# Patient Record
Sex: MALE | Race: WHITE | Employment: UNEMPLOYED | URBAN - METROPOLITAN AREA
[De-identification: names, ages, dates, MRNs, and addresses within clinical notes are randomized per-mention and may not be internally consistent; named-entity substitution may affect disease eponyms.]

---

## 2017-01-11 ENCOUNTER — HOSPITAL ENCOUNTER (OUTPATIENT)
Dept: LAB | Age: 52
Discharge: HOME | End: 2017-01-11

## 2017-01-11 ENCOUNTER — LAB ONLY (OUTPATIENT)
Dept: FAMILY MEDICINE CLINIC | Age: 52
End: 2017-01-11

## 2017-01-11 DIAGNOSIS — E11.9 TYPE 2 DIABETES MELLITUS WITHOUT COMPLICATION, UNSPECIFIED LONG TERM INSULIN USE STATUS: Primary | ICD-10-CM

## 2017-01-11 DIAGNOSIS — E11.9 TYPE 2 DIABETES MELLITUS WITHOUT COMPLICATION, UNSPECIFIED LONG TERM INSULIN USE STATUS: ICD-10-CM

## 2017-01-11 LAB
ALBUMIN SERPL BCP-MCNC: 3.9 G/DL (ref 3.4–5)
ALBUMIN/GLOB SERPL: 1.3 {RATIO} (ref 0.8–1.7)
ALP SERPL-CCNC: 65 U/L (ref 45–117)
ALT SERPL-CCNC: 44 U/L (ref 16–61)
ANION GAP BLD CALC-SCNC: 9 MMOL/L (ref 3–18)
AST SERPL W P-5'-P-CCNC: 20 U/L (ref 15–37)
BILIRUB SERPL-MCNC: 0.9 MG/DL (ref 0.2–1)
BUN SERPL-MCNC: 14 MG/DL (ref 7–18)
BUN/CREAT SERPL: 16 (ref 12–20)
CALCIUM SERPL-MCNC: 8.1 MG/DL (ref 8.5–10.1)
CHLORIDE SERPL-SCNC: 105 MMOL/L (ref 100–108)
CHOLEST SERPL-MCNC: 123 MG/DL
CO2 SERPL-SCNC: 28 MMOL/L (ref 21–32)
CREAT SERPL-MCNC: 0.89 MG/DL (ref 0.6–1.3)
CREAT UR-MCNC: 125 MG/DL (ref 30–125)
EST. AVERAGE GLUCOSE BLD GHB EST-MCNC: 157 MG/DL
GLOBULIN SER CALC-MCNC: 3 G/DL (ref 2–4)
GLUCOSE SERPL-MCNC: 225 MG/DL (ref 74–99)
HBA1C MFR BLD: 7.1 % (ref 4.2–5.6)
HDLC SERPL-MCNC: 31 MG/DL (ref 40–60)
HDLC SERPL: 4 {RATIO} (ref 0–5)
LDLC SERPL CALC-MCNC: 55 MG/DL (ref 0–100)
LIPID PROFILE,FLP: ABNORMAL
MICROALBUMIN UR-MCNC: 3.36 MG/DL (ref 0–3)
MICROALBUMIN/CREAT UR-RTO: 27 MG/G (ref 0–30)
POTASSIUM SERPL-SCNC: 3.6 MMOL/L (ref 3.5–5.5)
PROT SERPL-MCNC: 6.9 G/DL (ref 6.4–8.2)
SODIUM SERPL-SCNC: 142 MMOL/L (ref 136–145)
TRIGL SERPL-MCNC: 185 MG/DL (ref ?–150)
VLDLC SERPL CALC-MCNC: 37 MG/DL

## 2017-01-11 PROCEDURE — 83036 HEMOGLOBIN GLYCOSYLATED A1C: CPT | Performed by: NURSE PRACTITIONER

## 2017-01-11 PROCEDURE — 82043 UR ALBUMIN QUANTITATIVE: CPT | Performed by: NURSE PRACTITIONER

## 2017-01-11 PROCEDURE — 80053 COMPREHEN METABOLIC PANEL: CPT | Performed by: NURSE PRACTITIONER

## 2017-01-11 PROCEDURE — 80061 LIPID PANEL: CPT | Performed by: NURSE PRACTITIONER

## 2017-01-11 NOTE — PROGRESS NOTES
Pt. Name,  and orders verified before specimen collection. Site prepped using aseptic procedure. 21 gauge butterfly was utilized to collect specimen. Labs drawn in RT arm x's 1 attempts. Site benign no bleeding noted. Band-Aid and 2x2 applied. Pt tolerated procedure well.

## 2017-01-12 NOTE — PROGRESS NOTES
Will review results with pt at 1/23/17 appt  1. A1C increased from 6.5 to 7.1  2. Triglycerided elevated at 185. Diet? Alcohol? The patient is on Lovaza   3.  BMP Ok with exception of elevated glucose

## 2017-01-23 ENCOUNTER — OFFICE VISIT (OUTPATIENT)
Dept: FAMILY MEDICINE CLINIC | Age: 52
End: 2017-01-23

## 2017-01-23 VITALS
TEMPERATURE: 98.2 F | RESPIRATION RATE: 20 BRPM | OXYGEN SATURATION: 96 % | HEIGHT: 71 IN | HEART RATE: 78 BPM | BODY MASS INDEX: 30.66 KG/M2 | DIASTOLIC BLOOD PRESSURE: 86 MMHG | WEIGHT: 219 LBS | SYSTOLIC BLOOD PRESSURE: 127 MMHG

## 2017-01-23 DIAGNOSIS — J30.9 ALLERGIC RHINITIS, UNSPECIFIED ALLERGIC RHINITIS TRIGGER, UNSPECIFIED RHINITIS SEASONALITY: ICD-10-CM

## 2017-01-23 DIAGNOSIS — E11.9 TYPE 2 DIABETES MELLITUS WITHOUT COMPLICATION, UNSPECIFIED LONG TERM INSULIN USE STATUS: Primary | ICD-10-CM

## 2017-01-23 DIAGNOSIS — H66.92 LEFT OTITIS MEDIA, UNSPECIFIED CHRONICITY, UNSPECIFIED OTITIS MEDIA TYPE: ICD-10-CM

## 2017-01-23 DIAGNOSIS — I10 ESSENTIAL HYPERTENSION: ICD-10-CM

## 2017-01-23 DIAGNOSIS — E66.9 OBESITY (BMI 30-39.9): ICD-10-CM

## 2017-01-23 DIAGNOSIS — H26.9 RIGHT CATARACT: ICD-10-CM

## 2017-01-23 DIAGNOSIS — E78.1 HYPERTRIGLYCERIDEMIA: ICD-10-CM

## 2017-01-23 RX ORDER — AMOXICILLIN 500 MG/1
500 CAPSULE ORAL 2 TIMES DAILY
Qty: 14 CAP | Refills: 0 | Status: SHIPPED | OUTPATIENT
Start: 2017-01-23 | End: 2017-01-30

## 2017-01-23 RX ORDER — MOMETASONE FUROATE 50 UG/1
2 SPRAY, METERED NASAL DAILY
Qty: 2 CONTAINER | Refills: 0 | Status: SHIPPED | COMMUNITY
Start: 2017-01-23 | End: 2017-04-24 | Stop reason: SDUPTHER

## 2017-01-23 NOTE — PATIENT INSTRUCTIONS
Ear Infection (Otitis Media): Care Instructions  Your Care Instructions    An ear infection may start with a cold and affect the middle ear (otitis media). It can hurt a lot. Most ear infections clear up on their own in a couple of days. Most often you will not need antibiotics. This is because many ear infections are caused by a virus. Antibiotics don't work against a virus. Regular doses of pain medicines are the best way to reduce your fever and help you feel better. Follow-up care is a key part of your treatment and safety. Be sure to make and go to all appointments, and call your doctor if you are having problems. It's also a good idea to know your test results and keep a list of the medicines you take. How can you care for yourself at home? · Take pain medicines exactly as directed. ¨ If the doctor gave you a prescription medicine for pain, take it as prescribed. ¨ If you are not taking a prescription pain medicine, take an over-the-counter medicine, such as acetaminophen (Tylenol), ibuprofen (Advil, Motrin), or naproxen (Aleve). Read and follow all instructions on the label. ¨ Do not take two or more pain medicines at the same time unless the doctor told you to. Many pain medicines have acetaminophen, which is Tylenol. Too much acetaminophen (Tylenol) can be harmful. · Plan to take a full dose of pain reliever before bedtime. Getting enough sleep will help you get better. · Try a warm, moist washcloth on the ear. It may help relieve pain. · If your doctor prescribed antibiotics, take them as directed. Do not stop taking them just because you feel better. You need to take the full course of antibiotics. When should you call for help? Call your doctor now or seek immediate medical care if:  · You have new or increasing ear pain. · You have new or increasing pus or blood draining from your ear. · You have a fever with a stiff neck or a severe headache.   Watch closely for changes in your health, and be sure to contact your doctor if:  · You have new or worse symptoms. · You are not getting better after taking an antibiotic for 2 days. Where can you learn more? Go to http://todd-brayden.info/. Enter U214 in the search box to learn more about \"Ear Infection (Otitis Media): Care Instructions. \"  Current as of: July 29, 2016  Content Version: 11.1  © 1909-3246 [employer]. Care instructions adapted under license by [employer] (which disclaims liability or warranty for this information). If you have questions about a medical condition or this instruction, always ask your healthcare professional. [place] [address] any warranty or liability for your use of this information. The [facility] reminders! Foundation Operating Hours: These may change without notice. Mon- Wed 7am to 5pm. Closed for lunch 12-1pm  Thurs 7am to 12pm  Fridays closed     NO SHOW POLICY ~ If a patient has 3 no shows for an appointment with the Provider, Mental Health Provider, or the Nurse Navigator in 6 months, they will be discharged from the practice for 6 months. Medication ordering will also be suspended. If the patient is discharged from the [facility], they can go to the [place] [address] where they can be seen for the primary needs plus obtain the same types of medications as they receive at the [facility]. To avoid being discharged, the patient must call the office at [phone] 24 hours prior to their appointment if they need to cancel, arrive to their appointments on time and come to all scheduled appointments. If the patient is discharged from the practice, they can apply to be re-established after 12 months. Lab work:  Unless you are instructed differently, please return to the office between the hours of 7 am and 10:30 am Monday through Thursday to have your labs drawn one week before your next scheduled PROVIDER visit.   If you do not have an appointment to follow up on these results, please make one or plan to call the office if you do not hear from us to get the results. No news does not mean good news. Medications: If your medications are new or have changed, and you get your medications from the clinic pharmacy ([facility]), you MUST talk to the pharmacy staff to sign the new prescription applications. If you don't sign the applications we cannot get the medications for you. It usually takes 6-8 weeks for your medications to arrive. The Pharmacy staff will call you when your medications are available. You will have 30 days to come in and  your medications. If you don't  your medicines within those 30 days, those medicines will be placed on the self as samples and you will have to start all over again by completing the applications and waiting the 6-8 weeks for your medicines to arrive. This is firm and there will be no exceptions! ! The [facility] or [facility] will assist you with your medications if available but not all medications are available so some may be obtained through local pharmacies such as [facility] that has a large $4 list and [facility] that has many drugs for free or also for $4.  We will work hard to get the best medications for you that you can also afford. Feet Care: Local ministry through [facility]  Every second Tuesday of the month (except for holidays and election days) from 9am to 1 pm. The services provided by these ministry volunteers are free of charge with the option to donate. They will inspect your feet thoroughly, soak them for 10 minutes, cut and file your nails. They care for diabetics as well. Keep in mind this service is free and will be on a first come first serve basis. Bad teeth? Ask about the Dental Bus to get you in front of a local dentist. The bus leaves every other Wednesday for those on the list. (Ask about availability as these appointments are limited)    Eye exams for Diabetics. Please let us know so we can add you to the list to see the eye doctor at [facility]. You will receive a free eye exam and free glasses if needed. Unfortunately, if you are not a diabetic, we do not have a free service for eye exams for you (yet!). We do have information on where to go to get a huge discount on eye exams and glasses. Sick visits: If you are sick and it is not an emergency call the office to see if you can schedule an appointment. Charges and cost items from the clinic:  Most of our orders are covered by [employer] [place] [address] Ari but there ARE SOME CHARGES for items such as radiology interpretations and anesthesiology during procedures and surgeries. Please make sure you have contacted the Advanced Patient Advocacy (APA) group to check on your payment options: [email or web address]. [email or web address]. or come in and talk to them in person: On  Tuesdays, we have Advanced Patient Advocacy at the Clinic from [address] - 11:30pm and 1pm - 3pm. If you can't make it to the clinic on Tuesdays during their operating hours then APA is available Mon - Fri 8-4pm at DR. [facility] [place] on the first floor by the information desk. Their number is [phone]. It is important that you are screened in order to qualify for assistance and to avoid huge medical charges. The [facility] is not responsible for ANY charges you may accrue regardless of who ordered the medication, procedure, treatment or test. If you go to the Emergency Room, you WILL be charged! Behavior and emotional issues! It is stressful to be sick, have an illness, take medications, not have a job, not have medical insurance, have family issues or just getting older! Schedule an appointment with our mental health provider. She is in the office Mondays and Wednesdays from 8am to [phone] [facility] can also contact the following:     The national suicide hotline (3-396-858-XEIA or 7-613.949.3322)    [place], [place] [address] ([facility]) - [address], [address]   [phone]            Immunizations/Vaccination  Routine Immunizations are provided for infants, children, teens, and adults at the [facility]. Please bring all immunization records with you and present them at the time of registration. Phone [phone] extension [phone]  Hours (Walk in)  Monday, Tuesday, Wednesday and Friday  8:00 AM to 11:00 AM  12:30 PM to 3:00 PM      Drug and Alcohol Addiction Issues! It is hard to stop a poor habit but there is help out there. Please feel free to attend any other the following support groups to help you kick the habit or go to [facility] to be evaluated by the psychiatric team. Never give up!! [facility]on meetings: [facility] MONDAY 7:30 PM JUST FOR TODAY [employer] Al-Anon [facility] [address]     [facility] WEDNESDAY 10:30 AM [employer] Al-Anon [place] [facility], ROOM [address] [place] [address]     [facility] WEDNESDAY 8:00  Louise Denise[address] 8:00 PM KEEP IT SIMPLE [employer] Al-Anon [facility] [address]IXIE DENISE     [facility] THURSDAY 8:00 PM LET IT BEGIN WITH ME [employer] Al-Anon [facility] [address] [facility] 6;30 PM [place] [facility] PARENTS [employer] [facility] [place] [address] [facility] [address] [facility]      [place] MONDAY 10:30 AM [employer] [employer] Al-Anon [facility] [address] [facility] SATURDAY 8:00 PM [facility] SATURDAY NIGHT AFG Al-Anon [facility] [address] [facility] MONDAY 7:00 PM MONDAY NIGHT AFG Al-Anon [place] [facility] [address]     [place] WEDNESDAY 8:00 PM [employer]ENITY SEEK[employer] Al-Anon [facility] [address]  [facility]

## 2017-01-23 NOTE — MR AVS SNAPSHOT
Visit Information Date & Time Provider Department Dept. Phone Encounter #  
 1/23/2017  1:00 PM Nehemias Smith NP [address] [facility] [id number] Follow-up Instructions Return in about 3 months (around 4/23/2017), or if symptoms worsen or fail to improve. Upcoming Health Maintenance Date Due  
 EYE EXAM RETINAL OR DILATED Q1 1/2/1976 DTaP/Tdap/Td series (1 - Tdap) 11/24/2011 FOBT Q 1 YEAR AGE 50-75 1/2/2016 Pneumococcal 19-64 Medium Risk (1 of 1 - PPSV23) 1/23/2018* HEMOGLOBIN A1C Q6M 7/11/2017 FOOT EXAM Q1 10/20/2017 MICROALBUMIN Q1 1/11/2018 LIPID PANEL Q1 1/11/2018 *Topic was postponed. The date shown is not the original due date. Allergies as of 1/23/2017  Review Complete On: 1/23/2017 By: Carola Tyson No Known Allergies Current Immunizations  Reviewed on 10/22/2015 Name Date Influenza Vaccine Mag Rice) 10/12/2016, 10/22/2015 Td 11/23/2011 Not reviewed this visit You Were Diagnosed With   
  
 Codes Comments Type 2 diabetes mellitus without complication, unspecified long term insulin use status (HCC)    -  Primary ICD-10-CM: E11.9 ICD-9-CM: 250.00 Essential hypertension     ICD-10-CM: I10 
ICD-9-CM: 401.9 Obesity (BMI 30-39. 9)     ICD-10-CM: E66.9 ICD-9-CM: 278.00 Hypertriglyceridemia     ICD-10-CM: E78.1 ICD-9-CM: 272.1 Right cataract     ICD-10-CM: H26.9 ICD-9-CM: 366.9 Allergic rhinitis, unspecified allergic rhinitis trigger, unspecified rhinitis seasonality     ICD-10-CM: J30.9 ICD-9-CM: 477.9 Left otitis media, unspecified chronicity, unspecified otitis media type     ICD-10-CM: H66.92 
ICD-9-CM: 382. 9 Vitals BP Pulse Temp Resp Height(growth percentile) Weight(growth percentile) (!) 145/98 78 98.2 °F (36.8 °C) 20 5' 11\" (1.803 m) 219 lb (99.3 kg) SpO2 BMI Smoking Status 96% 30.54 kg/m2 Former Smoker BMI and BSA Data Body Mass Index Body Surface Area 30.54 kg/m 2 2.23 m 2 Preferred Pharmacy Pharmacy Name Phone [facility] Alisa [address]. [phone] Your Updated Medication List  
  
   
This list is accurate as of: 1/23/17  1:39 PM.  Always use your most recent med list.  
  
  
  
  
 amoxicillin 500 mg capsule Commonly known as:  AMOXIL Take 1 Cap by mouth two (2) times a day for 7 days. Indications: ACUTE OTITIS MEDIA  
  
 lisinopril-hydroCHLOROthiazide 20-12.5 mg per tablet Commonly known as:  Riana Mcardle Take 1 Tab by mouth daily. Indications: HYPERTENSION  
  
 loratadine 10 mg tablet Commonly known as:  Braulio Sor Take 1 Tab by mouth daily. Indications: ALLERGIC RHINITIS  
  
 meloxicam 15 mg tablet Commonly known as:  MOBIC  
TAKE ONE TABLET BY MOUTH ONCE DAILY  
  
 mometasone 50 mcg/actuation nasal spray Commonly known as:  NASONEX  
2 Sprays by Both Nostrils route daily. multivitamin tablet Commonly known as:  ONE A DAY Take 1 Tab by mouth daily. omega-3 acid ethyl esters 1 gram capsule Commonly known as:  Uyen Hoit Take 2 Caps by mouth daily (with breakfast). pitavastatin 2 mg tablet Commonly known as:  LIVALO Take 1 Tab by mouth daily. To start once Crestor complete. potassium 99 mg tablet Take 1 Tab by mouth daily. pregabalin 75 mg capsule Commonly known as:  Violet Harada Take 1 Cap by mouth two (2) times a day. Max Daily Amount: 150 mg. sAXagliptin-metFORMIN 2.5-1,000 mg Tm24 Commonly known as:  KOMBIGLYZE XR Take 2 Tabs by mouth daily (with dinner). Prescriptions Sent to Pharmacy Refills  
 amoxicillin (AMOXIL) 500 mg capsule 0 Sig: Take 1 Cap by mouth two (2) times a day for 7 days. Indications: ACUTE OTITIS MEDIA Class: Normal  
 Pharmacy: [id number] Ph #: [phone] Route: Oral  
  
We Performed the Following REFERRAL TO OPHTHALMOLOGY [REF57 Custom] Follow-up Instructions Return in about 3 months (around 4/23/2017), or if symptoms worsen or fail to improve. Referral Information Referral ID Referred By Referred To  
  
 [id number] My [facility] Not Available Visits Status Start Date End Date 1 New Request 1/23/17 1/23/18 If your referral has a status of pending review or denied, additional information will be sent to support the outcome of this decision. Patient Instructions Ear Infection (Otitis Media): Care Instructions Your Care Instructions An ear infection may start with a cold and affect the middle ear (otitis media). It can hurt a lot. Most ear infections clear up on their own in a couple of days. Most often you will not need antibiotics. This is because many ear infections are caused by a virus. Antibiotics don't work against a virus. Regular doses of pain medicines are the best way to reduce your fever and help you feel better. Follow-up care is a key part of your treatment and safety. Be sure to make and go to all appointments, and call your doctor if you are having problems. It's also a good idea to know your test results and keep a list of the medicines you take. How can you care for yourself at home? · Take pain medicines exactly as directed. ¨ If the doctor gave you a prescription medicine for pain, take it as prescribed. ¨ If you are not taking a prescription pain medicine, take an over-the-counter medicine, such as acetaminophen (Tylenol), ibuprofen (Advil, Motrin), or naproxen (Aleve). Read and follow all instructions on the label. ¨ Do not take two or more pain medicines at the same time unless the doctor told you to. Many pain medicines have acetaminophen, which is Tylenol. Too much acetaminophen (Tylenol) can be harmful. · Plan to take a full dose of pain reliever before bedtime. Getting enough sleep will help you get better. · Try a warm, moist washcloth on the ear. It may help relieve pain. · If your doctor prescribed antibiotics, take them as directed. Do not stop taking them just because you feel better. You need to take the full course of antibiotics. When should you call for help? Call your doctor now or seek immediate medical care if: 
· You have new or increasing ear pain. · You have new or increasing pus or blood draining from your ear. · You have a fever with a stiff neck or a severe headache. Watch closely for changes in your health, and be sure to contact your doctor if: 
· You have new or worse symptoms. · You are not getting better after taking an antibiotic for 2 days. Where can you learn more? Go to http://todd-brayden.info/. Enter W172 in the search box to learn more about \"Ear Infection (Otitis Media): Care Instructions. \" Current as of: July 29, 2016 Content Version: 11.1 © 6494-7518 [employer]. Care instructions adapted under license by [employer] (which disclaims liability or warranty for this information). If you have questions about a medical condition or this instruction, always ask your healthcare professional. [place] [address] any warranty or liability for your use of this information. [facility] reminders! Foundation Operating Hours: These may change without notice. Mon- Wed 7am to 5pm. Closed for lunch 12-1pm 
Thurs 7am to 12pm 
Fridays closed NO SHOW POLICY ~ If a patient has 3 no shows for an appointment with the Provider, Mental Health Provider, or the Nurse Navigator in 6 months, they will be discharged from the practice for 6 months. Medication ordering will also be suspended.  If the patient is discharged from the [place], they can go to the [place] [address] where they can be seen for the primary needs plus obtain the same types of medications as they receive at the [facility]. To avoid being discharged, the patient must call the office at [phone] 24 hours prior to their appointment if they need to cancel, arrive to their appointments on time and come to all scheduled appointments. If the patient is discharged from the practice, they can apply to be re-established after 12 months. Lab work:  Unless you are instructed differently, please return to the office between the hours of 7 am and 10:30 am Monday through Thursday to have your labs drawn one week before your next scheduled PROVIDER visit. If you do not have an appointment to follow up on these results, please make one or plan to call the office if you do not hear from us to get the results. No news does not mean good news. Medications: If your medications are new or have changed, and you get your medications from the clinic pharmacy ([facility]), you MUST talk to the pharmacy staff to sign the new prescription applications. If you don't sign the applications we cannot get the medications for you. It usually takes 6-8 weeks for your medications to arrive. The Pharmacy staff will call you when your medications are available. You will have 30 days to come in and  your medications. If you don't  your medicines within those 30 days, those medicines will be placed on the self as samples and you will have to start all over again by completing the applications and waiting the 6-8 weeks for your medicines to arrive. This is firm and there will be no exceptions! ! The [facility] or [facility] will assist you with your medications if available but not all medications are available so some may be obtained through local pharmacies such as [facility] that has a large $4 list and [facility] that has many drugs for free or also for $4.  We will work hard to get the best medications for you that you can also afford. Feet Care: [facility] through [facility] Every second Tuesday of the month (except for holidays and election days) from 9am to 1 pm. The services provided by these ministry volunteers are free of charge with the option to donate. They will inspect your feet thoroughly, soak them for 10 minutes, cut and file your nails. They care for diabetics as well. Keep in mind this service is free and will be on a first come first serve basis. Bad teeth? Ask about the Dental Bus to get you in front of a local dentist. The bus leaves every other Wednesday for those on the list. (Ask about availability as these appointments are limited) Eye exams for Diabetics. Please let us know so we can add you to the list to see the eye doctor at [facility]. You will receive a free eye exam and free glasses if needed. Unfortunately, if you are not a diabetic, we do not have a free service for eye exams for you (yet!). We do have information on where to go to get a huge discount on eye exams and glasses. Sick visits: If you are sick and it is not an emergency call the office to see if you can schedule an appointment. Charges and cost items from the clinic:  Most of our orders are covered by [employer] but there ARE SOME CHARGES for items such as radiology interpretations and anesthesiology during procedures and surgeries. Please make sure you have contacted the Advanced Patient Advocacy (APA) group to check on your payment options: [email or web address]. [email or web address]. or come in and talk to them in person: On 
Tuesdays, we have Advanced Patient Advocacy at the Clinic from [address] [facility] - 11:30pm and 1pm - 3pm. If you can't make it to the clinic on Tuesdays during their operating hours then APA is available Mon - Fri 8-4pm at [facility] on the first floor by the information desk. Their number is [phone].  It is important that you are screened in order to qualify for assistance and to avoid huge medical charges. The [facility] is not responsible for ANY charges you may accrue regardless of who ordered the medication, procedure, treatment or test. If you go to the Emergency Room, you WILL be charged! Behavior and emotional issues! It is stressful to be sick, have an illness, take medications, not have a job, not have medical insurance, have family issues or just getting older! Schedule an appointment with our mental health provider. She is in the office Mondays and Wednesdays from 8am to [place] [id number] can also contact the following: The national suicide hotline (2-624-134-PTMV or 8-823.362.4822) [address] [facility] [address] [facility], [address] [facility] 
[phone] Community Services Board (CSB) [facility] [address] [facility], [facility] Matheus Denise 
[phone] Immunizations/Vaccination Routine Immunizations are provided for infants, children, teens, and adults at the [facility]. Please bring all immunization records with you and present them at the time of registration. Phone [phone] Hours (Walk in) Monday, Tuesday, Wednesday and Friday 8:00 AM to 11:00 AM 
12:30 PM to 3:00 PM 
 
 
Drug and Alcohol Addiction Issues! It is hard to stop a poor habit but there is help out there. Please feel free to attend any other the following support groups to help you kick the habit or go to [facility] Emergency Department to be evaluated by the psychiatric team. Never give up!! AlAnon meetings: Aminah Wallace, Primo galvan [place] MONDAY 7:30 PM JUST FOR TODAY [employer] Mike [facility] [address] [facility] [place] WEDNESDAY 10:30 AM [employer]GIN[employer] Mike Brandon [employer] OF [facility], ROOM [address] [place], [place] [address] WEDNES[facility] 8:00 PM Ramses Ndiaye [place] THURSDAY 8:00 PM KEEP IT SIMPLE [employer] Al-[facility]yn [facility] [address] 8:00 PM LET IT BEGIN WITH ME [employer] Al[facility]on [facility] [address]  
 
[place] FRIDAY 6;30 PM [place] PARENTS [employer] Parents [facility] [address] [facility]  [place] MONDAY 10:30  [place] [address] [place] SATURDAY 8:00 PM [facility] SATUR[facility] NIGHT [facility] Al-Anon [place] [address] [place] MONDAY 7:00 PM MONDAY NIGHT AF[employer] Al-Anon [facility] [place] [address]  
 
[place] WEDNESDAY 8:00 PM SERENITY SEEKERS [employer] Al-Anon [facility] [address]  Please provide this summary of care documentation to your next provider. Your primary care clinician is listed as Anurag Shelton. If you have any questions after today's visit, please call [phone].

## 2017-01-23 NOTE — PROGRESS NOTES
[address]  [facility], [address] [place] [address]  [phone] office/[phone] fax      1/23/2017    Reason for visit:   Chief Complaint   Patient presents with    Results    Ear Fullness     Pt says he just don't feel well for about 4 - 5 days    Headache       Patient: Dalia White, 1965, [id number]       Primary MD: Jones Harris NP    Subjective:   Dalia White, a 47 y.o. male, who presents for Results; Ear Fullness (Pt says he just don't feel well for about 4 - 5 days); and Headache      Results   Associated symptoms include headaches. Pertinent negatives include no chest pain, no abdominal pain and no shortness of breath. Ear Fullness    The history is provided by the patient. This is a new problem. The current episode started more than 2 days ago. The problem occurs constantly. Patient complains that both ears are affected. There has been no fever. The pain is at a severity of 5/10. The pain is mild. Associated symptoms include headaches, rhinorrhea and neck pain. Pertinent negatives include no hearing loss, no sore throat, no abdominal pain, no diarrhea, no vomiting, no cough and no rash. His past medical history does not include chronic ear infection, hearing loss or tympanostomy tube. Headache   The history is provided by the patient. This is a new problem. The current episode started more than 2 days ago. The problem occurs constantly. The problem has not changed since onset. Associated symptoms include headaches. Pertinent negatives include no chest pain, no abdominal pain and no shortness of breath. Nothing aggravates the symptoms. The symptoms are relieved by NSAIDs (advil migraine liquid gels ). The treatment provided mild relief. Cardiovascular Disease Follow up: The patient has hypertension and hyperlipidemia.   Diet and Lifestyle: generally follows a low fat low cholesterol diet  Home BP Monitoring: is not measured at home.  Pertinent ROS: taking medications as instructed, no medication side effects noted, no TIA's, no chest pain on exertion, no dyspnea on exertion, no swelling of ankles. Diabetes Follow Up     Current symptoms/problems include paresthesias of the feet: mild and have been unchanged. Symptoms have been present for several years. Known diabetic complications: peripheral neuropathy  Cardiovascular risk factors: diabetes mellitus  Current diabetic medications include oral agents (dual therapy): combination: Kombiglyze. Eye exam current (within one year): yes  Weight trend: stable  Prior visit with dietician: no  Current diet: well balanced  Current exercise: no regular exercise    Current monitoring regimen: none  Home blood sugar records: trend: stable  Any episodes of hypoglycemia? no    Is She on ACE inhibitor or angiotensin II receptor blocker?    Yes   lisinopril     Past Medical History   Diagnosis Date    Cataract, right 2010    Diabetes ([address]     History of vitamin D deficiency 6/2016    Hypercholesterolemia     Hypertension     Hypertriglyceridemia 6/30/2016    Hypokalemia 6/30/2016    Low back pain     Numbness and tingling of foot June 2014     left toes and mid bottom of foot    Scrotal mass 6/6/2016       Past Surgical History   Procedure Laterality Date    Hx hernia repair  2742     umbilical, done at [facility]    Pr excision tumor soft tiss face/scalp subq 2+cm  1/4/16     scalp lesion removal, Dr. Ricardo Mckay History     Social History    Marital status: SINGLE     Spouse name: N/A    Number of children: 0    Years of education: 6     Occupational History    unemployed and living with son and daughter-in-law, after incarceration 9/2011 out 4/2014      Social History Main Topics    Smoking status: Former Smoker    Smokeless tobacco: Never Used      Comment: stopped in HS, never heavy and never long term    Alcohol use No      Comment: rarely holiday    Drug use: No    Sexual activity: Yes      Comment: no partner, spouse  2014     Other Topics Concern   Yudi Feliciano, from [id number], other than honorable discharge    Blood Transfusions No    Caffeine Concern No     decreased his 2-3 super big gulps    Occupational Exposure Yes     ? while in [place],  then [facility] [employer] No    Sleep Concern No    Stress Concern Yes     Life in general    Weight Concern No    Special Diet No    Back Care No    Exercise No     starting to do something but not currently    Bike Helmet No    Seat Belt Yes    Self-Exams No     Social History Narrative       No Known Allergies    Current Outpatient Prescriptions on File Prior to Visit   Medication Sig Dispense Refill    meloxicam (MOBIC) 15 mg tablet TAKE ONE TABLET BY MOUTH ONCE DAILY 30 Tab 0    potassium 99 mg tablet Take 1 Tab by mouth daily.  lisinopril-hydrochlorothiazide (PRINZIDE, ZESTORETIC) 20-12.5 mg per tablet Take 1 Tab by mouth daily. Indications: HYPERTENSION 30 Tab 5    pitavastatin (LIVALO) 2 mg tablet Take 1 Tab by mouth daily. To start once Crestor complete. 90 Tab 3    saxagliptin-metFORMIN (KOMBIGLYZE XR) 2.5-1,000 mg TM24 Take 2 Tabs by mouth daily (with dinner). 180 Tab 3    omega-3 acid ethyl esters (LOVAZA) 1 gram capsule Take 2 Caps by mouth daily (with breakfast). 180 Cap 3    pregabalin (LYRICA) 75 mg capsule Take 1 Cap by mouth two (2) times a day. Max Daily Amount: 150 mg. 60 Cap 3    multivitamin (ONE A DAY) tablet Take 1 Tab by mouth daily.  loratadine (CLARITIN) 10 mg tablet Take 1 Tab by mouth daily. Indications: ALLERGIC RHINITIS 30 Tab 1     No current facility-administered medications on file prior to visit. Review of Systems   Constitutional: Negative. HENT: Positive for congestion, ear pain and rhinorrhea. Negative for hearing loss and sore throat. Eyes: Positive for blurred vision (Right eye blurred vision ).  Negative for double vision, photophobia, pain, discharge and redness. Respiratory: Negative. Negative for cough, sputum production and shortness of breath. Cardiovascular: Negative. Negative for chest pain. Gastrointestinal: Negative. Negative for abdominal pain, diarrhea, heartburn, nausea and vomiting. Genitourinary: Negative. Musculoskeletal: Positive for neck pain. Skin: Negative for rash. Neurological: Positive for headaches. Negative for dizziness. Endo/Heme/Allergies: Positive for environmental allergies (History of env allergy). Psychiatric/Behavioral: Negative. Objective:   Visit Vitals    /86    Pulse 78    Temp 98.2 °F (36.8 °C)    Resp 20    Ht 5' 11\" (1.803 m)    Wt 219 lb (99.3 kg)    SpO2 96%    BMI 30.54 kg/m2      Wt Readings from Last 3 Encounters:   01/23/17 219 lb (99.3 kg)   10/19/16 221 lb 12.8 oz (100.6 kg)   08/03/16 218 lb (98.9 kg)     Lab Results   Component Value Date/Time    Glucose 225 01/11/2017 08:32 AM    Glucose (POC) 158 01/04/2016 11:52 AM    Glucose  10/19/2016 01:10 PM         Physical Exam   Constitutional: He is oriented to person, place, and time. He appears well-developed and well-nourished. HENT:   Head: Normocephalic. Right Ear: Tympanic membrane, external ear and ear canal normal. Tympanic membrane is not perforated and not erythematous. No decreased hearing is noted. Left Ear: Hearing normal. Tympanic membrane is injected and erythematous. A middle ear effusion is present. Nose: Mucosal edema and rhinorrhea present. Right sinus exhibits no maxillary sinus tenderness and no frontal sinus tenderness. Left sinus exhibits no maxillary sinus tenderness and no frontal sinus tenderness. Eyes: Pupils are equal, round, and reactive to light. Neck: Normal range of motion. Neck supple. No JVD present. Carotid bruit is not present. No thyromegaly present. Cardiovascular: Normal rate and regular rhythm.     No murmur heard.  Pulmonary/Chest: Effort normal and breath sounds normal. No respiratory distress. He has no wheezes. Abdominal: Soft. Bowel sounds are normal. He exhibits no distension. There is no tenderness. Musculoskeletal: Normal range of motion. He exhibits no edema or deformity. Neurological: He is alert and oriented to person, place, and time. He has normal reflexes. Skin: Skin is warm and dry. Psychiatric: He has a normal mood and affect. His behavior is normal. Judgment and thought content normal.   Vitals reviewed. Assessment:    Tom Rudd who has risk factors including (see above previous medical hx) and:       ICD-10-CM ICD-9-CM    1. Type 2 diabetes mellitus without complication, unspecified long term insulin use status (HCC) E11.9 250.00    2. Essential hypertension I10 401.9    3. Obesity (BMI 30-39. 9) E66.9 278.00    4. Hypertriglyceridemia E78.1 272.1    5. Right cataract H26.9 366.9 REFERRAL TO OPHTHALMOLOGY   6. Allergic rhinitis, unspecified allergic rhinitis trigger, unspecified rhinitis seasonality J30.9 477.9 mometasone (NASONEX) 50 mcg/actuation nasal spray   7. Left otitis media, unspecified chronicity, unspecified otitis media type H66.92 382.9 mometasone (NASONEX) 50 mcg/actuation nasal spray      amoxicillin (AMOXIL) 500 mg capsule   1. Type 2 diabetes mellitus without complication, unspecified long term insulin use status ([facility])  A1C 7.1  Continue current regimen and work on diet     2. Essential hypertension  -The current medical regimen is effective;  continue present plan and medications. 3. Obesity (BMI 30-39.9)  -The patient is asked to make an attempt to improve diet and exercise patterns to aid in medical management of this problem. 4. Hypertriglyceridemia  -The patient is on Lovazo. Triglycerides elevated at 185. -TG over 150, will have patient work on diet to reduce simple carbohydrates, alcohol and saturated fats from diet.       A certain amount of triglycerides are needed for your body to function, but excess is stored as fat. Triglycerides have always been known to raise heart disease risk. Foods to lower triglycerides   Cold water fish such as salmon, tuna, and cod. Olive oil and walnut oil. Grapes and Blueberries. Beans, legumes, peas and lentils. Spinach, broccoli, and [place] sprouts. Tomatoes, oranges, grapefruit, edgard, and limes. Foods to avoid  Butter and margarine are simply triglycerides in a stick or tub form. Palm, coconut oil,  Lard. Eating candy and sugary foods. Fatty red meat, poultry skin, butter, high-fat dairy products and shellfish. 5. Right cataract  _the patient was seen by optomtrist and dx with cataracts.   - REFERRAL TO OPHTHALMOLOGY    6. Allergic rhinitis, unspecified allergic rhinitis trigger, unspecified rhinitis seasonality  -Restart Claritin  - mometasone (NASONEX) 50 mcg/actuation nasal spray; 2 Sprays by Both Nostrils route daily. Dispense: 2 Container; Refill: 0    7. Left otitis media, unspecified chronicity, unspecified otitis media type  -Start taking Claritin  - mometasone (NASONEX) 50 mcg/actuation nasal spray; 2 Sprays by Both Nostrils route daily. Dispense: 2 Container; Refill: 0  - amoxicillin (AMOXIL) 500 mg capsule; Take 1 Cap by mouth two (2) times a day for 7 days. Indications: ACUTE OTITIS MEDIA  Dispense: 14 Cap; Refill: 0      Written instructions followed our verbal discussion of all information discussed above, pending tests ordered and future goals/plans. Patient expressed understanding of current diagnosis, planned testing, follow up and if needed to contact the office for any questions or concerns prior to the next visit. Plan:   Med reconciliation completed with patient. Reviewed side effects of medications with the patient. Questions were answered and patient verb understanding. Discussed lab results with patient  1. A1C increased from 6.5 to 7.1  2. Triglycerided elevated at 185. Diet? Alcohol? The patient is on Lovaza   3. BMP Ok with exception of elevated glucose    Orders Placed This Encounter    REFERRAL TO OPHTHALMOLOGY     Referral Priority:   Routine     Referral Type:   Consultation     Referral Reason:   Specialty Services Required    mometasone (NASONEX) 50 mcg/actuation nasal spray     Si Sprays by Both Nostrils route daily. Dispense:  2 Container     Refill:  0    amoxicillin (AMOXIL) 500 mg capsule     Sig: Take 1 Cap by mouth two (2) times a day for 7 days. Indications: ACUTE OTITIS MEDIA     Dispense:  14 Cap     Refill:  0     Current Outpatient Prescriptions   Medication Sig Dispense Refill    mometasone (NASONEX) 50 mcg/actuation nasal spray 2 Sprays by Both Nostrils route daily. 2 Container 0    amoxicillin (AMOXIL) 500 mg capsule Take 1 Cap by mouth two (2) times a day for 7 days. Indications: ACUTE OTITIS MEDIA 14 Cap 0    meloxicam (MOBIC) 15 mg tablet TAKE ONE TABLET BY MOUTH ONCE DAILY 30 Tab 0    potassium 99 mg tablet Take 1 Tab by mouth daily.  lisinopril-hydrochlorothiazide (PRINZIDE, ZESTORETIC) 20-12.5 mg per tablet Take 1 Tab by mouth daily. Indications: HYPERTENSION 30 Tab 5    pitavastatin (LIVALO) 2 mg tablet Take 1 Tab by mouth daily. To start once Crestor complete. 90 Tab 3    saxagliptin-metFORMIN (KOMBIGLYZE XR) 2.5-1,000 mg TM24 Take 2 Tabs by mouth daily (with dinner). 180 Tab 3    omega-3 acid ethyl esters (LOVAZA) 1 gram capsule Take 2 Caps by mouth daily (with breakfast). 180 Cap 3    pregabalin (LYRICA) 75 mg capsule Take 1 Cap by mouth two (2) times a day. Max Daily Amount: 150 mg. 60 Cap 3    multivitamin (ONE A DAY) tablet Take 1 Tab by mouth daily.  loratadine (CLARITIN) 10 mg tablet Take 1 Tab by mouth daily.  Indications: ALLERGIC RHINITIS 30 Tab 1     Medications Discontinued During This Encounter   Medication Reason    rosuvastatin (CRESTOR) 20 mg tablet Alternate Therapy       Follow-up Disposition:  Return in about 3 months (around 4/23/2017), or if symptoms worsen or fail to improve. Labs needed for follow-up appt    \"No Show policy was reviewed with the patient. The services affected are the nurse navigator and the provider. No show appointments include missing labs for a future scheduled appointment, Pap/pelvics, arriving to appointment more than 10 minutes late, and calling to cancel appointment less than 24 hours in advance. After the 6th No Show, the patient will be removed from the [facility] to include medications for 6 months. The patient will be referred to the [place] [id number] for their primary care needs. \"     Anthony Doss, [address]      I spent 35 minutes with the patient in face-to-face consultation, of which greater than 50% was spent in counseling and coordination of care as described above.

## 2017-01-24 ENCOUNTER — DOCUMENTATION ONLY (OUTPATIENT)
Dept: FAMILY MEDICINE CLINIC | Age: 52
End: 2017-01-24

## 2017-01-24 NOTE — PROGRESS NOTES
Patient was referred to Opthalmology at [facility] since that specialty is not offered by  [facility] in this area. Letter mailed to patient with instructions to begin the financial screening process with [facility].

## 2017-01-24 NOTE — LETTER
1/24/2017 1:59 PM 
 
Mr. Dalia Vazquez [address] [place] [phone] Dalia White Your provider has ordered a referral to a specialist (Opthalmology) that is not available through the [facility]. Therefore, we are referring you to [facility] in [place], [place]. You will need to call [phone] to begin the financial screening process. Once approved your referral can be completed. Please keep our office up-to-date on the status of your application. Thank you, Sincerely, Salvador Mcduffie LPN

## 2017-02-02 ENCOUNTER — TELEPHONE (OUTPATIENT)
Dept: FAMILY MEDICINE CLINIC | Age: 52
End: 2017-02-02

## 2017-02-19 RX ORDER — MELOXICAM 15 MG/1
TABLET ORAL
Qty: 30 TAB | Refills: 0 | Status: SHIPPED | OUTPATIENT
Start: 2017-02-19 | End: 2018-06-13 | Stop reason: SDUPTHER

## 2017-04-04 ENCOUNTER — TELEPHONE (OUTPATIENT)
Dept: FAMILY MEDICINE CLINIC | Age: 52
End: 2017-04-04

## 2017-04-13 DIAGNOSIS — E11.9 TYPE 2 DIABETES MELLITUS WITHOUT COMPLICATION, UNSPECIFIED LONG TERM INSULIN USE STATUS: Primary | ICD-10-CM

## 2017-04-13 NOTE — TELEPHONE ENCOUNTER
Medication: Kombiglyze 2.5/1000, dose: 2 tab, how often: qd , current number of medication days provided: 90, refill per application. Lot #: [id number], EXP 04/2018. This medication was received and verified for the following 1. Correct Patient, 2. Correct Diagnosis, 3. Correct Drug, 4. Correct route, and no current allergy to medication. Please contact patient to come  their medications. Danielle Umanzor, MSN,RN,[facility],  Please correct label to read 2 tabs daily.

## 2017-04-18 ENCOUNTER — HOSPITAL ENCOUNTER (OUTPATIENT)
Dept: LAB | Age: 52
Discharge: HOME | End: 2017-04-18

## 2017-04-18 ENCOUNTER — TELEPHONE (OUTPATIENT)
Dept: FAMILY MEDICINE CLINIC | Age: 52
End: 2017-04-18

## 2017-04-18 ENCOUNTER — LAB ONLY (OUTPATIENT)
Dept: FAMILY MEDICINE CLINIC | Age: 52
End: 2017-04-18

## 2017-04-18 DIAGNOSIS — E78.1 HYPERTRIGLYCERIDEMIA: Primary | ICD-10-CM

## 2017-04-18 DIAGNOSIS — E11.9 TYPE 2 DIABETES MELLITUS WITHOUT COMPLICATION, UNSPECIFIED LONG TERM INSULIN USE STATUS: ICD-10-CM

## 2017-04-18 LAB
ALBUMIN SERPL BCP-MCNC: 3.8 G/DL (ref 3.4–5)
ALBUMIN/GLOB SERPL: 1.2 {RATIO} (ref 0.8–1.7)
ALP SERPL-CCNC: 72 U/L (ref 45–117)
ALT SERPL-CCNC: 39 U/L (ref 16–61)
ANION GAP BLD CALC-SCNC: 7 MMOL/L (ref 3–18)
AST SERPL W P-5'-P-CCNC: 20 U/L (ref 15–37)
BILIRUB SERPL-MCNC: 0.7 MG/DL (ref 0.2–1)
BUN SERPL-MCNC: 12 MG/DL (ref 7–18)
BUN/CREAT SERPL: 15 (ref 12–20)
CALCIUM SERPL-MCNC: 8.3 MG/DL (ref 8.5–10.1)
CHLORIDE SERPL-SCNC: 104 MMOL/L (ref 100–108)
CHOLEST SERPL-MCNC: 152 MG/DL
CO2 SERPL-SCNC: 29 MMOL/L (ref 21–32)
CREAT SERPL-MCNC: 0.82 MG/DL (ref 0.6–1.3)
EST. AVERAGE GLUCOSE BLD GHB EST-MCNC: 203 MG/DL
GLOBULIN SER CALC-MCNC: 3.3 G/DL (ref 2–4)
GLUCOSE SERPL-MCNC: 181 MG/DL (ref 74–99)
HBA1C MFR BLD: 8.7 % (ref 4.2–5.6)
HDLC SERPL-MCNC: 31 MG/DL (ref 40–60)
HDLC SERPL: 4.9 {RATIO} (ref 0–5)
LDLC SERPL CALC-MCNC: 84.2 MG/DL (ref 0–100)
LIPID PROFILE,FLP: ABNORMAL
POTASSIUM SERPL-SCNC: 3.5 MMOL/L (ref 3.5–5.5)
PROT SERPL-MCNC: 7.1 G/DL (ref 6.4–8.2)
SODIUM SERPL-SCNC: 140 MMOL/L (ref 136–145)
TRIGL SERPL-MCNC: 184 MG/DL (ref ?–150)
VLDLC SERPL CALC-MCNC: 36.8 MG/DL

## 2017-04-18 PROCEDURE — 80053 COMPREHEN METABOLIC PANEL: CPT | Performed by: NURSE PRACTITIONER

## 2017-04-18 PROCEDURE — 83036 HEMOGLOBIN GLYCOSYLATED A1C: CPT | Performed by: NURSE PRACTITIONER

## 2017-04-18 PROCEDURE — 80061 LIPID PANEL: CPT | Performed by: NURSE PRACTITIONER

## 2017-04-18 NOTE — PROGRESS NOTES
Labs drawn on first attempt in right [facility]. Three identifiers used for confirmation: name,  and last of SSN. Lab orders verified.

## 2017-04-19 NOTE — PROGRESS NOTES
Will review results with pt at 4/24/17 appt  1. Hypertriglyceridemia 184  2. CMP ok  3. A1C 8.7 from 7. 1

## 2017-04-19 NOTE — TELEPHONE ENCOUNTER
Medication: Livalo 2 mg, dose: 1 tab, how often: daily, current number of medication days provided: 90, refill per application. Lot #[id number], EXP 04/2018   This medication was received and verified for the following 1. Correct Patient, 2. Correct Diagnosis, 3. Correct Drug, 4. Correct route, and no current allergy to medication.

## 2017-04-24 ENCOUNTER — TELEPHONE (OUTPATIENT)
Dept: FAMILY MEDICINE CLINIC | Age: 52
End: 2017-04-24

## 2017-04-24 ENCOUNTER — OFFICE VISIT (OUTPATIENT)
Dept: FAMILY MEDICINE CLINIC | Age: 52
End: 2017-04-24

## 2017-04-24 VITALS
SYSTOLIC BLOOD PRESSURE: 126 MMHG | TEMPERATURE: 98.6 F | HEIGHT: 71 IN | DIASTOLIC BLOOD PRESSURE: 87 MMHG | OXYGEN SATURATION: 96 % | HEART RATE: 75 BPM | BODY MASS INDEX: 31.22 KG/M2 | RESPIRATION RATE: 16 BRPM | WEIGHT: 223 LBS

## 2017-04-24 DIAGNOSIS — I10 ESSENTIAL HYPERTENSION: ICD-10-CM

## 2017-04-24 DIAGNOSIS — E78.1 HYPERTRIGLYCERIDEMIA: ICD-10-CM

## 2017-04-24 DIAGNOSIS — E11.9 TYPE 2 DIABETES MELLITUS WITHOUT COMPLICATION, UNSPECIFIED LONG TERM INSULIN USE STATUS: ICD-10-CM

## 2017-04-24 DIAGNOSIS — I10 ESSENTIAL HYPERTENSION WITH GOAL BLOOD PRESSURE LESS THAN 130/85: ICD-10-CM

## 2017-04-24 DIAGNOSIS — Z00.00 ROUTINE HEALTH MAINTENANCE: ICD-10-CM

## 2017-04-24 DIAGNOSIS — J30.2 SEASONAL ALLERGIC RHINITIS, UNSPECIFIED ALLERGIC RHINITIS TRIGGER: ICD-10-CM

## 2017-04-24 RX ORDER — LISINOPRIL AND HYDROCHLOROTHIAZIDE 12.5; 2 MG/1; MG/1
1 TABLET ORAL DAILY
Qty: 30 TAB | Refills: 3 | Status: SHIPPED | OUTPATIENT
Start: 2017-04-24 | End: 2017-07-17 | Stop reason: SDUPTHER

## 2017-04-24 RX ORDER — MOMETASONE FUROATE 50 UG/1
2 SPRAY, METERED NASAL DAILY
Qty: 2 CONTAINER | Refills: 0 | Status: SHIPPED | COMMUNITY
Start: 2017-04-24 | End: 2019-04-11 | Stop reason: SDUPTHER

## 2017-04-24 RX ORDER — MOMETASONE FUROATE 50 UG/1
2 SPRAY, METERED NASAL DAILY
Qty: 2 CONTAINER | Refills: 0 | Status: SHIPPED | COMMUNITY
Start: 2017-04-24 | End: 2017-12-20 | Stop reason: SDUPTHER

## 2017-04-24 RX ORDER — DESLORATADINE 5 MG/1
5 TABLET ORAL DAILY
Qty: 90 TAB | Refills: 3 | Status: SHIPPED | COMMUNITY
Start: 2017-04-24 | End: 2018-06-13

## 2017-04-24 NOTE — PATIENT INSTRUCTIONS
Learning About Diabetes Food Guidelines  Your Care Instructions  Meal planning is important to manage diabetes. It helps keep your blood sugar at a target level (which you set with your doctor). You don't have to eat special foods. You can eat what your family eats, including sweets once in a while. But you do have to pay attention to how often you eat and how much you eat of certain foods. You may want to work with a dietitian or a certified diabetes educator (CDE) to help you plan meals and snacks. A dietitian or CDE can also help you lose weight if that is one of your goals. What should you know about eating carbs? Managing the amount of carbohydrate (carbs) you eat is an important part of healthy meals when you have diabetes. Carbohydrate is found in many foods. · Learn which foods have carbs. And learn the amounts of carbs in different foods. ¨ Bread, cereal, pasta, and rice have about 15 grams of carbs in a serving. A serving is 1 slice of bread (1 ounce), ½ cup of cooked cereal, or 1/3 cup of cooked pasta or rice. ¨ Fruits have 15 grams of carbs in a serving. A serving is 1 small fresh fruit, such as an apple or orange; ½ of a banana; ½ cup of cooked or canned fruit; ½ cup of fruit juice; 1 cup of melon or raspberries; or 2 tablespoons of dried fruit. ¨ Milk and no-sugar-added yogurt have 15 grams of carbs in a serving. A serving is 1 cup of milk or 2/3 cup of no-sugar-added yogurt. ¨ Starchy vegetables have 15 grams of carbs in a serving. A serving is ½ cup of mashed potatoes or sweet potato; 1 cup winter squash; ½ of a small baked potato; ½ cup of cooked beans; or ½ cup cooked corn or green peas. · Learn how much carbs to eat each day and at each meal. A dietitian or CDE can teach you how to keep track of the amount of carbs you eat. This is called carbohydrate counting. · If you are not sure how to count carbohydrate grams, use the Plate Method to plan meals.  It is a good, quick way to make sure that you have a balanced meal. It also helps you spread carbs throughout the day. ¨ Divide your plate by types of foods. Put non-starchy vegetables on half the plate, meat or other protein food on one-quarter of the plate, and a grain or starchy vegetable in the final quarter of the plate. To this you can add a small piece of fruit and 1 cup of milk or yogurt, depending on how many carbs you are supposed to eat at a meal.  · Try to eat about the same amount of carbs at each meal. Do not \"save up\" your daily allowance of carbs to eat at one meal.  · Proteins have very little or no carbs per serving. Examples of proteins are beef, chicken, turkey, fish, eggs, tofu, cheese, cottage cheese, and peanut butter. A serving size of meat is 3 ounces, which is about the size of a deck of cards. Examples of meat substitute serving sizes (equal to 1 ounce of meat) are 1/4 cup of cottage cheese, 1 egg, 1 tablespoon of peanut butter, and ½ cup of tofu. How can you eat out and still eat healthy? · Learn to estimate the serving sizes of foods that have carbohydrate. If you measure food at home, it will be easier to estimate the amount in a serving of restaurant food. · If the meal you order has too much carbohydrate (such as potatoes, corn, or baked beans), ask to have a low-carbohydrate food instead. Ask for a salad or green vegetables. · If you use insulin, check your blood sugar before and after eating out to help you plan how much to eat in the future. · If you eat more carbohydrate at a meal than you had planned, take a walk or do other exercise. This will help lower your blood sugar. What else should you know? · Limit saturated fat, such as the fat from meat and dairy products. This is a healthy choice because people who have diabetes are at higher risk of heart disease. So choose lean cuts of meat and nonfat or low-fat dairy products. Use olive or canola oil instead of butter or shortening when cooking.   · Don't skip meals. Your blood sugar may drop too low if you skip meals and take insulin or certain medicines for diabetes. · Check with your doctor before you drink alcohol. Alcohol can cause your blood sugar to drop too low. Alcohol can also cause a bad reaction if you take certain diabetes medicines. Follow-up care is a key part of your treatment and safety. Be sure to make and go to all appointments, and call your doctor if you are having problems. It's also a good idea to know your test results and keep a list of the medicines you take. Where can you learn more? Go to http://todd-brayden.info/. Enter K457 in the search box to learn more about \"Learning About Diabetes Food Guidelines. \"  Current as of: May 23, 2016  Content Version: 11.2  © 7702-9742 [employer]. Care instructions adapted under license by [employer] (which disclaims liability or warranty for this information). If you have questions about a medical condition or this instruction, always ask your healthcare professional. [place] [address] any warranty or liability for your use of this information. Dapagliflozin (By mouth)   Dapagliflozin (lll-l-ahm-FLOE-zin)  Treats type 2 diabetes. Brand Name(s):Farxiga   There may be other brand names for this medicine. When This Medicine Should Not Be Used: This medicine is not right for everyone. Do not use it if you had an allergic reaction to dapagliflozin. How to Use This Medicine:   Tablet  · Take your medicine as directed. Your dose may need to be changed several times to find what works best for you. This medicine is usually taken in the morning. · This medicine should come with a Medication Guide. Ask your pharmacist for a copy if you do not have one. · Missed dose: Take a dose as soon as you remember. If it is almost time for your next dose, wait until then and take a regular dose.  Do not take extra medicine to make up for a missed dose.  · Store the medicine in a closed container at room temperature, away from heat, moisture, and direct light. Drugs and Foods to Avoid:   Ask your doctor or pharmacist before using any other medicine, including over-the-counter medicines, vitamins, and herbal products. · Some medicines can affect how dapagliflozin works. Tell your doctor if you are using any of the following:  ¨ Diuretic (water pill)  ¨ Insulin or other diabetes medicine  Warnings While Using This Medicine:   · Tell your doctor if you are pregnant or breastfeeding, or if you have kidney disease, liver disease, high cholesterol, or a history of pancreas problems, genital yeast or urinary tract infections, or bladder cancer. Tell your doctor if you are on a low-salt diet or if you drink alcohol. · This medicine may cause the following problems:  ¨ Low blood pressure  ¨ Ketoacidosis (high ketones and acid in the blood)  ¨ Increased risk of genital yeast or urinary tract infections  ¨ Low blood sugar levels  · Tell any doctor or dentist who treats you that you are using this medicine. This medicine may affect certain medical test results. This medicine may affect the results of urine glucose tests. · Your doctor will do lab tests at regular visits to check on the effects of this medicine. Keep all appointments. · Keep all medicine out of the reach of children. Never share your medicine with anyone.   Possible Side Effects While Using This Medicine:   Call your doctor right away if you notice any of these side effects:  · Allergic reaction: Itching or hives, swelling in your face or hands, swelling or tingling in your mouth or throat, chest tightness, trouble breathing  · Change in how much or how often you urinate, bloody urine, painful or difficult urination, lower back or side pain, fever, chills  · Increased hunger, headache, confusion, shaking, trembling, sweating  · Lightheadedness, dizziness, fainting  · Trouble breathing, tiredness, stomach pain, nausea, vomiting  If you notice these less serious side effects, talk with your doctor:   · Redness, itching, pain, or swelling of the penis, bad-smelling discharge from the penis  · White or yellow vaginal discharge, vaginal itching or odor  If you notice other side effects that you think are caused by this medicine, tell your doctor. Call your doctor for medical advice about side effects. You may report side effects to FDA at 9-653-UVJ-3795  © 2016 [address] is for End User's use only and may not be sold, redistributed or otherwise used for commercial purposes. The above information is an  only. It is not intended as medical advice for individual conditions or treatments. Talk to your doctor, nurse or pharmacist before following any medical regimen to see if it is safe and effective for you.

## 2017-04-24 NOTE — LETTER
4/24/2017 [facility] [address] [place], [address] Sabine Parker, 1965, is picking up the following medications ordered from the [facility]: 
 
KOMBIGLYZE XR 2.5/1000 MG #180 AND LIVALO 2 MG #90. Caitie Blair Patient's Signature: _____________________________ Today's Date: 4/24/2017

## 2017-04-27 ENCOUNTER — DOCUMENTATION ONLY (OUTPATIENT)
Dept: FAMILY MEDICINE CLINIC | Age: 52
End: 2017-04-27

## 2017-04-27 NOTE — PROGRESS NOTES
Patient called stating he has been approved for treatment at [facility].  He was given the number today to schedule his appointment

## 2017-05-09 ENCOUNTER — TELEPHONE (OUTPATIENT)
Dept: FAMILY MEDICINE CLINIC | Age: 52
End: 2017-05-09

## 2017-05-09 NOTE — TELEPHONE ENCOUNTER
Medication: Lovaza 1gm, dose: 2 caps, how often: every day with breakfast, current number of medication days provided: 90, refill per application. Lot #[id number], EXP 05/2018     This medication was received and verified for the following 1. Correct Patient, 2. Correct Diagnosis, 3. Correct Drug, 4. Correct route, and no current allergy to medication. Medication: Farxiga 2.5/1000mg , dose: 1 tab, how often: qd , current number of medication days provided: 90, refill per application. Lot #[id number], EXP 05/2018    This medication was received and verified for the following 1. Correct Patient, 2. Correct Diagnosis, 3. Correct Drug, 4. Correct route, and no current allergy to medication.      Aide Person PharmD

## 2017-05-30 ENCOUNTER — TELEPHONE (OUTPATIENT)
Dept: FAMILY MEDICINE CLINIC | Age: 52
End: 2017-05-30

## 2017-06-07 NOTE — TELEPHONE ENCOUNTER
Medication: Clarinex 5mg, dose: 1 tab, how often: daily as needed for allergies, current number of medication days provided: 90, refill per application. Lot #[id number], EXP 05/2018  This medication was received and verified for the following 1. Correct Patient, 2. Correct Diagnosis, 3. Correct Drug, 4. Correct route, and no current allergy to medication. Medication: Nasonex, dose: 2 sprays both nostrils, how often: every day as needed for allergies, current number of medication days provided: 90, refill per application. Lot #[id number], EXP 05/2018 . This medication was received and verified for the following 1. Correct Patient, 2. Correct Diagnosis, 3. Correct Drug, 4. Correct route, and no current allergy to medication.     Trinidad SanfordD

## 2017-06-14 ENCOUNTER — OFFICE VISIT (OUTPATIENT)
Dept: FAMILY MEDICINE CLINIC | Age: 52
End: 2017-06-14

## 2017-06-14 ENCOUNTER — HOSPITAL ENCOUNTER (OUTPATIENT)
Dept: GENERAL RADIOLOGY | Age: 52
Discharge: HOME | End: 2017-06-14
Payer: SUBSIDIZED

## 2017-06-14 ENCOUNTER — TELEPHONE (OUTPATIENT)
Dept: ORTHOPEDIC SURGERY | Facility: CLINIC | Age: 52
End: 2017-06-14

## 2017-06-14 VITALS
TEMPERATURE: 98.2 F | WEIGHT: 231.6 LBS | DIASTOLIC BLOOD PRESSURE: 88 MMHG | BODY MASS INDEX: 32.3 KG/M2 | HEART RATE: 69 BPM | SYSTOLIC BLOOD PRESSURE: 134 MMHG | OXYGEN SATURATION: 95 % | RESPIRATION RATE: 16 BRPM

## 2017-06-14 DIAGNOSIS — M54.2 NECK AND SHOULDER PAIN: ICD-10-CM

## 2017-06-14 DIAGNOSIS — R20.0 NUMBNESS AND TINGLING: ICD-10-CM

## 2017-06-14 DIAGNOSIS — M54.2 NECK AND SHOULDER PAIN: Primary | ICD-10-CM

## 2017-06-14 DIAGNOSIS — R20.2 NUMBNESS AND TINGLING: ICD-10-CM

## 2017-06-14 DIAGNOSIS — M25.519 NECK AND SHOULDER PAIN: Primary | ICD-10-CM

## 2017-06-14 DIAGNOSIS — M25.519 NECK AND SHOULDER PAIN: ICD-10-CM

## 2017-06-14 PROCEDURE — 72050 X-RAY EXAM NECK SPINE 4/5VWS: CPT

## 2017-06-14 RX ORDER — IBUPROFEN 800 MG/1
800 TABLET ORAL
Qty: 30 TAB | Refills: 0 | Status: SHIPPED | OUTPATIENT
Start: 2017-06-14 | End: 2017-07-25 | Stop reason: ALTCHOICE

## 2017-06-14 RX ORDER — CYCLOBENZAPRINE HCL 10 MG
5 TABLET ORAL
Qty: 30 TAB | Refills: 0 | Status: SHIPPED | OUTPATIENT
Start: 2017-06-14 | End: 2017-07-25 | Stop reason: SDUPTHER

## 2017-06-14 NOTE — PROGRESS NOTES
Marleni Gamez,   Please inform the patient that his Cervical Xray showed  1. Degenerative Disc disease:  natural breakdown of an intervertebral disc of the spine  2. Osteoarthritic changes     We will continue conservative treatment with Ibuprofen and flexeril for 6-8 weeks and if no improvement will consider physical therapy. If numbness and tingling gets worse please RTO to be seen.

## 2017-06-14 NOTE — PROGRESS NOTES
Called patient and explained that C-S xray showed degenerative disc disease which is a natural breakdown of the intervertebral disc of the spine and osteoarthritic changes. He is on his way to  the medications of ibuprofen and flexeril from pharmacy. Information including side effects given on ibuprofen and flexeril and patient verbalized understanding to try meds x 6-8 wks. If no improvement, he may be referred to physical therapy . He agrees to return to clinic if symptoms increase. Understanding verbally verified and all questions answered.

## 2017-06-14 NOTE — PROGRESS NOTES
HISTORY OF PRESENT ILLNESS  Tavia Nunez is a 47 y.o. male. With past pmhx HTN, DM, HLD, numbness of foot, OA of the knee presents with right arm/shoulder pain. Denies any known mechanism of injury. 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 Arm Pain    The history is provided by the patient. The current episode started more than 2 days ago. The problem occurs constantly. The problem has not changed since onset. The pain is present in the right arm. The quality of the pain is described as aching and sharp. The pain is at a severity of 8/10. Associated symptoms include numbness, tingling and neck pain. Pertinent negatives include full range of motion and no back pain. Shoulder Pain    The history is provided by the patient. The incident occurred more than 2 days ago (1 week). There was no injury mechanism. The right shoulder is affected. The pain is at a severity of 8/10. The pain is moderate. The pain has been constant since onset. The pain radiates. There is no history of shoulder injury. He has no other injuries. There is no history of shoulder surgery. Associated symptoms include numbness and tingling. He reports no foreign bodies present. Review of Systems   Musculoskeletal: Positive for neck pain. Negative for back pain. Right neck/shoulder/arm shoulder pain with radiation/radiculopathy   Neurological: Positive for tingling and numbness. Physical Exam   Musculoskeletal:        Right shoulder: He exhibits pain. He exhibits normal range of motion and normal strength. Cervical back: He exhibits decreased range of motion, tenderness, pain and spasm. Right upper arm: He exhibits no tenderness, no bony tenderness, no swelling and no deformity.    Positive Spurlings Maneuver  ROM in right arm/shoulder normal  Right lateral bending of neck with pain  Right rotation of neck abnormal with pain   Hyperextension and flexion of the neck normal ASSESSMENT and PLAN  Differential Dx:   DDD, Cervical Stenosis, Cervical Sprain/Strain, Spondylosis, Cervical Radiculopathy, Herniated cervical disc, torticollis, Diabetic Neuropathy    ICD-10-CM ICD-9-CM    1. Neck and shoulder pain M54.2 723.1 XR SPINE CERV 4 OR 5 V    M25.519 719.41 ibuprofen (MOTRIN) 800 mg tablet      cyclobenzaprine (FLEXERIL) 10 mg tablet   2. Numbness and tingling R20.0 782.0 XR SPINE CERV 4 OR 5 V    R20.2  ibuprofen (MOTRIN) 800 mg tablet      cyclobenzaprine (FLEXERIL) 10 mg tablet     Follow-up Disposition:  Return in about 3 months (around 9/14/2017), or if symptoms worsen or fail to improve.

## 2017-06-14 NOTE — TELEPHONE ENCOUNTER
Patient is requesting pain medication for kristel knee pain. Patient was last seen 8/3/2016, and was told that he may need to schedule an appointment prior to receiving any medication, and refused. Patient can be reached at [phone].

## 2017-06-14 NOTE — TELEPHONE ENCOUNTER
Please review message below and advise.      Last Visit: 08/03/2016 with MD Avani Montiel    Next Appointment: noted to f/u in 6 months for evaluation and management

## 2017-06-14 NOTE — MR AVS SNAPSHOT
Visit Information Date & Time Provider Department Dept. Phone Encounter #  
 6/14/2017 10:30 AM Jluis Davila NP [address] [id number] Follow-up Instructions Return in about 3 months (around 9/14/2017), or if symptoms worsen or fail to improve. Your Appointments 7/25/2017  2:30 PM  
Follow Up with Jluis Davila NP [address] [facility] ([address] [facility]) Appt Note: 3 mth f/u  
 [address] [facility] [id number] [address] [facility] [id number] Upcoming Health Maintenance Date Due  
 EYE EXAM RETINAL OR DILATED Q1 1/2/1976 DTaP/Tdap/Td series (1 - Tdap) 11/24/2011 FOBT Q 1 YEAR AGE 50-75 1/2/2016 Pneumococcal 19-64 Medium Risk (1 of 1 - PPSV23) 1/23/2018* INFLUENZA AGE 9 TO ADULT 8/1/2017 HEMOGLOBIN A1C Q6M 10/18/2017 FOOT EXAM Q1 10/20/2017 MICROALBUMIN Q1 1/11/2018 LIPID PANEL Q1 4/18/2018 *Topic was postponed. The date shown is not the original due date. Allergies as of 6/14/2017  Review Complete On: 6/14/2017 By: Jluis Davila NP No Known Allergies Current Immunizations  Reviewed on 10/22/2015 Name Date Influenza Vaccine Piedadariel Rebolledo) 10/12/2016, 10/22/2015 Td 11/23/2011 Not reviewed this visit You Were Diagnosed With   
  
 Codes Comments Neck and shoulder pain    -  Primary ICD-10-CM: M54.2, M25.519 ICD-9-CM: 723.1, 719.41 Numbness and tingling     ICD-10-CM: R20.0, R20.2 ICD-9-CM: 677. 0 Vitals BP Pulse Temp Resp Weight(growth percentile) SpO2  
 134/88 69 98.2 °F (36.8 °C) 16 231 lb 9.6 oz (105.1 kg) 95% BMI Smoking Status 32.3 kg/m2 Former Smoker Vitals History BMI and BSA Data Body Mass Index Body Surface Area  
 32.3 kg/m 2 2.29 m 2 Preferred Pharmacy Pharmacy Name Phone [facility] [address] [phone] Your Updated Medication List  
  
   
This list is accurate as of: 6/14/17 11:05 AM.  Always use your most recent med list.  
  
  
  
  
 * dapagliflozin 10 mg Tab Commonly known as:  [employer] Take 1 Tab by mouth daily (with breakfast). Indications: type 2 diabetes mellitus * dapagliflozin 10 mg Tab Commonly known as:  [employer] Take 1 Tab by mouth daily (with breakfast). Indications: type 2 diabetes mellitus  
  
 desloratadine 5 mg tablet Commonly known as:  CLARINEX Take 1 Tab by mouth daily. lisinopril-hydroCHLOROthiazide 20-12.5 mg per tablet Commonly known as:  Camryn Boga Take 1 Tab by mouth daily. Indications: hypertension  
  
 loratadine 10 mg tablet Commonly known as:  Christi Oseguera Take 1 Tab by mouth daily. Indications: ALLERGIC RHINITIS  
  
 meloxicam 15 mg tablet Commonly known as:  MOBIC  
TAKE ONE TABLET BY MOUTH ONCE DAILY * mometasone 50 mcg/actuation nasal spray Commonly known as:  NASONEX  
2 Sprays by Both Nostrils route daily. Indications: ALLERGIC RHINITIS * mometasone 50 mcg/actuation nasal spray Commonly known as:  NASONEX  
2 Sprays by Both Nostrils route daily. multivitamin tablet Commonly known as:  ONE A DAY Take 1 Tab by mouth daily. omega-3 acid ethyl esters 1 gram capsule Commonly known as:  Kashmir Fritter Take 2 Caps by mouth daily (with breakfast). pitavastatin 2 mg tablet Commonly known as:  LIVALO Take 1 Tab by mouth daily. To start once Crestor complete. potassium 99 mg tablet Take 1 Tab by mouth daily. pregabalin 75 mg capsule Commonly known as:  Bonnie Groom Take 1 Cap by mouth two (2) times a day. Max Daily Amount: 150 mg. sAXagliptin-metFORMIN 2.5-1,000 mg Tm24 Commonly known as:  KOMBIGLYZE XR Take 2 Tabs by mouth daily (with dinner). * Notice: This list has 4 medication(s) that are the same as other medications prescribed for you. Read the directions carefully, and ask your doctor or other care provider to review them with you. Follow-up Instructions Return in about 3 months (around 9/14/2017), or if symptoms worsen or fail to improve. To-Do List   
 06/14/2017 Imaging:  XR SPINE CERV 4 OR 5 V Patient Instructions Neck Pain: Care Instructions Your Care Instructions You can have neck pain anywhere from the bottom of your head to the top of your shoulders. It can spread to the upper back or arms. Injuries, painting a ceiling, sleeping with your neck twisted, staying in one position for too long, and many other activities can cause neck pain. Most neck pain gets better with home care. Your doctor may recommend medicine to relieve pain or relax your muscles. He or she may suggest exercise and physical therapy to increase flexibility and relieve stress. You may need to wear a special (cervical) collar to support your neck for a day or two. Follow-up care is a key part of your treatment and safety. Be sure to make and go to all appointments, and call your doctor if you are having problems. It's also a good idea to know your test results and keep a list of the medicines you take. How can you care for yourself at home? · Try using a heating pad on a low or medium setting for 15 to 20 minutes every 2 or 3 hours. Try a warm shower in place of one session with the heating pad. · You can also try an ice pack for 10 to 15 minutes every 2 to 3 hours. Put a thin cloth between the ice and your skin. · Take pain medicines exactly as directed. ¨ If the doctor gave you a prescription medicine for pain, take it as prescribed. ¨ If you are not taking a prescription pain medicine, ask your doctor if you can take an over-the-counter medicine.  
· If your doctor recommends a cervical collar, wear it exactly as directed. When should you call for help? Call your doctor now or seek immediate medical care if: 
· You have new or worsening numbness in your arms, buttocks or legs. · You have new or worsening weakness in your arms or legs. (This could make it hard to stand up.) · You lose control of your bladder or bowels. Watch closely for changes in your health, and be sure to contact your doctor if: 
· Your neck pain is getting worse. · You are not getting better after 1 week. · You do not get better as expected. Where can you learn more? Go to http://todd-brayden.info/. Enter [id number] in the search box to learn more about \"Neck Pain: Care Instructions. \" Current as of: May 23, 2016 Content Version: 11.2 © 2166-2086 [employer]. Care instructions adapted under license by [employer] (which disclaims liability or warranty for this information). If you have questions about a medical condition or this instruction, always ask your healthcare professional. [place] [address] any warranty or liability for your use of this information. Introducing [facility]! Peg Hemp introduces Instagarage patient portal. Now you can access parts of your medical record, email your doctor's office, and request medication refills online. 1. In your internet browser, go to [email or web address] Talk [email or web address] [email or web address] Talk Radio 2. Click on the First Time User? Click Here link in the Sign In box. You will see the New Member Sign Up page. 3. Enter your Instagarage Access Code exactly as it appears below. You will not need to use this code after youve completed the sign-up process. If you do not sign up before the expiration date, you must request a new code. · Inst[employer]age Access Code: [email or web address] Expires: 9/12/2017 10:39 AM 
 
4. Enter the last four digits of your Social Security Number (xxxx) and Date of Birth (mm/dd/yyyy) as indicated and click Submit.  You will be taken to the next sign-up page. 5. Create a Skimbl ID. This will be your Skimbl login ID and cannot be changed, so think of one that is secure and easy to remember. 6. Create a Skimbl password. You can change your password at any time. 7. Enter your Password Reset Question and Answer. This can be used at a later time if you forget your password. 8. Enter your e-mail address. You will receive e-mail notification when new information is available in [address]. 9. Click Sign Up. You can now view and download portions of your medical record. 10. Click the Download Summary menu link to download a portable copy of your medical information. If you have questions, please visit the Frequently Asked Questions section of the Skimbl website. Remember, Skimbl is NOT to be used for urgent needs. For medical emergencies, dial 911. Now available from your iPhone and Android! Please provide this summary of care documentation to your next provider. Your primary care clinician is listed as David Dai. If you have any questions after today's visit, please call [phone].

## 2017-06-14 NOTE — PATIENT INSTRUCTIONS
Neck Pain: Care Instructions  Your Care Instructions  You can have neck pain anywhere from the bottom of your head to the top of your shoulders. It can spread to the upper back or arms. Injuries, painting a ceiling, sleeping with your neck twisted, staying in one position for too long, and many other activities can cause neck pain. Most neck pain gets better with home care. Your doctor may recommend medicine to relieve pain or relax your muscles. He or she may suggest exercise and physical therapy to increase flexibility and relieve stress. You may need to wear a special (cervical) collar to support your neck for a day or two. Follow-up care is a key part of your treatment and safety. Be sure to make and go to all appointments, and call your doctor if you are having problems. It's also a good idea to know your test results and keep a list of the medicines you take. How can you care for yourself at home? · Try using a heating pad on a low or medium setting for 15 to 20 minutes every 2 or 3 hours. Try a warm shower in place of one session with the heating pad. · You can also try an ice pack for 10 to 15 minutes every 2 to 3 hours. Put a thin cloth between the ice and your skin. · Take pain medicines exactly as directed. ¨ If the doctor gave you a prescription medicine for pain, take it as prescribed. ¨ If you are not taking a prescription pain medicine, ask your doctor if you can take an over-the-counter medicine. · If your doctor recommends a cervical collar, wear it exactly as directed. When should you call for help? Call your doctor now or seek immediate medical care if:  · You have new or worsening numbness in your arms, buttocks or legs. · You have new or worsening weakness in your arms or legs. (This could make it hard to stand up.)  · You lose control of your bladder or bowels.   Watch closely for changes in your health, and be sure to contact your doctor if:  · Your neck pain is getting worse.  · You are not getting better after 1 week. · You do not get better as expected. Where can you learn more? Go to http://todd-brayden.info/. Enter [id number] in the search box to learn more about \"Neck Pain: Care Instructions. \"  Current as of: May 23, 2016  Content Version: 11.2  © 7921-1662 [employer]. Care instructions adapted under license by [employer] (which disclaims liability or warranty for this information). If you have questions about a medical condition or this instruction, always ask your healthcare professional. [place] [address] any warranty or liability for your use of this information.

## 2017-06-16 ENCOUNTER — OFFICE VISIT (OUTPATIENT)
Dept: ORTHOPEDIC SURGERY | Age: 52
End: 2017-06-16

## 2017-06-16 VITALS
DIASTOLIC BLOOD PRESSURE: 75 MMHG | HEIGHT: 71 IN | BODY MASS INDEX: 30.27 KG/M2 | TEMPERATURE: 97.7 F | HEART RATE: 76 BPM | WEIGHT: 216.2 LBS | SYSTOLIC BLOOD PRESSURE: 117 MMHG

## 2017-06-16 DIAGNOSIS — M17.0 PRIMARY OSTEOARTHRITIS OF BOTH KNEES: Primary | ICD-10-CM

## 2017-06-16 RX ORDER — MELOXICAM 15 MG/1
15 TABLET ORAL DAILY
Qty: 30 TAB | Refills: 3 | Status: SHIPPED | OUTPATIENT
Start: 2017-06-16 | End: 2017-10-17 | Stop reason: SDUPTHER

## 2017-06-16 NOTE — PROGRESS NOTES
[address] [place] [address]  [phone]           Patient: Brianna Aceves                MRN: [id number]       SSN: [id number]  YOB: 1965        AGE: 47 y.o. SEX: male  Body mass index is 30.15 kg/(m^2). PCP: Asha Melchor NP  06/16/17      This office note has been dictated. REVIEW OF SYSTEMS:  Constitutional: Negative for fever, chills, weight loss and malaise/fatigue. HENT: Negative. Eyes: Negative. Respiratory: Negative. Cardiovascular: Negative. Gastrointestinal: No bowel incontinence or constipation. Genitourinary: No bladder incontinence or saddle anesthesia. Skin: Negative. Neurological: Negative. Endo/Heme/Allergies: Negative. Psychiatric/Behavioral: Negative. Musculoskeletal: As per HPI above. Past Medical History:   Diagnosis Date    Cataract, right 2010    Diabetes ([address]     History of vitamin D deficiency 6/2016    Hypercholesterolemia     Hypertension     Hypertriglyceridemia 6/30/2016    Hypokalemia 6/30/2016    Low back pain     Numbness and tingling of foot June 2014    left toes and mid bottom of foot    Scrotal mass 6/6/2016         Current Outpatient Prescriptions:     ibuprofen (MOTRIN) 800 mg tablet, Take 1 Tab by mouth every eight (8) hours as needed for Pain., Disp: 30 Tab, Rfl: 0    cyclobenzaprine (FLEXERIL) 10 mg tablet, Take 0.5 Tabs by mouth three (3) times daily as needed (for pain and muscle spasms). , Disp: 30 Tab, Rfl: 0    dapagliflozin (FARXIGA) 10 mg tab, Take 1 Tab by mouth daily (with breakfast). Indications: type 2 diabetes mellitus, Disp: 90 Tab, Rfl: 3    mometasone (NASONEX) 50 mcg/actuation nasal spray, 2 Sprays by Both Nostrils route daily. Indications: ALLERGIC RHINITIS, Disp: 2 Container, Rfl: 0    lisinopril-hydroCHLOROthiazide (PRINZIDE, ZESTORETIC) 20-12.5 mg per tablet, Take 1 Tab by mouth daily.  Indications: hypertension, Disp: 30 Tab, Rfl: 3    potassium 99 mg tablet, Take 1 Tab by mouth daily. , Disp: , Rfl:     pitavastatin (LIVALO) 2 mg tablet, Take 1 Tab by mouth daily. To start once Crestor complete., Disp: 90 Tab, Rfl: 3    saxagliptin-metFORMIN (KOMBIGLYZE XR) 2.5-1,000 mg TM24, Take 2 Tabs by mouth daily (with dinner). , Disp: 180 Tab, Rfl: 3    omega-3 acid ethyl esters (LOVAZA) 1 gram capsule, Take 2 Caps by mouth daily (with breakfast). , Disp: 180 Cap, Rfl: 3    pregabalin (LYRICA) 75 mg capsule, Take 1 Cap by mouth two (2) times a day. Max Daily Amount: 150 mg., Disp: 60 Cap, Rfl: 3    dapagliflozin (FARXIGA) 10 mg tab, Take 1 Tab by mouth daily (with breakfast). Indications: type 2 diabetes mellitus, Disp: 90 Tab, Rfl: 0    mometasone (NASONEX) 50 mcg/actuation nasal spray, 2 Sprays by Both Nostrils route daily. , Disp: 2 Container, Rfl: 0    desloratadine (CLARINEX) 5 mg tablet, Take 1 Tab by mouth daily. , Disp: 90 Tab, Rfl: 3    meloxicam (MOBIC) 15 mg tablet, TAKE ONE TABLET BY MOUTH ONCE DAILY, Disp: 30 Tab, Rfl: 0    multivitamin (ONE A DAY) tablet, Take 1 Tab by mouth daily. , Disp: , Rfl:     loratadine (CLARITIN) 10 mg tablet, Take 1 Tab by mouth daily.  Indications: ALLERGIC RHINITIS, Disp: 30 Tab, Rfl: 1    No Known Allergies    Social History     Social History    Marital status: SINGLE     Spouse name: N/A    Number of children: 0    Years of education: 6     Occupational History    unemployed and living with son and daughter-in-law, after incarceration 2011 out 2014      Social History Main Topics    Smoking status: Former Smoker    Smokeless tobacco: Never Used      Comment: stopped in HS, never heavy and never long term    Alcohol use No      Comment: rarely holiday    Drug use: No    Sexual activity: Yes      Comment: no partner, spouse  2014     Other Topics Concern   [id number], other than honorable discharge    Blood Transfusions No    Caffeine Concern No     decreased his 2-3 super big gulps    Occupational Exposure Yes     ? while in [place],  then [facility] No    Sleep Concern No    Stress Concern Yes     Life in general    Weight Concern No    Special Diet No    Back Care No    Exercise No     starting to do something but not currently    Bike Helmet No    Seat Belt Yes    Self-Exams No     Social History Narrative       Past Surgical History:   Procedure Laterality Date    HX HERNIA REPAIR  6363    umbilical, done at [address] [facility] [address] 2+CM  1/4/16    scalp lesion removal, Dr. Poppy Thomson             We did see Mr. Jori Chu for followup with regards to his bilateral knee osteoarthritis. The patient does have patellofemoral arthritis, trouble getting in and out of a chair and going up and down stairs. He does have some feelings of instability in his knee at times. The left hip is the worse of the two at this point. He has had injections in the past, which gave him good relief. At this point, the pain is intermittent. He would like a refill of his Mobic. The patient has had no fevers, chills, systemic changes, and no injuries to report. PHYSICAL EXAMINATION: In general, the patient is alert and oriented x 3 and is in no acute distress. The patient is well-developed and well-nourished with a normal affect. The patient is afebrile. HEENT:  Head is normocephalic and atraumatic. Pupils are equally round and reactive to light and accommodation. Extraocular eye movements are intact. Neck is supple. Trachea is midline. No JVD is present. Breathing is nonlabored. Examination of the lower extremities reveals pain-free range of motion of the hips. There is no pain to palpation of the trochanteric bursae. There is a negative straight leg raise, negative calf tenderness, and negative Arlenes sign. There are no signs of DVT present.   Examination of each knee reveals the skin is intact. There is no ecchymosis, no warmth, and no signs of infection or cellulitis present. He does have crepitus arising from range of motion activities and it feels like a plica band bilaterally with some slight snapping anteriorly. He has no pain to palpation to the lateral joint line, but slightly to the medial joint line. There is a slight click present medially with Lin's test.     ASSESSMENT:  Bilateral knee osteoarthritis, patellofemoral, with likely plicamine and meniscal pathology. PLAN:  At this point, the patient states his knees are quiescent. We will hold off on MRIs for now. Certainly, if his symptoms act up again, we can order him an MRI of his knees. In the meantime, we are going to order Mobic 15 mg with food. He is instructed on its use, as well as any precautions. He will call with any questions or concerns that shall arise.                JR Gregorio TANG, SCARLETT, ATC

## 2017-06-28 ENCOUNTER — TELEPHONE (OUTPATIENT)
Dept: FAMILY MEDICINE CLINIC | Age: 52
End: 2017-06-28

## 2017-06-29 NOTE — TELEPHONE ENCOUNTER
Medication: Livalo 2mg , dose: tab, how often: qpm , current number of medication days provided: 90, refill per application. Lot #: [id number], EXP 06/2018. This medication was received and verified for the following 1. Correct Patient, 2. Correct Diagnosis, 3. Correct Drug, 4. Correct route, and no current allergy to medication. Please contact patient to come  their medications. Renetta Casey, EFE,RN,AGNP-C     [address], make sure pt has completed crestor before starting Livalo.  Thank you

## 2017-07-14 DIAGNOSIS — E11.9 TYPE 2 DIABETES MELLITUS WITHOUT COMPLICATION, UNSPECIFIED LONG TERM INSULIN USE STATUS: Primary | ICD-10-CM

## 2017-07-14 DIAGNOSIS — I10 ESSENTIAL HYPERTENSION: ICD-10-CM

## 2017-07-17 DIAGNOSIS — I10 ESSENTIAL HYPERTENSION WITH GOAL BLOOD PRESSURE LESS THAN 130/85: ICD-10-CM

## 2017-07-17 RX ORDER — LISINOPRIL AND HYDROCHLOROTHIAZIDE 12.5; 2 MG/1; MG/1
1 TABLET ORAL DAILY
Qty: 30 TAB | Refills: 3 | Status: SHIPPED | OUTPATIENT
Start: 2017-07-17 | End: 2017-07-25 | Stop reason: SDUPTHER

## 2017-07-18 ENCOUNTER — LAB ONLY (OUTPATIENT)
Dept: FAMILY MEDICINE CLINIC | Age: 52
End: 2017-07-18

## 2017-07-18 ENCOUNTER — HOSPITAL ENCOUNTER (OUTPATIENT)
Dept: LAB | Age: 52
Discharge: HOME | End: 2017-07-18

## 2017-07-18 DIAGNOSIS — E11.9 TYPE 2 DIABETES MELLITUS WITHOUT COMPLICATION, UNSPECIFIED LONG TERM INSULIN USE STATUS: ICD-10-CM

## 2017-07-18 DIAGNOSIS — E11.9 TYPE 2 DIABETES MELLITUS WITHOUT COMPLICATION, UNSPECIFIED LONG TERM INSULIN USE STATUS: Primary | ICD-10-CM

## 2017-07-18 DIAGNOSIS — I10 ESSENTIAL HYPERTENSION: ICD-10-CM

## 2017-07-18 LAB
ALBUMIN SERPL BCP-MCNC: 4.1 G/DL (ref 3.4–5)
ALBUMIN/GLOB SERPL: 1.2 {RATIO} (ref 0.8–1.7)
ALP SERPL-CCNC: 64 U/L (ref 45–117)
ALT SERPL-CCNC: 29 U/L (ref 16–61)
ANION GAP BLD CALC-SCNC: 6 MMOL/L (ref 3–18)
AST SERPL W P-5'-P-CCNC: 14 U/L (ref 15–37)
BILIRUB SERPL-MCNC: 1.1 MG/DL (ref 0.2–1)
BUN SERPL-MCNC: 21 MG/DL (ref 7–18)
BUN/CREAT SERPL: 24 (ref 12–20)
CALCIUM SERPL-MCNC: 8.4 MG/DL (ref 8.5–10.1)
CHLORIDE SERPL-SCNC: 108 MMOL/L (ref 100–108)
CHOLEST SERPL-MCNC: 146 MG/DL
CO2 SERPL-SCNC: 26 MMOL/L (ref 21–32)
CREAT SERPL-MCNC: 0.86 MG/DL (ref 0.6–1.3)
EST. AVERAGE GLUCOSE BLD GHB EST-MCNC: 131 MG/DL
GLOBULIN SER CALC-MCNC: 3.4 G/DL (ref 2–4)
GLUCOSE SERPL-MCNC: 107 MG/DL (ref 74–99)
HBA1C MFR BLD: 6.2 % (ref 4.2–5.6)
HDLC SERPL-MCNC: 32 MG/DL (ref 40–60)
HDLC SERPL: 4.6 {RATIO} (ref 0–5)
LDLC SERPL CALC-MCNC: 80.8 MG/DL (ref 0–100)
LIPID PROFILE,FLP: ABNORMAL
POTASSIUM SERPL-SCNC: 3.9 MMOL/L (ref 3.5–5.5)
PROT SERPL-MCNC: 7.5 G/DL (ref 6.4–8.2)
SODIUM SERPL-SCNC: 140 MMOL/L (ref 136–145)
TRIGL SERPL-MCNC: 166 MG/DL (ref ?–150)
VLDLC SERPL CALC-MCNC: 33.2 MG/DL

## 2017-07-18 PROCEDURE — 80053 COMPREHEN METABOLIC PANEL: CPT | Performed by: NURSE PRACTITIONER

## 2017-07-18 PROCEDURE — 80061 LIPID PANEL: CPT | Performed by: NURSE PRACTITIONER

## 2017-07-18 PROCEDURE — 83036 HEMOGLOBIN GLYCOSYLATED A1C: CPT | Performed by: NURSE PRACTITIONER

## 2017-07-20 ENCOUNTER — TELEPHONE (OUTPATIENT)
Dept: FAMILY MEDICINE CLINIC | Age: 52
End: 2017-07-20

## 2017-07-25 ENCOUNTER — OFFICE VISIT (OUTPATIENT)
Dept: FAMILY MEDICINE CLINIC | Age: 52
End: 2017-07-25

## 2017-07-25 VITALS
OXYGEN SATURATION: 96 % | DIASTOLIC BLOOD PRESSURE: 91 MMHG | TEMPERATURE: 98 F | SYSTOLIC BLOOD PRESSURE: 131 MMHG | HEART RATE: 83 BPM | HEIGHT: 71 IN | WEIGHT: 214 LBS | RESPIRATION RATE: 20 BRPM | BODY MASS INDEX: 29.96 KG/M2

## 2017-07-25 DIAGNOSIS — I10 ESSENTIAL HYPERTENSION: ICD-10-CM

## 2017-07-25 DIAGNOSIS — Z79.899 CONTROLLED SUBSTANCE AGREEMENT SIGNED: ICD-10-CM

## 2017-07-25 DIAGNOSIS — M54.2 NECK AND SHOULDER PAIN: ICD-10-CM

## 2017-07-25 DIAGNOSIS — E11.42 DM TYPE 2 WITH DIABETIC PERIPHERAL NEUROPATHY (HCC): Primary | ICD-10-CM

## 2017-07-25 DIAGNOSIS — E55.9 VITAMIN D DEFICIENCY: ICD-10-CM

## 2017-07-25 DIAGNOSIS — R20.0 NUMBNESS AND TINGLING: ICD-10-CM

## 2017-07-25 DIAGNOSIS — M25.519 NECK AND SHOULDER PAIN: ICD-10-CM

## 2017-07-25 DIAGNOSIS — Z12.11 COLON CANCER SCREENING: ICD-10-CM

## 2017-07-25 DIAGNOSIS — R20.2 NUMBNESS AND TINGLING: ICD-10-CM

## 2017-07-25 RX ORDER — CYCLOBENZAPRINE HCL 10 MG
5 TABLET ORAL
Qty: 30 TAB | Refills: 0 | Status: SHIPPED | OUTPATIENT
Start: 2017-07-25 | End: 2019-01-25

## 2017-07-25 RX ORDER — LISINOPRIL AND HYDROCHLOROTHIAZIDE 12.5; 2 MG/1; MG/1
1 TABLET ORAL DAILY
Qty: 30 TAB | Refills: 5 | Status: SHIPPED | OUTPATIENT
Start: 2017-07-25 | End: 2018-08-09 | Stop reason: SDUPTHER

## 2017-07-25 NOTE — LETTER
Name:Tianna Boo :1965 MR #:[id number] Provider Name:Jessica Liang NP  
*RGCZ-031* BSMG-491 () Page 1 of 5 Initial [employer] CONTROLLED SUBSTANCE AGREEMENT I may be prescribed medications that are controlled substances as part  of my treatment plan for management of my medical condition(s). The goal of my treatment plan is to maintain and/or improve my health and wellbeing. Because controlled substances have an increased risk of abuse or harm, continual re-evaluation is needed determine if the goals of my treatment plan are being met for my safety and the safety of others. I Gregg Cherelle  am entering into this Controlled Substance Agreement with my provider, Jodie Hernández NP at [facility] . I understand that successful treatment requires mutual trust and honesty between me and my provider. I understand that there are state and federal laws and regulations which apply to the medications that my provider may prescribe that must be followed. I understand there are risks and benefits ts of taking the medicines that my provider may prescribe. I understand and agree that following this Agreement is necessary in continuing my provider-patient relationship and success of my treatment plan. As a part of my treatment plan, I agree to the following: COMMUNICATION: 
 
1. I will communicate fully with my provider about my medical condition(s), including the effect on my daily life and how well my medications are helping. I will tell my provider all of the medications that I take for any reason, including medications I receive from another health care provider, and will notify my provider about all issues, problems or concerns, including any side effects, which may be related to my medications.  
 
I understand that this information allows my provider to adjust my treatment plan to help manage my medical condition. I understand that this information will become part of my permanent medical record. 2. I will notify my provider if I have a history of alcohol/drug misuse/addiction or if I have had treatment for alcohol/drug addiction in the past, or if I have a new problem with or concern about alcohol/drug use/addiction, because this increases the likelihood of high risk behaviors and may lead to serious medical conditions. 3. Females Only: I will notify my provider if I am or become pregnant, or if I intend to become pregnant, or if I intend to breastfeed. I understand that communication of these issues with my provider is important, due to possible effects my medication could have on an unborn fetus or breastfeeding child. Name:Tianna Saldana :1965 MR #:[id number] Provider Name:Jessica Alanis NP  
*WSET-767* BSMG-491 () Page 2 of 5 Initial [employer] MISUSE OF MEDICATIONS / DRUGS: 
 
1. I agree to take all controlled substances as prescribed, and will not misuse or abuse any controlled substances prescribed by my provider. For my safety, I will not increase the amount of medicine I take without first talking with and getting permission from my provider. 2. If I have a medical emergency, another health care provider may prescribe me medication. If I seek emergency treatment, I will notify my provider within seventy-two (72) hours. 3. I understand that my provider may discuss my use and/or possible misuse/abuse of controlled substances and alcohol, as appropriate, with any health care provider involved in my care, pharmacist or legal authority. ILLEGAL DRUGS: 
 
1. I will not use illegal drugs of any kind, including but not limited to marijuana, heroin, cocaine, or any prescription drug which is not prescribed to me.  
 
DRUG DIVERSION / PRESCRIPTION FRAUD: 
 
 1. I will not share, sell, trade, give away, or otherwise misuse my prescriptions or medications. 2. I will not alter any prescriptions provided to me by my provider. SINGLE PROVIDER: 
 
1. I agree that all controlled substances that I take will be prescribed only by my provider (or his/her covering provider) under this Agreement. This agreement does not prevent me from seeking emergency medical treatment or receiving pain management related to a surgery. PROTECTING MEDICATIONS: 
 
1. I am responsible for keeping my prescriptions and medications in a safe and secure place including safeguarding them from loss or theft. I understand that lost, stolen or damaged/destroyed prescriptions or medications will not be replaced. Name:.Hoang Nava :1965 MR #:[id number] Provider Name:Jessica Blanchard NP  
*LBYG-966* BSMG-491 () Page 3 of 5 Initial [employer] PRESCRIPTION RENEWALS/REFILLS: 
 
 
1. I authorize my provider and my pharmacy to cooperate fully with any local, state, or federal law enforcement agency in the investigation of any possible misuse, sale, or other diversion of my controlled substance prescriptions or medications. RISKS: 
 
 
1. I understand that if I do not adhere to this Agreement in any way, my provider may change my prescriptions, stop prescribing controlled substances or end our provider-patient relationship. 2. If my provider decides to stop prescribing medication, or decides to end our provider-patient relationship,my provider may require that I taper my medications slowly. If necessary, my provider may also provide a prescription for other medications to treat my withdrawal symptoms. UNDERSTANDING THIS AGREEMENT: 
 
I understand that my provider may adjust or stop my prescriptions for controlled substances based on my medical condition and my treatment plan. I understand that this Agreement does not guarantee that I will be prescribed medications or controlled substances. I understand that controlled substances may be just one part of my treatment plan. My initial on each page and my signature below shows that I have read each page of this Agreement, I have had an opportunity to ask questions, and all of my questions have been answered to my satisfaction by my provider. By signing below, I agree to comply with this Agreement, and I understand that if I do not follow the Agreements listed above, my provider may stop 
 
 
 
_________________________________________  Date/Time 7/25/2017 2:54 PM   
             (Patient Signature)

## 2017-07-25 NOTE — PROGRESS NOTES
[facility]  [address] [facility], [address]  [phone] office/[phone] fax      7/25/2017    Reason for visit:   Chief Complaint   Patient presents with    Results    Shoulder Pain     right shoulder pain level 5/10 x 1-2 months       Patient: Jose Lim, 1965, [id number]       Primary MD: Amber Keller NP    Subjective:   Jose Lim, a 47 y.o. male, who presents for Results and Shoulder Pain (right shoulder pain level 5/10 x 1-2 months)      Shoulder Pain    The incident occurred at home. There was no injury mechanism. The right shoulder is affected. The pain is at a severity of 5/10. The pain is moderate. The pain has been fluctuating since onset. The pain does not radiate. There is no history of shoulder injury. He has no other injuries. There is no history of shoulder surgery. Associated symptoms include tingling. Pertinent negatives include no numbness and no muscle weakness. He reports no foreign bodies present. Diabetes Follow Up  Known diabetic complications: peripheral neuropathy  Cardiovascular risk factors: dyslipidemia, diabetes mellitus, male gender, hypertension  Current diabetic medications include See MAR. Eye exam current (within one year): yes  Weight trend: stable  Prior visit with dietician: no  Current diet: \"healthy\" diet  in general  Current exercise: walking    Current monitoring regimen: office lab tests - 3 times yearly  Home blood sugar records: trend: decreasing steadily  Any episodes of hypoglycemia? no    Is She on ACE inhibitor or angiotensin II receptor blocker?    Yes   Prinzide    Past Medical History:   Diagnosis Date    Cataract, right 2010    Diabetes ([facility] [address]     History of vitamin D deficiency 6/2016    Hypercholesterolemia     Hypertension     Hypertriglyceridemia 6/30/2016    Hypokalemia 6/30/2016    Low back pain     Numbness and tingling of foot June 2014    left toes and mid bottom of foot    Scrotal mass 2016       Past Surgical History:   Procedure Laterality Date    HX HERNIA REPAIR  6195    umbilical, done at [address] TISS FACE/SCALP SUBQ 2+CM  16    scalp lesion removal, Dr. Orlando Pennington History     Social History    Marital status: SINGLE     Spouse name: N/A    Number of children: 0    Years of education: 6     Occupational History    unemployed and living with son and daughter-in-law, after incarceration 2011 out 2014      Social History Main Topics    Smoking status: Former Smoker    Smokeless tobacco: Never Used      Comment: stopped in HS, never heavy and never long term    Alcohol use No      Comment: rarely holiday    Drug use: No    Sexual activity: Yes      Comment: no partner, spouse  2014     Other Topics Concern   Yudi Feliciano, from 0920-1086, other than honorable discharge    Blood Transfusions No    Caffeine Concern No     decreased his 2-3 super big gulps    Occupational Exposure Yes     ? 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 Indications: type 2 diabetes mellitus 90 Tab 3    mometasone (NASONEX) 50 mcg/actuation nasal spray 2 Sprays by Both Nostrils route daily. Indications: ALLERGIC RHINITIS 2 Container 0    meloxicam (MOBIC) 15 mg tablet TAKE ONE TABLET BY MOUTH ONCE DAILY 30 Tab 0    pitavastatin (LIVALO) 2 mg tablet Take 1 Tab by mouth daily. To start once Crestor complete.  90 Tab 3    saxagliptin-metFORMIN (KOMBIGLYZE XR) 2.5-1,000 mg TM24 Take 2 Tabs by mouth daily (with dinner). 180 Tab 3    omega-3 acid ethyl esters (LOVAZA) 1 gram capsule Take 2 Caps by mouth daily (with breakfast). 180 Cap 3    pregabalin (LYRICA) 75 mg capsule Take 1 Cap by mouth two (2) times a day. Max Daily Amount: 150 mg. 60 Cap 3    meloxicam (MOBIC) 15 mg tablet Take 1 Tab by mouth daily. 30 Tab 3    mometasone (NASONEX) 50 mcg/actuation nasal spray 2 Sprays by Both Nostrils route daily. 2 Container 0    desloratadine (CLARINEX) 5 mg tablet Take 1 Tab by mouth daily. 90 Tab 3    potassium 99 mg tablet Take 1 Tab by mouth daily.  multivitamin (ONE A DAY) tablet Take 1 Tab by mouth daily.  loratadine (CLARITIN) 10 mg tablet Take 1 Tab by mouth daily. Indications: ALLERGIC RHINITIS 30 Tab 1     No current facility-administered medications on file prior to visit. Review of Systems   Constitutional: Negative. HENT: Negative. Eyes: Positive for blurred vision (right eye can see shadows, has cataracts in right eye. Has [facility] 8/3/17 for pre-op exam for surgery). Respiratory: Negative. Cardiovascular: Negative. Genitourinary: Negative. Musculoskeletal: Positive for joint pain (Right shoulder ) and neck pain. Negative for back pain, falls and myalgias. Reports some relief with flexiril but reports he's out    Skin: Negative. Neurological: Positive for tingling. Negative for numbness. Psychiatric/Behavioral: Negative.         Objective:   Visit Vitals    BP (!) 131/91    Pulse 83    Temp 98 °F (36.7 °C) (Oral)    Resp 20    Ht 5' 11\" (1.803 m)    Wt 214 lb (97.1 kg)    SpO2 96%    BMI 29.85 kg/m2      Wt Readings from Last 3 Encounters:   07/25/17 214 lb (97.1 kg)   06/16/17 216 lb 3.2 oz (98.1 kg)   06/14/17 231 lb 9.6 oz (105.1 kg)     Lab Results   Component Value Date/Time    Glucose 107 07/18/2017 09:44 AM    Glucose (POC) 158 01/04/2016 11:52 AM    Glucose  10/19/2016 01:10 PM         Physical Exam   Constitutional: He is oriented to person, place, and time. He appears well-developed and well-nourished. HENT:   Head: Normocephalic. Eyes: Pupils are equal, round, and reactive to light. Neck: Normal range of motion. Neck supple. No JVD present. Carotid bruit is not present. No thyromegaly present. Cardiovascular: Normal rate and regular rhythm. No murmur heard. Pulmonary/Chest: Effort normal and breath sounds normal. No respiratory distress. He has no wheezes. Abdominal: Soft. Bowel sounds are normal. He exhibits no distension. There is no tenderness. Musculoskeletal: Normal range of motion. He exhibits no edema or deformity. Cervical back: He exhibits pain and spasm. Neurological: He is alert and oriented to person, place, and time. He has normal reflexes. Skin: Skin is warm and dry. Psychiatric: He has a normal mood and affect. His behavior is normal. Judgment and thought content normal.   Vitals reviewed. CERVICAL SPINE COMPLETE     CPT CODE: 50511     COMPARISON: None     INDICATIONS: Neck and shoulder pain with right-sided radiculopathy for one week     FINDINGS: AP, lateral,  both oblique, and odontoid views are obtained. Disc  space narrowing is present at the C6-C7 level. Mild discal spurring is present  at the C5-C6 and C6-C7 levels. Bilateral neural foraminal narrowing is present  at the C6-C7 level. I see no fracture or subluxation. Prevertebral soft tissues  and predental space are normal.     IMPRESSION  Impression:      Degenerative disc disease and osteoarthritic changes primarily at the C6-C7  level as described above.   Assessment:    David Gold who has risk factors including (see above previous medical hx) and:       ICD-10-CM ICD-9-CM    1. DM type 2 with diabetic peripheral neuropathy ([facility] [address] E11.42 250.60 CBC WITH AUTOMATED DIFF     357.2 HEMOGLOBIN A1C WITH EAG      LIPID PANEL      METABOLIC PANEL, COMPREHENSIVE      PROSTATE SPECIFIC AG (PSA)   2. Essential hypertension I10 401.9 lisinopril-hydroCHLOROthiazide (PRINZIDE, ZESTORETIC) 20-12.5 mg per tablet      LIPID PANEL      METABOLIC PANEL, COMPREHENSIVE      MAGNESIUM      MICROALBUMIN, UR, RAND W/ MICROALBUMIN/CREA RATIO   3. Controlled substance agreement signed Z79.899 V58.69 DRUG SCREEN, URINE   4. Colon cancer screening Z12.11 V76.51 OCCULT BLOOD, IMMUNOASSAY (FIT)   5. Neck and shoulder pain M54.2 723.1 cyclobenzaprine (FLEXERIL) 10 mg tablet    M25.519 719.41 REFERRAL TO PHYSICAL THERAPY   6. Numbness and tingling R20.0 782.0 cyclobenzaprine (FLEXERIL) 10 mg tablet    R20.2  REFERRAL TO PHYSICAL THERAPY   7. Vitamin D deficiency E55.9 268.9 VITAMIN D, 25 HYDROXY     1. DM type 2 with diabetic peripheral neuropathy ([facility] [address]  -The current medical regimen is effective;  continue present plan and medications. - CBC WITH AUTOMATED DIFF; Future  - HEMOGLOBIN A1C WITH EAG; Future  - LIPID PANEL; Future  - METABOLIC PANEL, COMPREHENSIVE; Future  - PROSTATE SPECIFIC AG; Future    2. Essential hypertension  - lisinopril-hydroCHLOROthiazide (PRINZIDE, ZESTORETIC) 20-12.5 mg per tablet; Take 1 Tab by mouth daily. Indications: hypertension  Dispense: 30 Tab; Refill: 5  - LIPID PANEL; Future  - METABOLIC PANEL, COMPREHENSIVE; Future  - MAGNESIUM; Future  - MICROALBUMIN, UR, RAND W/ MICROALBUMIN/CREA RATIO; Future    3. Controlled substance agreement signed  -Patient is on Lyrica for neuropathy   - reviewed  -Controlled contract signed  - [address]; Future    4. Colon cancer screening  - OCCULT BLOOD, IMMUNOASSAY (FIT); Future    5. Neck and shoulder pain  -Xray with DDD and OA Continue mobic and muscle relaxant. Will refer to PT for further therapy. - cyclobenzaprine (FLEXERIL) 10 mg tablet; Take 0.5 Tabs by mouth three (3) times daily as needed (for pain and muscle spasms). Dispense: 30 Tab; Refill: 0  - REFERRAL TO PHYSICAL THERAPY    6.  Numbness and tingling  - cyclobenzaprine (FLEXERIL) 10 mg tablet; Take 0.5 Tabs by mouth three (3) times daily as needed (for pain and muscle spasms). Dispense: 30 Tab; Refill: 0  - REFERRAL TO PHYSICAL THERAPY    7. Vitamin D deficiency  - VITAMIN D, 25 HYDROXY; Future      Written instructions followed our verbal discussion of all information discussed above, pending tests ordered and future goals/plans. Patient expressed understanding of current diagnosis, planned testing, follow up and if needed to contact the office for any questions or concerns prior to the next visit. Plan:   Med reconciliation completed with patient. Reviewed side effects of medications with the patient. Questions were answered and patient verb understanding. Discussed lab results with patient  Displays most recent values of common labs: H&H, WBC, Platelets, ALT, AST, BUN, Creat, Na, K, TSH, HgbA1c, Lipids, INR and/or PSA. For additional labs, please use Results Review or Flowsheets.     Lab Results   Component Value Date/Time    WBC 6.6 10/12/2016 09:49 AM    HGB 14.7 10/12/2016 09:49 AM    HCT 41.9 10/12/2016 09:49 AM    PLATELET 994 05/85/6268 09:49 AM       Lab Results   Component Value Date/Time    ALT (SGPT) 29 07/18/2017 09:44 AM    AST (SGOT) 14 07/18/2017 09:44 AM    Creatinine 0.86 07/18/2017 09:44 AM    BUN 21 07/18/2017 09:44 AM    Sodium 140 07/18/2017 09:44 AM    Potassium 3.9 07/18/2017 09:44 AM       Lab Results   Component Value Date/Time    Cholesterol, total 146 07/18/2017 09:44 AM    HDL Cholesterol 32 07/18/2017 09:44 AM    LDL, calculated 80.8 07/18/2017 09:44 AM    Triglyceride 166 07/18/2017 09:44 AM    TSH 1.24 08/03/2015 03:03 PM    Hemoglobin A1c 6.2 07/18/2017 09:44 AM    INR 1.0 08/03/2015 03:03 PM       Lab Results   Component Value Date/Time    Prostate Specific Ag 1.4 08/03/2015 03:03 PM       Orders Placed This Encounter    CBC WITH AUTOMATED DIFF     Standing Status:   Future     Standing Expiration Date: 4/1/2018    HEMOGLOBIN A1C WITH EAG     Standing Status:   Future     Standing Expiration Date:   4/1/2018    LIPID PANEL     Standing Status:   Future     Standing Expiration Date:   6/0/1106    METABOLIC PANEL, COMPREHENSIVE     Standing Status:   Future     Standing Expiration Date:   4/1/2018    MAGNESIUM     Standing Status:   Future     Standing Expiration Date:   4/1/2018    MICROALBUMIN, UR, RAND W/ MICROALBUMIN/CREA RATIO     Standing Status:   Future     Standing Expiration Date:   4/1/2018    PROSTATE SPECIFIC AG     Standing Status:   Future     Standing Expiration Date:   4/1/2018    VITAMIN D, 25 HYDROXY     Standing Status:   Future     Standing Expiration Date:   4/1/2018    DRUG SCREEN, URINE     Standing Status:   Future     Standing Expiration Date:   4/1/2018    OCCULT BLOOD, IMMUNOASSAY (FIT)     Standing Status:   Future     Standing Expiration Date:   7/26/2018    REFERRAL TO PHYSICAL THERAPY     Referral Priority:   Routine     Referral Type:   PT/OT/ST     Referral Reason:   Specialty Services Required    cyclobenzaprine (FLEXERIL) 10 mg tablet     Sig: Take 0.5 Tabs by mouth three (3) times daily as needed (for pain and muscle spasms). Dispense:  30 Tab     Refill:  0    lisinopril-hydroCHLOROthiazide (PRINZIDE, ZESTORETIC) 20-12.5 mg per tablet     Sig: Take 1 Tab by mouth daily. Indications: hypertension     Dispense:  30 Tab     Refill:  5     Current Outpatient Prescriptions   Medication Sig Dispense Refill    cyclobenzaprine (FLEXERIL) 10 mg tablet Take 0.5 Tabs by mouth three (3) times daily as needed (for pain and muscle spasms). 30 Tab 0    lisinopril-hydroCHLOROthiazide (PRINZIDE, ZESTORETIC) 20-12.5 mg per tablet Take 1 Tab by mouth daily. Indications: hypertension 30 Tab 5    dapagliflozin (FARXIGA) 10 mg tab Take 1 Tab by mouth daily (with breakfast).  Indications: type 2 diabetes mellitus 90 Tab 3    mometasone (NASONEX) 50 mcg/actuation nasal spray 2 Sprays by Both Nostrils route daily. Indications: ALLERGIC RHINITIS 2 Container 0    meloxicam (MOBIC) 15 mg tablet TAKE ONE TABLET BY MOUTH ONCE DAILY 30 Tab 0    pitavastatin (LIVALO) 2 mg tablet Take 1 Tab by mouth daily. To start once Crestor complete. 90 Tab 3    saxagliptin-metFORMIN (KOMBIGLYZE XR) 2.5-1,000 mg TM24 Take 2 Tabs by mouth daily (with dinner). 180 Tab 3    omega-3 acid ethyl esters (LOVAZA) 1 gram capsule Take 2 Caps by mouth daily (with breakfast). 180 Cap 3    pregabalin (LYRICA) 75 mg capsule Take 1 Cap by mouth two (2) times a day. Max Daily Amount: 150 mg. 60 Cap 3    meloxicam (MOBIC) 15 mg tablet Take 1 Tab by mouth daily. 30 Tab 3    mometasone (NASONEX) 50 mcg/actuation nasal spray 2 Sprays by Both Nostrils route daily. 2 Container 0    desloratadine (CLARINEX) 5 mg tablet Take 1 Tab by mouth daily. 90 Tab 3    potassium 99 mg tablet Take 1 Tab by mouth daily.  multivitamin (ONE A DAY) tablet Take 1 Tab by mouth daily.  loratadine (CLARITIN) 10 mg tablet Take 1 Tab by mouth daily. Indications: ALLERGIC RHINITIS 30 Tab 1     Medications Discontinued During This Encounter   Medication Reason    ibuprofen (MOTRIN) 800 mg tablet Alternate Therapy    cyclobenzaprine (FLEXERIL) 10 mg tablet Reorder    lisinopril-hydroCHLOROthiazide (PRINZIDE, ZESTORETIC) 20-12.5 mg per tablet Reorder       Follow-up Disposition:  Return in about 6 months (around 1/25/2018), or if symptoms worsen or fail to improve. Labs needed for follow-up appt      Jessica Baker, [address]      I spent 35 minutes with the patient in face-to-face consultation, of which greater than 50% was spent in counseling and coordination of care as described above.

## 2017-07-25 NOTE — MR AVS SNAPSHOT
Visit Information Date & Time Provider Department Dept. Phone Encounter #  
 7/25/2017  2:30 PM Homer Boone NP [address] [facility] [id number] Follow-up Instructions Return in about 6 months (around 1/25/2018), or if symptoms worsen or fail to improve. Upcoming Health Maintenance Date Due  
 EYE EXAM RETINAL OR DILATED Q1 1/2/1976 DTaP/Tdap/Td series (1 - Tdap) 11/24/2011 FOBT Q 1 YEAR AGE 50-75 1/2/2016 Pneumococcal 19-64 Medium Risk (1 of 1 - PPSV23) 1/23/2018* INFLUENZA AGE 9 TO ADULT 8/1/2017 FOOT EXAM Q1 10/20/2017 MICROALBUMIN Q1 1/11/2018 HEMOGLOBIN A1C Q6M 1/18/2018 LIPID PANEL Q1 7/18/2018 *Topic was postponed. The date shown is not the original due date. Allergies as of 7/25/2017  Review Complete On: 7/25/2017 By: Alex Epps. Uri Posada LPN No Known Allergies Current Immunizations  Reviewed on 10/22/2015 Name Date Influenza Vaccine Dariela Neil) 10/12/2016, 10/22/2015 Td 11/23/2011 Not reviewed this visit You Were Diagnosed With   
  
 Codes Comments DM type 2 with diabetic peripheral neuropathy (HCC)    -  Primary ICD-10-CM: E11.42 
ICD-9-CM: 250.60, 357.2 Essential hypertension     ICD-10-CM: I10 
ICD-9-CM: 401.9 Controlled substance agreement signed     ICD-10-CM: Z79.899 ICD-9-CM: V58.69 Colon cancer screening     ICD-10-CM: Z12.11 ICD-9-CM: V76.51 Neck and shoulder pain     ICD-10-CM: M54.2, M25.519 ICD-9-CM: 723.1, 719.41 Numbness and tingling     ICD-10-CM: R20.0, R20.2 ICD-9-CM: 782.0 Essential hypertension with goal blood pressure less than 130/85     ICD-10-CM: I10 
ICD-9-CM: 401.9 Vitals BP Pulse Temp Resp Height(growth percentile) Weight(growth percentile) (!) 131/91 83 98 °F (36.7 °C) (Oral) 20 5' 11\" (1.803 m) 214 lb (97.1 kg) SpO2 BMI Smoking Status 96% 29.85 kg/m2 Former Smoker Vitals History BMI and BSA Data Body Mass Index Body Surface Area  
 29.85 kg/m 2 2.21 m 2 Preferred Pharmacy Pharmacy Name Phone [facility] [address] [phone] Your Updated Medication List  
  
   
This list is accurate as of: 7/25/17  2:58 PM.  Always use your most recent med list.  
  
  
  
  
 cyclobenzaprine 10 mg tablet Commonly known as:  FLEXERIL Take 0.5 Tabs by mouth three (3) times daily as needed (for pain and muscle spasms). dapagliflozin 10 mg Tab Commonly known as:  [employer] Take 1 Tab by mouth daily (with breakfast). Indications: type 2 diabetes mellitus  
  
 desloratadine 5 mg tablet Commonly known as:  CLARINEX Take 1 Tab by mouth daily. lisinopril-hydroCHLOROthiazide 20-12.5 mg per tablet Commonly known as:  Coye David Take 1 Tab by mouth daily. Indications: hypertension  
  
 loratadine 10 mg tablet Commonly known as:  Rodriguez Canavan Take 1 Tab by mouth daily. Indications: ALLERGIC RHINITIS  
  
 * meloxicam 15 mg tablet Commonly known as:  MOBIC  
TAKE ONE TABLET BY MOUTH ONCE DAILY * meloxicam 15 mg tablet Commonly known as:  MOBIC Take 1 Tab by mouth daily. * mometasone 50 mcg/actuation nasal spray Commonly known as:  NASONEX  
2 Sprays by Both Nostrils route daily. Indications: ALLERGIC RHINITIS * mometasone 50 mcg/actuation nasal spray Commonly known as:  NASONEX  
2 Sprays by Both Nostrils route daily. multivitamin tablet Commonly known as:  ONE A DAY Take 1 Tab by mouth daily. omega-3 acid ethyl esters 1 gram capsule Commonly known as:  Sabino Shook Take 2 Caps by mouth daily (with breakfast). pitavastatin 2 mg tablet Commonly known as:  LIVALO Take 1 Tab by mouth daily. To start once Crestor complete. potassium 99 mg tablet Take 1 Tab by mouth daily. pregabalin 75 mg capsule Commonly known as:  [place] Take 1 Cap by mouth two (2) times a day. Max Daily Amount: 150 mg. sAXagliptin-metFORMIN 2.5-1,000 mg Tm24 Commonly known as:  KOMBIGLYZE XR Take 2 Tabs by mouth daily (with dinner). * Notice: This list has 4 medication(s) that are the same as other medications prescribed for you. Read the directions carefully, and ask your doctor or other care provider to review them with you. Prescriptions Sent to Pharmacy Refills  
 cyclobenzaprine (FLEXERIL) 10 mg tablet 0 Sig: Take 0.5 Tabs by mouth three (3) times daily as needed (for pain and muscle spasms). Class: Normal  
 Pharmacy: [place] [id number]. Ph #: [phone] Route: Oral  
 lisinopril-hydroCHLOROthiazide (PRINZIDE, ZESTORETIC) 20-12.5 mg per tablet 5 Sig: Take 1 Tab by mouth daily. Indications: hypertension Class: Normal  
 Pharmacy: [place] [id number]. Ph #: [phone] Route: Oral  
  
We Performed the Following REFERRAL TO PHYSICAL THERAPY [CVZ10 Custom] Follow-up Instructions Return in about 6 months (around 1/25/2018), or if symptoms worsen or fail to improve. Referral Information Referral ID Referred By Referred To  
  
 [id number] Adam PRIETO Not Available Visits Status Start Date End Date 1 New Request 7/25/17 7/25/18 If your referral has a status of pending review or denied, additional information will be sent to support the outcome of this decision. Please provide this summary of care documentation to your next provider. Your primary care clinician is listed as Erlinda [place]. If you have any questions after today's visit, please call [phone].

## 2017-07-26 ENCOUNTER — HOSPITAL ENCOUNTER (OUTPATIENT)
Dept: LAB | Age: 52
Discharge: HOME | End: 2017-07-26

## 2017-07-26 DIAGNOSIS — Z12.11 COLON CANCER SCREENING: ICD-10-CM

## 2017-07-26 PROCEDURE — 82274 ASSAY TEST FOR BLOOD FECAL: CPT | Performed by: NURSE PRACTITIONER

## 2017-07-26 NOTE — TELEPHONE ENCOUNTER
Medication: Kombiglyze 2.5/1000, dose: 2 tab, how often: qd , current number of medication days provided: 90, refill per application. Lot #: [id number], EXP 07/2018. This medication was received and verified for the following 1. Correct Patient, 2. Correct Diagnosis, 3. Correct Drug, 4. Correct route, and no current allergy to medication. Please contact patient to come  their medications.      Kenney Carranza, MSN,RN,[facility]

## 2017-08-01 LAB — HEMOCCULT STL QL IA: NEGATIVE

## 2017-08-02 ENCOUNTER — HOSPITAL ENCOUNTER (OUTPATIENT)
Dept: PHYSICAL THERAPY | Age: 52
Discharge: HOME | End: 2017-08-02
Payer: SELF-PAY

## 2017-08-02 PROCEDURE — 97530 THERAPEUTIC ACTIVITIES: CPT

## 2017-08-02 PROCEDURE — 97161 PT EVAL LOW COMPLEX 20 MIN: CPT

## 2017-08-02 PROCEDURE — 97110 THERAPEUTIC EXERCISES: CPT

## 2017-08-02 NOTE — PROGRESS NOTES
PT DAILY TREATMENT NOTE/SHOULDER EVAL 3-16    Patient Name: Bella Jones  Date:2017  : 1965  [x]  Patient  Verified  Payor: SELF PAY / Plan: [facility] SELF PAY / Product Type: Self Pay /    In time: 8:47  Out time:9:33  Total Treatment Time (min): 46  Total Timed Codes (min): 16  1:1 Treatment Time ( only): 36   Visit #: 1 of     Treatment Area: Cervicalgia [M54.2]  Anesthesia of skin [R20.0]  Paresthesia of skin [R20.2]  Pain in shoulder [M25.519]    SUBJECTIVE  Pain Level (0-10 scale): 3-4  [x]constant [x]intermittent []improving []worsening []no change since onset    Any medication changes, allergies to medications, adverse drug reactions, diagnosis change, or new procedure performed?: [x] No    [] Yes (see summary sheet for update)  Subjective functional status/changes:     PLOF: independent with all mobility/ADLs performance  Limitations to PLOF: none  Mechanism of Injury: unkonw  Current symptoms/Complaints: driving  Previous Treatment/Compliance: pain med  PMHx/Surgical Hx: unremarkable with R shoulder  Work Hx: unemployed  Living Situation: living alone  Pt Goals: \"relief\"  Barriers: []pain []financial []time []transportation []other  Motivation: yes  Substance use: []Alcohol []Tobacco []other:   FABQ Score: []low []elevate  Cognition: A & O x 4    Other:    OBJECTIVE/EXAMINATION      CC: \"I have pain in the shoulder blade and running down into my R arm, I have numbness and tingling around my arm, hand. \"    Modality rationale: decrease pain, increase tissue extensibility and increase muscle contraction/control to improve the patients ability to tolerate ADLs   Min Type Additional Details    [] Estim:  []Unatt       []IFC  []Premod                        []Other:  []w/ice   []w/heat  Position:  Location:    [] Estim: []Att    []TENS instruct  []NMES                    []Other:  []w/US   []w/ice   []w/heat  Position:  Location:    []  Traction: [] Cervical       []Lumbar [] Prone          []Supine                       []Intermittent   []Continuous Lbs:  [] before manual  [] after manual    []  Ultrasound: []Continuous   [] Pulsed                           []1MHz   []3MHz Location:  W/cm2:    []  Iontophoresis with dexamethasone         Location: [] Take home patch   [] In clinic   10 []  Ice     []  heat  []  Ice massage  []  Laser   []  Anodyne Position: long sitting  Location: upper back    []  Laser with stim  []  Other: Position:  Location:    []  Vasopneumatic Device Pressure:       [] lo [] med [] hi   Temperature: [] lo [] med [] hi   [x] Skin assessment post-treatment:  [x]intact []redness- no adverse reaction    []redness - adverse reaction:     20 min [x]Eval                  []Re-Eval       8 min Therapeutic Exercise:  [x] See flow sheet :   Rationale: increase ROM and increase strength to improve the patients ability to perform ADLs comfortably.     8 min Therapeutic Activity:  []  See flow sheet: pt education on posture, use of tennis ball for TPR, modalities, POC, DN concept and purpose in scope of practice   Rationale: increase ROM, increase strength, improve coordination and increase proprioception  to improve the patients ability to perform ADLs comfortably     With   [] TE   [] TA   [] neuro   [] other: Patient Education: [x] Review HEP    [] Progressed/Changed HEP based on:   [] positioning   [] body mechanics   [] transfers   [] heat/ice application    [] other:      Other Objective/Functional Measures: BP: 124/82 mmHg    Physical Therapy Evaluation - Shoulder    Posture: [] Poor    [x] Fair with forwarded head, min slouching     [] Good    Describe:      Cervical   Full functional ROM with all except lat: 30 degrees toward L, 25 degrees toward R and pain with this      ROM: full functional ROM  [] Unable to assess at this time                                           AROM                                                              PROM   Left Right Left Right   Flexion   Flexion     Extension   Extension     Scaption/ABD   Scaptin/ABD     ER @ 0 Degrees   ER @ 0 Degrees     ER @ 90 Degrees   ER @ 90 Degrees     IR @ 90 Degrees   IR @ 90 Degrees       End Feel / Painful Arc:    Strength:   [] Unable to assess at this time                                                                             L (1-5) R (1-5) Pain   Flexors [facility] WFL [] Yes   [] No   Abductors WFL WFL [] Yes   [] No   External Rotators   [] Yes   [] No   Internal Rotators   [] Yes   [] No   Supraspinatus   [] Yes   [] No   Serratus Anterior 4 4 [] Yes   [] No   Lower Trapezius 3 3 [] Yes   [] No   Elbow Flexion [facility] WFL [] Yes   [] No   Elbow Extension [facility] WFL [] Yes   [] No       Scapulohumoral Control / Rhythm:  Able to eccentrically lower with good control?  Left: [x] Yes   [] No     Right: [x] Yes   [] No    Accessory Motions:    Palpation  [] Min  [] Mod  [x] Severe    Location:hypertonicity of suboccipital muscles  [] Min  [x] Mod  [] Severe    Location: hypertonicity and positive trigger point of mid trap/parascapular muscle of R side   [] Min  [] Mod  [] Severe    Location:    Optional Tests:  WNL with L UE, numbness and tingling with UE  Sensation Left Right Reflexes Left Right   Biceps (C5)   Biceps (C5)     Coughlin Radial(C6-7)   Brachioradialis (C6)     Coughlin Ulnar(C8-T1)   Triceps (C7)       Adson's Test  [] Pos   [] Neg Yergason's Test [] Pos   [] Neg  Loren's Test  [] Pos   [] Neg Baker's Sign [] Pos   [] Neg  Neer's Test  [] Pos   [] Neg Clunk Test  [] Pos   [] Neg  Hawkin's Test  [] Pos   [] Neg AC Joint  [] Pos   [] Neg  Speed's Test  [] Pos   [] Neg SC Joint  [] Pos   [] Neg  Empty Can  [] Pos   [x] Neg Pectoral Tightness [] Pos   [] Neg  Anterior Apprehension [] Pos   [] Neg   Posterior Apprehension [] Pos   [] Neg      Other Tests / Comments:    Negative Phalen's, Spurling to R with and without compression, Alar's test   Positive Bakody's sign    Pain Level (0-10 scale) post treatment: 2/10    ASSESSMENT/Changes in Function: see POC    Patient will continue to benefit from skilled PT services to modify and progress therapeutic interventions, address functional mobility deficits, address ROM deficits, address strength deficits, analyze and address soft tissue restrictions, analyze and cue movement patterns, analyze and modify body mechanics/ergonomics, assess and modify postural abnormalities and instruct in home and community integration to attain remaining goals.      [x]  See Plan of Care  []  See progress note/recertification  []  See Discharge Summary         Progress towards goals / Updated goals:  See POC    PLAN  []  Upgrade activities as tolerated     [x]  Continue plan of care  []  Update interventions per flow sheet       []  Discharge due to:_  []  Other:_      Ilana Agent, PT 8/2/2017  8:51 AM

## 2017-08-02 NOTE — PROGRESS NOTES
[facility] - [facility]  [address] [place] [address]  [phone] fax    Plan of Care/ Statement of Necessity for Physical Therapy Services    Patient name: Latrell Pina Start of Care: 2017   Referral source: Debborah Klinefelter, NP : 1965    Medical Diagnosis: Cervicalgia [M54.2]  Anesthesia of skin [R20.0]  Paresthesia of skin [R20.2]  Pain in shoulder [M25.519]   Onset Date:aabout 2 months ago    Treatment Diagnosis: R upper back/shoulder pain and numbness of R UE   Prior Hospitalization: see medical history Provider#: [id number]   Medications: Verified on Patient summary List    Comorbidities: DM type 2, HTN, LBP, numbness of L foot   Prior Level of Function: independent with all ADLs/driving     The Plan of Care and following information is based on the information from the initial evaluation. Assessment/ beckett information: Mr. Jennifer Joseph is a 48 y/o M pt with CC of pain locating along his R scapula and numbness and tingling of R UE. The problem started about 2 months ago and has been causing difficulty with driving for prolonged period of time, pt couldn't find consistent relief with pain med. Imaging done included Xray of cervical spine showing normal degenerative changes. Pt presented with hypertonicity of suboccipital and R parascapular musculature, especially mid and lower trap with positive trigger point. He also demonstrated positive Bakody's sign, decreased sensation of R UE, min decreased AROM of cervical spine, decreased strength of upper back musculature, decreased core strength poor posture with forwarded head. Negative Spurling's, Negative Empty Cane, Negative Drop Arm, negative Alar ligament test. Pt would benefit from skilled PT to address these deficits above. Educated pt on Dry Needling in scope of practice.     Evaluation Complexity History HIGH Complexity :3+ comorbidities / personal factors will impact the outcome/ POC ; Examination HIGH Complexity : 4+ Standardized tests and measures addressing body structure, function, activity limitation and / or participation in recreation  ;Presentation LOW Complexity : Stable, uncomplicated  ;Clinical Decision Making MEDIUM Complexity : FOTO score of 26-74  Overall Complexity Rating: LOW   Problem List: pain affecting function, decrease ROM, decrease strength, edema affecting function, decrease ADL/ functional abilitiies, decrease activity tolerance, decrease flexibility/ joint mobility and decrease transfer abilities   Treatment Plan may include any combination of the following: Therapeutic exercise, Therapeutic activities, Neuromuscular re-education, Physical agent/modality, Manual therapy, Patient education, Self Care training and Functional mobility training  Patient / Family readiness to learn indicated by: asking questions, trying to perform skills and interest  Persons(s) to be included in education: patient (P)  Barriers to Learning/Limitations: None  Patient Goal (s): Relief  Patient Self Reported Health Status: good  Rehabilitation Potential: good    Short term goals: To be accomplished within 1 week   1. Pt will be independent with HEP to maintain progression. Long term goals: To be accomplished within 4 weeks   1. Pt will improve FOTO score by 6 points to show improvement with functional mobility performance. 2. Pt will report being able to drive with no numbness of R UE so he can drive safely and comfortably. 3. Pt will have R mid and lower trapezius strength at least 4/5 to be able to perform ADLs comfortably. Frequency / Duration: Patient to be seen 2-3 times per week for 4 weeks.     Patient/ CarPatient/ Caregiver education and instruction: Diagnosis, prognosis, self care, activity modification and exercises   [x]  Plan of care has been reviewed with MIRACLE Sanderson, PT 8/2/2017 9:35 AM    ________________________________________________________________________    I certify that the above Therapy Services are being furnished while the patient is under my care. I agree with the treatment plan and certify that this therapy is necessary.     Physician's Signature:____________________  Date:____________Time: _________    Please sign and return to [facility] - [facility] Isbell  [address] [place] [address]  [phone] fax

## 2017-08-08 ENCOUNTER — HOSPITAL ENCOUNTER (OUTPATIENT)
Dept: PHYSICAL THERAPY | Age: 52
Discharge: HOME | End: 2017-08-08
Payer: SELF-PAY

## 2017-08-08 PROCEDURE — 97110 THERAPEUTIC EXERCISES: CPT

## 2017-08-08 PROCEDURE — 97140 MANUAL THERAPY 1/> REGIONS: CPT

## 2017-08-08 NOTE — PROGRESS NOTES
PT DAILY TREATMENT NOTE     Patient Name: Jose Lim  Date:2017  : 1965  [x]  Patient  Verified  Payor: SELF PAY / Plan: [facility] SELF PAY / Product Type: Self Pay /    In time:9:31  Out time:10:10  Total Treatment Time (min): 39  Visit #: 2 of     Treatment Area: Cervicalgia [M54.2]  Paresthesia of skin [R20.2]  Pain in shoulder [M25.519]    SUBJECTIVE  Pain Level (0-10 scale): 1/10 shoulder (R)  Any medication changes, allergies to medications, adverse drug reactions, diagnosis change, or new procedure performed?: [x] No    [] Yes (see summary sheet for update)  Subjective functional status/changes:   [] No changes reported  Pt reported shoulder is hurting more than the neck today    OBJECTIVE    Modality rationale: decrease pain, increase tissue extensibility and increase muscle contraction/control to improve the patients ability to tolerate ADLs   Min Type Additional Details    [] Estim:  []Unatt       []IFC  []Premod                        []Other:  []w/ice   []w/heat  Position:  Location:    [] Estim: []Att    []TENS instruct  []NMES                    []Other:  []w/US   []w/ice   []w/heat  Position:  Location:    []  Traction: [] Cervical       []Lumbar                       [] Prone          []Supine                       []Intermittent   []Continuous Lbs:  [] before manual  [] after manual    []  Ultrasound: []Continuous   [] Pulsed                           []1MHz   []3MHz W/cm2:  Location:    []  Iontophoresis with dexamethasone         Location: [] Take home patch   [] In clinic   10 []  Ice     [x]  heat  []  Ice massage  []  Laser   []  Anodyne Position: seated  Location: B shoulder, c/s    []  Laser with stim  []  Other:  Position:  Location:    []  Vasopneumatic Device Pressure:       [] lo [] med [] hi   Temperature: [] lo [] med [] hi   [x] Skin assessment post-treatment:  [x]intact []redness- no adverse reaction    []redness - adverse reaction:     21 min Therapeutic Exercise:  [x] See flow sheet :   Rationale: increase ROM and increase strength to improve the patients ability to perform ADLs with ease    8 min Manual Therapy:  DTM/TPR to B UT, R scapular muscles medially   Rationale: decrease pain, increase tissue extensibility and decrease trigger points to improve pt's ability to perform ADLs comfortably. With   [] TE   [] TA   [] neuro   [] other: Patient Education: [x] Review HEP    [] Progressed/Changed HEP based on:   [] positioning   [] body mechanics   [] transfers   [] heat/ice application    [] other:      Other Objective/Functional Measures:    Pt performed all Wendy appropriately after instructions     Improved muscle tones along suboccipital and R parascapular region   Triggers points along med border of R scapular muscles    Pain Level (0-10 scale) post treatment: 3/10    ASSESSMENT/Changes in Function: initiated treatment as plan. Pt showed improved muscle tone; reported mod relief with tennis ball use. Will edu pt on theracane use next visit to improve his progression. Patient will continue to benefit from skilled PT services to modify and progress therapeutic interventions, address functional mobility deficits, address ROM deficits, address strength deficits, analyze and address soft tissue restrictions, analyze and cue movement patterns, analyze and modify body mechanics/ergonomics, assess and modify postural abnormalities and instruct in home and community integration to attain remaining goals. [x]  See Plan of Care  []  See progress note/recertification  []  See Discharge Summary         Progress towards goals / Updated goals:  Short term goals: To be accomplished within 1 week                        1. Pt will be independent with HEP to maintain progression. Met 8-8-17  Long term goals:  To be accomplished within 4 weeks                        1. Pt will improve FOTO score by 6 points to show improvement with functional mobility performance. 2. Pt will report being able to drive with no numbness of R UE so he can drive safely and comfortably. 3. Pt will have R mid and lower trapezius strength at least 4/5 to be able to perform ADLs comfortably.                   PLAN  []  Upgrade activities as tolerated     [x]  Continue plan of care  []  Update interventions per flow sheet       []  Discharge due to:_  []  Other:_      Lauren Lofton, PT 8/8/2017  7:43 AM    Future Appointments  Date Time Provider Yudi Chaves   8/8/2017 9:30 AM Thienphuc Epimenio Soles [facility]   8/10/2017 9:30 AM Margaret Robles PT [facility]   8/15/2017 10:00 AM Pauline Villegason [facility]   8/17/2017 10:00 AM Margaret Robles PT [id number]   8/22/2017 9:30 AM Thienphuc Epimenio Soles [facility]   8/24/2017 9:30 AM Margaret Robles PT [id number] [facility]   8/29/2017 9:30 AM Thienphuc Epimenio Soles [id number] [facility]   8/31/2017 9:30 AM Margaret Robles PT [id number] [facility]   12/20/2017 8:00 AM Myrna Mejía, NP [id number]

## 2017-08-10 ENCOUNTER — TELEPHONE (OUTPATIENT)
Dept: FAMILY MEDICINE CLINIC | Age: 52
End: 2017-08-10

## 2017-08-10 ENCOUNTER — HOSPITAL ENCOUNTER (OUTPATIENT)
Dept: PHYSICAL THERAPY | Age: 52
Discharge: HOME | End: 2017-08-10
Payer: SELF-PAY

## 2017-08-10 PROCEDURE — 97140 MANUAL THERAPY 1/> REGIONS: CPT

## 2017-08-10 PROCEDURE — 97110 THERAPEUTIC EXERCISES: CPT

## 2017-08-10 NOTE — PROGRESS NOTES
Request for use of Dry Needling/Intramuscular Manual Therapy  Patient: Madi Chamberlain     Referral Source: Mary Kay West NP  Diagnosis: Cervicalgia [M54.2]  Paresthesia of skin [R20.2]  Pain in shoulder [M25.519]      : 1965  Date of initial visit: 17   Attended visits: 3  Missed Visits: 0    Based on findings from the physical therapy examination and evaluation, the evaluating therapist believes the patientMadi  would benefit from including Dry Needling as part of the plan of care. Dry needling is a treatment technique utilized in conjunction with other PT interventions to inactivate myofascial trigger points and the pain and dysfunction they cause. Dry Needling is an advanced procedure that requires additional training including greater than 54 hours of intensive course work. Physical Therapists at [address] [place] [address] are trained and/or certified through [employer] for their education. PROCEDURE:   Solid filament sterile needle (typically 0.3mm/30 gauge) inserted into a trigger point   Repeated movements inactivate the trigger points, taking 30-60 seconds per site   Typically consists of 1 dry needling session per week and a possible second treatment including muscle re-education, flexibility, strengthening and other manual techniques to facilitate the benefits of dry needling     BENEFITS:   Inactivation of trigger points   Decreased pain   Increased muscle length   Improved movement patterns   Restoration of function POTENTIAL RISKS:   Post-needling soreness   Infection   Bruising/bleeding   Penetration of a nerve   Pneumothorax   All treating PTs have been thoroughly educated in avoiding adverse reactions    If you agree with this recommendation, please sign this form and fax it to us at [phone].   If you have questions or concerns regarding dry needling or any other treatment we may be providing, please contact us at [phone]    Thank you for allowing us to assist in the care of your patient. Debbie Colin, PT    8/10/2017 10:13 AM     NOTE TO PHYSICIAN:  PLEASE COMPLETE THE ORDERS BELOW AND   FAX TO [facility]: [phone]  If you are unable to process this request in 24 hours please contact our office:   [phone]    I have read the above request and AGREE to the recommendation of including dry needling as part of the plan of care.     Physicians signature: _________________________Date: _________Time:________

## 2017-08-10 NOTE — PROGRESS NOTES
PT DAILY TREATMENT NOTE - [facility] 3-16    Patient Name: Melissa Hernandez  Date:8/10/2017  : 1965  [x]  Patient  Verified  Payor: SELF PAY / Plan: [facility]\"" SELF PAY / Product Type: Self Pay /    In time: 9:30  Out time: 10:10  Total Treatment Time (min): 40  Total Timed Codes (min): 30     Visit #: 3 of 8-12    Treatment Area: Cervicalgia [M54.2]  Paresthesia of skin [R20.2]  Pain in shoulder [M25.519]    SUBJECTIVE  Pain Level (0-10 scale):  3-4/10  Any medication changes, allergies to medications, adverse drug reactions, diagnosis change, or new procedure performed?: [x] No    [] Yes (see summary sheet for update)  Subjective functional status/changes:   [] No changes reported  Pt.  Reports he woke up with tingling in R hand in all his fingerw    OBJECTIVE    Modality rationale: decrease pain and increase tissue extensibility to improve the patients ability to increase ease of ADLs   Min Type Additional Details    [] Estim:  []Unatt       []IFC  []Premod                        []Other:  []w/ice   []w/heat  Position:  Location:    [] Estim: []Att    []TENS instruct  []NMES                    []Other:  []w/US   []w/ice   []w/heat  Position:  Location:    []  Traction: [] Cervical       []Lumbar                       [] Prone          []Supine                       []Intermittent   []Continuous Lbs:  [] before manual  [] after manual    []  Ultrasound: []Continuous   [] Pulsed                           []1MHz   []3MHz Location:  W/cm2:    []  Iontophoresis with dexamethasone         Location: [] Take home patch   [] In clinic   10 []  Ice     [x]  heat  []  Ice massage  []  Laser   []  Anodyne Position: seated  Location: R shoulder    []  Laser with stim  []  Other: Position:  Location:    []  Vasopneumatic Device Pressure:       [] lo [] med [] hi   Temperature: [] lo [] med [] hi   [x] Skin assessment post-treatment:  [x]intact []redness- no adverse reaction    []redness - adverse reaction:     10 min Therapeutic Exercise:  [x] See flow sheet :   Rationale: increase ROM and increase strength to improve the patients ability to increase ease of ADLs    20 min Manual Therapy:  Trigger point release rhomboids, mid trap, lower trap, infraspinatus, supraspinatus. Cervical distraction   Rationale: decrease pain, increase ROM and increase tissue extensibility to increase ease of ADLs          With   [x] TE   [] TA   [] neuro   [] other: Patient Education: [x] Review HEP    [] Progressed/Changed HEP based on:   [] positioning   [] body mechanics   [] transfers   [] heat/ice application    [] other:      Other Objective/Functional Measures:   Decreased numbness/tingling following trigger point release and cervical distraction    Pt. Required cues to decrease UT use during shoulder shrugs  Discussed dry needling with patient and he is agreeable, will send script for dry needling  Pt. Was educated on using theracane     Pain Level (0-10 scale) post treatment: 1/10    ASSESSMENT/Changes in Function:  Pt. Is making slow progress towards goals. He continues to have numbness/tinging down to R hand and has significant trigger points in posterior shoulder. Patient will continue to benefit from skilled PT services to modify and progress therapeutic interventions, address functional mobility deficits, address ROM deficits, address strength deficits, analyze and address soft tissue restrictions, analyze and cue movement patterns, analyze and modify body mechanics/ergonomics and assess and modify postural abnormalities to attain remaining goals. Progress towards goals / Updated goals:  Short term goals: To be accomplished within 1 week                        1. Pt will be independent with HEP to maintain progression. Met 8-8-17    Long term goals: To be accomplished within 4 weeks                        1.  Pt will improve FOTO score by 6 points to show improvement with functional mobility performance.                        0. Pt will report being able to drive with no numbness of R UE so he can drive safely and comfortably.   Not met (8/10/17)                        3. Pt will have R mid and lower trapezius strength at least 4/5 to be able to perform ADLs comfortably.                  PLAN  []  Upgrade activities as tolerated     [x]  Continue plan of care  []  Update interventions per flow sheet       []  Discharge due to:_  []  Other:_      Pablo Lofton, PT 8/10/2017  9:32 AM

## 2017-08-10 NOTE — TELEPHONE ENCOUNTER
Medication: Farxiga 10 mg , dose: 1 tab, how often: qd , current number of medication days provided: 90, refill per application. Lot #: [id number], EXP 08/2018. This medication was received and verified for the following 1. Correct Patient, 2. Correct Diagnosis, 3. Correct Drug, 4. Correct route, and no current allergy to medication. Please contact patient to come  their medications.      Joyce Ng MSN, RN, FNP-C     [facility]

## 2017-08-15 ENCOUNTER — HOSPITAL ENCOUNTER (OUTPATIENT)
Dept: PHYSICAL THERAPY | Age: 52
Discharge: HOME | End: 2017-08-15
Payer: SELF-PAY

## 2017-08-15 PROCEDURE — 97140 MANUAL THERAPY 1/> REGIONS: CPT

## 2017-08-15 PROCEDURE — 97110 THERAPEUTIC EXERCISES: CPT

## 2017-08-15 NOTE — PROGRESS NOTES
PT DAILY TREATMENT NOTE     Patient Name: Godfrey Burrell  Date:8/15/2017  : 1965  [x]  Patient  Verified  Payor: SELF PAY / Plan: [facility] SELF PAY / Product Type: Self Pay /    In time:1000  Out time:1040  Total Treatment Time (min): 40  Visit #: 4 of     Treatment Area: Cervicalgia [M54.2]  Paresthesia of skin [R20.2]  Pain in shoulder [M25.519]    SUBJECTIVE  Pain Level (0-10 scale): 1/10  Any medication changes, allergies to medications, adverse drug reactions, diagnosis change, or new procedure performed?: [x] No    [] Yes (see summary sheet for update)  Subjective functional status/changes:   [] No changes reported  Pt stated that he is doing fairly well today    OBJECTIVE    Modality rationale: decrease pain and increase tissue extensibility to improve the patients ability to increase ease with ADLs   Min Type Additional Details    [] Estim:  []Unatt       []IFC  []Premod                        []Other:  []w/ice   []w/heat  Position:  Location:    [] Estim: []Att    []TENS instruct  []NMES                    []Other:  []w/US   []w/ice   []w/heat  Position:  Location:    []  Traction: [] Cervical       []Lumbar                       [] Prone          []Supine                       []Intermittent   []Continuous Lbs:  [] before manual  [] after manual    []  Ultrasound: []Continuous   [] Pulsed                           []1MHz   []3MHz W/cm2:  Location:    []  Iontophoresis with dexamethasone         Location: [] Take home patch   [] In clinic   10 []  Ice     [x]  heat  []  Ice massage  []  Laser   []  Anodyne Position:seated  Location:back and B sh    []  Laser with stim  []  Other:  Position:  Location:    []  Vasopneumatic Device Pressure:       [] lo [] med [] hi   Temperature: [] lo [] med [] hi   [x] Skin assessment post-treatment:  [x]intact []redness- no adverse reaction    []redness - adverse reaction:     22 min Therapeutic Exercise:  [x] See flow sheet :   Rationale: increase ROM and increase strength to improve the patients ability to increase ease with ADLs    8 min Manual Therapy:  DTM to C/S paraspinals and sub-occipitals and manual traction   Rationale: decrease pain, increase ROM and increase tissue extensibility to increase ease with ADLs    With   [x] TE   [] TA   [] neuro   [] other: Patient Education: [x] Review HEP    [] Progressed/Changed HEP based on:   [] positioning   [] body mechanics   [] transfers   [] heat/ice application    [] other:      Other Objective/Functional Measures:   Pt had no complaint of increased pain with exercises  TB Y and T's were challenging  Pt had moderate tightness in cervical paraspinals and sub-occipitals      Pain Level (0-10 scale) post treatment: 0/10    ASSESSMENT/Changes in Function:   Pt is slowly progressing toward goals. Pt cont with numbness in R UE, but had none at end of session today. Cont to have difficulty with driving    Patient will continue to benefit from skilled PT services to modify and progress therapeutic interventions, address functional mobility deficits, address ROM deficits and address strength deficits to attain remaining goals. [x]  See Plan of Care  []  See progress note/recertification  []  See Discharge Summary         Progress towards goals / Updated goals:  Short term goals: To be accomplished within 1 week                        2. Pt will be independent with HEP to maintain progression. Met 8-8-17     Long term goals: To be accomplished within 4 weeks                        4. Pt will improve FOTO score by 6 points to show improvement with functional mobility performance.                        9. Pt will report being able to drive with no numbness of R UE so he can drive safely and comfortably.   Not met (8/10/17)                        3. Pt will have R mid and lower trapezius strength at least 4/5 to be able to perform ADLs comfortably.                  PLAN  []  Upgrade activities as tolerated     [x] Continue plan of care  []  Update interventions per flow sheet       []  Discharge due to:_  []  Other:_      Ailin Flores, PTA 8/15/2017  10:04 AM    Future Appointments  Date Time Provider Yudi Chaves   8/17/2017 10:00 AM Sandip Willoughby, PT [facility]   8/22/2017 9:30 AM Thienphjacquelin Alma Silvano [facility]   8/24/2017 9:30 AM Sandip Willoughby, PT [id number] [facility]   8/29/2017 9:30 AM Pauline Chowdhurya Silvano GAKSZRU [facility]   8/31/2017 9:30 AM Sandip Willoughby, PT [email or web address] [facility]   12/20/2017 8:00 AM Gail Guajardo NP [address] [facility]

## 2017-08-17 ENCOUNTER — HOSPITAL ENCOUNTER (OUTPATIENT)
Dept: PHYSICAL THERAPY | Age: 52
End: 2017-08-17
Payer: SELF-PAY

## 2017-08-22 ENCOUNTER — HOSPITAL ENCOUNTER (OUTPATIENT)
Dept: PHYSICAL THERAPY | Age: 52
Discharge: HOME | End: 2017-08-22
Payer: SELF-PAY

## 2017-08-22 PROCEDURE — 97140 MANUAL THERAPY 1/> REGIONS: CPT

## 2017-08-22 PROCEDURE — 97110 THERAPEUTIC EXERCISES: CPT

## 2017-08-22 NOTE — PROGRESS NOTES
PT DAILY TREATMENT NOTE     Patient Name: Latrell Pina  Date:2017  : 1965  [x]  Patient  Verified  Payor: SELF PAY / Plan: [facility] SELF PAY / Product Type: Self Pay /    In time:930  Out time:1011  Total Treatment Time (min): 41  Visit #: 5 of     Treatment Area: Cervicalgia [M54.2]  Paresthesia of skin [R20.2]  Pain in shoulder [M25.519]    SUBJECTIVE  Pain Level (0-10 scale): 2/10  Any medication changes, allergies to medications, adverse drug reactions, diagnosis change, or new procedure performed?: [x] No    [] Yes (see summary sheet for update)  Subjective functional status/changes:   [] No changes reported  Pt stated that he is doing pretty good today    OBJECTIVE    Modality rationale: decrease inflammation and decrease pain to improve the patients ability to increase ease with ADLs   Min Type Additional Details    [] Estim:  []Unatt       []IFC  []Premod                        []Other:  []w/ice   []w/heat  Position:  Location:    [] Estim: []Att    []TENS instruct  []NMES                    []Other:  []w/US   []w/ice   []w/heat  Position:  Location:    []  Traction: [] Cervical       []Lumbar                       [] Prone          []Supine                       []Intermittent   []Continuous Lbs:  [] before manual  [] after manual    []  Ultrasound: []Continuous   [] Pulsed                           []1MHz   []3MHz W/cm2:  Location:    []  Iontophoresis with dexamethasone         Location: [] Take home patch   [] In clinic   10 [x]  Ice     []  heat  []  Ice massage  []  Laser   []  Anodyne Position:seated  Location:back and R sh    []  Laser with stim  []  Other:  Position:  Location:    []  Vasopneumatic Device Pressure:       [] lo [] med [] hi   Temperature: [] lo [] med [] hi   [x] Skin assessment post-treatment:  [x]intact []redness- no adverse reaction    []redness - adverse reaction:     23 min Therapeutic Exercise:  [x] See flow sheet :   Rationale: increase ROM and increase strength to improve the patients ability to increase ease with ADLs    8 min Manual Therapy:  DTM to cervical paraspinals, UT and sub-occipitals and manual traction   Rationale: decrease pain, increase ROM and increase tissue extensibility to increase ease with ADLs    With   [x] TE   [] TA   [] neuro   [] other: Patient Education: [x] Review HEP    [] Progressed/Changed HEP based on:   [] positioning   [] body mechanics   [] transfers   [] heat/ice application    [] other:      Other Objective/Functional Measures:   Pt had no complaint of increased pain with exercises  Cont with some tightness in R UT and cervical paraspinals  Tolerated manual traction without difficulty     Pain Level (0-10 scale) post treatment: 0/10    ASSESSMENT/Changes in Function:   Pt cont to slowly progress toward goals. Pt cont with radicular sx's in R UE. Stated that it is about the same. Overall pain has decreased. Pt cont to have difficulty with driving. Patient will continue to benefit from skilled PT services to modify and progress therapeutic interventions, address functional mobility deficits, address ROM deficits and address strength deficits to attain remaining goals. [x]  See Plan of Care  []  See progress note/recertification  []  See Discharge Summary         Progress towards goals / Updated goals:  Short term goals: To be accomplished within 1 week                        0. Pt will be independent with HEP to maintain progression. Met 8-8-17      Long term goals: To be accomplished within 4 weeks                        1. Pt will improve FOTO score by 6 points to show improvement with functional mobility performance.                        1. Pt will report being able to drive with no numbness of R UE so he can drive safely and comfortably.   Not met (8/10/17)                        3. Pt will have R mid and lower trapezius strength at least 4/5 to be able to perform ADLs comfortably.                  PLAN  [] Upgrade activities as tolerated     [x]  Continue plan of care  []  Update interventions per flow sheet       []  Discharge due to:_  []  Other:_      Nora Aguilar, MIRACLE 8/22/2017  9:31 AM    Future Appointments  Date Time Provider Yudi Chaves   8/24/2017 9:30 AM Brynn Arias, PT [id number]   8/29/2017 9:30 AM Pauline Ramirez [facility]   8/30/2017 8:00 AM Brynn Arias, PT [facility]   8/31/2017 9:30 AM Brynn Arias, PT [id number] [facility]   12/20/2017 8:00 AM Mary Kay Nick NP [address] [facility]

## 2017-08-24 ENCOUNTER — HOSPITAL ENCOUNTER (OUTPATIENT)
Dept: PHYSICAL THERAPY | Age: 52
Discharge: HOME | End: 2017-08-24
Payer: SELF-PAY

## 2017-08-24 PROCEDURE — 97110 THERAPEUTIC EXERCISES: CPT

## 2017-08-24 PROCEDURE — 97140 MANUAL THERAPY 1/> REGIONS: CPT

## 2017-08-24 NOTE — PROGRESS NOTES
PT DAILY TREATMENT NOTE - [facility] [address]    Patient Name: Kika Puri  Date:2017  : 1965  [x]  Patient  Verified  Payor: SELF PAY / Plan: [facility] SELF PAY / Product Type: Self Pay /    In time: 9:10  Out time: 9:52  Total Treatment Time (min): 42  Total Timed Codes (min): 32     Visit #: 6 of 8-12    Treatment Area: Cervicalgia [M54.2]  Paresthesia of skin [R20.2]  Pain in shoulder [M25.519]    SUBJECTIVE  Pain Level (0-10 scale):  1/10  Any medication changes, allergies to medications, adverse drug reactions, diagnosis change, or new procedure performed?: [x] No    [] Yes (see summary sheet for update)  Subjective functional status/changes:   [] No changes reported  Pt. Reports he has been having less pain but continues to have tingling down arm.      OBJECTIVE    Modality rationale: decrease inflammation and decrease pain to improve the patients ability to increase ease of ADLs   Min Type Additional Details    [] Estim:  []Unatt       []IFC  []Premod                        []Other:  []w/ice   []w/heat  Position:  Location:    [] Estim: []Att    []TENS instruct  []NMES                    []Other:  []w/US   []w/ice   []w/heat  Position:  Location:    []  Traction: [] Cervical       []Lumbar                       [] Prone          []Supine                       []Intermittent   []Continuous Lbs:  [] before manual  [] after manual    []  Ultrasound: []Continuous   [] Pulsed                           []1MHz   []3MHz Location:  W/cm2:    []  Iontophoresis with dexamethasone         Location: [] Take home patch   [] In clinic   10 [x]  Ice     []  heat  []  Ice massage  []  Laser   []  Anodyne Position: seated  Location: R shoulder    []  Laser with stim  []  Other: Position:  Location:    []  Vasopneumatic Device Pressure:       [] lo [] med [] hi   Temperature: [] lo [] med [] hi   [x] Skin assessment post-treatment:  [x]intact []redness- no adverse reaction    []redness - adverse reaction:     10 min Therapeutic Exercise:  [x] See flow sheet :   Rationale: increase ROM and increase strength to improve the patients ability to increase ease of ADLs    22 min Manual Therapy:  See attached note, STM to infraspinatus and middle trap   Rationale: decrease pain, increase ROM and increase tissue extensibility to increase ease of ADLs    Dry Needling Procedure Note    Procedure: An intramuscular manual therapy (dry needling) and a neuro-muscular re-education treatment was done to deactivate myofascial trigger points with a 30 gauge filament needle under aseptic technique. Indications:  [x] Myofascial pain and dysfunction [] Muscled spasms  [x] Myalgia/myositis   [] Muscle cramps  [] Muscle imbalances  [] Temporomandibular Dysfunction  [] Other:    Chart reviewed for the following:  Cadence OH PT, have reviewed the medical history, summary list and precautions/contraindications for [employer].   TIME OUT performed immediately prior to start of procedure:  Cadence HO PT, have performed the following reviews on [employer] prior to the start of the session:      [x] Verified patient identification by name and date of birth    [x] Agreement on all muscles being treated was verified   [x] Purpose of dry needling, side effects, possible complications, risks and benefits were explained to the patient   [x] Procedure site(s) verified  [x] Patient was positioned for comfort and draped for privacy  [x] Informed Consent was signed (initial visit) and verified verbally (subsequent visits)  [x] Patient was instructed on the need to report the use of blood thinners and/or immunosuppressant medications  [x] How to respond to possible adverse effects of treatment  [x] Self treatment of post needling soreness: ice, heat (moist heat, heat wraps) and stretching  [x] Opportunity was given to ask any questions, all questions were answered            Time:  9:20-9:35  Date of procedure: 8/24/2017  Treatment: The following muscles were treated today with intramuscular dry needling  [] Left [] Right Masster  [] Left [] Right Temporalis  [] Left [] Right Zygomaticus Major / Minor  [] Left [] Right Lateral Pterygoid  [] Left [] Right Medial Pterygoid  [] Left [] Right Digastric Post / Anterior Belly  [] Left [] Right Sternocleidomastoid  [] Left [] Right Scalene Anterior / Medial / Posterior  [] Left [] Right Extra Laryngeal Muscles  [] Left [] Right Upper Trapezius  [] Left [x] Right Middle Trapezius  [] Left [] Right Lower Trapezius  [] Left [] Right Oblique Capitis Inferior  [] Left [] Right Splenius Capitis / Cervicis  [] Left [] Right Semispinalis: Capitis / Cervicis  [] Left [] Right Multifidi / Rotatores Cervicis / Thoracic  [] Left [] Right Longissimus Thoracis / Illiocostalis  [] Left [] Right Levator Scapulae  [] Left [x] Right Supraspinatus / Infraspinatus  [] Left [] Right Teres Major / Minor  [] Left [] Right Rhomboids / Serratus posterior superior  [] Left [] Right Pectoralis Major / Minor  [] Left [] Right Serratus Anterior  [] Left [] Right Latissimus Dorsi  [] Left [] Right Subscapularis  [] Left [] Right Coracobrachialis  [] Left [] Right Biceps Brachii  [] Left [] Right Deltoid: Anterior / Medial / Posterior  [] Left [] Right Brachialis  [] Left [] Right Triceps  [] Left [] Right Brachioradialis  [] Left [] Right Extensor Carpi Radialis Brevis / Extensor Carpi Radialis Longus    [] Left [] Right  Extensor digitorum  [] Left [] Right Supinator / Pronator Teres  [] Left [] Right Flexor Carpi Radialis/ Flexor Carpi Ulnaris   [] Left [] Right  Flexor Digitorum Superficialis/ Flexor Digitorum Profundus  [] Left [] Right Flexor Pollicis Longus / Flexor Pollicis Brevis / Palmaris Longus  [] Left [] Right Abductor Pollicis Longus / Abductor Pollicis Brevis  [] Left [] Right Opponens Pollicis / Adductor Pollicis  [] Left [] Right Dorsal / Palmar Interossei / Lumbricalis  [] Left [] Right Abductor Digiti Minimi / Opponens Digiti Minimi    Patient's response to today's treatment:  [] Latent Twitch Response     [x] Muscle relaxation [] Pain Relief  [] Post needling soreness    [] without complications  [] Increased Range of Motion   [] Decreased headaches    [] Decreased Tinnitus  [] Other:     Performed by: Junior Miranda, PT            With   [x] TE   [] TA   [] neuro   [] other: Patient Education: [x] Review HEP    [] Progressed/Changed HEP based on:   [] positioning   [] body mechanics   [] transfers   [] heat/ice application    [x] other: dry needling     Other Objective/Functional Measures:   Pt. Was educated on dry needling and contraindications   He had a good twitch response on R infraspinatus with referred pain to R anterior shoulder  He was educated on stretching and ice to help alleviate soreness     Pain Level (0-10 scale) post treatment: 0/10    ASSESSMENT/Changes in Function: pt. Is progressing slowly towards goals. He is having less pain but continues to complain of tingling down R UE    Patient will continue to benefit from skilled PT services to modify and progress therapeutic interventions, address functional mobility deficits, address ROM deficits, address strength deficits, analyze and address soft tissue restrictions and analyze and cue movement patterns to attain remaining goals. Progress towards goals / Updated goals:  Short term goals: To be accomplished within 1 week                        1. Pt will be independent with HEP to maintain progression. Met 8-8-17      Long term goals: To be accomplished within 4 weeks                        1. Pt will improve FOTO score by 6 points to show improvement with functional mobility performance.                        7. Pt will report being able to drive with no numbness of R UE so he can drive safely and comfortably.   Not met (8/24/17)                        3. Pt will have R mid and lower trapezius strength at least 4/5 to be able to perform ADLs comfortably.                  PLAN  []  Upgrade activities as tolerated     [x]  Continue plan of care  []  Update interventions per flow sheet       []  Discharge due to:_  []  Other:_      Jacqualine Schaumann, PT 8/24/2017  9:15 AM

## 2017-08-29 ENCOUNTER — HOSPITAL ENCOUNTER (OUTPATIENT)
Dept: PHYSICAL THERAPY | Age: 52
Discharge: HOME | End: 2017-08-29
Payer: SELF-PAY

## 2017-08-29 ENCOUNTER — OFFICE VISIT (OUTPATIENT)
Dept: FAMILY MEDICINE CLINIC | Age: 52
End: 2017-08-29

## 2017-08-29 VITALS
TEMPERATURE: 98.2 F | DIASTOLIC BLOOD PRESSURE: 94 MMHG | HEIGHT: 71 IN | WEIGHT: 211 LBS | HEART RATE: 90 BPM | SYSTOLIC BLOOD PRESSURE: 138 MMHG | RESPIRATION RATE: 20 BRPM | OXYGEN SATURATION: 96 % | BODY MASS INDEX: 29.54 KG/M2

## 2017-08-29 DIAGNOSIS — B02.9 HERPES ZOSTER WITHOUT COMPLICATION: Primary | ICD-10-CM

## 2017-08-29 PROCEDURE — 97110 THERAPEUTIC EXERCISES: CPT

## 2017-08-29 PROCEDURE — 97140 MANUAL THERAPY 1/> REGIONS: CPT

## 2017-08-29 RX ORDER — PREDNISONE 20 MG/1
20 TABLET ORAL DAILY
Qty: 5 TAB | Refills: 0 | Status: SHIPPED | OUTPATIENT
Start: 2017-08-29 | End: 2017-09-03

## 2017-08-29 RX ORDER — VALACYCLOVIR HYDROCHLORIDE 1 G/1
1000 TABLET, FILM COATED ORAL 3 TIMES DAILY
Qty: 21 TAB | Refills: 0 | Status: SHIPPED | OUTPATIENT
Start: 2017-08-29 | End: 2017-09-05

## 2017-08-29 NOTE — PROGRESS NOTES
PT DAILY TREATMENT NOTE     Patient Name: Loki Robertson  Date:2017  : 1965  [x]  Patient  Verified  Payor: SELF PAY / Plan: [facility] SELF PAY / Product Type: Self Pay /    In time: 9:30  Out time: 10:10  Total Treatment Time (min): 40  Visit #: 7 of     Treatment Area: Cervicalgia [M54.2]  Paresthesia of skin [R20.2]  Pain in shoulder [M25.519]    SUBJECTIVE  Pain Level (0-10 scale): 2/10  Any medication changes, allergies to medications, adverse drug reactions, diagnosis change, or new procedure performed?: [x] No    [] Yes (see summary sheet for update)  Subjective functional status/changes:   [] No changes reported  Pt reported feeling very good after DN last session    OBJECTIVE  Modality rationale: decrease inflammation and decrease pain to improve the patients ability to increase ease of ADLs   Min Type Additional Details     [] Estim:  []Unatt       []IFC  []Premod                        []Other:  []w/ice   []w/heat  Position:  Location:     [] Estim: []Att    []TENS instruct  []NMES                    []Other:  []w/US   []w/ice   []w/heat  Position:  Location:     []  Traction: [] Cervical       []Lumbar                       [] Prone          []Supine                       []Intermittent   []Continuous Lbs:  [] before manual  [] after manual     []  Ultrasound: []Continuous   [] Pulsed                           []1MHz   []3MHz Location:  W/cm2:     []  Iontophoresis with dexamethasone         Location: [] Take home patch   [] In clinic   10 [x]  Ice     []  heat  []  Ice massage  []  Laser   []  Anodyne Position: prone  Location: R shoulder.sca[     []  Laser with stim  []  Other: Position:  Location:     []  Vasopneumatic Device Pressure:       [] lo [] med [] hi   Temperature: [] lo [] med [] hi   [x] Skin assessment post-treatment:  [x]intact []redness- no adverse reaction    []redness - adverse reaction:      23 min Therapeutic Exercise:  [x] See flow sheet :   Rationale: increase ROM and increase strength to improve the patients ability to increase ease of ADLs     7 min Manual Therapy: [place] DTM/TPR to infraspinatus and middle trap   Rationale: decrease pain, increase ROM and increase tissue extensibility to increase ease of ADLs    With   [] TE   [] TA   [] neuro   [] other: Patient Education: [x] Review HEP    [] Progressed/Changed HEP based on:   [] positioning   [] body mechanics   [] transfers   [] heat/ice application    [] other:      Other Objective/Functional Measures: Mod numbness/tingling during manual   Hypertonicity of lower t/s musculature   Cont to demonstrate shoulder elevation with MT/LT Wendy   FOTO: 67 Neck; 71 shouder    Pain Level (0-10 scale) post treatment: 0/10    ASSESSMENT/Changes in Function: pt making slow progress towards goals. He reported good response with DN. Will cont with POC and DN to improve his parascapular muscle extensibility and strength for comfortable ADLs performance. Patient will continue to benefit from skilled PT services to modify and progress therapeutic interventions, address functional mobility deficits, address ROM deficits, address strength deficits, analyze and address soft tissue restrictions and analyze and cue movement patterns to attain remaining goals.      Progress towards goals / Updated goals:  Short term goals: To be accomplished within 1 week                        1. Pt will be independent with HEP to maintain progression.   Met 8-8-17      Long term goals: To be accomplished within 4 weeks                        1. Pt will improve FOTO score by 6 points to show improvement with functional mobility performance. Met with shoulder but decreased 1 with neck 7 8-29-17                        2. Pt will report being able to drive with no numbness of R UE so he can drive safely and comfortably.   Not met (8/24/17)                        3. Pt will have R mid and lower trapezius strength at least 4/5 to be able to perform ADLs comfortably.                   PLAN  []  Upgrade activities as tolerated     [x]  Continue plan of care  []  Update interventions per flow sheet       []  Discharge due to:_  []  Other:_      Ilana Gibson, PT 8/29/2017  7:53 AM    Future Appointments  Date Time Provider Yudi Chaves   8/29/2017 9:30 AM Pauline Gutierres [facility]   8/30/2017 8:00 AM Timbo Morales PT [id number]   8/31/2017 9:30 AM Timbo Morales PT [id number] [facility]   12/20/2017 8:00 AM Dinorah Nuñez NP [address] [facility]

## 2017-08-29 NOTE — MR AVS SNAPSHOT
Visit Information Date & Time Provider Department Dept. Phone Encounter #  
 8/29/2017  1:00 PM Ernestina Sandifer, NP [address] [facility] [id number] Follow-up Instructions Return in about 1 week (around 9/5/2017), or if symptoms worsen or fail to improve. Your Appointments 12/20/2017  8:00 AM  
Follow Up with Ernestina Sandifer, NP [address] [facility] ([address]) Appt Note: 6 mth follow up  
 [address] [facility] [address]  
[address] [facility] [address] Upcoming Health Maintenance Date Due  
 EYE EXAM RETINAL OR DILATED Q1 1/2/1976 DTaP/Tdap/Td series (1 - Tdap) 11/24/2011 INFLUENZA AGE 9 TO ADULT 8/1/2017 Pneumococcal 19-64 Medium Risk (1 of 1 - PPSV23) 1/23/2018* FOOT EXAM Q1 10/20/2017 MICROALBUMIN Q1 1/11/2018 HEMOGLOBIN A1C Q6M 1/18/2018 LIPID PANEL Q1 7/18/2018 FOBT Q 1 YEAR AGE 50-75 7/26/2018 *Topic was postponed. The date shown is not the original due date. Allergies as of 8/29/2017  Review Complete On: 8/29/2017 By: Kym Khan No Known Allergies Current Immunizations  Reviewed on 10/22/2015 Name Date Influenza Vaccine Rashawn Diaz) 10/12/2016, 10/22/2015 Td 11/23/2011 Not reviewed this visit You Were Diagnosed With   
  
 Codes Comments Herpes zoster without complication    -  Primary ICD-10-CM: B02.9 ICD-9-CM: 376. 9 Vitals BP Pulse Temp Resp Height(growth percentile) Weight(growth percentile) (!) 138/94 90 98.2 °F (36.8 °C) 20 5' 11\" (1.803 m) 211 lb (95.7 kg) SpO2 BMI Smoking Status 96% 29.43 kg/m2 Former Smoker Vitals History BMI and BSA Data Body Mass Index Body Surface Area  
 29.43 kg/m 2 2.19 m 2 Preferred Pharmacy Pharmacy Name Phone [id number] [phone] Your Updated Medication List  
  
   
This list is accurate as of: 8/29/17  1:28 PM.  Always use your most recent med list.  
  
  
  
  
 cyclobenzaprine 10 mg tablet Commonly known as:  FLEXERIL Take 0.5 Tabs by mouth three (3) times daily as needed (for pain and muscle spasms). dapagliflozin 10 mg Tab Commonly known as:  [employer] Take 1 Tab by mouth daily (with breakfast). Indications: type 2 diabetes mellitus  
  
 desloratadine 5 mg tablet Commonly known as:  CLARINEX Take 1 Tab by mouth daily. lisinopril-hydroCHLOROthiazide 20-12.5 mg per tablet Commonly known as:  Pilar Rattler Take 1 Tab by mouth daily. Indications: hypertension  
  
 loratadine 10 mg tablet Commonly known as:  Junior Daub Take 1 Tab by mouth daily. Indications: ALLERGIC RHINITIS  
  
 * meloxicam 15 mg tablet Commonly known as:  MOBIC  
TAKE ONE TABLET BY MOUTH ONCE DAILY * meloxicam 15 mg tablet Commonly known as:  MOBIC Take 1 Tab by mouth daily. * mometasone 50 mcg/actuation nasal spray Commonly known as:  NASONEX  
2 Sprays by Both Nostrils route daily. Indications: ALLERGIC RHINITIS * mometasone 50 mcg/actuation nasal spray Commonly known as:  NASONEX  
2 Sprays by Both Nostrils route daily. multivitamin tablet Commonly known as:  ONE A DAY Take 1 Tab by mouth daily. omega-3 acid ethyl esters 1 gram capsule Commonly known as:  Sabino Lapine Take 2 Caps by mouth daily (with breakfast). pitavastatin calcium 2 mg tablet Commonly known as:  LIVALO Take 1 Tab by mouth daily. To start once Crestor complete. potassium 99 mg tablet Take 1 Tab by mouth daily. predniSONE 20 mg tablet Commonly known as:  Prema Look Take 1 Tab by mouth daily for 5 days. With Breakfast  
  
 pregabalin 75 mg capsule Commonly known as:  Valentine Raw Take 1 Cap by mouth two (2) times a day. Max Daily Amount: 150 mg. sAXagliptin-metFORMIN 2.5-1,000 mg Tm24 Commonly known as:  KOMBIGLYZE XR Take 2 Tabs by mouth daily (with dinner). valACYclovir 1 gram tablet Commonly known as:  VALTREX Take 1 Tab by mouth three (3) times daily for 7 days. Indications: HERPES ZOSTER * Notice: This list has 4 medication(s) that are the same as other medications prescribed for you. Read the directions carefully, and ask your doctor or other care provider to review them with you. Prescriptions Printed Refills  
 valACYclovir (VALTREX) 1 gram tablet 0 Sig: Take 1 Tab by mouth three (3) times daily for 7 days. Indications: HERPES ZOSTER Class: Print Route: Oral  
 predniSONE (DELTASONE) 20 mg tablet 0 Sig: Take 1 Tab by mouth daily for 5 days. With Breakfast  
 Class: Print Route: Oral  
  
Follow-up Instructions Return in about 1 week (around 9/5/2017), or if symptoms worsen or fail to improve. To-Do List   
 08/30/2017 8:00 AM  
  Appointment with Antonio Moore PT at [facility] [address] ([phone])  
  
 08/31/2017 9:30 AM  
  Appointment with Antonio Moore PT at [facility] [address] [facility] ([phone]) Patient Instructions Shingles: Care Instructions Your Care Instructions Shingles (herpes zoster) causes pain and a blistered rash. The rash can appear anywhere on the body but will be on only one side of the body, the left or right. It will be in a band, a strip, or a small area. The pain can be very severe. Shingles can also cause tingling or itching in the area of the rash. The blisters scab over after a few days and heal in 2 to 4 weeks. Medicines can help you feel better and may help prevent more serious problems caused by shingles. Shingles is caused by the same virus that causes chickenpox. When you have chickenpox, the virus gets into your nerve roots and stays there (becomes dormant) long after you get over the chickenpox.  If the virus becomes active again, it can cause shingles. Follow-up care is a key part of your treatment and safety. Be sure to make and go to all appointments, and call your doctor if you are having problems. It's also a good idea to know your test results and keep a list of the medicines you take. How can you care for yourself at home? · Be safe with medicines. Take your medicines exactly as prescribed. Call your doctor if you think you are having a problem with your medicine. Antiviral medicine helps you get better faster. · Try not to scratch or pick at the blisters. They will crust over and fall off on their own if you leave them alone. · Put cool, wet cloths on the area to relieve pain and itching. You can also use calamine lotion. Try not to use so much lotion that it cakes and is hard to get off. · Put cornstarch or baking soda on the sores to help dry them out so they heal faster. · Do not use thick ointment, such as petroleum jelly, on the sores. This will keep them from drying and healing. · To help remove loose crusts, soak them in tap water. This can help decrease oozing, and dry and soothe the skin. · Take an over-the-counter pain medicine, such as acetaminophen (Tylenol), ibuprofen (Advil, Motrin), or naproxen (Aleve). Read and follow all instructions on the label. · Avoid close contact with people until the blisters have healed. It is very important for you to avoid contact with anyone who has never had chickenpox or the chickenpox vaccine. Pregnant women, young babies, and anyone else who has a hard time fighting infection (such as someone with HIV, diabetes, or cancer) is especially at risk. When should you call for help? Call your doctor now or seek immediate medical care if: 
· You have a new or higher fever. · You have a severe headache and a stiff neck. · You lose the ability to think clearly. · The rash spreads to your forehead, nose, eyes, or eyelids. · You have eye pain, or your vision gets worse. · You have new pain in your face, or you cannot move the muscles in your face. · Blisters spread to new parts of your body. Watch closely for changes in your health, and be sure to contact your doctor if: · The rash has not healed after 2 to 4 weeks. · You still have pain after the rash has healed. Where can you learn more? Go to http://todd-brayden.info/. Acosta William in the search box to learn more about \"Shingles: Care Instructions. \" Current as of: March 3, 2017 Content Version: 11.3 © 1325-6418 [employer]. Care instructions adapted under license by [employer] (which disclaims liability or warranty for this information). If you have questions about a medical condition or this instruction, always ask your healthcare professional. [address] any warranty or liability for your use of this information. Please provide this summary of care documentation to your next provider. Your primary care clinician is listed as Juanita Cabrera. If you have any questions after today's visit, please call [phone].

## 2017-08-29 NOTE — PATIENT INSTRUCTIONS

## 2017-08-29 NOTE — PROGRESS NOTES
Chief Complaint   Patient presents with    Rash     rash on stomach since last nite that itch and burn       Subjective:      Billy Mercado is a 47 y.o. male who presents for evaluation of rash. Rash started 1 day ago. Lesions are red in color, are of blistering textrure, 8 cm by 8cm in size. Initial distribution: abdomen. Rash has not changed over time. Discomfort associated with rash: burning, painful 8/10 with touch. Associated symptoms: no associated symptoms. Denies: fever, cough, congestion, sore throat, headache, arthralgia, nausea, vomiting, myalgia, decrease in appetite, decrease in energy level. Patient has not had previous evaluation of rash. Patient has not had previous treatment. . Patient has not had contacts with person with similar rash but states that he ingris his nephews yesterday and he had an abrasion on his right wrist from playing in the back yard on his fence and suggest maybe he got it from playing with his nephew. Patient has not identified precipitant. Patient has not had new exposures (soaps, lotions, laundry detergents, foods, medications, plants, insects or animals.)    Review of Systems  Pertinent items are noted in HPI. Objective:     Visit Vitals    BP (!) 138/94    Pulse 90    Temp 98.2 °F (36.8 °C)    Resp 20    Ht 5' 11\" (1.803 m)    Wt 211 lb (95.7 kg)    SpO2 96%    BMI 29.43 kg/m2     General:  alert, cooperative, no distress, appears stated age   Primary findings: macule, vesicles   Lesion Size: 8 cm x 8 cm. Color of lesions:  erythematous   Blanching: yes   Configuration: dermatomal   Secondary Features:  excoriation: with some vesicular like structures   Distribution:  Abdomen    Lymph Nodes:  no regional adenopathy     Assessment:     Zoster    Plan:   Follow up in 1 week if there is no improvement. Information on the above diagnosis was given to the patient. Watch for signs of fever or worsening of the rash.     Diff Dx: Contact Dermatitis, Allergic Reaction, Eczema, Shingles    1. Herpes zoster without complication  -Nsaids as needed for pain   - valACYclovir (VALTREX) 1 gram tablet; Take 1 Tab by mouth three (3) times daily for 7 days. Indications: HERPES ZOSTER  Dispense: 21 Tab; Refill: 0  - predniSONE (DELTASONE) 20 mg tablet; Take 1 Tab by mouth daily for 5 days. With Breakfast  Dispense: 5 Tab;  Refill: 0

## 2017-08-30 ENCOUNTER — HOSPITAL ENCOUNTER (OUTPATIENT)
Dept: PHYSICAL THERAPY | Age: 52
End: 2017-08-30
Payer: SELF-PAY

## 2017-08-31 ENCOUNTER — APPOINTMENT (OUTPATIENT)
Dept: PHYSICAL THERAPY | Age: 52
End: 2017-08-31
Payer: SELF-PAY

## 2017-09-19 ENCOUNTER — TELEPHONE (OUTPATIENT)
Dept: FAMILY MEDICINE CLINIC | Age: 52
End: 2017-09-19

## 2017-09-19 NOTE — TELEPHONE ENCOUNTER
Medication: Lovaza 1gm, dose: 2 caps, how often: QD, current number of medication days provided: 90, refill per application. Lot #: [id number], EXP 09/2018. This medication was received and verified for the following 1. Correct Patient, 2. Correct Diagnosis, 3. Correct Drug, 4. Correct route, and no current allergy to medication. Please contact patient to come  their medications.      Marissa Saucedo MSN, RN, [facility]

## 2017-09-21 ENCOUNTER — TELEPHONE (OUTPATIENT)
Dept: FAMILY MEDICINE CLINIC | Age: 52
End: 2017-09-21

## 2017-09-21 NOTE — TELEPHONE ENCOUNTER
Medication: Kombiglyze 2.5/1000, dose: 2 tab, how often: qd , current number of medication days provided: 90, refill per application. Lot #: [id number], EXP 09/2018  . This medication was received and verified for the following 1. Correct Patient, 2. Correct Diagnosis, 3. Correct Drug, 4. Correct route, and no current allergy to medication      Please contact patient to come  their medications.      Krupa Nguyen, MSN, RN, [facility]

## 2017-10-05 ENCOUNTER — TELEPHONE (OUTPATIENT)
Dept: FAMILY MEDICINE CLINIC | Age: 52
End: 2017-10-05

## 2017-10-05 NOTE — TELEPHONE ENCOUNTER
Medication: Livalo , dose: 2 mg, how often: Daily , current number of medication days provided: 90, refill per application. Lot #: [id number], EXP 01/2018. This medication was received and verified for the following 1. Correct Patient, 2. Correct Diagnosis, 3. Correct Drug, 4. Correct route, and no current allergy to medication. Please contact patient to come  their medications.      Eduardo Carrera MSN, RN, [facility]

## 2017-10-10 NOTE — PROGRESS NOTES
[facility] - [facility]  [address] [place] [address]  [phone] fax    Physical Therapy Discharge Summary    Patient name: Marilu Wilson Start of Care: 2017   Referral source: Mae Segura NP : 1965                          Medical Diagnosis: Cervicalgia [M54.2]  Anesthesia of skin [R20.0]  Paresthesia of skin [R20.2]  Pain in shoulder [M25.519] Onset Date:aabout 2 months ago                          Treatment Diagnosis: R upper back/shoulder pain and numbness of R UE   Prior Hospitalization: see medical history Provider#: [id number]   Medications: Verified on Patient summary List    Comorbidities: DM type 2, HTN, LBP, numbness of L foot   Prior Level of Function: independent with all ADLs/driving                          Visits from Start of Care: 7    Missed Visits: 1    Reporting Period :  2017 to 2017    Summary of Care:  Goal: Pt will be independent with HEP to maintain progression  Status at last note/certification: not met  Status at discharge: not met    Goal: Pt will improve FOTO score by 6 points to show improvement with functional mobility performance. Status at last note/certification: not met  Status at discharge: not met    Goal: Pt will report being able to drive with no numbness of R UE so he can drive safely and comfortably. Status at last note/certification: not met  Status at discharge: not met    Goal: Pt will have R mid and lower trapezius strength at least 4/5 to be able to perform ADLs comfortably.         Status at last note/certification: not met  Status at discharge: not met      ASSESSMENT/RECOMMENDATIONS: pt making steady progress towards goals with gradually improving back pain and reducing radicular symptoms.  He cont to be limited with core and B UEs strength, increased pain and radicular symptoms with prolonged physical activities/amb, and decreased extensibility of parascapular/paraspinal musculature. Pt would cont to benefit from skilled PT. Pt canceled appointments due to other medical issue (unknown rash) and promised to follow up latter with PT but he didn't. Will discharge due to non-compliance.     [x]Discontinue therapy: []Patient has reached or is progressing toward set goals      [x]Patient is non-compliant or has abdicated      [x]Due to lack of appreciable progress towards set goals    Maria Guadalupe Shin, PT 10/10/2017 9:49 AM

## 2017-10-16 ENCOUNTER — CLINICAL SUPPORT (OUTPATIENT)
Dept: FAMILY MEDICINE CLINIC | Age: 52
End: 2017-10-16

## 2017-10-16 DIAGNOSIS — Z23 ENCOUNTER FOR IMMUNIZATION: Primary | ICD-10-CM

## 2017-10-16 NOTE — PROGRESS NOTES
Marina Duffy is a 47 y.o. male who presents for influenza immunizations. He denies any symptoms , reactions or allergies that would exclude them from being immunized today. Risks and adverse reactions were discussed and the VIS was given to them. All questions were addressed. He was observed for 20 min post injection. There were no reactions observed. Marizol Campo LPN

## 2017-10-16 NOTE — MR AVS SNAPSHOT
Visit Information Date & Time Provider Department Dept. Phone Encounter #  
 10/16/2017  9:10 AM Mtateo Evans, LAB [address] [id number] Your Appointments 12/20/2017  8:00 AM  
Follow Up with Ramo Franklin NP [address] [facility] ([facility]) Appt Note: 6 mth follow up  
 [address] [id number]  
[address] [facility] [address] Upcoming Health Maintenance Date Due  
 EYE EXAM RETINAL OR DILATED Q1 1/2/1976 DTaP/Tdap/Td series (1 - Tdap) 11/24/2011 INFLUENZA AGE 9 TO ADULT 8/1/2017 FOOT EXAM Q1 10/20/2017 Pneumococcal 19-64 Medium Risk (1 of 1 - PPSV23) 1/23/2018* MICROALBUMIN Q1 1/11/2018 HEMOGLOBIN A1C Q6M 1/18/2018 LIPID PANEL Q1 7/18/2018 FOBT Q 1 YEAR AGE 50-75 7/26/2018 *Topic was postponed. The date shown is not the original due date. Allergies as of 10/16/2017  Review Complete On: 10/16/2017 By: Kely Castrejon. Gladis Campo LPN No Known Allergies Current Immunizations  Reviewed on 10/22/2015 Name Date Influenza Vaccine Melanie Sven) 10/12/2016, 10/22/2015 Influenza Vaccine (Quad) PF 10/16/2017  9:19 AM  
 Td 11/23/2011 Not reviewed this visit You Were Diagnosed With   
  
 Codes Comments Encounter for immunization    -  Primary ICD-10-CM: E41 ICD-9-CM: V03.89 Vitals Smoking Status Former Smoker Preferred Pharmacy Pharmacy Name Phone [facility] [address] [phone] Your Updated Medication List  
  
   
This list is accurate as of: 10/16/17  9:21 AM.  Always use your most recent med list.  
  
  
  
  
 cyclobenzaprine 10 mg tablet Commonly known as:  FLEXERIL Take 0.5 Tabs by mouth three (3) times daily as needed (for pain and muscle spasms). dapagliflozin 10 mg Tab tablet Commonly known as:  [employer] Take 1 Tab by mouth daily (with breakfast). Indications: type 2 diabetes mellitus  
  
 desloratadine 5 mg tablet Commonly known as:  CLARINEX Take 1 Tab by mouth daily. lisinopril-hydroCHLOROthiazide 20-12.5 mg per tablet Commonly known as:  Jayde Hodgkin Take 1 Tab by mouth daily. Indications: hypertension  
  
 loratadine 10 mg tablet Commonly known as:  César Hudson Take 1 Tab by mouth daily. Indications: ALLERGIC RHINITIS  
  
 * meloxicam 15 mg tablet Commonly known as:  MOBIC  
TAKE ONE TABLET BY MOUTH ONCE DAILY * meloxicam 15 mg tablet Commonly known as:  MOBIC Take 1 Tab by mouth daily. * mometasone 50 mcg/actuation nasal spray Commonly known as:  NASONEX  
2 Sprays by Both Nostrils route daily. Indications: ALLERGIC RHINITIS * mometasone 50 mcg/actuation nasal spray Commonly known as:  NASONEX  
2 Sprays by Both Nostrils route daily. multivitamin tablet Commonly known as:  ONE A DAY Take 1 Tab by mouth daily. omega-3 acid ethyl esters 1 gram capsule Commonly known as:  Peggi Rodriguez Take 2 Caps by mouth daily (with breakfast). pitavastatin calcium 2 mg tablet Commonly known as:  LIVALO Take 1 Tab by mouth daily. To start once Crestor complete. potassium 99 mg tablet Take 1 Tab by mouth daily. pregabalin 75 mg capsule Commonly known as:  Edman Speaker Take 1 Cap by mouth two (2) times a day. Max Daily Amount: 150 mg. sAXagliptin-metFORMIN 2.5-1,000 mg Tm24 Commonly known as:  KOMBIGLYZE XR Take 2 Tabs by mouth daily (with dinner). * Notice: This list has 4 medication(s) that are the same as other medications prescribed for you. Read the directions carefully, and ask your doctor or other care provider to review them with you. We Performed the Following INFLUENZA VIRUS VAC QUAD,SPLIT,PRESV FREE SYRINGE IM [id number] CPT(R)] Patient Instructions Vaccine Information Statement Influenza (Flu) Vaccine (Inactivated or Recombinant): What you need to know Many Vaccine Information Statements are available in [place] and other languages. See www.immunize.org/vis Hojas de Información Sobre Vacunas están disponibles en Español y en muchos otros idiomas. Visite www.immunize.org/vis 1. Why get vaccinated? Influenza (flu) is a contagious disease that spreads around the [facility] every year, usually between October and May. Flu is caused by influenza viruses, and is spread mainly by coughing, sneezing, and close contact. Anyone can get flu. Flu strikes suddenly and can last several days. Symptoms vary by age, but can include: 
 fever/chills  sore throat  muscle aches  fatigue  cough  headache  runny or stuffy nose Flu can also lead to pneumonia and blood infections, and cause diarrhea and seizures in children. If you have a medical condition, such as heart or lung disease, flu can make it worse. Flu is more dangerous for some people. Infants and young children, people 72years of age and older, pregnant women, and people with certain health conditions or a weakened immune system are at greatest risk. Each year thousands of people in the [facility] die from flu, and many more are hospitalized. Flu vaccine can: 
 keep you from getting flu, 
 make flu less severe if you do get it, and 
 keep you from spreading flu to your family and other people. 2. Inactivated and recombinant flu vaccines A dose of flu vaccine is recommended every flu season. Children 6 months through 6years of age may need two doses during the same flu season. Everyone else needs only one dose each flu season.   
 
 
Some inactivated flu vaccines contain a very small amount of a mercury-based preservative called thimerosal. Studies have not shown thimerosal in vaccines to be harmful, but flu vaccines that do not contain thimerosal are available. There is no live flu virus in flu shots. They cannot cause the flu. There are many flu viruses, and they are always changing. Each year a new flu vaccine is made to protect against three or four viruses that are likely to cause disease in the upcoming flu season. But even when the vaccine doesnt exactly match these viruses, it may still provide some protection Flu vaccine cannot prevent: 
 flu that is caused by a virus not covered by the vaccine, or 
 illnesses that look like flu but are not. It takes about 2 weeks for protection to develop after vaccination, and protection lasts through the flu season. 3. Some people should not get this vaccine Tell the person who is giving you the vaccine:  If you have any severe, life-threatening allergies. If you ever had a life-threatening allergic reaction after a dose of flu vaccine, or have a severe allergy to any part of this vaccine, you may be advised not to get vaccinated. Most, but not all, types of flu vaccine contain a small amount of egg protein.  If you ever had Guillain-Barré Syndrome (also called GBS). Some people with a history of GBS should not get this vaccine. This should be discussed with your doctor.  If you are not feeling well. It is usually okay to get flu vaccine when you have a mild illness, but you might be asked to come back when you feel better. 4. Risks of a vaccine reaction With any medicine, including vaccines, there is a chance of reactions. These are usually mild and go away on their own, but serious reactions are also possible. Most people who get a flu shot do not have any problems with it. Minor problems following a flu shot include:  
 soreness, redness, or swelling where the shot was given  hoarseness  sore, red or itchy eyes  cough  fever  aches  headache  itching  fatigue If these problems occur, they usually begin soon after the shot and last 1 or 2 days. More serious problems following a flu shot can include the following:  There may be a small increased risk of Guillain-Barré Syndrome (GBS) after inactivated flu vaccine. This risk has been estimated at 1 or 2 additional cases per million people vaccinated. This is much lower than the risk of severe complications from flu, which can be prevented by flu vaccine.  Young children who get the flu shot along with pneumococcal vaccine (PCV13) and/or DTaP vaccine at the same time might be slightly more likely to have a seizure caused by fever. Ask your doctor for more information. Tell your doctor if a child who is getting flu vaccine has ever had a seizure. Problems that could happen after any injected vaccine:  People sometimes faint after a medical procedure, including vaccination. Sitting or lying down for about 15 minutes can help prevent fainting, and injuries caused by a fall. Tell your doctor if you feel dizzy, or have vision changes or ringing in the ears.  Some people get severe pain in the shoulder and have difficulty moving the arm where a shot was given. This happens very rarely.  Any medication can cause a severe allergic reaction. Such reactions from a vaccine are very rare, estimated at about 1 in a million doses, and would happen within a few minutes to a few hours after the vaccination. As with any medicine, there is a very remote chance of a vaccine causing a serious injury or death. The safety of vaccines is always being monitored. For more information, visit: www.cdc.gov/vaccinesafety/ 
 
5. What if there is a serious reaction? What should I look for?  Look for anything that concerns you, such as signs of a severe allergic reaction, very high fever, or unusual behavior.  
 
 
The [facility] Vaccine Injury Compensation Program (VICP) is a federal program that was created to compensate people who may have been injured by certain vaccines. Persons who believe they may have been injured by a vaccine can learn about the program and about filing a claim by calling 4-950.814.7875 or visiting the [address] website at www.[facility].gov/vaccinecompensation. There is a time limit to file a claim for compensation. 7. How can I learn more?  Ask your healthcare provider. He or she can give you the vaccine package insert or suggest other sources of information.  Call your local or state health department.  Contact the Centers for Disease Control and Prevention (CDC): 
- Call 3-468.864.5311 (1-800-CDC-INFO) or 
- Visit CDCs website at www.cdc.gov/flu Vaccine Information Statement Inactivated Influenza Vaccine 8/7/2015 
42 KETAN Dejesus [id number] Department of Health and [employer] Centers for Disease Control and Prevention Office Use Only Please provide this summary of care documentation to your next provider. Your primary care clinician is listed as Bijan Almanza. If you have any questions after today's visit, please call [phone].

## 2017-10-16 NOTE — PATIENT INSTRUCTIONS
Vaccine Information Statement    Influenza (Flu) Vaccine (Inactivated or Recombinant): What you need to know    Many Vaccine Information Statements are available in [place] and other languages. See www.immunize.org/vis  Hojas de Información Sobre Vacunas están disponibles en Español y en muchos otros idiomas. Visite www.immunize.org/vis    1. Why get vaccinated? Influenza (flu) is a contagious disease that spreads around the [place] every year, usually between October and May. Flu is caused by influenza viruses, and is spread mainly by coughing, sneezing, and close contact. Anyone can get flu. Flu strikes suddenly and can last several days. Symptoms vary by age, but can include:   fever/chills   sore throat   muscle aches   fatigue   cough   headache    runny or stuffy nose    Flu can also lead to pneumonia and blood infections, and cause diarrhea and seizures in children. If you have a medical condition, such as heart or lung disease, flu can make it worse. Flu is more dangerous for some people. Infants and young children, people 72years of age and older, pregnant women, and people with certain health conditions or a weakened immune system are at greatest risk. Each year thousands of people in the [facility] die from flu, and many more are hospitalized. Flu vaccine can:   keep you from getting flu,   make flu less severe if you do get it, and   keep you from spreading flu to your family and other people. 2. Inactivated and recombinant flu vaccines    A dose of flu vaccine is recommended every flu season. Children 6 months through 6years of age may need two doses during the same flu season. Everyone else needs only one dose each flu season.        Some inactivated flu vaccines contain a very small amount of a mercury-based preservative called thimerosal. Studies have not shown thimerosal in vaccines to be harmful, but flu vaccines that do not contain thimerosal are available. There is no live flu virus in flu shots. They cannot cause the flu. There are many flu viruses, and they are always changing. Each year a new flu vaccine is made to protect against three or four viruses that are likely to cause disease in the upcoming flu season. But even when the vaccine doesnt exactly match these viruses, it may still provide some protection    Flu vaccine cannot prevent:   flu that is caused by a virus not covered by the vaccine, or   illnesses that look like flu but are not. It takes about 2 weeks for protection to develop after vaccination, and protection lasts through the flu season. 3. Some people should not get this vaccine    Tell the person who is giving you the vaccine:     If you have any severe, life-threatening allergies. If you ever had a life-threatening allergic reaction after a dose of flu vaccine, or have a severe allergy to any part of this vaccine, you may be advised not to get vaccinated. Most, but not all, types of flu vaccine contain a small amount of egg protein.  If you ever had Guillain-Barré Syndrome (also called GBS). Some people with a history of GBS should not get this vaccine. This should be discussed with your doctor.  If you are not feeling well. It is usually okay to get flu vaccine when you have a mild illness, but you might be asked to come back when you feel better. 4. Risks of a vaccine reaction    With any medicine, including vaccines, there is a chance of reactions. These are usually mild and go away on their own, but serious reactions are also possible. Most people who get a flu shot do not have any problems with it.      Minor problems following a flu shot include:    soreness, redness, or swelling where the shot was given     hoarseness   sore, red or itchy eyes   cough   fever   aches   headache   itching   fatigue  If these problems occur, they usually begin soon after the shot and last 1 or 2 days. More serious problems following a flu shot can include the following:     There may be a small increased risk of Guillain-Barré Syndrome (GBS) after inactivated flu vaccine. This risk has been estimated at 1 or 2 additional cases per million people vaccinated. This is much lower than the risk of severe complications from flu, which can be prevented by flu vaccine.  Young children who get the flu shot along with pneumococcal vaccine (PCV13) and/or DTaP vaccine at the same time might be slightly more likely to have a seizure caused by fever. Ask your doctor for more information. Tell your doctor if a child who is getting flu vaccine has ever had a seizure. Problems that could happen after any injected vaccine:      People sometimes faint after a medical procedure, including vaccination. Sitting or lying down for about 15 minutes can help prevent fainting, and injuries caused by a fall. Tell your doctor if you feel dizzy, or have vision changes or ringing in the ears.  Some people get severe pain in the shoulder and have difficulty moving the arm where a shot was given. This happens very rarely.  Any medication can cause a severe allergic reaction. Such reactions from a vaccine are very rare, estimated at about 1 in a million doses, and would happen within a few minutes to a few hours after the vaccination. As with any medicine, there is a very remote chance of a vaccine causing a serious injury or death. The safety of vaccines is always being monitored. For more information, visit: www.cdc.gov/vaccinesafety/    5. What if there is a serious reaction? What should I look for?  Look for anything that concerns you, such as signs of a severe allergic reaction, very high fever, or unusual behavior.     Signs of a severe allergic reaction can include hives, swelling of the face and throat, difficulty breathing, a fast heartbeat, dizziness, and weakness - usually within a few minutes to a few hours after the vaccination. What should I do?  If you think it is a severe allergic reaction or other emergency that cant wait, call 9-1-1 and get the person to the nearest hospital. Otherwise, call your doctor.  Reactions should be reported to the Vaccine Adverse Event Reporting System (VAERS). Your doctor should file this report, or you can do it yourself through  the VAERS web site at www.vaers. [facility].gov, or by calling 7-267.190.3684. VAERS does not give medical advice. 6. The National Vaccine Injury Compensation Program    The [facility] Vaccine Injury Compensation Program (VICP) is a federal program that was created to compensate people who may have been injured by certain vaccines. Persons who believe they may have been injured by a vaccine can learn about the program and about filing a claim by calling 2-235.578.9121 or visiting the [address] website at www.[facility].gov/vaccinecompensation. There is a time limit to file a claim for compensation. 7. How can I learn more?  Ask your healthcare provider. He or she can give you the vaccine package insert or suggest other sources of information.  Call your local or state health department.  Contact the Centers for Disease Control and Prevention (CDC):  - Call 2-719.179.4977 (1-800-CDC-INFO) or  - Visit CDCs website at www.cdc.gov/flu    Vaccine Information Statement   Inactivated Influenza Vaccine   8/7/2015  42 KETAN Gilson Renny [id number]    Department of Health and Human Services  Centers for Disease Control and Prevention    Office Use Only

## 2017-10-17 DIAGNOSIS — M17.0 PRIMARY OSTEOARTHRITIS OF BOTH KNEES: ICD-10-CM

## 2017-10-17 RX ORDER — MELOXICAM 15 MG/1
TABLET ORAL
Qty: 30 TAB | Refills: 3 | Status: SHIPPED | OUTPATIENT
Start: 2017-10-17 | End: 2018-02-21 | Stop reason: SDUPTHER

## 2017-10-24 ENCOUNTER — TELEPHONE (OUTPATIENT)
Dept: FAMILY MEDICINE CLINIC | Age: 52
End: 2017-10-24

## 2017-10-24 NOTE — TELEPHONE ENCOUNTER
Medication: Farxiga 10mg , dose: 1 tab, how often: qd , current number of medication days provided: 90, refill per application. Lot #: [id number], EXP 10/2018  . This medication was received and verified for the following 1. Correct Patient, 2. Correct Diagnosis, 3. Correct Drug, 4. Correct route, and no current allergy to medication. Please contact patient to come  their medications.      Melissa Harman MSN, RN, FNP-C     [facility]

## 2017-11-21 ENCOUNTER — TELEPHONE (OUTPATIENT)
Dept: FAMILY MEDICINE CLINIC | Age: 52
End: 2017-11-21

## 2017-11-22 NOTE — TELEPHONE ENCOUNTER
Medication: Nasonex, dose: 2 sprays both nostrils, how often: every day as needed for allergies, current number of medication days provided: 90, refill per application. Lot #: [id number], EXP 11/2018  . This medication was received and verified for the following 1. Correct Patient, 2. Correct Diagnosis, 3. Correct Drug, 4. Correct route, and no current allergy to medication. Medication: Lovaza 1gm, dose: 2 caps, how often: QD, current number of medication days provided: 90, refill per application. Lot #: [id number], EXP 11/2018. This medication was received and verified for the following 1. Correct Patient, 2. Correct Diagnosis, 3. Correct Drug, 4. Correct route, and no current allergy to medication. Please contact patient to come  their medications.      Brandy Lopez, MSN, RN, FNP-C     [facility]

## 2017-12-11 ENCOUNTER — HOSPITAL ENCOUNTER (OUTPATIENT)
Dept: LAB | Age: 52
Discharge: HOME | End: 2017-12-11

## 2017-12-11 ENCOUNTER — LAB ONLY (OUTPATIENT)
Dept: FAMILY MEDICINE CLINIC | Age: 52
End: 2017-12-11

## 2017-12-11 DIAGNOSIS — Z79.899 CONTROLLED SUBSTANCE AGREEMENT SIGNED: ICD-10-CM

## 2017-12-11 DIAGNOSIS — E11.42 DM TYPE 2 WITH DIABETIC PERIPHERAL NEUROPATHY (HCC): ICD-10-CM

## 2017-12-11 DIAGNOSIS — I10 ESSENTIAL HYPERTENSION: Primary | ICD-10-CM

## 2017-12-11 DIAGNOSIS — I10 ESSENTIAL HYPERTENSION: ICD-10-CM

## 2017-12-11 DIAGNOSIS — E55.9 VITAMIN D DEFICIENCY: ICD-10-CM

## 2017-12-11 LAB
25(OH)D3 SERPL-MCNC: 11.7 NG/ML (ref 30–100)
ALBUMIN SERPL-MCNC: 4.1 G/DL (ref 3.4–5)
ALBUMIN/GLOB SERPL: 1.2 {RATIO} (ref 0.8–1.7)
ALP SERPL-CCNC: 71 U/L (ref 45–117)
ALT SERPL-CCNC: 39 U/L (ref 16–61)
AMPHET UR QL SCN: NEGATIVE
ANION GAP SERPL CALC-SCNC: 7 MMOL/L (ref 3–18)
AST SERPL-CCNC: 19 U/L (ref 15–37)
BARBITURATES UR QL SCN: NEGATIVE
BASOPHILS # BLD: 0.1 K/UL (ref 0–0.06)
BASOPHILS NFR BLD: 1 % (ref 0–2)
BENZODIAZ UR QL: NEGATIVE
BILIRUB SERPL-MCNC: 1.2 MG/DL (ref 0.2–1)
BUN SERPL-MCNC: 20 MG/DL (ref 7–18)
BUN/CREAT SERPL: 22 (ref 12–20)
CALCIUM SERPL-MCNC: 8.5 MG/DL (ref 8.5–10.1)
CANNABINOIDS UR QL SCN: NEGATIVE
CHLORIDE SERPL-SCNC: 103 MMOL/L (ref 100–108)
CHOLEST SERPL-MCNC: 128 MG/DL
CO2 SERPL-SCNC: 29 MMOL/L (ref 21–32)
COCAINE UR QL SCN: NEGATIVE
CREAT SERPL-MCNC: 0.91 MG/DL (ref 0.6–1.3)
CREAT UR-MCNC: 257 MG/DL (ref 30–125)
DIFFERENTIAL METHOD BLD: ABNORMAL
EOSINOPHIL # BLD: 0.3 K/UL (ref 0–0.4)
EOSINOPHIL NFR BLD: 4 % (ref 0–5)
ERYTHROCYTE [DISTWIDTH] IN BLOOD BY AUTOMATED COUNT: 13.1 % (ref 11.6–14.5)
EST. AVERAGE GLUCOSE BLD GHB EST-MCNC: 134 MG/DL
GLOBULIN SER CALC-MCNC: 3.3 G/DL (ref 2–4)
GLUCOSE SERPL-MCNC: 130 MG/DL (ref 74–99)
HBA1C MFR BLD: 6.3 % (ref 4.2–5.6)
HCT VFR BLD AUTO: 47.3 % (ref 36–48)
HDLC SERPL-MCNC: 37 MG/DL (ref 40–60)
HDLC SERPL: 3.5 {RATIO} (ref 0–5)
HDSCOM,HDSCOM: NORMAL
HGB BLD-MCNC: 16.5 G/DL (ref 13–16)
LDLC SERPL CALC-MCNC: 44 MG/DL (ref 0–100)
LIPID PROFILE,FLP: ABNORMAL
LYMPHOCYTES # BLD: 3.2 K/UL (ref 0.9–3.6)
LYMPHOCYTES NFR BLD: 38 % (ref 21–52)
MAGNESIUM SERPL-MCNC: 2.1 MG/DL (ref 1.6–2.6)
MCH RBC QN AUTO: 29.3 PG (ref 24–34)
MCHC RBC AUTO-ENTMCNC: 34.9 G/DL (ref 31–37)
MCV RBC AUTO: 84 FL (ref 74–97)
METHADONE UR QL: NEGATIVE
MICROALBUMIN UR-MCNC: 6.82 MG/DL (ref 0–3)
MICROALBUMIN/CREAT UR-RTO: 27 MG/G (ref 0–30)
MONOCYTES # BLD: 0.7 K/UL (ref 0.05–1.2)
MONOCYTES NFR BLD: 9 % (ref 3–10)
NEUTS SEG # BLD: 4.1 K/UL (ref 1.8–8)
NEUTS SEG NFR BLD: 48 % (ref 40–73)
OPIATES UR QL: NEGATIVE
PCP UR QL: NEGATIVE
PLATELET # BLD AUTO: 183 K/UL (ref 135–420)
PMV BLD AUTO: 10.8 FL (ref 9.2–11.8)
POTASSIUM SERPL-SCNC: 3.6 MMOL/L (ref 3.5–5.5)
PROT SERPL-MCNC: 7.4 G/DL (ref 6.4–8.2)
PSA SERPL-MCNC: 2.5 NG/ML (ref 0–4)
RBC # BLD AUTO: 5.63 M/UL (ref 4.7–5.5)
SODIUM SERPL-SCNC: 139 MMOL/L (ref 136–145)
TRIGL SERPL-MCNC: 235 MG/DL (ref ?–150)
VLDLC SERPL CALC-MCNC: 47 MG/DL
WBC # BLD AUTO: 8.3 K/UL (ref 4.6–13.2)

## 2017-12-11 PROCEDURE — 80307 DRUG TEST PRSMV CHEM ANLYZR: CPT | Performed by: NURSE PRACTITIONER

## 2017-12-11 PROCEDURE — 82043 UR ALBUMIN QUANTITATIVE: CPT | Performed by: NURSE PRACTITIONER

## 2017-12-11 PROCEDURE — 85025 COMPLETE CBC W/AUTO DIFF WBC: CPT | Performed by: NURSE PRACTITIONER

## 2017-12-11 PROCEDURE — 80053 COMPREHEN METABOLIC PANEL: CPT | Performed by: NURSE PRACTITIONER

## 2017-12-11 PROCEDURE — 84153 ASSAY OF PSA TOTAL: CPT | Performed by: NURSE PRACTITIONER

## 2017-12-11 PROCEDURE — 80061 LIPID PANEL: CPT | Performed by: NURSE PRACTITIONER

## 2017-12-11 PROCEDURE — 82306 VITAMIN D 25 HYDROXY: CPT | Performed by: NURSE PRACTITIONER

## 2017-12-11 PROCEDURE — 83735 ASSAY OF MAGNESIUM: CPT | Performed by: NURSE PRACTITIONER

## 2017-12-11 PROCEDURE — 83036 HEMOGLOBIN GLYCOSYLATED A1C: CPT | Performed by: NURSE PRACTITIONER

## 2017-12-11 NOTE — PROGRESS NOTES
Will review results with pt at 12/20/17 appt  1. Triglycerides 235 from 166  2. A1C 6.3  3. Vit D 11.7  4.  Microalbuminuria, on ace inhibitor

## 2017-12-20 ENCOUNTER — OFFICE VISIT (OUTPATIENT)
Dept: FAMILY MEDICINE CLINIC | Age: 52
End: 2017-12-20

## 2017-12-20 VITALS
HEART RATE: 80 BPM | OXYGEN SATURATION: 96 % | RESPIRATION RATE: 16 BRPM | HEIGHT: 71 IN | BODY MASS INDEX: 30.27 KG/M2 | DIASTOLIC BLOOD PRESSURE: 89 MMHG | TEMPERATURE: 98.3 F | SYSTOLIC BLOOD PRESSURE: 129 MMHG | WEIGHT: 216.2 LBS

## 2017-12-20 DIAGNOSIS — E78.1 HYPERTRIGLYCERIDEMIA: ICD-10-CM

## 2017-12-20 DIAGNOSIS — E11.9 TYPE 2 DIABETES MELLITUS WITHOUT COMPLICATION, UNSPECIFIED LONG TERM INSULIN USE STATUS: Primary | ICD-10-CM

## 2017-12-20 DIAGNOSIS — I10 ESSENTIAL HYPERTENSION: ICD-10-CM

## 2017-12-20 DIAGNOSIS — E55.9 VITAMIN D DEFICIENCY: ICD-10-CM

## 2017-12-20 RX ORDER — PREDNISOLONE ACETATE 10 MG/ML
1 SUSPENSION/ DROPS OPHTHALMIC
COMMUNITY
End: 2019-01-25

## 2017-12-20 RX ORDER — LEVOFLOXACIN 5 MG/ML
SOLUTION/ DROPS TOPICAL 4 TIMES DAILY
COMMUNITY
Start: 2017-12-13 | End: 2017-12-23

## 2017-12-20 RX ORDER — ERGOCALCIFEROL 1.25 MG/1
CAPSULE ORAL
Qty: 12 CAP | Refills: 0 | Status: SHIPPED | OUTPATIENT
Start: 2017-12-20 | End: 2019-01-25

## 2017-12-20 RX ORDER — OMEGA-3-ACID ETHYL ESTERS 1 G/1
2 CAPSULE, LIQUID FILLED ORAL 2 TIMES DAILY WITH MEALS
Qty: 180 CAP | Refills: 3 | Status: SHIPPED | COMMUNITY
Start: 2017-12-20 | End: 2018-06-13

## 2017-12-20 NOTE — MR AVS SNAPSHOT
Visit Information Date & Time Provider Department Dept. Phone Encounter #  
 12/20/2017  8:00 AM Jaquelin Villasenor NP [address] [facility] [address] [id number] Follow-up Instructions Return in about 6 months (around 6/20/2018), or if symptoms worsen or fail to improve. Your Appointments 6/13/2018  9:30 AM  
Follow Up with Jaquelin Villasenor NP [address] [facility] ([address]) Appt Note: 6 mth follow up  
 [address] [place] [id number]  
[address] [facility] [id number] Upcoming Health Maintenance Date Due  
 EYE EXAM RETINAL OR DILATED Q1 1/2/1976 DTaP/Tdap/Td series (1 - Tdap) 11/24/2011 Pneumococcal 19-64 Medium Risk (1 of 1 - PPSV23) 1/23/2018* HEMOGLOBIN A1C Q6M 6/11/2018 FOBT Q 1 YEAR AGE 50-75 7/26/2018 MICROALBUMIN Q1 12/11/2018 LIPID PANEL Q1 12/11/2018 FOOT EXAM Q1 12/20/2018 *Topic was postponed. The date shown is not the original due date. Allergies as of 12/20/2017  Review Complete On: 12/20/2017 By: Maged Crane. Sherry Batista LPN No Known Allergies Current Immunizations  Reviewed on 10/22/2015 Name Date Influenza Vaccine Nayan Goring) 10/12/2016, 10/22/2015 Influenza Vaccine (Quad) PF 10/16/2017  9:19 AM  
 Td 11/23/2011 Not reviewed this visit You Were Diagnosed With   
  
 Codes Comments Type 2 diabetes mellitus without complication, unspecified long term insulin use status (HCC)    -  Primary ICD-10-CM: E11.9 ICD-9-CM: 250.00 Essential hypertension     ICD-10-CM: I10 
ICD-9-CM: 401.9 Hypertriglyceridemia     ICD-10-CM: E78.1 ICD-9-CM: 272.1 Vitamin D deficiency     ICD-10-CM: E55.9 ICD-9-CM: 268.9 Vitals BP Pulse Temp Resp Height(growth percentile) Weight(growth percentile)  129/89 (BP 1 Location: Left arm, BP Patient Position: Sitting) 80 98.3 °F (36.8 °C) (Oral) 16 5' 11\" (1.803 m) 216 lb 3.2 oz (98.1 kg) SpO2 BMI Smoking Status 96% 30.15 kg/m2 Former Smoker Vitals History BMI and BSA Data Body Mass Index Body Surface Area  
 30.15 kg/m 2 2.22 m 2 Preferred Pharmacy Pharmacy Name Phone [facility] [address]. [phone] Your Updated Medication List  
  
   
This list is accurate as of: 12/20/17  8:19 AM.  Always use your most recent med list.  
  
  
  
  
 cyclobenzaprine 10 mg tablet Commonly known as:  FLEXERIL Take 0.5 Tabs by mouth three (3) times daily as needed (for pain and muscle spasms). dapagliflozin 10 mg Tab tablet Commonly known as:  [employer] Take 1 Tab by mouth daily (with breakfast). Indications: type 2 diabetes mellitus  
  
 desloratadine 5 mg tablet Commonly known as:  CLARINEX Take 1 Tab by mouth daily. ergocalciferol 50,000 unit capsule Commonly known as:  VITAMIN D2 Take one capsule every 7 days till gone, then start taking over-the-counter Vitamin D3 1,000 units every day  
  
 levoFLOXacin 0.5 % ophthalmic solution Commonly known as:  Josefina Karen Administer  to right eye four (4) times daily. use in affected eye(s)  
  
 lisinopril-hydroCHLOROthiazide 20-12.5 mg per tablet Commonly known as:  Brittany Gosper Take 1 Tab by mouth daily. Indications: hypertension  
  
 loratadine 10 mg tablet Commonly known as:  Alanda Youong Take 1 Tab by mouth daily. Indications: ALLERGIC RHINITIS  
  
 * meloxicam 15 mg tablet Commonly known as:  MOBIC  
TAKE ONE TABLET BY MOUTH ONCE DAILY * meloxicam 15 mg tablet Commonly known as:  MOBIC  
TAKE ONE TABLET BY MOUTH ONCE DAILY  
  
 mometasone 50 mcg/actuation nasal spray Commonly known as:  NASONEX  
2 Sprays by Both Nostrils route daily. Indications: ALLERGIC RHINITIS  
  
 multivitamin tablet Commonly known as:  ONE A DAY Take 1 Tab by mouth daily. omega-3 acid ethyl esters 1 gram capsule Commonly known as:  [employer] Take 2 Caps by mouth two (2) times daily (with meals). pitavastatin calcium 2 mg tablet Commonly known as:  LIVALO Take 1 Tab by mouth daily. To start once Crestor complete. potassium 99 mg tablet Take 1 Tab by mouth daily. prednisoLONE acetate 1 % ophthalmic suspension Commonly known as:  PRED FORTE Administer 1 Drop to right eye six (6) times daily. pregabalin 75 mg capsule Commonly known as:  Jinnie Ruff Take 1 Cap by mouth two (2) times a day. Max Daily Amount: 150 mg. sAXagliptin-metFORMIN 2.5-1,000 mg Tm24 Commonly known as:  KOMBIGLYZE XR Take 2 Tabs by mouth daily (with dinner). * Notice: This list has 2 medication(s) that are the same as other medications prescribed for you. Read the directions carefully, and ask your doctor or other care provider to review them with you. Prescriptions Printed Refills  
 omega-3 acid ethyl esters (LOVAZA) 1 gram capsule 3 Sig: Take 2 Caps by mouth two (2) times daily (with meals). Class: Program  
 Route: Oral  
  
Prescriptions Sent to Mail Order Refills  
 omega-3 acid ethyl esters (LOVAZA) 1 gram capsule 3 Sig: Take 2 Caps by mouth two (2) times daily (with meals). Class: Program  
 Pharmacy: [facility] [address] [place] [address], [facility] [address] [facility]. Ph #: [phone] Route: Oral  
  
Prescriptions Sent to Pharmacy Refills  
 omega-3 acid ethyl esters (LOVAZA) 1 gram capsule 3 Sig: Take 2 Caps by mouth two (2) times daily (with meals). Class: Program  
 Pharmacy: [address] [place] [address], [address] [facility]. Ph #: [phone] Route: Oral  
 ergocalciferol (VITAMIN D2) 50,000 unit capsule 0 Sig: Take one capsule every 7 days till gone, then start taking over-the-counter Vitamin D3 1,000 units every day  Class: Normal  
 Pharmacy: [facility] [address]  #: [phone] Follow-up Instructions Return in about 6 months (around 6/20/2018), or if symptoms worsen or fail to improve. Patient Instructions Hyperlipidemia: After Your Visit Your Care Instructions Hyperlipidemia is too much fat in your blood. The body has several kinds of fat, including cholesterol and triglycerides. Your body needs fat for many things, such as making new cells. But too much fat in your blood increases your chances of having a heart attack or stroke. You may be able to lower your cholesterol and triglycerides with a heart-healthy diet, exercise, and if needed, medicine. Your doctor may want you to try lifestyle changes first to see whether they lower the fat in your blood. You may need to take medicine if lifestyle changes do not lower the fat in your blood enough. Follow-up care is a key part of your treatment and safety. Be sure to make and go to all appointments, and call your doctor if you are having problems. Its also a good idea to know your test results and keep a list of the medicines you take. How can you care for yourself at home? Take your medicines · Take your medicines exactly as prescribed. Call your doctor if you think you are having a problem with your medicine. · If you take medicine to lower your cholesterol, go to follow-up visits. You will need to have blood tests. · Do not take large doses of niacin, which is a B vitamin, while taking medicine called statins. It may increase the chance of muscle pain and liver problems. · Talk to your doctor about avoiding grapefruit juice if you are taking statins. Grapefruit juice can raise the level of this medicine in your blood. This could increase side effects. Eat more fruits, vegetables, and fiber · Fruits and vegetables have lots of nutrients that help protect against heart disease, and they have littleif anyfat. Try to eat at least five servings a day. Dark green, deep orange, or yellow fruits and vegetables are healthy choices. · Keep carrots, celery, and other veggies handy for snacks. Buy fruit that is in season and store it where you can see it so that you will be tempted to eat it. Cook dishes that have a lot of veggies in them, such as stir-fries and soups. · Foods high in fiber may reduce your cholesterol and provide important vitamins and minerals. High-fiber foods include whole-grain cereals and breads, oatmeal, beans, brown rice, citrus fruits, and apples. · Buy whole-grain breads and cereals instead of white bread and pastries. Limit saturated fat · Read food labels and try to avoid saturated fat and trans fat. They increase your risk of heart disease. · Use olive or canola oil when you cook. Try cholesterol-lowering spreads, such as Benecol or Take Control. · Bake, broil, grill, or steam foods instead of frying them. · Limit the amount of high-fat meats you eat, including hot dogs and sausages. Cut out all visible fat when you prepare meat. · Eat fish, skinless poultry, and soy products such as tofu instead of high-fat meats. Soybeans may be especially good for your heart. Eat at least two servings of fish a week. Certain fish, such as salmon, contain omega-3 fatty acids, which may help reduce your risk of heart attack. · Choose low-fat or fat-free milk and dairy products. Get exercise, limit alcohol, and quit smoking · Get more exercise. Work with your doctor to set up an exercise program. Even if you can do only a small amount, exercise will help you get stronger, have more energy, and manage your weight and your stress. Walking is an easy way to get exercise. Gradually increase the amount you walk every day. Aim for at least 30 minutes on most days of the week. You also may want to swim, bike, or do other activities. · Limit alcohol to no more than 2 drinks a day for men and 1 drink a day for women. · Do not smoke. If you need help quitting, talk to your doctor about stop-smoking programs and medicines. These can increase your chances of quitting for good. When should you call for help? Call 911 anytime you think you may need emergency care. For example, call if: 
· You have symptoms of a heart attack. These may include: ¨ Chest pain or pressure, or a strange feeling in the chest. 
¨ Sweating. ¨ Shortness of breath. ¨ Nausea or vomiting. ¨ Pain, pressure, or a strange feeling in the back, neck, jaw, or upper belly or in one or both shoulders or arms. ¨ Lightheadedness or sudden weakness. ¨ A fast or irregular heartbeat. After you call 911, the  may tell you to chew 1 adult-strength or 2 to 4 low-dose aspirin. Wait for an ambulance. Do not try to drive yourself. · You have signs of a stroke. These may include: 
¨ Sudden numbness, paralysis, or weakness in your face, arm, or leg, especially on only one side of your body. ¨ New problems with walking or balance. ¨ Sudden vision changes. ¨ Drooling or slurred speech. ¨ New problems speaking or understanding simple statements, or feeling confused. ¨ A sudden, severe headache that is different from past headaches. · You passed out (lost consciousness). Call your doctor now or seek immediate medical care if: 
· You have muscle pain or weakness. Watch closely for changes in your health, and be sure to contact your doctor if: 
· You are very tired. · You have an upset stomach, gas, constipation, or belly pain or cramps. Where can you learn more? Go to [employer] Enter C406 in the search box to learn more about \"Hyperlipidemia: After Your Visit. \"  
© 9489-0846 [employer], [employer].  Care instructions adapted under license by Julio C Peres (which disclaims liability or warranty for this information). This care instruction is for use with your licensed healthcare professional. If you have questions about a medical condition or this instruction, always ask your healthcare professional. [address] any warranty or liability for your use of this information. Content Version: 3.4.996182; Last Revised: October 13, 2011 Please provide this summary of care documentation to your next provider. Your primary care clinician is listed as Lisa Avalos. If you have any questions after today's visit, please call [phone].

## 2017-12-20 NOTE — PROGRESS NOTES
12/20/17    PCP: Daniela Luna NP    Chief Complaint   Patient presents with    Follow Up Chronic Condition    Other     revovering from right eye cataract surgery done 12/13/17 at [place] [address]  Jacqueline Her  is a 47 y.o. male whom presents for Follow Up Chronic Condition and Other (revovering from right eye cataract surgery done 12/13/17 at [address] [place] [address])      HPI    Diabetes Mellitus:  He has diabetes mellitus, and  hyperlipidemia. Diabetic ROS - medication compliance: compliant all of the time, diabetic diet compliance: compliant most of the time, home glucose monitoring: is not performed, further diabetic ROS: no polyuria or polydipsia, no chest pain, dyspnea or TIA's, no numbness, tingling or pain in extremities. Lab review: labs reviewed, I note that lipids triglycerides high. Current Outpatient Prescriptions   Medication Sig Dispense Refill    prednisoLONE acetate (PRED FORTE) 1 % ophthalmic suspension Administer 1 Drop to right eye six (6) times daily.  levoFLOXacin (QUIXIN) 0.5 % ophthalmic solution Administer  to right eye four (4) times daily. use in affected eye(s)      omega-3 acid ethyl esters (LOVAZA) 1 gram capsule Take 2 Caps by mouth two (2) times daily (with meals). 180 Cap 3    ergocalciferol (VITAMIN D2) 50,000 unit capsule Take one capsule every 7 days till gone, then start taking over-the-counter Vitamin D3 1,000 units every day 12 Cap 0    cyclobenzaprine (FLEXERIL) 10 mg tablet Take 0.5 Tabs by mouth three (3) times daily as needed (for pain and muscle spasms). 30 Tab 0    lisinopril-hydroCHLOROthiazide (PRINZIDE, ZESTORETIC) 20-12.5 mg per tablet Take 1 Tab by mouth daily. Indications: hypertension 30 Tab 5    dapagliflozin (FARXIGA) 10 mg tab Take 1 Tab by mouth daily (with breakfast). Indications: type 2 diabetes mellitus 90 Tab 3    mometasone (NASONEX) 50 mcg/actuation nasal spray 2 Sprays by Both Nostrils route daily.  Indications: ALLERGIC RHINITIS 2 Container 0    meloxicam (MOBIC) 15 mg tablet TAKE ONE TABLET BY MOUTH ONCE DAILY 30 Tab 0    pitavastatin (LIVALO) 2 mg tablet Take 1 Tab by mouth daily. To start once Crestor complete. 90 Tab 3    saxagliptin-metFORMIN (KOMBIGLYZE XR) 2.5-1,000 mg TM24 Take 2 Tabs by mouth daily (with dinner). 180 Tab 3    pregabalin (LYRICA) 75 mg capsule Take 1 Cap by mouth two (2) times a day. Max Daily Amount: 150 mg. 60 Cap 3    multivitamin (ONE A DAY) tablet Take 1 Tab by mouth daily.  meloxicam (MOBIC) 15 mg tablet TAKE ONE TABLET BY MOUTH ONCE DAILY 30 Tab 3    desloratadine (CLARINEX) 5 mg tablet Take 1 Tab by mouth daily. 90 Tab 3    potassium 99 mg tablet Take 1 Tab by mouth daily.  loratadine (CLARITIN) 10 mg tablet Take 1 Tab by mouth daily. Indications: ALLERGIC RHINITIS 30 Tab 1     No Known Allergies  Past Medical History:   Diagnosis Date    Cataract, right 2010    Diabetes ([facility] [address] [address]     History of vitamin D deficiency 6/2016    Hypercholesterolemia     Hypertension     Hypertriglyceridemia 6/30/2016    Hypokalemia 6/30/2016    Low back pain     Numbness and tingling of foot June 2014    left toes and mid bottom of foot    Scrotal mass 6/6/2016     Past Surgical History:   Procedure Laterality Date    HX HERNIA REPAIR  6492    umbilical, done at [address] TISS FACE/SCALP SUBQ 2+CM  1/4/16    scalp lesion removal, Dr. Rodriguez Ards     No family history on file.   Social History   Substance Use Topics    Smoking status: Former Smoker    Smokeless tobacco: Never Used      Comment: stopped in HS, never heavy and never long term    Alcohol use No      Comment: rarely holiday      Lab Results  Component Value Date/Time   WBC 8.3 12/11/2017 09:13 AM   HGB 16.5 12/11/2017 09:13 AM   HCT 47.3 12/11/2017 09:13 AM   PLATELET 349 74/37/4775 09:13 AM   MCV 84.0 12/11/2017 09:13 AM     Lab Results  Component Value Date/Time   Hemoglobin A1c 6.3 12/11/2017 09:13 AM   Hemoglobin A1c 6.2 07/18/2017 09:44 AM   Hemoglobin A1c 8.7 04/18/2017 08:47 AM   Glucose 130 12/11/2017 09:13 AM   Glucose (POC) 158 01/04/2016 11:52 AM   Glucose  10/19/2016 01:10 PM   Microalbumin/Creat ratio (mg/g creat) 27 12/11/2017 09:13 AM   Microalbumin,urine random 6.82 12/11/2017 09:13 AM   LDL, calculated 44 12/11/2017 09:13 AM   Creatinine 0.91 12/11/2017 09:13 AM      Lab Results  Component Value Date/Time   Cholesterol, total 128 12/11/2017 09:13 AM   HDL Cholesterol 37 12/11/2017 09:13 AM   LDL, calculated 44 12/11/2017 09:13 AM   Triglyceride 235 12/11/2017 09:13 AM   CHOL/HDL Ratio 3.5 12/11/2017 09:13 AM     Lab Results   Component Value Date/Time    Sodium 139 12/11/2017 09:13 AM    Potassium 3.6 12/11/2017 09:13 AM    Chloride 103 12/11/2017 09:13 AM    CO2 29 12/11/2017 09:13 AM    Anion gap 7 12/11/2017 09:13 AM    Glucose 130 12/11/2017 09:13 AM    BUN 20 12/11/2017 09:13 AM    Creatinine 0.91 12/11/2017 09:13 AM    BUN/Creatinine ratio 22 12/11/2017 09:13 AM    GFR est AA >60 12/11/2017 09:13 AM    GFR est non-AA >60 12/11/2017 09:13 AM    Calcium 8.5 12/11/2017 09:13 AM    Bilirubin, total 1.2 12/11/2017 09:13 AM    ALT (SGPT) 39 12/11/2017 09:13 AM    AST (SGOT) 19 12/11/2017 09:13 AM    Alk. phosphatase 71 12/11/2017 09:13 AM    Protein, total 7.4 12/11/2017 09:13 AM    Albumin 4.1 12/11/2017 09:13 AM    Globulin 3.3 12/11/2017 09:13 AM    A-G Ratio 1.2 12/11/2017 09:13 AM         Visit Vitals    /89 (BP 1 Location: Left arm, BP Patient Position: Sitting)    Pulse 80    Temp 98.3 °F (36.8 °C) (Oral)    Resp 16    Ht 5' 11\" (1.803 m)    Wt 216 lb 3.2 oz (98.1 kg)    SpO2 96%    BMI 30.15 kg/m2       Pain Scale: 5/10    Pain Location: Knee (left knee and right)    Review of Systems   Constitutional: Negative. HENT: Negative. Eyes: Negative. Just had cataract surgery    Respiratory: Negative. Cardiovascular: Negative. Gastrointestinal: Negative. Genitourinary: Negative. Musculoskeletal: Negative. Skin: Negative. Neurological: Negative. Endo/Heme/Allergies: Negative. Psychiatric/Behavioral: Negative. Physical Exam   Constitutional: He is oriented to person, place, and time and well-developed, well-nourished, and in no distress. HENT:   Head: Normocephalic. Eyes: Pupils are equal, round, and reactive to light. Dark Glasses on    Neck: Normal range of motion. Neck supple. Cardiovascular: Normal rate, regular rhythm and normal heart sounds. Pulmonary/Chest: Effort normal and breath sounds normal.   Abdominal: Soft. Bowel sounds are normal.   Musculoskeletal: Normal range of motion. He exhibits no edema. Neurological: He is alert and oriented to person, place, and time. Skin: Skin is warm and dry. Psychiatric: Mood and affect normal.   Vitals reviewed. Radiology reports:     ASSESSMENT and PLAN    ICD-10-CM ICD-9-CM    1. Type 2 diabetes mellitus without complication, unspecified long term insulin use status ([facility]) E11.9 250.00    2. Essential hypertension I10 401.9    3. Hypertriglyceridemia E78.1 272.1 omega-3 acid ethyl esters (LOVAZA) 1 gram capsule   4. Vitamin D deficiency E55.9 268.9 ergocalciferol (VITAMIN D2) 50,000 unit capsule     Orders Placed This Encounter    prednisoLONE acetate (PRED FORTE) 1 % ophthalmic suspension    levoFLOXacin (QUIXIN) 0.5 % ophthalmic solution    omega-3 acid ethyl esters (LOVAZA) 1 gram capsule    ergocalciferol (VITAMIN D2) 50,000 unit capsule     Diagnoses and all orders for this visit:    1. Type 2 diabetes mellitus without complication, unspecified long term insulin use status ([facility]    2. Essential hypertension    3. Hypertriglyceridemia  -     omega-3 acid ethyl esters (LOVAZA) 1 gram capsule; Take 2 Caps by mouth two (2) times daily (with meals). 4. Vitamin D deficiency  -     ergocalciferol (VITAMIN D2) 50,000 unit capsule;  Take one capsule every 7 days till gone, then start taking over-the-counter Vitamin D3 1,000 units every day      Follow-up Disposition:  Return in about 6 months (around 6/20/2018), or if symptoms worsen or fail to improve. reviewed diet, exercise and weight control  cardiovascular risk and specific lipid/LDL goals reviewed  reviewed medications and side effects in detail  specific diabetic recommendations: low cholesterol diet, weight control and daily exercise discussed, all medications, side effects and compliance discussed carefully, foot care discussed and Podiatry visits discussed, annual eye examinations at Ophthalmology discussed, glycohemoglobin and other lab monitoring discussed, long term diabetic complications discussed and labs immediately prior to next visit      There are no discontinued medications. Written instructions followed our verbal discussion of all information discussed above, pending tests ordered and future goals/plans. Patient expressed understanding of current diagnosis, planned testing, follow up and if needed to contact the office for any questions or concerns prior to the next visit.     Call [facility] for Financial Assistance

## 2017-12-20 NOTE — PATIENT INSTRUCTIONS
Hyperlipidemia: After Your Visit  Your Care Instructions  Hyperlipidemia is too much fat in your blood. The body has several kinds of fat, including cholesterol and triglycerides. Your body needs fat for many things, such as making new cells. But too much fat in your blood increases your chances of having a heart attack or stroke. You may be able to lower your cholesterol and triglycerides with a heart-healthy diet, exercise, and if needed, medicine. Your doctor may want you to try lifestyle changes first to see whether they lower the fat in your blood. You may need to take medicine if lifestyle changes do not lower the fat in your blood enough. Follow-up care is a key part of your treatment and safety. Be sure to make and go to all appointments, and call your doctor if you are having problems. Its also a good idea to know your test results and keep a list of the medicines you take. How can you care for yourself at home? Take your medicines  · Take your medicines exactly as prescribed. Call your doctor if you think you are having a problem with your medicine. · If you take medicine to lower your cholesterol, go to follow-up visits. You will need to have blood tests. · Do not take large doses of niacin, which is a B vitamin, while taking medicine called statins. It may increase the chance of muscle pain and liver problems. · Talk to your doctor about avoiding grapefruit juice if you are taking statins. Grapefruit juice can raise the level of this medicine in your blood. This could increase side effects. Eat more fruits, vegetables, and fiber  · Fruits and vegetables have lots of nutrients that help protect against heart disease, and they have little--if any--fat. Try to eat at least five servings a day. Dark green, deep orange, or yellow fruits and vegetables are healthy choices. · Keep carrots, celery, and other veggies handy for snacks.  Buy fruit that is in season and store it where you can see it so that you will be tempted to eat it. Cook dishes that have a lot of veggies in them, such as stir-fries and soups. · Foods high in fiber may reduce your cholesterol and provide important vitamins and minerals. High-fiber foods include whole-grain cereals and breads, oatmeal, beans, brown rice, citrus fruits, and apples. · Buy whole-grain breads and cereals instead of white bread and pastries. Limit saturated fat  · Read food labels and try to avoid saturated fat and trans fat. They increase your risk of heart disease. · Use olive or canola oil when you cook. Try cholesterol-lowering spreads, such as Benecol or Take Control. · Bake, broil, grill, or steam foods instead of frying them. · Limit the amount of high-fat meats you eat, including hot dogs and sausages. Cut out all visible fat when you prepare meat. · Eat fish, skinless poultry, and soy products such as tofu instead of high-fat meats. Soybeans may be especially good for your heart. Eat at least two servings of fish a week. Certain fish, such as salmon, contain omega-3 fatty acids, which may help reduce your risk of heart attack. · Choose low-fat or fat-free milk and dairy products. Get exercise, limit alcohol, and quit smoking  · Get more exercise. Work with your doctor to set up an exercise program. Even if you can do only a small amount, exercise will help you get stronger, have more energy, and manage your weight and your stress. Walking is an easy way to get exercise. Gradually increase the amount you walk every day. Aim for at least 30 minutes on most days of the week. You also may want to swim, bike, or do other activities. · Limit alcohol to no more than 2 drinks a day for men and 1 drink a day for women. · Do not smoke. If you need help quitting, talk to your doctor about stop-smoking programs and medicines. These can increase your chances of quitting for good. When should you call for help?   Call 911 anytime you think you may need emergency care. For example, call if:  · You have symptoms of a heart attack. These may include:  ¨ Chest pain or pressure, or a strange feeling in the chest.  ¨ Sweating. ¨ Shortness of breath. ¨ Nausea or vomiting. ¨ Pain, pressure, or a strange feeling in the back, neck, jaw, or upper belly or in one or both shoulders or arms. ¨ Lightheadedness or sudden weakness. ¨ A fast or irregular heartbeat. After you call 911, the  may tell you to chew 1 adult-strength or 2 to 4 low-dose aspirin. Wait for an ambulance. Do not try to drive yourself. · You have signs of a stroke. These may include:  ¨ Sudden numbness, paralysis, or weakness in your face, arm, or leg, especially on only one side of your body. ¨ New problems with walking or balance. ¨ Sudden vision changes. ¨ Drooling or slurred speech. ¨ New problems speaking or understanding simple statements, or feeling confused. ¨ A sudden, severe headache that is different from past headaches. · You passed out (lost consciousness). Call your doctor now or seek immediate medical care if:  · You have muscle pain or weakness. Watch closely for changes in your health, and be sure to contact your doctor if:  · You are very tired. · You have an upset stomach, gas, constipation, or belly pain or cramps. Where can you learn more? Go to [email or web address]  Enter C406 in the search box to learn more about \"Hyperlipidemia: After Your Visit. \"   © 2111-2841 [employer]. Care instructions adapted under license by Katelynn Costa (which disclaims liability or warranty for this information). This care instruction is for use with your licensed healthcare professional. If you have questions about a medical condition or this instruction, always ask your healthcare professional. [place] [address] any warranty or liability for your use of this information.   Content Version: 5.8.928826; Last Revised: October 13, 2011

## 2018-01-03 ENCOUNTER — TELEPHONE (OUTPATIENT)
Dept: FAMILY MEDICINE CLINIC | Age: 53
End: 2018-01-03

## 2018-01-03 NOTE — TELEPHONE ENCOUNTER
Medication: Farxiga 10mg , dose: 1 tab, how often: qd , current number of medication days provided: 90, refill per application. Lot #: [id number], EXP 01/2019. This medication was received and verified for the following 1. Correct Patient, 2. Correct Diagnosis, 3. Correct Drug, 4. Correct route, and no current allergy to medication. Medication: Kombiglyze 2.5-1,000, dose: 2 tab, how often: qd , current number of medication days provided: 90, refill per application. Lot #: [id number], EXP 01/2019. This medication was received and verified for the following 1. Correct Patient, 2. Correct Diagnosis, 3. Correct Drug, 4. Correct route, and no current allergy to medication. Please contact patient to come  their medications.      Irina Moran, MSN, RN, [facility]

## 2018-02-21 DIAGNOSIS — M17.0 PRIMARY OSTEOARTHRITIS OF BOTH KNEES: ICD-10-CM

## 2018-02-21 RX ORDER — MELOXICAM 15 MG/1
TABLET ORAL
Qty: 30 TAB | Refills: 3 | Status: SHIPPED | OUTPATIENT
Start: 2018-02-21 | End: 2018-07-03 | Stop reason: SDUPTHER

## 2018-02-26 ENCOUNTER — TELEPHONE (OUTPATIENT)
Dept: FAMILY MEDICINE CLINIC | Age: 53
End: 2018-02-26

## 2018-02-27 NOTE — TELEPHONE ENCOUNTER
Medication: Livalo , dose: 2 mg, how often: Daily , current number of medication days provided: 90, refill per application. Lot #: [id number], EXP 02/2019. This medication was received and verified for the following 1. Correct Patient, 2. Correct Diagnosis, 3. Correct Drug, 4. Correct route, and no current allergy to medication. Please contact patient to come  their medications.      EFE Rizvi, RN, [facility]

## 2018-03-05 ENCOUNTER — TELEPHONE (OUTPATIENT)
Dept: FAMILY MEDICINE CLINIC | Age: 53
End: 2018-03-05

## 2018-03-06 NOTE — TELEPHONE ENCOUNTER
Medication: Nasonex, dose: 2 sprays both nostrils, how often: every day as needed for allergies, current number of medication days provided: 90, refill per application. Lot #: [id number], EXP 03/2019  . This medication was received and verified for the following 1. Correct Patient, 2. Correct Diagnosis, 3. Correct Drug, 4. Correct route, and no current allergy to medication. Please contact patient to come  their medications.      Nelson Gimenez, MSN, RN, FNP-C     [facility]

## 2018-03-14 ENCOUNTER — TELEPHONE (OUTPATIENT)
Dept: FAMILY MEDICINE CLINIC | Age: 53
End: 2018-03-14

## 2018-03-15 NOTE — TELEPHONE ENCOUNTER
Medication: Farxiga 10 mg , dose: 1 tab, how often: qd , current number of medication days provided: 90, refill per application. Lot #: [id number], EXP 03/2019. This medication was received and verified for the following 1. Correct Patient, 2. Correct Diagnosis, 3. Correct Drug, 4. Correct route, and no current allergy to medication. Medication: Kombiglyze 2.5-1,000, dose: 1 tab, how often: qd , current number of medication days provided: 90, refill per application. Lot #: [id number], EXP 03/2019. This medication was received and verified for the following 1. Correct Patient, 2. Correct Diagnosis, 3. Correct Drug, 4. Correct route, and no current allergy to medication. Please contact patient to come  their medications.      Reji Rivero MSN, RN, [facility]

## 2018-05-08 ENCOUNTER — TELEPHONE (OUTPATIENT)
Dept: FAMILY MEDICINE CLINIC | Age: 53
End: 2018-05-08

## 2018-05-08 NOTE — TELEPHONE ENCOUNTER
Medication: Livalo , dose: 2 mg, how often: Daily , current number of medication days provided: 90, refill per application. Lot #: [id number], EXP 05/2019. This medication was received and verified for the following 1. Correct Patient, 2. Correct Diagnosis, 3. Correct Drug, 4. Correct route, and no current allergy to medication. Please contact patient to come  their medications.      EFE Bethea, RN, [facility]

## 2018-05-30 ENCOUNTER — TELEPHONE (OUTPATIENT)
Dept: FAMILY MEDICINE CLINIC | Age: 53
End: 2018-05-30

## 2018-05-30 NOTE — TELEPHONE ENCOUNTER
Medication: Farxiga 10 mg , dose: 1 tab, how often: qd , current number of medication days provided: 90, refill per application. Lot #: [id number], EXP 05/2019. This medication was received and verified for the following 1. Correct Patient, 2. Correct Diagnosis, 3. Correct Drug, 4. Correct route, and no current allergy to medication. Medication: Kombiglyze 2.5-1,000 mg, dose: 2 tab, how often: qd , current number of medication days provided: 90, refill per application. Lot #: [id number], EXP 05/2019  . This medication was received and verified for the following 1. Correct Patient, 2. Correct Diagnosis, 3. Correct Drug, 4. Correct route, and no current allergy to medication. Please contact patient to come  their medications.      Jeffrey Franco, MSN, RN, FNP-C     [facility]

## 2018-05-31 DIAGNOSIS — E11.9 TYPE 2 DIABETES MELLITUS WITHOUT COMPLICATION, UNSPECIFIED WHETHER LONG TERM INSULIN USE (HCC): Primary | ICD-10-CM

## 2018-05-31 DIAGNOSIS — I10 ESSENTIAL HYPERTENSION: ICD-10-CM

## 2018-06-05 ENCOUNTER — LAB ONLY (OUTPATIENT)
Dept: FAMILY MEDICINE CLINIC | Age: 53
End: 2018-06-05

## 2018-06-05 ENCOUNTER — HOSPITAL ENCOUNTER (OUTPATIENT)
Dept: LAB | Age: 53
Discharge: HOME | End: 2018-06-05

## 2018-06-05 DIAGNOSIS — E11.9 TYPE 2 DIABETES MELLITUS WITHOUT COMPLICATION, UNSPECIFIED WHETHER LONG TERM INSULIN USE (HCC): Primary | ICD-10-CM

## 2018-06-05 DIAGNOSIS — E11.9 TYPE 2 DIABETES MELLITUS WITHOUT COMPLICATION, UNSPECIFIED WHETHER LONG TERM INSULIN USE (HCC): ICD-10-CM

## 2018-06-05 LAB
ANION GAP SERPL CALC-SCNC: 7 MMOL/L (ref 3–18)
BUN SERPL-MCNC: 20 MG/DL (ref 7–18)
BUN/CREAT SERPL: 22 (ref 12–20)
CALCIUM SERPL-MCNC: 9.3 MG/DL (ref 8.5–10.1)
CHLORIDE SERPL-SCNC: 105 MMOL/L (ref 100–108)
CO2 SERPL-SCNC: 30 MMOL/L (ref 21–32)
CREAT SERPL-MCNC: 0.89 MG/DL (ref 0.6–1.3)
EST. AVERAGE GLUCOSE BLD GHB EST-MCNC: 137 MG/DL
GLUCOSE SERPL-MCNC: 166 MG/DL (ref 74–99)
HBA1C MFR BLD: 6.4 % (ref 4.2–5.6)
POTASSIUM SERPL-SCNC: 3.8 MMOL/L (ref 3.5–5.5)
SODIUM SERPL-SCNC: 142 MMOL/L (ref 136–145)

## 2018-06-05 PROCEDURE — 80048 BASIC METABOLIC PNL TOTAL CA: CPT | Performed by: NURSE PRACTITIONER

## 2018-06-05 PROCEDURE — 83036 HEMOGLOBIN GLYCOSYLATED A1C: CPT | Performed by: NURSE PRACTITIONER

## 2018-06-05 NOTE — PROGRESS NOTES
Verified patient name, , demographics and orders. Venipuncture for labs performed using 23G x 3/4\" butterfly Left AC with 2nd attempt using aseptic technique. Skin intact and dry. No active bleeding or complications noted. Bandaid dressing applied.

## 2018-06-13 ENCOUNTER — OFFICE VISIT (OUTPATIENT)
Dept: FAMILY MEDICINE CLINIC | Age: 53
End: 2018-06-13

## 2018-06-13 VITALS
RESPIRATION RATE: 16 BRPM | BODY MASS INDEX: 29.96 KG/M2 | DIASTOLIC BLOOD PRESSURE: 95 MMHG | WEIGHT: 214 LBS | HEART RATE: 74 BPM | SYSTOLIC BLOOD PRESSURE: 151 MMHG | OXYGEN SATURATION: 96 % | HEIGHT: 71 IN | TEMPERATURE: 98 F

## 2018-06-13 DIAGNOSIS — E11.42 CONTROLLED TYPE 2 DIABETES MELLITUS WITH DIABETIC POLYNEUROPATHY, WITHOUT LONG-TERM CURRENT USE OF INSULIN (HCC): Primary | ICD-10-CM

## 2018-06-13 DIAGNOSIS — I10 ESSENTIAL HYPERTENSION: ICD-10-CM

## 2018-06-13 PROBLEM — E11.40 TYPE 2 DIABETES MELLITUS WITH DIABETIC NEUROPATHY (HCC): Status: ACTIVE | Noted: 2018-06-13

## 2018-06-13 RX ORDER — PREGABALIN 100 MG/1
100 CAPSULE ORAL 2 TIMES DAILY
Qty: 180 CAP | Refills: 3 | Status: SHIPPED | COMMUNITY
Start: 2018-06-13 | End: 2019-02-20 | Stop reason: ALTCHOICE

## 2018-06-13 RX ORDER — FISH OIL/DHA/EPA 1200-144MG
CAPSULE ORAL
COMMUNITY
End: 2022-08-15

## 2018-06-13 RX ORDER — DIPHENHYDRAMINE HCL 25 MG
TABLET,DISINTEGRATING ORAL
COMMUNITY
End: 2022-08-15

## 2018-06-13 NOTE — LETTER
6/13/2018 [facility] [address], [address] Dai Asif, 1965, is picking up the following medications ordered from the [facility]: 
 
KOMBIGLYZE XR 2.5/1000 MG #180, FARXIGA 10 MG #90 AND LIVALO 2 MG #90. Sukhdeep Negro Patient's Signature: _____________________________ Today's Date: 6/13/2018

## 2018-06-13 NOTE — PROGRESS NOTES
Subjective:     Oc Parisi is a 47 y.o. male seen for follow up of diabetes. He also has hypertension and hyperlipidemia. Diabetic Review of Systems - medication compliance: compliant all of the time, diabetic diet compliance: compliant most of the time, home glucose monitoring: Patient did not bring glucose log to this visit. , further diabetic ROS: no polyuria or polydipsia, no chest pain, dyspnea or TIA's, has dysesthesias in the feet, .. Other symptoms and concerns: Pt with Peripheral neuropathy complains of dropping things. He reports some numbness in his fingers. Patient Active Problem List    Diagnosis Date Noted    Type 2 diabetes mellitus with diabetic neuropathy ([facility] [address] 06/13/2018    Hypertriglyceridemia 06/30/2016    Hypokalemia 06/30/2016    Scrotal mass 06/06/2016    Low back pain     Other seasonal allergic rhinitis 10/07/2015    Right cataract 08/03/2015    Type 2 diabetes mellitus without complication [facility] 45/81/7764    Dribbling urine 08/03/2015    Essential hypertension 08/03/2015    Numbness of left foot 08/03/2015     Current Outpatient Prescriptions   Medication Sig Dispense Refill    calcium carbonate-vitamin D3 600 mg(1,500mg) -800 unit tab Take  by mouth.  fish oil-dha-epa 1,200-144-216 mg cap Take  by mouth.  pregabalin (LYRICA) 100 mg capsule Take 1 Cap by mouth two (2) times a day. Max Daily Amount: 200 mg. Indications: Diabetic Peripheral Neuropathy 180 Cap 3    meloxicam (MOBIC) 15 mg tablet TAKE ONE TABLET BY MOUTH ONCE DAILY 30 Tab 3    cyclobenzaprine (FLEXERIL) 10 mg tablet Take 0.5 Tabs by mouth three (3) times daily as needed (for pain and muscle spasms). 30 Tab 0    lisinopril-hydroCHLOROthiazide (PRINZIDE, ZESTORETIC) 20-12.5 mg per tablet Take 1 Tab by mouth daily. Indications: hypertension 30 Tab 5    dapagliflozin (FARXIGA) 10 mg tab Take 1 Tab by mouth daily (with breakfast).  Indications: type 2 diabetes mellitus 90 Tab 3    mometasone (NASONEX) 50 mcg/actuation nasal spray 2 Sprays by Both Nostrils route daily. Indications: ALLERGIC RHINITIS 2 Container 0    pitavastatin (LIVALO) 2 mg tablet Take 1 Tab by mouth daily. To start once Crestor complete. 90 Tab 3    saxagliptin-metFORMIN (KOMBIGLYZE XR) 2.5-1,000 mg TM24 Take 2 Tabs by mouth daily (with dinner). 180 Tab 3    multivitamin (ONE A DAY) tablet Take 1 Tab by mouth daily.  prednisoLONE acetate (PRED FORTE) 1 % ophthalmic suspension Administer 1 Drop to right eye six (6) times daily.  ergocalciferol (VITAMIN D2) 50,000 unit capsule Take one capsule every 7 days till gone, then start taking over-the-counter Vitamin D3 1,000 units every day 12 Cap 0    potassium 99 mg tablet Take 1 Tab by mouth daily. No Known Allergies  Past Medical History:   Diagnosis Date    Cataract, right 2010    Diabetes ([address]     History of vitamin D deficiency 6/2016    Hypercholesterolemia     Hypertension     Hypertriglyceridemia 6/30/2016    Hypokalemia 6/30/2016    Low back pain     Numbness and tingling of foot June 2014    left toes and mid bottom of foot    Scrotal mass 6/6/2016     Past Surgical History:   Procedure Laterality Date    HX HERNIA REPAIR  5710    umbilical, done at [address] TISS FACE/SCALP SUBQ 2+CM  1/4/16    scalp lesion removal, Dr. Truman Giraldo     No family history on file.   Social History   Substance Use Topics    Smoking status: Former Smoker    Smokeless tobacco: Never Used      Comment: stopped in HS, never heavy and never long term    Alcohol use No      Comment: rarely holiday        Lab Results   Component Value Date/Time    WBC 8.3 12/11/2017 09:13 AM    HGB 16.5 (H) 12/11/2017 09:13 AM    HCT 47.3 12/11/2017 09:13 AM    PLATELET 034 31/20/7668 09:13 AM    MCV 84.0 12/11/2017 09:13 AM     Lab Results   Component Value Date/Time    Hemoglobin A1c 6.4 (H) 06/05/2018 08:54 AM    Hemoglobin A1c 6.3 (H) 12/11/2017 09:13 AM Hemoglobin A1c 6.2 (H) 07/18/2017 09:44 AM    Glucose 166 (H) 06/05/2018 08:54 AM    Glucose (POC) 158 (H) 01/04/2016 11:52 AM    Glucose  10/19/2016 01:10 PM    Microalbumin/Creat ratio (mg/g creat) 27 12/11/2017 09:13 AM    Microalbumin,urine random 6.82 (H) 12/11/2017 09:13 AM    LDL, calculated 44 12/11/2017 09:13 AM    Creatinine 0.89 06/05/2018 08:54 AM      Lab Results   Component Value Date/Time    Cholesterol, total 128 12/11/2017 09:13 AM    HDL Cholesterol 37 (L) 12/11/2017 09:13 AM    LDL, calculated 44 12/11/2017 09:13 AM    Triglyceride 235 (H) 12/11/2017 09:13 AM    CHOL/HDL Ratio 3.5 12/11/2017 09:13 AM     Lab Results   Component Value Date/Time    Sodium 142 06/05/2018 08:54 AM    Potassium 3.8 06/05/2018 08:54 AM    Chloride 105 06/05/2018 08:54 AM    CO2 30 06/05/2018 08:54 AM    Anion gap 7 06/05/2018 08:54 AM    Glucose 166 (H) 06/05/2018 08:54 AM    BUN 20 (H) 06/05/2018 08:54 AM    Creatinine 0.89 06/05/2018 08:54 AM    BUN/Creatinine ratio 22 (H) 06/05/2018 08:54 AM    GFR est AA >60 06/05/2018 08:54 AM    GFR est non-AA >60 06/05/2018 08:54 AM    Calcium 9.3 06/05/2018 08:54 AM    Bilirubin, total 1.2 (H) 12/11/2017 09:13 AM    ALT (SGPT) 39 12/11/2017 09:13 AM    AST (SGOT) 19 12/11/2017 09:13 AM    Alk.  phosphatase 71 12/11/2017 09:13 AM    Protein, total 7.4 12/11/2017 09:13 AM    Albumin 4.1 12/11/2017 09:13 AM    Globulin 3.3 12/11/2017 09:13 AM    A-G Ratio 1.2 12/11/2017 09:13 AM         Review of Systems  Constitutional: negative  Eyes: negative  Ears, nose, mouth, throat, and face: negative  Respiratory: negative  Cardiovascular: negative  Gastrointestinal: negative  Genitourinary:negative  Musculoskeletal:negative for neck pain  Neurological: positive for paresthesia and dropping items \"I noticed it about 4 times\"   Endocrine: negative    Objective:     Visit Vitals    BP (!) 151/95 (BP 1 Location: Left arm, BP Patient Position: Sitting)    Pulse 74    Temp 98 °F (36.7 °C) (Oral)    Resp 16    Ht 5' 11\" (1.803 m)    Wt 214 lb (97.1 kg)    SpO2 96%    BMI 29.85 kg/m2     Appearance: alert, well appearing, and in no distress and well hydrated. Exam: heart sounds normal rate, regular rhythm, normal S1, S2, no murmurs, rubs, clicks or gallops, normal bilateral carotid upstroke without bruits, chest clear, no hepatosplenomegaly, no carotid bruits, feet: warm, good capillary refill and normal DP and PT pulses  Cranial nerves intact, Strength 5/5. Pt with diminished sensation in extremities of the hand. Negative Tinels and Phalens. No wrist pain or neck/shoulder  Pain/tenderness. Lab review: labs are reviewed, up to date and normal.    Assessment/Plan:     diabetes well controlled, stable, hypertension well controlled, stable, hyperlipidemia well controlled, stable. Diabetic issues reviewed with him: low cholesterol diet, weight control and daily exercise discussed, all medications, side effects and compliance discussed carefully, foot care discussed and Podiatry visits discussed, annual eye examinations at Ophthalmology discussed and labs immediately prior to next visit. Lyrica increased to 100 mg BID for neuropathy    ICD-10-CM ICD-9-CM    1. Controlled type 2 diabetes mellitus with diabetic polyneuropathy, without long-term current use of insulin ([facility]) E11.42 250.60 pregabalin (LYRICA) 100 mg capsule     892.7 METABOLIC PANEL, BASIC      LIPID PANEL      HEMOGLOBIN A1C WITH EAG   2. Essential hypertension L59 091.9 METABOLIC PANEL, BASIC      LIPID PANEL      HEMOGLOBIN A1C WITH EAG     Follow-up Disposition:  Return in about 3 months (around 9/13/2018), or if symptoms worsen or fail to improve.      Medications Discontinued During This Encounter   Medication Reason    meloxicam (MOBIC) 15 mg tablet Duplicate Order    desloratadine (CLARINEX) 5 mg tablet Not A Current Medication    loratadine (CLARITIN) 10 mg tablet Not A Current Medication    omega-3 acid ethyl esters (LOVAZA) 1 gram capsule Not A Current Medication    pregabalin (LYRICA) 75 mg capsule Reorder

## 2018-06-13 NOTE — PATIENT INSTRUCTIONS
Diabetic Neuropathy: Care Instructions  Your Care Instructions    When you have diabetes, your blood sugar level may get too high. Over time, high blood sugar levels can damage nerves. This is called diabetic neuropathy. Nerve damage can cause pain, burning, tingling, and numbness and may leave you feeling weak. The feet are often affected. When you have nerve damage in your feet, you cannot feel your feet and toes as well as normal and may not notice cuts or sores. Even a small injury can lead to a serious infection. It is very important that you follow your doctor's advice on foot care. Sometimes diabetes damages nerves that help the body function. If this happens, your blood pressure, sweating, digestion, and urination might be affected. Your doctor may give you a target blood sugar level that is higher or lower than you are used to. Try to keep your blood sugar very close to this target level to prevent more damage. Follow-up care is a key part of your treatment and safety. Be sure to make and go to all appointments, and call your doctor if you are having problems. It's also a good idea to know your test results and keep a list of the medicines you take. How can you care for yourself at home? · Take your medicines exactly as prescribed. Call your doctor if you think you are having a problem with your medicine. It is very important that you take your insulin or diabetes pills as your doctor tells you. · Try to keep blood sugar at your target level. ¨ Eat a variety of healthy foods, with carbohydrate spread out in your meals. A dietitian can help you plan meals. ¨ Try to get at least 30 minutes of exercise on most days. ¨ Check your blood sugar as many times each day as your doctor recommends. · Take and record your blood pressure at home if your doctor tells you to. Learn the importance of the two measures of blood pressure (such as 130 over 80, or 130/80).  To take your blood pressure at home:  ¨ Ask your doctor to check your blood pressure monitor to be sure it is accurate and the cuff fits you. Also ask your doctor to watch you to make sure that you are using it right. ¨ Do not use medicine known to raise blood pressure (such as some nasal decongestant sprays) before taking your blood pressure. ¨ Avoid taking your blood pressure if you have just exercised or are nervous or upset. Rest at least 15 minutes before you take a reading. · Take pain medicines exactly as directed. ¨ If the doctor gave you a prescription medicine for pain, take it as prescribed. ¨ If you are not taking a prescription pain medicine, ask your doctor if you can take an over-the-counter medicine. · Do not smoke. Smoking can increase your chance for a heart attack or stroke. If you need help quitting, talk to your doctor about stop-smoking programs and medicines. These can increase your chances of quitting for good. · Limit alcohol to 2 drinks a day for men and 1 drink a day for women. Too much alcohol can cause health problems. · Eat small meals often, rather than 2 or 3 large meals a day. To care for your feet  · Prevent injury by wearing shoes at all times, even when you are indoors. · Do foot care as part of your daily routine. Wash your feet and then rub lotion on your feet, but not between your toes. Use a handheld mirror or magnifying mirror to inspect your feet for blisters, cuts, cracks, or sores. · Have your toenails trimmed and filed straight across. · Wear shoes and socks that fit well. Soft shoes that have good support and that fit well (such as tennis shoes) are best for your feet. · Check your shoes for any loose objects or rough edges before you put them on. · Ask your doctor to check your feet during each visit. Your doctor may notice a foot problem you have missed. · Get early treatment for any foot problem, even a minor one. When should you call for help?   Call your doctor now or seek immediate medical care if:  ? · You have symptoms of infection, such as:  ¨ Increased pain, swelling, warmth, or redness. ¨ Red streaks leading from the area. ¨ Pus draining from the area. ¨ A fever. ? · You have new or worse numbness, pain, or tingling in any part of your body. ? Watch closely for changes in your health, and be sure to contact your doctor if:  ? · You have a new problem with your feet, such as:  ¨ A new sore or ulcer. ¨ A break in the skin that is not healing after several days. ¨ Bleeding corns or calluses. ¨ An ingrown toenail. ? · You do not get better as expected. Where can you learn more? Go to http://todd-brayden.info/. Enter S503 in the search box to learn more about \"Diabetic Neuropathy: Care Instructions. \"  Current as of: March 13, 2017  Content Version: 11.4  © 6374-9351 [employer]. Care instructions adapted under license by [employer] (which disclaims liability or warranty for this information). If you have questions about a medical condition or this instruction, always ask your healthcare professional. [address] any warranty or liability for your use of this information.

## 2018-06-13 NOTE — MR AVS SNAPSHOT
[address] [facility] [address] [phone] Patient: Last Reeves MRN: [id number] :[id number] Visit Information Date & Time Provider Department Dept. Phone Encounter #  
 2018  9:30 AM Margie Ambriz NP  [facility] [id number] Your Appointments 2018  9:30 AM  
Follow Up with Margie Ambriz NP [address] [facility] ([address]) Appt Note: 3 mth follow up  
 [facility] [address]  
[address] [facility] [address] Upcoming Health Maintenance Date Due  
 EYE EXAM RETINAL OR DILATED Q1 1976 DTaP/Tdap/Td series (1 - Tdap) 2011 FOBT Q 1 YEAR AGE 50-75 2018 Pneumococcal 19-64 Medium Risk (1 of 1 - PPSV23) 2019* Influenza Age 5 to Adult 2018 HEMOGLOBIN A1C Q6M 2018 MICROALBUMIN Q1 2018 LIPID PANEL Q1 2018 FOOT EXAM Q1 2018 *Topic was postponed. The date shown is not the original due date. Allergies as of 2018  Review Complete On: 2018 By: Linh Forte. Tony Quintanilla LPN No Known Allergies Current Immunizations  Reviewed on 10/22/2015 Name Date Influenza Vaccine Pamela Broody) 10/12/2016, 10/22/2015 Influenza Vaccine (Quad) PF 10/16/2017  9:19 AM  
 Td 2011 Not reviewed this visit You Were Diagnosed With   
  
 Codes Comments Controlled type 2 diabetes mellitus with diabetic polyneuropathy, without long-term current use of insulin (HCC)    -  Primary ICD-10-CM: E11.42 
ICD-9-CM: 250.60, 357.2 Essential hypertension     ICD-10-CM: I10 
ICD-9-CM: 401.9 Vitals BP Pulse Temp Resp Height(growth percentile) Weight(growth percentile)  (!) 151/95 (BP 1 Location: Left arm, BP Patient Position: Sitting) 74 98 °F (36.7 °C) (Oral) 16 5' 11\" (1.803 m) 214 lb (97.1 kg) SpO2 BMI Smoking Status 96% 29.85 kg/m2 Former Smoker Vitals History BMI and BSA Data Body Mass Index Body Surface Area  
 29.85 kg/m 2 2.21 m 2 Preferred Pharmacy Pharmacy Name Phone [address] [place] [address]. [phone] Your Updated Medication List  
  
   
This list is accurate as of 6/13/18  9:36 AM.  Always use your most recent med list.  
  
  
  
  
 calcium carbonate-vitamin D3 600 mg(1,500mg) -800 unit Tab Take  by mouth. cyclobenzaprine 10 mg tablet Commonly known as:  FLEXERIL Take 0.5 Tabs by mouth three (3) times daily as needed (for pain and muscle spasms). dapagliflozin 10 mg Tab tablet Commonly known as:  [employer] Take 1 Tab by mouth daily (with breakfast). Indications: type 2 diabetes mellitus  
  
 ergocalciferol 50,000 unit capsule Commonly known as:  VITAMIN D2 Take one capsule every 7 days till gone, then start taking over-the-counter Vitamin D3 1,000 units every day  
  
 fish oil-dha-epa 1,200-144-216 mg Cap Take  by mouth.  
  
 lisinopril-hydroCHLOROthiazide 20-12.5 mg per tablet Commonly known as:  Eather Revel Take 1 Tab by mouth daily. Indications: hypertension  
  
 meloxicam 15 mg tablet Commonly known as:  MOBIC  
TAKE ONE TABLET BY MOUTH ONCE DAILY  
  
 mometasone 50 mcg/actuation nasal spray Commonly known as:  NASONEX  
2 Sprays by Both Nostrils route daily. Indications: ALLERGIC RHINITIS  
  
 multivitamin tablet Commonly known as:  ONE A DAY Take 1 Tab by mouth daily. pitavastatin calcium 2 mg tablet Commonly known as:  LIVALO Take 1 Tab by mouth daily. To start once Crestor complete. potassium 99 mg tablet Take 1 Tab by mouth daily. prednisoLONE acetate 1 % ophthalmic suspension Commonly known as:  PRED FORTE Administer 1 Drop to right eye six (6) times daily. pregabalin 100 mg capsule Commonly known as:  Shyam Laurel Take 1 Cap by mouth two (2) times a day. Max Daily Amount: 200 mg. Indications: Diabetic Peripheral Neuropathy sAXagliptin-metFORMIN 2.5-1,000 mg Tm24 Commonly known as:  KOMBIGLYZE XR Take 2 Tabs by mouth daily (with dinner). Prescriptions Printed Refills  
 pregabalin (LYRICA) 100 mg capsule 3 Sig: Take 1 Cap by mouth two (2) times a day. Max Daily Amount: 200 mg. Indications: Diabetic Peripheral Neuropathy Class: Program  
 Route: Oral  
  
Prescriptions Sent to Mail Order Refills  
 pregabalin (LYRICA) 100 mg capsule 3 Sig: Take 1 Cap by mouth two (2) times a day. Max Daily Amount: 200 mg. Indications: Diabetic Peripheral Neuropathy Class: Program  
 Pharmacy: [address] [place], [place] [address], [address] [facility]. Ph #: [phone] Route: Oral  
  
Prescriptions Sent to Pharmacy Refills  
 pregabalin (LYRICA) 100 mg capsule 3 Sig: Take 1 Cap by mouth two (2) times a day. Max Daily Amount: 200 mg. Indications: Diabetic Peripheral Neuropathy Class: Program  
 Pharmacy: [address] [place], [place] [address], [address] [facility]. Ph #: [phone] Route: Oral  
  
Patient Instructions Diabetic Neuropathy: Care Instructions Your Care Instructions When you have diabetes, your blood sugar level may get too high. Over time, high blood sugar levels can damage nerves. This is called diabetic neuropathy. Nerve damage can cause pain, burning, tingling, and numbness and may leave you feeling weak. The feet are often affected. When you have nerve damage in your feet, you cannot feel your feet and toes as well as normal and may not notice cuts or sores. Even a small injury can lead to a serious infection. It is very important that you follow your doctor's advice on foot care. Sometimes diabetes damages nerves that help the body function. If this happens, your blood pressure, sweating, digestion, and urination might be affected. Your doctor may give you a target blood sugar level that is higher or lower than you are used to. Try to keep your blood sugar very close to this target level to prevent more damage. Follow-up care is a key part of your treatment and safety. Be sure to make and go to all appointments, and call your doctor if you are having problems. It's also a good idea to know your test results and keep a list of the medicines you take. How can you care for yourself at home? · Take your medicines exactly as prescribed. Call your doctor if you think you are having a problem with your medicine. It is very important that you take your insulin or diabetes pills as your doctor tells you. · Try to keep blood sugar at your target level. ¨ Eat a variety of healthy foods, with carbohydrate spread out in your meals. A dietitian can help you plan meals. ¨ Try to get at least 30 minutes of exercise on most days. ¨ Check your blood sugar as many times each day as your doctor recommends. · Take and record your blood pressure at home if your doctor tells you to. Learn the importance of the two measures of blood pressure (such as 130 over 80, or 130/80). To take your blood pressure at home: ¨ Ask your doctor to check your blood pressure monitor to be sure it is accurate and the cuff fits you. Also ask your doctor to watch you to make sure that you are using it right. ¨ Do not use medicine known to raise blood pressure (such as some nasal decongestant sprays) before taking your blood pressure. ¨ Avoid taking your blood pressure if you have just exercised or are nervous or upset. Rest at least 15 minutes before you take a reading. · Take pain medicines exactly as directed. ¨ If the doctor gave you a prescription medicine for pain, take it as prescribed. ¨ If you are not taking a prescription pain medicine, ask your doctor if you can take an over-the-counter medicine. · Do not smoke. Smoking can increase your chance for a heart attack or stroke. If you need help quitting, talk to your doctor about stop-smoking programs and medicines. These can increase your chances of quitting for good. · Limit alcohol to 2 drinks a day for men and 1 drink a day for women. Too much alcohol can cause health problems. · Eat small meals often, rather than 2 or 3 large meals a day. To care for your feet · Prevent injury by wearing shoes at all times, even when you are indoors. · Do foot care as part of your daily routine. Wash your feet and then rub lotion on your feet, but not between your toes. Use a handheld mirror or magnifying mirror to inspect your feet for blisters, cuts, cracks, or sores. · Have your toenails trimmed and filed straight across. · Wear shoes and socks that fit well. Soft shoes that have good support and that fit well (such as tennis shoes) are best for your feet. · Check your shoes for any loose objects or rough edges before you put them on. · Ask your doctor to check your feet during each visit. Your doctor may notice a foot problem you have missed. · Get early treatment for any foot problem, even a minor one. When should you call for help? Call your doctor now or seek immediate medical care if: 
? · You have symptoms of infection, such as: 
¨ Increased pain, swelling, warmth, or redness. ¨ Red streaks leading from the area. ¨ Pus draining from the area. ¨ A fever. ? · You have new or worse numbness, pain, or tingling in any part of your body. ? Watch closely for changes in your health, and be sure to contact your doctor if: 
? · You have a new problem with your feet, such as: ¨ A new sore or ulcer. ¨ A break in the skin that is not healing after several days. ¨ Bleeding corns or calluses. ¨ An ingrown toenail. ? · You do not get better as expected. Where can you learn more? Go to http://todd-brayden.info/. Enter H192 in the search box to learn more about \"Diabetic Neuropathy: Care Instructions. \" Current as of: March 13, 2017 Content Version: 11.4 © 2629-0510 [employer]. Care instructions adapted under license by [employer] (which disclaims liability or warranty for this information). If you have questions about a medical condition or this instruction, always ask your healthcare professional. [address] any warranty or liability for your use of this information. Please provide this summary of care documentation to your next provider. Your primary care clinician is listed as Yvrose Turner. If you have any questions after today's visit, please call [phone].

## 2018-07-03 DIAGNOSIS — M17.0 PRIMARY OSTEOARTHRITIS OF BOTH KNEES: ICD-10-CM

## 2018-07-03 RX ORDER — MELOXICAM 15 MG/1
TABLET ORAL
Qty: 30 TAB | Refills: 3 | Status: SHIPPED | OUTPATIENT
Start: 2018-07-03 | End: 2018-11-10 | Stop reason: SDUPTHER

## 2018-07-10 ENCOUNTER — TELEPHONE (OUTPATIENT)
Dept: FAMILY MEDICINE CLINIC | Age: 53
End: 2018-07-10

## 2018-08-07 ENCOUNTER — TELEPHONE (OUTPATIENT)
Dept: FAMILY MEDICINE CLINIC | Age: 53
End: 2018-08-07

## 2018-08-07 NOTE — TELEPHONE ENCOUNTER
Medication: Livalo , dose: 2 mg, how often: Daily , current number of medication days provided: 90, refill per application. Lot #: [id number], EXP 02/2019. This medication was received and verified for the following 1. Correct Patient, 2. Correct Diagnosis, 3. Correct Drug, 4. Correct route, and no current allergy to medication. Please contact patient to come  their medications.      Alex Borrego MSN, RN, [facility]

## 2018-08-08 DIAGNOSIS — I10 ESSENTIAL HYPERTENSION WITH GOAL BLOOD PRESSURE LESS THAN 130/85: ICD-10-CM

## 2018-08-09 RX ORDER — LISINOPRIL AND HYDROCHLOROTHIAZIDE 12.5; 2 MG/1; MG/1
TABLET ORAL
Qty: 30 TAB | Refills: 3 | Status: SHIPPED | OUTPATIENT
Start: 2018-08-09 | End: 2018-09-26 | Stop reason: SDUPTHER

## 2018-08-30 ENCOUNTER — TELEPHONE (OUTPATIENT)
Dept: FAMILY MEDICINE CLINIC | Age: 53
End: 2018-08-30

## 2018-08-30 NOTE — TELEPHONE ENCOUNTER
Medication: Farxiga 10 mg , dose: 1 tab, how often: qd , current number of medication days provided: 90, refill per application. Lot #: [id number], EXP 08/2019. This medication was received and verified for the following 1. Correct Patient, 2. Correct Diagnosis, 3. Correct Drug, 4. Correct route, and no current allergy to medication. Medication: Kombiglyze 2.5/1,000 mg, dose: 2 tab, how often: qd , current number of medication days provided: 90, refill per application. Lot #: [id number], EXP 08/2019. This medication was received and verified for the following 1. Correct Patient, 2. Correct Diagnosis, 3. Correct Drug, 4. Correct route, and no current allergy to medication. Please contact patient to come  their medications.      Anupam Finn, MSN, RN, FNP-C     [facility]

## 2018-09-18 ENCOUNTER — LAB ONLY (OUTPATIENT)
Dept: FAMILY MEDICINE CLINIC | Age: 53
End: 2018-09-18

## 2018-09-18 ENCOUNTER — HOSPITAL ENCOUNTER (OUTPATIENT)
Dept: LAB | Age: 53
Discharge: HOME | End: 2018-09-18

## 2018-09-18 DIAGNOSIS — E11.9 TYPE 2 DIABETES MELLITUS WITHOUT COMPLICATION, UNSPECIFIED WHETHER LONG TERM INSULIN USE (HCC): Primary | ICD-10-CM

## 2018-09-18 DIAGNOSIS — I10 ESSENTIAL HYPERTENSION: ICD-10-CM

## 2018-09-18 DIAGNOSIS — E11.42 CONTROLLED TYPE 2 DIABETES MELLITUS WITH DIABETIC POLYNEUROPATHY, WITHOUT LONG-TERM CURRENT USE OF INSULIN (HCC): ICD-10-CM

## 2018-09-18 DIAGNOSIS — E78.1 HYPERTRIGLYCERIDEMIA: ICD-10-CM

## 2018-09-18 LAB
ANION GAP SERPL CALC-SCNC: 8 MMOL/L (ref 3–18)
BUN SERPL-MCNC: 17 MG/DL (ref 7–18)
BUN/CREAT SERPL: 18 (ref 12–20)
CALCIUM SERPL-MCNC: 9.4 MG/DL (ref 8.5–10.1)
CHLORIDE SERPL-SCNC: 104 MMOL/L (ref 100–108)
CHOLEST SERPL-MCNC: 138 MG/DL
CO2 SERPL-SCNC: 29 MMOL/L (ref 21–32)
CREAT SERPL-MCNC: 0.95 MG/DL (ref 0.6–1.3)
EST. AVERAGE GLUCOSE BLD GHB EST-MCNC: 131 MG/DL
GLUCOSE SERPL-MCNC: 115 MG/DL (ref 74–99)
HBA1C MFR BLD: 6.2 % (ref 4.2–5.6)
HDLC SERPL-MCNC: 32 MG/DL (ref 40–60)
HDLC SERPL: 4.3 {RATIO} (ref 0–5)
LDLC SERPL CALC-MCNC: 70.8 MG/DL (ref 0–100)
LIPID PROFILE,FLP: ABNORMAL
POTASSIUM SERPL-SCNC: 3.3 MMOL/L (ref 3.5–5.5)
SODIUM SERPL-SCNC: 141 MMOL/L (ref 136–145)
TRIGL SERPL-MCNC: 176 MG/DL (ref ?–150)
VLDLC SERPL CALC-MCNC: 35.2 MG/DL

## 2018-09-18 PROCEDURE — 80061 LIPID PANEL: CPT | Performed by: NURSE PRACTITIONER

## 2018-09-18 PROCEDURE — 83036 HEMOGLOBIN GLYCOSYLATED A1C: CPT | Performed by: NURSE PRACTITIONER

## 2018-09-18 PROCEDURE — 80048 BASIC METABOLIC PNL TOTAL CA: CPT | Performed by: NURSE PRACTITIONER

## 2018-09-18 NOTE — PROGRESS NOTES
Verified patient name, , demographics and orders. Venipuncture for labs performed using 23G x 3/4\" butterfly Right AC using aseptic technique. Skin intact and dry. No active bleeding or complications noted. Bandaid dressing applied.

## 2018-09-26 ENCOUNTER — OFFICE VISIT (OUTPATIENT)
Dept: FAMILY MEDICINE CLINIC | Age: 53
End: 2018-09-26

## 2018-09-26 VITALS
WEIGHT: 218.6 LBS | DIASTOLIC BLOOD PRESSURE: 88 MMHG | BODY MASS INDEX: 30.6 KG/M2 | TEMPERATURE: 98.2 F | OXYGEN SATURATION: 96 % | HEIGHT: 71 IN | HEART RATE: 77 BPM | SYSTOLIC BLOOD PRESSURE: 150 MMHG | RESPIRATION RATE: 16 BRPM

## 2018-09-26 DIAGNOSIS — E78.1 HYPERTRIGLYCERIDEMIA: ICD-10-CM

## 2018-09-26 DIAGNOSIS — I10 ESSENTIAL HYPERTENSION: ICD-10-CM

## 2018-09-26 DIAGNOSIS — E11.40 TYPE 2 DIABETES MELLITUS WITH DIABETIC NEUROPATHY, WITH LONG-TERM CURRENT USE OF INSULIN (HCC): Primary | ICD-10-CM

## 2018-09-26 DIAGNOSIS — G47.30 SLEEP APNEA, UNSPECIFIED TYPE: ICD-10-CM

## 2018-09-26 DIAGNOSIS — E87.6 HYPOKALEMIA: ICD-10-CM

## 2018-09-26 DIAGNOSIS — R09.81 NASAL CONGESTION: ICD-10-CM

## 2018-09-26 DIAGNOSIS — Z79.4 TYPE 2 DIABETES MELLITUS WITH DIABETIC NEUROPATHY, WITH LONG-TERM CURRENT USE OF INSULIN (HCC): Primary | ICD-10-CM

## 2018-09-26 RX ORDER — LISINOPRIL AND HYDROCHLOROTHIAZIDE 12.5; 2 MG/1; MG/1
TABLET ORAL
Qty: 30 TAB | Refills: 6 | Status: SHIPPED | OUTPATIENT
Start: 2018-09-26 | End: 2019-02-20 | Stop reason: SDUPTHER

## 2018-09-26 RX ORDER — POTASSIUM CHLORIDE 20 MEQ/1
20 TABLET, EXTENDED RELEASE ORAL ONCE
Qty: 1 TAB | Refills: 0 | Status: SHIPPED | OUTPATIENT
Start: 2018-09-26 | End: 2018-09-26

## 2018-09-26 NOTE — PROGRESS NOTES
Subjective:   Milagro Ramos is a 47y.o. year old male who presents for evalution of Follow Up Chronic Condition (DM, HTN, HLD) and Nasal Congestion (x 2 weeks)  . Diabetes Follow Up   Current symptoms/problems include paresthesias of the feet: tolerable and have been stable. Symptoms have been present for several months. Known diabetic complications: peripheral neuropathy  Cardiovascular risk factors: dyslipidemia, diabetes mellitus, male gender, hypertension  Current diabetic medications include Farxiga and Kombiglyze. Eye exam current (within one year): unknown  Weight trend: stable  Prior visit with dietician: no  Current diet: not asked  Current exercise: not asked    Current monitoring regimen: none  Home blood sugar records: trend: stable  Any episodes of hypoglycemia? no    Is She on ACE inhibitor or angiotensin II receptor blocker? Yes   lisinopril (generic)  Pt is on a statin   LDL is stable       Pt report h/o Sleep apnea with Dx> 20 years ago. He is not currently on CPAP and has not had routine follow up with sleep specialists. He does endorse snoring, daytime somnolence and headaches. Review of Systems - General ROS: negative  Ophthalmic ROS: negative  ENT ROS: positive for - nasal congestion and nasal discharge  negative for - headaches, hearing change, sinus pain or sore throat  Allergy and Immunology ROS: h/o seasonal allergies  Endocrine ROS: negative  Respiratory ROS: no cough, shortness of breath, or wheezing  Cardiovascular ROS: no chest pain or dyspnea on exertion  Gastrointestinal ROS: no abdominal pain, change in bowel habits, or black or bloody stools  Neurological ROS: no TIA or stroke symptoms  No Known Allergies  Current Outpatient Prescriptions on File Prior to Visit   Medication Sig Dispense Refill    meloxicam (MOBIC) 15 mg tablet TAKE 1 TABLET BY MOUTH ONCE DAILY 30 Tab 3    calcium carbonate-vitamin D3 600 mg(1,500mg) -800 unit tab Take  by mouth.       fish oil-dha-epa 1,200-144-216 mg cap Take  by mouth.  pregabalin (LYRICA) 100 mg capsule Take 1 Cap by mouth two (2) times a day. Max Daily Amount: 200 mg. Indications: Diabetic Peripheral Neuropathy 180 Cap 3    dapagliflozin (FARXIGA) 10 mg tab Take 1 Tab by mouth daily (with breakfast). Indications: type 2 diabetes mellitus 90 Tab 3    mometasone (NASONEX) 50 mcg/actuation nasal spray 2 Sprays by Both Nostrils route daily. Indications: ALLERGIC RHINITIS 2 Container 0    pitavastatin (LIVALO) 2 mg tablet Take 1 Tab by mouth daily. To start once Crestor complete. 90 Tab 3    saxagliptin-metFORMIN (KOMBIGLYZE XR) 2.5-1,000 mg TM24 Take 2 Tabs by mouth daily (with dinner). 180 Tab 3    multivitamin (ONE A DAY) tablet Take 1 Tab by mouth daily.  prednisoLONE acetate (PRED FORTE) 1 % ophthalmic suspension Administer 1 Drop to right eye six (6) times daily.  ergocalciferol (VITAMIN D2) 50,000 unit capsule Take one capsule every 7 days till gone, then start taking over-the-counter Vitamin D3 1,000 units every day 12 Cap 0    cyclobenzaprine (FLEXERIL) 10 mg tablet Take 0.5 Tabs by mouth three (3) times daily as needed (for pain and muscle spasms). 30 Tab 0    potassium 99 mg tablet Take 1 Tab by mouth daily. No current facility-administered medications on file prior to visit.       Past Medical History:   Diagnosis Date    Cataract, right 2010    Diabetes ([facility] [address]     History of vitamin D deficiency 6/2016    Hypercholesterolemia     Hypertension     Hypertriglyceridemia 6/30/2016    Hypokalemia 6/30/2016    Low back pain     Numbness and tingling of foot June 2014    left toes and mid bottom of foot    Scrotal mass 6/6/2016     Past Surgical History:   Procedure Laterality Date    HX HERNIA REPAIR  3885    umbilical, done at [address] TISS FACE/SCALP SUBQ 2+CM  1/4/16    scalp lesion removal, Dr. Zohaib Rizo     Lab Results   Component Value Date/Time    WBC 8.3 12/11/2017 09:13 AM    HGB 16.5 (H) 12/11/2017 09:13 AM    HCT 47.3 12/11/2017 09:13 AM    PLATELET 284 81/32/6285 09:13 AM    MCV 84.0 12/11/2017 09:13 AM     Lab Results   Component Value Date/Time    Sodium 141 09/18/2018 09:36 AM    Potassium 3.3 (L) 09/18/2018 09:36 AM    Chloride 104 09/18/2018 09:36 AM    CO2 29 09/18/2018 09:36 AM    Anion gap 8 09/18/2018 09:36 AM    Glucose 115 (H) 09/18/2018 09:36 AM    BUN 17 09/18/2018 09:36 AM    Creatinine 0.95 09/18/2018 09:36 AM    BUN/Creatinine ratio 18 09/18/2018 09:36 AM    GFR est AA >60 09/18/2018 09:36 AM    GFR est non-AA >60 09/18/2018 09:36 AM    Calcium 9.4 09/18/2018 09:36 AM    Bilirubin, total 1.2 (H) 12/11/2017 09:13 AM    AST (SGOT) 19 12/11/2017 09:13 AM    Alk. phosphatase 71 12/11/2017 09:13 AM    Protein, total 7.4 12/11/2017 09:13 AM    Albumin 4.1 12/11/2017 09:13 AM    Globulin 3.3 12/11/2017 09:13 AM    A-G Ratio 1.2 12/11/2017 09:13 AM    ALT (SGPT) 39 12/11/2017 09:13 AM     Lab Results   Component Value Date/Time    Hemoglobin A1c 6.2 (H) 09/18/2018 09:36 AM     Lab Results   Component Value Date/Time    TSH 1.24 08/03/2015 03:03 PM     Wt Readings from Last 3 Encounters:   09/26/18 218 lb 9.6 oz (99.2 kg)   06/13/18 214 lb (97.1 kg)   12/20/17 216 lb 3.2 oz (98.1 kg)     BP Readings from Last 3 Encounters:   09/26/18 150/88   06/13/18 (!) 151/95   12/20/17 129/89       Objective:     Visit Vitals    /88 (BP 1 Location: Left arm, BP Patient Position: Sitting)    Pulse 77    Temp 98.2 °F (36.8 °C) (Oral)    Resp 16    Ht 5' 11\" (1.803 m)    Wt 218 lb 9.6 oz (99.2 kg)    SpO2 96%    BMI 30.49 kg/m2       Physical Examination: General appearance - alert, well appearing, and in no distress  Neck - supple, no significant adenopathy, carotids upstroke normal bilaterally, no bruits, thyroid exam: thyroid is normal in size without nodules or tenderness He does have some excess neck tissue previous US was negative.    Chest - clear to auscultation, no wheezes, rales or rhonchi, symmetric air entry  Heart - normal rate, regular rhythm, normal S1, S2, no murmurs, rubs, clicks or gallops  Extremities - peripheral pulses normal, no pedal edema, no clubbing or cyanosis  Assessment/ Plan:       ICD-10-CM ICD-9-CM    1. Type 2 diabetes mellitus with diabetic neuropathy, with long-term current use of insulin (HCC) E11.40 250.60 lisinopril-hydroCHLOROthiazide (PRINZIDE, ZESTORETIC) 20-12.5 mg per tablet    J37.7 137.9 METABOLIC PANEL, COMPREHENSIVE     V58.67 LIPID PANEL      HEMOGLOBIN A1C WITH EAG   2. Essential hypertension B61 968.7 METABOLIC PANEL, COMPREHENSIVE      LIPID PANEL   3. Hypertriglyceridemia J07.0 714.6 METABOLIC PANEL, COMPREHENSIVE      LIPID PANEL   4. Hypokalemia E87.6 276.8 potassium chloride (K-DUR, KLOR-CON) 20 mEq tablet      METABOLIC PANEL, COMPREHENSIVE   5. Nasal congestion R09.81 478.19     Recommend Daily Nasonex use, and OTC nasal decongestent Benzedrex given h/o HTN or allergy medication. Advised not to use Pseudafed as this may raise his BP    6. Sleep apnea, unspecified type G47.30 780.57 REFERRAL TO NEUROLOGY    H?o sleep apnea Dx 20 years ago. Will refer to Neurology for sleep study and cpap settings. Diagnoses and all orders for this visit:    1. Type 2 diabetes mellitus with diabetic neuropathy, with long-term current use of insulin (HCC)  -     lisinopril-hydroCHLOROthiazide (PRINZIDE, ZESTORETIC) 20-12.5 mg per tablet; TAKE ONE TABLET BY MOUTH ONCE DAILY FOR HYPERTENSION  -     METABOLIC PANEL, COMPREHENSIVE; Future  -     LIPID PANEL; Future  -     HEMOGLOBIN A1C WITH EAG; Future    2. Essential hypertension  -     METABOLIC PANEL, COMPREHENSIVE; Future  -     LIPID PANEL; Future    3. Hypertriglyceridemia  -     METABOLIC PANEL, COMPREHENSIVE; Future  -     LIPID PANEL; Future    4. Hypokalemia  -     potassium chloride (K-DUR, KLOR-CON) 20 mEq tablet; Take 1 Tab by mouth once for 1 dose.  Indications: hypokalemia  -     METABOLIC PANEL, COMPREHENSIVE; Future    5. Nasal congestion  Comments:  Recommend Daily Nasonex use, and OTC nasal decongestent Benzedrex given h/o HTN or allergy medication. Advised not to use Pseudafed as this may raise his BP     6. Sleep apnea, unspecified type  Comments:  H?o sleep apnea Dx 20 years ago. Will refer to Neurology for sleep study and cpap settings. Orders:  -     REFERRAL TO NEUROLOGY        I have discussed the diagnosis with the patient and the intended plan as seen in the above orders. The patient has received an after-visit summary and questions were answered concerning future plans. I have discussed medication side effects and warnings with the patient as well. Pt verbalizes understanding. Follow-up Disposition:  Return in about 6 months (around 3/26/2019), or if symptoms worsen or fail to improve.

## 2018-09-26 NOTE — MR AVS SNAPSHOT
[address] [facility] [address] [phone] Patient: Page Home MRN: [id number] :1965 Visit Information Date & Time Provider Department Dept. Phone Encounter #  
 2018  9:30 AM Jessica Baez NP 1997 [facility] [id number] Your Appointments 2019 10:30 AM  
Follow Up with Jessica Baez NP [address] [facility] ([facility]) Appt Note: 5 mth follow up  
 [facility] [address]  
[address] [facility] [address] Upcoming Health Maintenance Date Due  
 EYE EXAM RETINAL OR DILATED Q1 1976 DTaP/Tdap/Td series (1 - Tdap) 2011 Shingrix Vaccine Age 50> (1 of 2) 2016 FOBT Q 1 YEAR AGE 50-75 2018 Influenza Age 5 to Adult 2018 Pneumococcal 19-64 Medium Risk (1 of 1 - PPSV23) 2019* MICROALBUMIN Q1 2018 FOOT EXAM Q1 2018 HEMOGLOBIN A1C Q6M 3/18/2019 LIPID PANEL Q1 2019 *Topic was postponed. The date shown is not the original due date. Allergies as of 2018  Review Complete On: 2018 By: Kely Castrejon. Gladis Campo LPN No Known Allergies Current Immunizations  Reviewed on 10/22/2015 Name Date Influenza Vaccine Melanie Sven) 10/12/2016, 10/22/2015 Influenza Vaccine (Quad) PF 10/16/2017  9:19 AM  
 Td 2011 Not reviewed this visit You Were Diagnosed With   
  
 Codes Comments Type 2 diabetes mellitus with diabetic neuropathy, with long-term current use of insulin (HCC)    -  Primary ICD-10-CM: E11.40, Z79.4 ICD-9-CM: 250.60, 357.2, V58.67 Essential hypertension     ICD-10-CM: I10 
ICD-9-CM: 401.9 Hypertriglyceridemia     ICD-10-CM: E78.1 ICD-9-CM: 272.1 Hypokalemia     ICD-10-CM: E87.6 ICD-9-CM: 276.8 Nasal congestion     ICD-10-CM: R09.81 ICD-9-CM: 478.19 Sleep apnea, unspecified type     ICD-10-CM: G47.30 ICD-9-CM: 780.57 H?o sleep apnea Dx 20 years ago. Will refer to Neurology for sleep study and cpap settings. Vitals BP Pulse Temp Resp Height(growth percentile) Weight(growth percentile) 150/88 (BP 1 Location: Left arm, BP Patient Position: Sitting) 77 98.2 °F (36.8 °C) (Oral) 16 5' 11\" (1.803 m) 218 lb 9.6 oz (99.2 kg) SpO2 BMI Smoking Status 96% 30.49 kg/m2 Former Smoker Vitals History BMI and BSA Data Body Mass Index Body Surface Area  
 30.49 kg/m 2 2.23 m 2 Preferred Pharmacy Pharmacy Name Phone [address] [place] [address]. [phone] Your Updated Medication List  
  
   
This list is accurate as of 9/26/18 10:03 AM.  Always use your most recent med list.  
  
  
  
  
 calcium carbonate-vitamin D3 600 mg(1,500mg) -800 unit Tab Take  by mouth. cyclobenzaprine 10 mg tablet Commonly known as:  FLEXERIL Take 0.5 Tabs by mouth three (3) times daily as needed (for pain and muscle spasms). dapagliflozin 10 mg Tab tablet Commonly known as:  [employer] Take 1 Tab by mouth daily (with breakfast). Indications: type 2 diabetes mellitus  
  
 ergocalciferol 50,000 unit capsule Commonly known as:  VITAMIN D2 Take one capsule every 7 days till gone, then start taking over-the-counter Vitamin D3 1,000 units every day  
  
 fish oil-dha-epa 1,200-144-216 mg Cap Take  by mouth.  
  
 lisinopril-hydroCHLOROthiazide 20-12.5 mg per tablet Commonly known as:  PRINZIDE, ZESTORETIC  
TAKE ONE TABLET BY MOUTH ONCE DAILY FOR HYPERTENSION  
  
 meloxicam 15 mg tablet Commonly known as:  MOBIC  
TAKE 1 TABLET BY MOUTH ONCE DAILY  
  
 mometasone 50 mcg/actuation nasal spray Commonly known as:  NASONEX  
2 Sprays by Both Nostrils route daily. Indications: ALLERGIC RHINITIS multivitamin tablet Commonly known as:  ONE A DAY Take 1 Tab by mouth daily. pitavastatin calcium 2 mg tablet Commonly known as:  LIVALO Take 1 Tab by mouth daily. To start once Crestor complete. potassium 99 mg tablet Take 1 Tab by mouth daily. potassium chloride 20 mEq tablet Commonly known as:  K-DUR, KLOR-CON Take 1 Tab by mouth once for 1 dose. Indications: hypokalemia  
  
 prednisoLONE acetate 1 % ophthalmic suspension Commonly known as:  PRED FORTE Administer 1 Drop to right eye six (6) times daily. pregabalin 100 mg capsule Commonly known as:  Shea Reas Take 1 Cap by mouth two (2) times a day. Max Daily Amount: 200 mg. Indications: Diabetic Peripheral Neuropathy sAXagliptin-metFORMIN 2.5-1,000 mg Tm24 Commonly known as:  KOMBIGLYZE XR Take 2 Tabs by mouth daily (with dinner). Prescriptions Printed Refills  
 potassium chloride (K-DUR, KLOR-CON) 20 mEq tablet 0 Sig: Take 1 Tab by mouth once for 1 dose. Indications: hypokalemia Class: Print Route: Oral  
  
Prescriptions Sent to Pharmacy Refills  
 lisinopril-hydroCHLOROthiazide (PRINZIDE, ZESTORETIC) 20-12.5 mg per tablet 6 Sig: TAKE ONE TABLET BY MOUTH ONCE DAILY FOR HYPERTENSION Class: Normal  
 Pharmacy: [facility] DR EVANS DOMINGUEZ [address] [place] [address].  #: [phone] We Performed the Following REFERRAL TO NEUROLOGY [DCE86 Custom] Comments:  
 Sleep Specialist referral  
  
Referral Information Referral ID Referred By Referred To  
  
 [id number] Funmi PRIETO Not Available Visits Status Start Date End Date 1 New Request 9/26/18 9/26/19 If your referral has a status of pending review or denied, additional information will be sent to support the outcome of this decision. Introducing [facility]!    
 [facility] introduces [employer] patient portal. Now you can access parts of your medical record, email your doctor's office, and request medication refills online. 1. In your internet browser, go to [email or web address] 2. Click on the First Time User? Click Here link in the Sign In box. You will see the New Member Sign Up page. 3. Enter your [employer] Access Code exactly as it appears below. You will not need to use this code after youve completed the sign-up process. If you do not sign up before the expiration date, you must request a new code. · [employer] Access Code: NIRU COFFMAN [facility] Expires: 12/25/2018  9:58 AM 
 
4. Enter the last four digits of your Social Security Number (xxxx) and Date of Birth (mm/dd/yyyy) as indicated and click Submit. You will be taken to the next sign-up page. 5. Create a Clar[employer]ridge ID. This will be your [employer] login ID and cannot be changed, so think of one that is secure and easy to remember. 6. Create a [employer] password. You can change your password at any time. 7. Enter your Password Reset Question and Answer. This can be used at a later time if you forget your password. 8. Enter your e-mail address. You will receive e-mail notification when new information is available in [address] 9. Click Sign Up. You can now view and download portions of your medical record. 10. Click the Download Summary menu link to download a portable copy of your medical information. If you have questions, please visit the Frequently Asked Questions section of the [employer] website. Remember, [employer] is NOT to be used for urgent needs. For medical emergencies, dial 911. Now available from your iPhone and Android! Please provide this summary of care documentation to your next provider. Your primary care clinician is listed as Pau Dewey. If you have any questions after today's visit, please call [phone].

## 2018-10-18 ENCOUNTER — CLINICAL SUPPORT (OUTPATIENT)
Dept: FAMILY MEDICINE CLINIC | Age: 53
End: 2018-10-18

## 2018-10-18 DIAGNOSIS — Z23 ENCOUNTER FOR IMMUNIZATION: Primary | ICD-10-CM

## 2018-10-18 NOTE — PROGRESS NOTES
Adelfo Olivo is a 47 y.o. male who presents for routine immunizations. He denies any symptoms , reactions or allergies that would exclude them from being immunized today. Risks and adverse reactions were discussed and the VIS was given to them. All questions were addressed. He was observed for 15 min post injection. There were no reactions observed.     Jaren Walker

## 2018-10-23 ENCOUNTER — TELEPHONE (OUTPATIENT)
Dept: FAMILY MEDICINE CLINIC | Age: 53
End: 2018-10-23

## 2018-10-23 ENCOUNTER — DOCUMENTATION ONLY (OUTPATIENT)
Dept: NEUROLOGY | Age: 53
End: 2018-10-23

## 2018-10-24 NOTE — TELEPHONE ENCOUNTER
Medication: Nasonex, dose: 2 sprays both nostrils, how often: every day as needed for allergies, current number of medication days provided: 90, refill per application. Lot #: [id number], EXP 10/2019. This medication was received and verified for the following 1. Correct Patient, 2. Correct Diagnosis, 3. Correct Drug, 4. Correct route, and no current allergy to medication. Please contact patient to come  their medications.      Zulma Gil, MSN, RN, FNP-C     [facility]

## 2018-10-29 ENCOUNTER — OFFICE VISIT (OUTPATIENT)
Dept: NEUROLOGY | Age: 53
End: 2018-10-29

## 2018-10-29 VITALS
HEART RATE: 83 BPM | DIASTOLIC BLOOD PRESSURE: 80 MMHG | TEMPERATURE: 97.6 F | SYSTOLIC BLOOD PRESSURE: 140 MMHG | WEIGHT: 215.8 LBS | BODY MASS INDEX: 30.21 KG/M2 | OXYGEN SATURATION: 97 % | RESPIRATION RATE: 22 BRPM | HEIGHT: 71 IN

## 2018-10-29 DIAGNOSIS — I10 ESSENTIAL HYPERTENSION: ICD-10-CM

## 2018-10-29 DIAGNOSIS — G47.33 OSA (OBSTRUCTIVE SLEEP APNEA): Primary | ICD-10-CM

## 2018-10-29 DIAGNOSIS — E11.42 DIABETIC POLYNEUROPATHY ASSOCIATED WITH TYPE 2 DIABETES MELLITUS (HCC): ICD-10-CM

## 2018-10-29 NOTE — PROGRESS NOTES
HPI: 15-year-old male referred by Ash Machado NP for evaluation of obstructive sleep apnea. Around  he had evaluation for symptoms of witnessed apneas while sleeping, waking up with dry mouth, excessive daytime sleepiness which led to a dx of EVELIN and initiation of CPAP. He used it for 1-2 yrs. He lost some weight, he was noticing the problems, he got lazy and stopped using it, and has not used it since. Now he is snoring, he stops breathing, he is sleepy. He is napping a lot, sits in a chair and easily dozes off. He is on several meds for HTN, semicontrolled, and is on meds for hyperlipidemia and TIIDM. He is on lyrica for symptoms of knifes, pins, tingling, numbness from DM neuropathy, it helps him.      Social History     Socioeconomic History    Marital status: SINGLE     Spouse name: Not on file    Number of children: 0    Years of education: 6    Highest education level: Not on file   Social Needs    Financial resource strain: Not on file    Food insecurity - worry: Not on file    Food insecurity - inability: Not on file   Apto needs - medical: Not on file   Apto needs - non-medical: Not on file   Occupational History    Occupation: unemployed and living with son and daughter-in-law, after incarceration 2011 out 2014   Tobacco Use    Smoking status: Former Smoker    Smokeless tobacco: Never Used    Tobacco comment: stopped in HS, never heavy and never long term   Substance and Sexual Activity    Alcohol use: No     Alcohol/week: 0.0 oz     Comment: rarely holiday    Drug use: No    Sexual activity: Yes     Comment: no partner, spouse  2014   Other Topics Concern   [address] Yes     Comment: [address], from [phone], other than honorable discharge    Blood Transfusions No    Caffeine Concern No     Comment: decreased his 2-3 super big gulps    Occupational Exposure Yes     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[employer], [address] then [address] No    Sleep Concern No    Stress Concern Yes     Comment: Life in general    Weight Concern No    Special Diet No    Back Care No    Exercise No     Comment: starting to do something but not currently    Bike Helmet No    Seat Belt Yes    Self-Exams No   Social History Narrative    Not on file       No family history on file. Current Outpatient Medications   Medication Sig Dispense Refill    lisinopril-hydroCHLOROthiazide (PRINZIDE, ZESTORETIC) 20-12.5 mg per tablet TAKE ONE TABLET BY MOUTH ONCE DAILY FOR HYPERTENSION 30 Tab 6    meloxicam (MOBIC) 15 mg tablet TAKE 1 TABLET BY MOUTH ONCE DAILY 30 Tab 3    calcium carbonate-vitamin D3 600 mg(1,500mg) -800 unit tab Take  by mouth.  pregabalin (LYRICA) 100 mg capsule Take 1 Cap by mouth two (2) times a day. Max Daily Amount: 200 mg. Indications: Diabetic Peripheral Neuropathy 180 Cap 3    dapagliflozin (FARXIGA) 10 mg tab Take 1 Tab by mouth daily (with breakfast). Indications: type 2 diabetes mellitus 90 Tab 3    mometasone (NASONEX) 50 mcg/actuation nasal spray 2 Sprays by Both Nostrils route daily. Indications: ALLERGIC RHINITIS 2 Container 0    pitavastatin (LIVALO) 2 mg tablet Take 1 Tab by mouth daily. To start once Crestor complete. 90 Tab 3    saxagliptin-metFORMIN (KOMBIGLYZE XR) 2.5-1,000 mg TM24 Take 2 Tabs by mouth daily (with dinner). 180 Tab 3    multivitamin (ONE A DAY) tablet Take 1 Tab by mouth daily.  fish oil-dha-epa 1,200-144-216 mg cap Take  by mouth.  prednisoLONE acetate (PRED FORTE) 1 % ophthalmic suspension Administer 1 Drop to right eye six (6) times daily.  ergocalciferol (VITAMIN D2) 50,000 unit capsule Take one capsule every 7 days till gone, then start taking over-the-counter Vitamin D3 1,000 units every day 12 Cap 0    cyclobenzaprine (FLEXERIL) 10 mg tablet Take 0.5 Tabs by mouth three (3) times daily as needed (for pain and muscle spasms).  30 Tab 0    potassium 99 mg tablet Take 1 Tab by mouth daily. Past Medical History:   Diagnosis Date    Cataract, right 2010    Diabetes ([facility] [address] [facility]     History of vitamin D deficiency 6/2016    Hypercholesterolemia     Hypertension     Hypertriglyceridemia 6/30/2016    Hypokalemia 6/30/2016    Low back pain     Numbness and tingling of foot June 2014    left toes and mid bottom of foot    Scrotal mass 6/6/2016       Past Surgical History:   Procedure Laterality Date    HX HERNIA REPAIR  0159    umbilical, done at [address] TISS FACE/SCALP SUBQ 2+CM  1/4/16    scalp lesion removal, Dr. Raman Wilcox       No Known Allergies    Patient Active Problem List   Diagnosis Code    Right cataract H26.9    Type 2 diabetes mellitus without complication ([facility] [address] [facility] R18.0    Dribbling urine N39.43    Essential hypertension I10    Numbness of left foot R20.8    Other seasonal allergic rhinitis J30.2    Low back pain M54.5    Scrotal mass N50.9    Hypertriglyceridemia E78.1    Hypokalemia E87.6    Type 2 diabetes mellitus with diabetic neuropathy ([facility] [facility] E11.40    Sleep apnea G47.30         Review of Systems:   Constitutional: no fever or chills, fatigue   Skin denies rash or itching  HEENT:  Denies tinnitus, hearing loss, or visual changes  Respiratory: denies shortness of breath  Cardiovascular: denies chest pain, dyspnea on exertion  Gastrointestinal: does not report nausea or vomiting  Genitourinary: does not report dysuria or incontinence  Musculoskeletal: does not report joint pain or swelling  Endocrine: denies weight change  Hematology: denies easy bruising or bleeding   Neurological: as above in HPI      PHYSICAL EXAMINATION:      VITAL SIGNS:    Visit Vitals  /80 (BP 1 Location: Left arm, BP Patient Position: Sitting)   Pulse 83   Temp 97.6 °F (36.4 °C) (Oral)   Resp 22   Ht 5' 11\" (1.803 m)   Wt 97.9 kg (215 lb 12.8 oz)   SpO2 97%   BMI 30.10 kg/m²       GENERAL: Well developed, obese, in no apparent distress.    HEART: RR, no murmurs heard, no carotid bruits  EXTREMITIES: No clubbing, cyanosis, or edema is identified. Pulses 2+    and symmetrical.  HEAD:   Normocephalic, atraumatic. Mallampati 4, crowded oropharynx    NEUROLOGIC EXAMINATION    MENTAL STATUS: Awake, alert, and oriented x 4. Attention and STM are grossly normal. There is no aphasia. Fund of knowledge is adequate. Mood and affect are appropriate  CRANIAL NERVES: Visual fields are full to confrontation. No fundus anomalies observed. Pupils are reactive to light and accommodation. Extraocular movements are intact and there is no nystagmus. Facial sensation is normal  Face is symmetrical.   Hearing is present. SCM/TPZ 5/5  Palate rises symmetrically. Tongue is in the midline. MOTOR:   The patient is 5/5 in all four limbs without any drift. CEREBELLAR: No tremors or dysmetria    SENSORY:  Dec distal PP, vibration, normal propioception. Romberg neg    DTR's:   +2 throughout, no long tract signs     GAIT:   Normal gait          Impression: He has a known history of EVELIN and he is quite symptomatic, with a metabolic syndrome which includes hypertension, diabetes, hyperlipidemia. He also has an associated diabetic neuropathy treated with Lyrica. Plan:  1-attended overnight sleep study   2-Counseled pt at length about obstructive sleep apnea evaluation, treatment options, weight loss as appropriate, and consequences of untreated EVELIN. 3-Counseled pt about sleep hygiene, including predictable bedtimes and rise times, avoidance of late meals near bedtime, no TV in bedroom, to get out of room if unable to fall asleep after 10-15 mins, and no napping. 4-F/U in 2 months      PLEASE NOTE:   Portions of this document may have been produced using voice recognition software. Unrecognized errors in transcription may be present. This note will not be viewable in [address]

## 2018-10-29 NOTE — PROGRESS NOTES
Authur Osler is a 47 y.o. male new patient in today to discuss sleep apnea; referred by Jeffrey Anaya NP. Learning assessment previously completed 12/22/2015; primary language is [place].

## 2018-11-06 ENCOUNTER — TELEPHONE (OUTPATIENT)
Dept: FAMILY MEDICINE CLINIC | Age: 53
End: 2018-11-06

## 2018-11-06 ENCOUNTER — HOSPITAL ENCOUNTER (OUTPATIENT)
Dept: SLEEP MEDICINE | Age: 53
Discharge: HOME | End: 2018-11-06
Payer: SELF-PAY

## 2018-11-06 DIAGNOSIS — G47.33 OSA (OBSTRUCTIVE SLEEP APNEA): ICD-10-CM

## 2018-11-06 PROCEDURE — 95810 POLYSOM 6/> YRS 4/> PARAM: CPT

## 2018-11-06 PROCEDURE — 95811 POLYSOM 6/>YRS CPAP 4/> PARM: CPT

## 2018-11-07 VITALS
SYSTOLIC BLOOD PRESSURE: 152 MMHG | HEIGHT: 71 IN | HEART RATE: 74 BPM | DIASTOLIC BLOOD PRESSURE: 92 MMHG | WEIGHT: 218.6 LBS | BODY MASS INDEX: 30.6 KG/M2

## 2018-11-07 NOTE — TELEPHONE ENCOUNTER
Medication: Livalo , dose: 2 mg, how often: Daily , current number of medication days provided: 90, refill per application. Lot #: [id number], EXP 02/2019. This medication was received and verified for the following 1. Correct Patient, 2. Correct Diagnosis, 3. Correct Drug, 4. Correct route, and no current allergy to medication. Please contact patient to come  their medications.      Tee Mac MSN, RN, [facility]

## 2018-11-10 DIAGNOSIS — M17.0 PRIMARY OSTEOARTHRITIS OF BOTH KNEES: ICD-10-CM

## 2018-11-19 RX ORDER — MELOXICAM 15 MG/1
TABLET ORAL
Qty: 30 TAB | Refills: 3 | Status: SHIPPED | OUTPATIENT
Start: 2018-11-19 | End: 2019-04-11 | Stop reason: SDUPTHER

## 2019-01-16 ENCOUNTER — OFFICE VISIT (OUTPATIENT)
Dept: NEUROLOGY | Age: 54
End: 2019-01-16

## 2019-01-16 VITALS
RESPIRATION RATE: 18 BRPM | DIASTOLIC BLOOD PRESSURE: 86 MMHG | BODY MASS INDEX: 31.64 KG/M2 | HEART RATE: 78 BPM | TEMPERATURE: 99.3 F | HEIGHT: 71 IN | WEIGHT: 226 LBS | SYSTOLIC BLOOD PRESSURE: 160 MMHG | OXYGEN SATURATION: 98 %

## 2019-01-16 DIAGNOSIS — G47.33 OSA (OBSTRUCTIVE SLEEP APNEA): Primary | ICD-10-CM

## 2019-01-16 DIAGNOSIS — E66.01 MORBID OBESITY (HCC): ICD-10-CM

## 2019-01-16 NOTE — PROGRESS NOTES
2019 3:16 PM    SSN: [id number]    Subjective:   48 y/o male coming for f/u of CC of h/o EVELIN, EDS, disruptive snoring. Split PSG on  showed AHI of 44 with desats to 77%, titrated to CPAP at 15. He remains sleepy, tired, and he is motivated to try this.     Social History     Socioeconomic History    Marital status: SINGLE     Spouse name: Not on file    Number of children: 0    Years of education: 6    Highest education level: Not on file   Social Needs    Financial resource strain: Not on file    Food insecurity - worry: Not on file    Food insecurity - inability: Not on file   [employer] needs - medical: Not on file   [employer] needs - non-medical: Not on file   Occupational History    Occupation: unemployed and living with son and daughter-in-law, after incarceration 2011 out 2014   Tobacco Use    Smoking status: Former Smoker    Smokeless tobacco: Never Used    Tobacco comment: stopped in HS, never heavy and never long term   Substance and Sexual Activity    Alcohol use: No     Alcohol/week: 0.0 oz     Comment: rarely holiday    Drug use: No    Sexual activity: Yes     Comment: no partner, spouse  2014   Other Topics Concern   [address] Service Yes     Comment: Les Beat[place], from [id number], other than honorable discharge    Blood Transfusions No    Caffeine Concern No     Comment: decreased his 2-3 super big gulps    Occupational Exposure Yes     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[employer], [place] then [employer] [facility] [employer] No    Sleep Concern No    Stress Concern Yes     Comment: Life in general    Weight Concern No    Special Diet No    Back Care No    Exercise No     Comment: starting to do something but not currently    Bike Helmet No    Seat Belt Yes    Self-Exams No   Social History Narrative    Not on file       Family History   Problem Relation Age of Onset    No Known Problems Mother     No Known Problems Sister        Current Outpatient Medications   Medication Sig Dispense Refill    meloxicam (MOBIC) 15 mg tablet TAKE 1 TABLET BY MOUTH ONCE DAILY 30 Tab 3    lisinopril-hydroCHLOROthiazide (PRINZIDE, ZESTORETIC) 20-12.5 mg per tablet TAKE ONE TABLET BY MOUTH ONCE DAILY FOR HYPERTENSION 30 Tab 6    calcium carbonate-vitamin D3 600 mg(1,500mg) -800 unit tab Take  by mouth.  fish oil-dha-epa 1,200-144-216 mg cap Take  by mouth.  pregabalin (LYRICA) 100 mg capsule Take 1 Cap by mouth two (2) times a day. Max Daily Amount: 200 mg. Indications: Diabetic Peripheral Neuropathy 180 Cap 3    cyclobenzaprine (FLEXERIL) 10 mg tablet Take 0.5 Tabs by mouth three (3) times daily as needed (for pain and muscle spasms). 30 Tab 0    dapagliflozin (FARXIGA) 10 mg tab Take 1 Tab by mouth daily (with breakfast). Indications: type 2 diabetes mellitus 90 Tab 3    mometasone (NASONEX) 50 mcg/actuation nasal spray 2 Sprays by Both Nostrils route daily. Indications: ALLERGIC RHINITIS 2 Container 0    potassium 99 mg tablet Take 1 Tab by mouth daily.  pitavastatin (LIVALO) 2 mg tablet Take 1 Tab by mouth daily. To start once Crestor complete. 90 Tab 3    saxagliptin-metFORMIN (KOMBIGLYZE XR) 2.5-1,000 mg TM24 Take 2 Tabs by mouth daily (with dinner). 180 Tab 3    multivitamin (ONE A DAY) tablet Take 1 Tab by mouth daily.  prednisoLONE acetate (PRED FORTE) 1 % ophthalmic suspension Administer 1 Drop to right eye six (6) times daily.       ergocalciferol (VITAMIN D2) 50,000 unit capsule Take one capsule every 7 days till gone, then start taking over-the-counter Vitamin D3 1,000 units every day 12 Cap 0       Past Medical History:   Diagnosis Date    Cataract, right 2010    Diabetes ([facility] [address]     History of vitamin D deficiency 6/2016    Hypercholesterolemia     Hypertension     Hypertriglyceridemia 6/30/2016    Hypokalemia 6/30/2016    Low back pain     Numbness and tingling of foot June 2014    left toes and mid bottom of foot    Scrotal mass 6/6/2016       Past Surgical History:   Procedure Laterality Date    HX HERNIA REPAIR  0161    umbilical, done at [address] TISS FACE/SCALP SUBQ 2+CM  1/4/16    scalp lesion removal, Dr. Alondra Smith       No Known Allergies    Vital signs:    Visit Vitals  /86 (BP 1 Location: Left arm, BP Patient Position: Sitting)   Pulse 78   Temp 99.3 °F (37.4 °C) (Oral)   Resp 18   Ht 5' 11\" (1.803 m)   Wt 102.5 kg (226 lb)   SpO2 98%   BMI 31.52 kg/m²       Review of Systems:   GENERAL: Denies fever or fatigue  CARDIAC: No CP or SOB  PULMONARY: No cough of SOB  MUSCULOSKELETAL: No new joint pain  NEURO: SEE HPI      EXAM: Alert, in NAD. Heart is regular. Oriented x3, EOM's are full, PERRL, no facial asymmetries. Strength and tone are normal. DTR's +2, gait symmetric           Assessment/Plan: Severe EVELIN responding well to CPAP at 15cms H2O with a [employer] medium. Discussed CPAP pitfalls, setup process, benefits. Will start at these settings, see him again in 2 months with a CPAP download. PLEASE NOTE:   Portions of this document may have been produced using voice recognition software. Unrecognized errors in transcription may be present. This note will not be viewable in [address]

## 2019-01-16 NOTE — PROGRESS NOTES
Chief Complaint   Patient presents with    Sleep Study     follow up     [place] [address] over the last two weeks 1/16/2019   Little interest or pleasure in doing things Not at all   Feeling down, depressed, irritable, or hopeless Not at all   Total Score PHQ 2 0

## 2019-01-25 ENCOUNTER — OFFICE VISIT (OUTPATIENT)
Dept: INTERNAL MEDICINE CLINIC | Age: 54
End: 2019-01-25

## 2019-01-25 VITALS
OXYGEN SATURATION: 96 % | HEART RATE: 83 BPM | DIASTOLIC BLOOD PRESSURE: 82 MMHG | HEIGHT: 71 IN | TEMPERATURE: 98 F | RESPIRATION RATE: 18 BRPM | SYSTOLIC BLOOD PRESSURE: 142 MMHG | BODY MASS INDEX: 31.5 KG/M2 | WEIGHT: 225 LBS

## 2019-01-25 DIAGNOSIS — E78.1 HYPERTRIGLYCERIDEMIA: ICD-10-CM

## 2019-01-25 DIAGNOSIS — E87.6 HYPOKALEMIA: ICD-10-CM

## 2019-01-25 DIAGNOSIS — G25.81 RESTLESS LEG: ICD-10-CM

## 2019-01-25 DIAGNOSIS — E11.9 TYPE 2 DIABETES MELLITUS WITHOUT COMPLICATION, UNSPECIFIED WHETHER LONG TERM INSULIN USE (HCC): Primary | ICD-10-CM

## 2019-01-25 DIAGNOSIS — Z12.5 PROSTATE CANCER SCREENING: ICD-10-CM

## 2019-01-25 DIAGNOSIS — I10 ESSENTIAL HYPERTENSION: ICD-10-CM

## 2019-01-25 DIAGNOSIS — Z12.11 COLON CANCER SCREENING: ICD-10-CM

## 2019-01-25 RX ORDER — CYCLOBENZAPRINE HCL 5 MG
5 TABLET ORAL
Qty: 30 TAB | Refills: 0 | Status: SHIPPED | OUTPATIENT
Start: 2019-01-25 | End: 2019-04-11 | Stop reason: SDUPTHER

## 2019-01-25 NOTE — PROGRESS NOTES
Ann Lozano is a 49 y.o. male presenting today for New Patient  . HPI:  Ann Lozano presents to the office today to establish care with the practice. Patient has a past medical history for hypertension, hyperlipidemia, diabetes mellitus, vitamin D deficiency, sleep apnea and lower back pain. Patient presents today stating he feels fine. He denies any chest pain, palpitation, or lower extremity edema. He is negative for cough, wheezing, or dyspnea. He denies any abdominal pain, nausea, no vomiting, diarrhea constipation. He is negative for headache or dizziness. Patient is followed by Dr. Nilam Benton, neurologist for EVELIN and Dr. Natalie Underwood, Orthopedic for osteoarthritis bilateral knees. Review of Systems   Constitutional: Negative for malaise/fatigue. Respiratory: Negative for cough and sputum production. Cardiovascular: Negative for chest pain and palpitations. Gastrointestinal: Negative for abdominal pain, constipation, diarrhea, nausea and vomiting. Musculoskeletal: Negative for myalgias. Neurological: Negative for dizziness and headaches. Psychiatric/Behavioral: Negative. No Known Allergies    Current Outpatient Medications   Medication Sig Dispense Refill    cyclobenzaprine (FLEXERIL) 5 mg tablet Take 1 Tab by mouth nightly. 30 Tab 0    meloxicam (MOBIC) 15 mg tablet TAKE 1 TABLET BY MOUTH ONCE DAILY 30 Tab 3    lisinopril-hydroCHLOROthiazide (PRINZIDE, ZESTORETIC) 20-12.5 mg per tablet TAKE ONE TABLET BY MOUTH ONCE DAILY FOR HYPERTENSION 30 Tab 6    calcium carbonate-vitamin D3 600 mg(1,500mg) -800 unit tab Take  by mouth.  fish oil-dha-epa 1,200-144-216 mg cap Take  by mouth.  pregabalin (LYRICA) 100 mg capsule Take 1 Cap by mouth two (2) times a day. Max Daily Amount: 200 mg. Indications: Diabetic Peripheral Neuropathy 180 Cap 3    dapagliflozin (FARXIGA) 10 mg tab Take 1 Tab by mouth daily (with breakfast).  Indications: type 2 diabetes mellitus 90 Tab 3    mometasone (NASONEX) 50 mcg/actuation nasal spray 2 Sprays by Both Nostrils route daily. Indications: ALLERGIC RHINITIS 2 Container 0    pitavastatin (LIVALO) 2 mg tablet Take 1 Tab by mouth daily. To start once Crestor complete. 90 Tab 3    saxagliptin-metFORMIN (KOMBIGLYZE XR) 2.5-1,000 mg TM24 Take 2 Tabs by mouth daily (with dinner). 180 Tab 3    multivitamin (ONE A DAY) tablet Take 1 Tab by mouth daily.          Past Medical History:   Diagnosis Date    Arthritis     Cataract, right 2010    Diabetes ([facility] [address]     H/O seasonal allergies     History of vitamin D deficiency 6/2016    Hypercholesterolemia     Hypertension     Hypertriglyceridemia 6/30/2016    Hypokalemia 6/30/2016    Low back pain     Numbness and tingling of foot June 2014    left toes and mid bottom of foot    Peripheral neuropathy     Scrotal mass 6/6/2016    Sleep apnea        Past Surgical History:   Procedure Laterality Date    HX HERNIA REPAIR  7134    umbilical, done at [address] TISS FACE/SCALP SUBQ 2+CM  1/4/16    scalp lesion removal, Dr. Thornton Lexy History     Socioeconomic History    Marital status: SINGLE     Spouse name: Not on file    Number of children: 0    Years of education: 6    Highest education level: Not on file   Social Needs    Financial resource strain: Not on file    Food insecurity - worry: Not on file    Food insecurity - inability: Not on file   FNZ needs - medical: Not on file   FNZ needs - non-medical: Not on file   Occupational History    Occupation: unemployed and living with son and daughter-in-law, after incarceration 9/2011 out 4/2014   Tobacco Use    Smoking status: Former Smoker    Smokeless tobacco: Never Used    Tobacco comment: stopped in HS, never heavy and never long term   Substance and Sexual Activity    Alcohol use: No     Alcohol/week: 0.0 oz     Comment: rarely holiday    Drug use: No    Sexual activity: Yes     Comment: no partner, spouse  2014   Other Topics Concern   [address] Service Yes     Comment: [address], from 1170-5945, other than honorable discharge    Blood Transfusions No    Caffeine Concern No     Comment: decreased his 2-3 super big gulps    Occupational Exposure Yes     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[employer], [facility] then [facility] No    Sleep Concern No    Stress Concern Yes     Comment: Life in general    Weight Concern No    Special Diet No    Back Care No    Exercise No     Comment: starting to do something but not currently    Bike Helmet No    Seat Belt Yes    Self-Exams No   Social History Narrative    Not on file       Patient does not have an advanced directive on file    Vitals:    19 1531   BP: 142/82   Pulse: 83   Resp: 18   Temp: 98 °F (36.7 °C)   TempSrc: Tympanic   SpO2: 96%   Weight: 225 lb (102.1 kg)   Height: 5' 11\" (1.803 m)   PainSc:   0 - No pain       Physical Exam   Constitutional: No distress. Cardiovascular: Normal rate, regular rhythm and normal heart sounds. Pulmonary/Chest: Effort normal and breath sounds normal.   Abdominal: Soft. Musculoskeletal: He exhibits no edema. Neurological: He is alert. Nursing note and vitals reviewed. No visits with results within 3 Month(s) from this visit.    Latest known visit with results is:   Hospital Outpatient Visit on 2018   Component Date Value Ref Range Status    Sodium 2018 141  136 - 145 mmol/L Final    Potassium 2018 3.3* 3.5 - 5.5 mmol/L Final    Chloride 2018 104  100 - 108 mmol/L Final    CO2 2018 29  21 - 32 mmol/L Final    Anion gap 2018 8  3.0 - 18 mmol/L Final    Glucose 2018 115* 74 - 99 mg/dL Final    BUN 2018 17  7.0 - 18 MG/DL Final    Creatinine 2018 0.95  0.6 - 1.3 MG/DL Final    BUN/Creatinine ratio 2018 18  12 - 20   Final    GFR est AA 2018 >60  >60 ml/min/1.73m2 Final    GFR est non-AA 09/18/2018 >60  >60 ml/min/1.73m2 Final    Comment: (NOTE)  Estimated GFR is calculated using the Modification of Diet in Renal   Disease (MDRD) Study equation, reported for both  Americans   (GFRAA) and non- Americans (GFRNA), and normalized to 1.73m2   body surface area. The physician must decide which value applies to   the patient. The MDRD study equation should only be used in   individuals age 25 or older. It has not been validated for the   following: pregnant women, patients with serious comorbid conditions,   or on certain medications, or persons with extremes of body size,   muscle mass, or nutritional status.  Calcium 09/18/2018 9.4  8.5 - 10.1 MG/DL Final    LIPID PROFILE 09/18/2018        Final    Cholesterol, total 09/18/2018 138  <200 MG/DL Final    Triglyceride 09/18/2018 176* <150 MG/DL Final    Comment: The drugs N-acetylcysteine (NAC) and  Metamiszole have been found to cause falsely  low results in this chemical assay. Please  be sure to submit blood samples obtained  BEFORE administration of either of these  drugs to assure correct results.  HDL Cholesterol 09/18/2018 32* 40 - 60 MG/DL Final    LDL, calculated 09/18/2018 70.8  0 - 100 MG/DL Final    VLDL, calculated 09/18/2018 35.2  MG/DL Final    CHOL/HDL Ratio 09/18/2018 4.3  0 - 5.0   Final    Hemoglobin A1c 09/18/2018 6.2* 4.2 - 5.6 % Final    Comment: (NOTE)  HbA1C Interpretive Ranges  <5.7              Normal  5.7 - 6.4         Consider Prediabetes  >6.5              Consider Diabetes      Est. average glucose 09/18/2018 131  mg/dL Final    Comment: (NOTE)  The eAG should be interpreted with patient characteristics in mind   since ethnicity, interindividual differences, red cell lifespan,   variation in rates of glycation, etc. may affect the validity of the   calculation. .No results found for any visits on 01/25/19. Assessment / Plan:      ICD-10-CM ICD-9-CM    1.  Type 2 diabetes mellitus without complication, unspecified whether long term insulin use (HCC) E11.9 250.00 MICROALBUMIN, UR, RAND W/ MICROALB/CREAT RATIO      HEMOGLOBIN A1C WITH EAG   2. Essential hypertension I10 401.9 CBC WITH AUTOMATED DIFF   3. Hypertriglyceridemia E78.1 272.1 LIPID PANEL   4. Hypokalemia D78.8 930.0 METABOLIC PANEL, COMPREHENSIVE   5. Colon cancer screening Z12.11 V76.51 COLOGUARD TEST (FECAL DNA COLORECTAL CANCER SCREENING)   6. Prostate cancer screening Z12.5 V76.44 PSA SCREENING (SCREENING)   7. Restless leg G25.81 333.94 cyclobenzaprine (FLEXERIL) 5 mg tablet     Type 2 diabetes-microalbuminuria hemoglobin A1c ordered  Hypertension-CBC, CMP ordered  Patient has a history of hypertriglyceridemia-lipid panel ordered  Colon cancer screening-patient refused colonoscopy but agreeable to Cologuard  PSA ordered for prostate cancer screening  Patient notes continuous movement throughout the night-Flexeril prescription given  Follow-up in 4 months    Follow-up Disposition:  Return if symptoms worsen or fail to improve. I asked the patient if he  had any questions and answered his  questions.   The patient stated that he understands the treatment plan and agrees with the treatment plan

## 2019-01-31 ENCOUNTER — TELEPHONE (OUTPATIENT)
Dept: FAMILY MEDICINE CLINIC | Age: 54
End: 2019-01-31

## 2019-01-31 NOTE — TELEPHONE ENCOUNTER
Medication: Nasonex, dose: 2 sprays both nostrils, how often: every day as needed for allergies, current number of medication days provided: 90, refill per application. Lot #: [id number], EXP 01/2020  This medication was received and verified for the following 1. Correct Patient, 2. Correct Diagnosis, 3. Correct Drug, 4. Correct route, and no current allergy to medication.

## 2019-02-05 ENCOUNTER — HOSPITAL ENCOUNTER (OUTPATIENT)
Dept: LAB | Age: 54
Discharge: HOME | End: 2019-02-05
Payer: COMMERCIAL

## 2019-02-05 DIAGNOSIS — E87.6 HYPOKALEMIA: ICD-10-CM

## 2019-02-05 DIAGNOSIS — I10 ESSENTIAL HYPERTENSION: ICD-10-CM

## 2019-02-05 DIAGNOSIS — E78.1 HYPERTRIGLYCERIDEMIA: ICD-10-CM

## 2019-02-05 DIAGNOSIS — Z12.5 PROSTATE CANCER SCREENING: ICD-10-CM

## 2019-02-05 DIAGNOSIS — E11.9 TYPE 2 DIABETES MELLITUS WITHOUT COMPLICATION, UNSPECIFIED WHETHER LONG TERM INSULIN USE (HCC): ICD-10-CM

## 2019-02-05 LAB
ALBUMIN SERPL-MCNC: 4.6 G/DL (ref 3.4–5)
ALBUMIN/GLOB SERPL: 1.4 {RATIO} (ref 0.8–1.7)
ALP SERPL-CCNC: 75 U/L (ref 45–117)
ALT SERPL-CCNC: 51 U/L (ref 16–61)
ANION GAP SERPL CALC-SCNC: 12 MMOL/L (ref 3–18)
AST SERPL-CCNC: 25 U/L (ref 15–37)
BASOPHILS # BLD: 0.1 K/UL (ref 0–0.1)
BASOPHILS NFR BLD: 1 % (ref 0–2)
BILIRUB SERPL-MCNC: 1.2 MG/DL (ref 0.2–1)
BUN SERPL-MCNC: 19 MG/DL (ref 7–18)
BUN/CREAT SERPL: 20 (ref 12–20)
CALCIUM SERPL-MCNC: 9.4 MG/DL (ref 8.5–10.1)
CHLORIDE SERPL-SCNC: 102 MMOL/L (ref 100–108)
CHOLEST SERPL-MCNC: 181 MG/DL
CO2 SERPL-SCNC: 28 MMOL/L (ref 21–32)
CREAT SERPL-MCNC: 0.96 MG/DL (ref 0.6–1.3)
DIFFERENTIAL METHOD BLD: ABNORMAL
EOSINOPHIL # BLD: 0.2 K/UL (ref 0–0.4)
EOSINOPHIL NFR BLD: 3 % (ref 0–5)
ERYTHROCYTE [DISTWIDTH] IN BLOOD BY AUTOMATED COUNT: 13.3 % (ref 11.6–14.5)
GLOBULIN SER CALC-MCNC: 3.3 G/DL (ref 2–4)
GLUCOSE SERPL-MCNC: 153 MG/DL (ref 74–99)
HCT VFR BLD AUTO: 48.9 % (ref 36–48)
HDLC SERPL-MCNC: 36 MG/DL (ref 40–60)
HDLC SERPL: 5 {RATIO} (ref 0–5)
HGB BLD-MCNC: 16.9 G/DL (ref 13–16)
LDLC SERPL CALC-MCNC: 97.6 MG/DL (ref 0–100)
LIPID PROFILE,FLP: ABNORMAL
LYMPHOCYTES # BLD: 2.8 K/UL (ref 0.9–3.6)
LYMPHOCYTES NFR BLD: 32 % (ref 21–52)
MCH RBC QN AUTO: 29.5 PG (ref 24–34)
MCHC RBC AUTO-ENTMCNC: 34.6 G/DL (ref 31–37)
MCV RBC AUTO: 85.3 FL (ref 74–97)
MONOCYTES # BLD: 0.5 K/UL (ref 0.05–1.2)
MONOCYTES NFR BLD: 6 % (ref 3–10)
NEUTS SEG # BLD: 5.1 K/UL (ref 1.8–8)
NEUTS SEG NFR BLD: 58 % (ref 40–73)
PLATELET # BLD AUTO: 210 K/UL (ref 135–420)
PMV BLD AUTO: 11.4 FL (ref 9.2–11.8)
POTASSIUM SERPL-SCNC: 3.5 MMOL/L (ref 3.5–5.5)
PROT SERPL-MCNC: 7.9 G/DL (ref 6.4–8.2)
PSA SERPL-MCNC: 2.2 NG/ML (ref 0–4)
RBC # BLD AUTO: 5.73 M/UL (ref 4.7–5.5)
SODIUM SERPL-SCNC: 142 MMOL/L (ref 136–145)
TRIGL SERPL-MCNC: 237 MG/DL (ref ?–150)
VLDLC SERPL CALC-MCNC: 47.4 MG/DL
WBC # BLD AUTO: 8.6 K/UL (ref 4.6–13.2)

## 2019-02-05 PROCEDURE — 82043 UR ALBUMIN QUANTITATIVE: CPT

## 2019-02-05 PROCEDURE — 84153 ASSAY OF PSA TOTAL: CPT

## 2019-02-05 PROCEDURE — 80053 COMPREHEN METABOLIC PANEL: CPT

## 2019-02-05 PROCEDURE — 83036 HEMOGLOBIN GLYCOSYLATED A1C: CPT

## 2019-02-05 PROCEDURE — 80061 LIPID PANEL: CPT

## 2019-02-05 PROCEDURE — 85025 COMPLETE CBC W/AUTO DIFF WBC: CPT

## 2019-02-05 PROCEDURE — 36415 COLL VENOUS BLD VENIPUNCTURE: CPT

## 2019-02-06 LAB
CREAT UR-MCNC: 96 MG/DL (ref 30–125)
EST. AVERAGE GLUCOSE BLD GHB EST-MCNC: 148 MG/DL
HBA1C MFR BLD: 6.8 % (ref 4.2–5.6)
MICROALBUMIN UR-MCNC: 197 MG/DL (ref 0–3)
MICROALBUMIN/CREAT UR-RTO: 2052 MG/G (ref 0–30)

## 2019-02-14 ENCOUNTER — LAB ONLY (OUTPATIENT)
Dept: FAMILY MEDICINE CLINIC | Age: 54
End: 2019-02-14

## 2019-02-14 DIAGNOSIS — Z01.89 ROUTINE LAB DRAW: Primary | ICD-10-CM

## 2019-02-20 ENCOUNTER — OFFICE VISIT (OUTPATIENT)
Dept: FAMILY MEDICINE CLINIC | Age: 54
End: 2019-02-20

## 2019-02-20 VITALS
HEIGHT: 71 IN | RESPIRATION RATE: 16 BRPM | DIASTOLIC BLOOD PRESSURE: 87 MMHG | SYSTOLIC BLOOD PRESSURE: 133 MMHG | OXYGEN SATURATION: 99 % | BODY MASS INDEX: 31.05 KG/M2 | WEIGHT: 221.8 LBS | HEART RATE: 67 BPM | TEMPERATURE: 97.9 F

## 2019-02-20 DIAGNOSIS — E78.2 MIXED HYPERLIPIDEMIA: ICD-10-CM

## 2019-02-20 DIAGNOSIS — I10 ESSENTIAL HYPERTENSION: ICD-10-CM

## 2019-02-20 DIAGNOSIS — E11.40 TYPE 2 DIABETES MELLITUS WITH DIABETIC NEUROPATHY, WITHOUT LONG-TERM CURRENT USE OF INSULIN (HCC): Primary | ICD-10-CM

## 2019-02-20 RX ORDER — METFORMIN HYDROCHLORIDE 1000 MG/1
1000 TABLET ORAL 2 TIMES DAILY WITH MEALS
Qty: 60 TAB | Refills: 11 | Status: SHIPPED | OUTPATIENT
Start: 2019-02-20 | End: 2019-04-11 | Stop reason: SDUPTHER

## 2019-02-20 RX ORDER — LISINOPRIL AND HYDROCHLOROTHIAZIDE 12.5; 2 MG/1; MG/1
TABLET ORAL
Qty: 30 TAB | Refills: 6 | Status: SHIPPED | OUTPATIENT
Start: 2019-02-20 | End: 2019-04-11 | Stop reason: SDUPTHER

## 2019-02-20 RX ORDER — GABAPENTIN 300 MG/1
300 CAPSULE ORAL 2 TIMES DAILY
Qty: 60 CAP | Refills: 6 | Status: SHIPPED | OUTPATIENT
Start: 2019-02-20 | End: 2019-04-11 | Stop reason: SDUPTHER

## 2019-02-20 RX ORDER — GLIPIZIDE 10 MG/1
10 TABLET ORAL
Qty: 30 TAB | Refills: 6 | Status: SHIPPED | OUTPATIENT
Start: 2019-02-20 | End: 2019-04-11 | Stop reason: SDUPTHER

## 2019-02-20 RX ORDER — ROSUVASTATIN CALCIUM 20 MG/1
20 TABLET, COATED ORAL
Qty: 30 TAB | Refills: 6 | Status: SHIPPED | OUTPATIENT
Start: 2019-02-20 | End: 2019-04-11 | Stop reason: SDUPTHER

## 2019-02-20 RX ORDER — GUAIFENESIN 100 MG/5ML
81 LIQUID (ML) ORAL DAILY
Qty: 30 TAB | Refills: 11 | Status: SHIPPED | OUTPATIENT
Start: 2019-02-20 | End: 2022-08-15

## 2019-02-20 NOTE — PROGRESS NOTES
No new complaints today. Patient decided to stay here instead of going to dr. Nilda Bell office. Patient has his CPAP machine.

## 2019-02-20 NOTE — PROGRESS NOTES
Subjective:     Ann Lozano is a 49 y.o. male seen for follow up of diabetes. He also has hypertension and hyperlipidemia. Diabetic Review of Systems - medication compliance: compliant all of the time, diabetic diet compliance: compliant most of the time, home glucose monitoring: is not performed, further diabetic ROS: no polyuria or polydipsia, no chest pain, dyspnea or TIA's, no numbness, tingling or pain in extremities. Other symptoms and concerns: Patient now has Medicaid and needs formulary meds. .    Patient Active Problem List    Diagnosis Date Noted    Sleep apnea 09/26/2018    Type 2 diabetes mellitus with diabetic neuropathy ([email or web address] 06/13/2018    Hypertriglyceridemia 06/30/2016    Hypokalemia 06/30/2016    Scrotal mass 06/06/2016    Low back pain     Other seasonal allergic rhinitis 10/07/2015    Right cataract 08/03/2015    Type 2 diabetes mellitus without complication ([email or web address] 86/05/6625    Dribbling urine 08/03/2015    Essential hypertension 08/03/2015    Numbness of left foot 08/03/2015     Current Outpatient Medications   Medication Sig Dispense Refill    metFORMIN (GLUCOPHAGE) 1,000 mg tablet Take 1 Tab by mouth two (2) times daily (with meals). 60 Tab 11    glipiZIDE (GLUCOTROL) 10 mg tablet Take 1 Tab by mouth daily (with breakfast). 30 Tab 6    gabapentin (NEURONTIN) 300 mg capsule Take 1 Cap by mouth two (2) times a day. 60 Cap 6    rosuvastatin (CRESTOR) 20 mg tablet Take 1 Tab by mouth nightly. 30 Tab 6    lisinopril-hydroCHLOROthiazide (PRINZIDE, ZESTORETIC) 20-12.5 mg per tablet TAKE ONE TABLET BY MOUTH ONCE DAILY FOR HYPERTENSION 30 Tab 6    aspirin 81 mg chewable tablet Take 1 Tab by mouth daily. 30 Tab 11    cyclobenzaprine (FLEXERIL) 5 mg tablet Take 1 Tab by mouth nightly. 30 Tab 0    meloxicam (MOBIC) 15 mg tablet TAKE 1 TABLET BY MOUTH ONCE DAILY 30 Tab 3    calcium carbonate-vitamin D3 600 mg(1,500mg) -800 unit tab Take  by mouth.       fish oil-dha-epa 1,200-144-216 mg cap Take  by mouth.  mometasone (NASONEX) 50 mcg/actuation nasal spray 2 Sprays by Both Nostrils route daily. Indications: ALLERGIC RHINITIS 2 Container 0    multivitamin (ONE A DAY) tablet Take 1 Tab by mouth daily.        No Known Allergies  Past Medical History:   Diagnosis Date    Arthritis     Cataract, right 2010    Diabetes ([address]     H/O seasonal allergies     History of vitamin D deficiency 6/2016    Hypercholesterolemia     Hypertension     Hypertriglyceridemia 6/30/2016    Hypokalemia 6/30/2016    Low back pain     Numbness and tingling of foot June 2014    left toes and mid bottom of foot    Peripheral neuropathy     Scrotal mass 6/6/2016    Sleep apnea      Past Surgical History:   Procedure Laterality Date    HX HERNIA REPAIR  5636    umbilical, done at [facility] SOFT TISS FACE/SCALP SUBQ 2+CM  1/4/16    scalp lesion removal, Dr. Violette Jara     Family History   Problem Relation Age of Onset    No Known Problems Mother     No Known Problems Sister      Social History     Tobacco Use    Smoking status: Former Smoker    Smokeless tobacco: Never Used    Tobacco comment: stopped in HS, never heavy and never long term   Substance Use Topics    Alcohol use: No     Alcohol/week: 0.0 oz     Comment: rarely holiday        Lab Results   Component Value Date/Time    WBC 8.6 02/05/2019 11:20 AM    HGB 16.9 (H) 02/05/2019 11:20 AM    HCT 48.9 (H) 02/05/2019 11:20 AM    PLATELET 971 00/52/7858 11:20 AM    MCV 85.3 02/05/2019 11:20 AM     Lab Results   Component Value Date/Time    Hemoglobin A1c 6.8 (H) 02/05/2019 11:20 AM    Hemoglobin A1c 6.2 (H) 09/18/2018 09:36 AM    Hemoglobin A1c 6.4 (H) 06/05/2018 08:54 AM    Glucose 153 (H) 02/05/2019 11:20 AM    Glucose (POC) 158 (H) 01/04/2016 11:52 AM    Glucose  10/19/2016 01:10 PM    Microalbumin/Creat ratio (mg/g creat) 2,052 (H) 02/05/2019 11:20 AM    Microalbumin,urine random 197.00 (H) 02/05/2019 11:20 AM    LDL, calculated 97.6 02/05/2019 11:20 AM    Creatinine 0.96 02/05/2019 11:20 AM      Lab Results   Component Value Date/Time    Cholesterol, total 181 02/05/2019 11:20 AM    HDL Cholesterol 36 (L) 02/05/2019 11:20 AM    LDL, calculated 97.6 02/05/2019 11:20 AM    Triglyceride 237 (H) 02/05/2019 11:20 AM    CHOL/HDL Ratio 5.0 02/05/2019 11:20 AM     Lab Results   Component Value Date/Time    Sodium 142 02/05/2019 11:20 AM    Potassium 3.5 02/05/2019 11:20 AM    Chloride 102 02/05/2019 11:20 AM    CO2 28 02/05/2019 11:20 AM    Anion gap 12 02/05/2019 11:20 AM    Glucose 153 (H) 02/05/2019 11:20 AM    BUN 19 (H) 02/05/2019 11:20 AM    Creatinine 0.96 02/05/2019 11:20 AM    BUN/Creatinine ratio 20 02/05/2019 11:20 AM    GFR est AA >60 02/05/2019 11:20 AM    GFR est non-AA >60 02/05/2019 11:20 AM    Calcium 9.4 02/05/2019 11:20 AM    Bilirubin, total 1.2 (H) 02/05/2019 11:20 AM    ALT (SGPT) 51 02/05/2019 11:20 AM    AST (SGOT) 25 02/05/2019 11:20 AM    Alk. phosphatase 75 02/05/2019 11:20 AM    Protein, total 7.9 02/05/2019 11:20 AM    Albumin 4.6 02/05/2019 11:20 AM    Globulin 3.3 02/05/2019 11:20 AM    A-G Ratio 1.4 02/05/2019 11:20 AM         Review of Systems  Review of Systems   Constitutional: Negative. HENT: Negative. Respiratory: Negative. Cardiovascular: Negative. Genitourinary: Negative. Musculoskeletal: Negative. Neurological: Negative. Psychiatric/Behavioral: Negative. Objective:     Visit Vitals  /87   Pulse 67   Temp 97.9 °F (36.6 °C)   Resp 16   Ht 5' 11\" (1.803 m)   Wt 221 lb 12.8 oz (100.6 kg)   SpO2 99%   BMI 30.93 kg/m²     Physical Exam   Constitutional: He is oriented to person, place, and time and well-developed, well-nourished, and in no distress. HENT:   Head: Normocephalic. Eyes: Pupils are equal, round, and reactive to light. Neck: Normal range of motion. Neck supple.    Cardiovascular: Normal rate, regular rhythm and normal heart sounds. Pulmonary/Chest: Effort normal and breath sounds normal.   Abdominal: Soft. Bowel sounds are normal.   Musculoskeletal: Normal range of motion. He exhibits no edema. Neurological: He is alert and oriented to person, place, and time. Skin: Skin is warm and dry. Psychiatric: Mood and affect normal.   Vitals reviewed. Lab review: labs reviewed, I note that glycosylated hemoglobin normal, lipids triglycerides high. Assessment/Plan:       Diabetic issues reviewed with him: home glucose monitoring emphasized, all medications, side effects and compliance discussed carefully, foot care discussed and Podiatry visits discussed, annual eye examinations at Ophthalmology discussed, glycohemoglobin and other lab monitoring discussed, long term diabetic complications discussed and labs immediately prior to next visit. ICD-10-CM ICD-9-CM    1. Type 2 diabetes mellitus with diabetic neuropathy, without long-term current use of insulin (HCC) E11.40 250.60 metFORMIN (GLUCOPHAGE) 1,000 mg tablet     357.2 glipiZIDE (GLUCOTROL) 10 mg tablet      gabapentin (NEURONTIN) 300 mg capsule      rosuvastatin (CRESTOR) 20 mg tablet      lisinopril-hydroCHLOROthiazide (PRINZIDE, ZESTORETIC) 20-12.5 mg per tablet      aspirin 81 mg chewable tablet      HEMOGLOBIN A1C WITH EAG      METABOLIC PANEL, COMPREHENSIVE      LIPID PANEL   2. Essential hypertension I10 401.9 aspirin 81 mg chewable tablet      METABOLIC PANEL, COMPREHENSIVE   3. Mixed hyperlipidemia E78.2 272.2 LIPID PANEL     Follow-up Disposition:  Return in about 6 months (around 8/20/2019), or if symptoms worsen or fail to improve. - Meds switched to [facility] Formulary   - Patient advised to start medication 3 days apart to avoid allergies. I have discussed the diagnosis with the patient and the intended plan as seen in the above orders. The patient has received an after-visit summary and questions were answered concerning future plans.   I have discussed medication side effects and warnings with the patient as well. Patient agreeable with above plan and verbalizes understanding.     Medications Discontinued During This Encounter   Medication Reason    saxagliptin-metFORMIN (KOMBIGLYZE XR) 2.5-1,000 mg TM24 Alternate Therapy    pitavastatin (LIVALO) 2 mg tablet Alternate Therapy    pregabalin (LYRICA) 100 mg capsule Alternate Therapy    lisinopril-hydroCHLOROthiazide (PRINZIDE, ZESTORETIC) 20-12.5 mg per tablet Reorder    dapagliflozin (FARXIGA) 10 mg tab Alternate Therapy

## 2019-02-26 NOTE — PROGRESS NOTES
Please let the patient know that his tests for microalbuminuria was positive. He is appropriately treated with lisinopril. His hemoglobin A1c is controlled such as almonds avocado PSA level is normal.  His triglycerides are mildly to moderately elevated and his good cholesterol is a little bit too low. Please decrease his fatty foods and begin exercise and increase his foods that are high in good cholesterol such as almonds, avocados, she is seeds, etc. all of the labs are okay.

## 2019-03-21 ENCOUNTER — OFFICE VISIT (OUTPATIENT)
Dept: FAMILY MEDICINE CLINIC | Facility: CLINIC | Age: 54
End: 2019-03-21

## 2019-03-21 VITALS
RESPIRATION RATE: 16 BRPM | TEMPERATURE: 98.8 F | OXYGEN SATURATION: 95 % | HEIGHT: 71 IN | HEART RATE: 89 BPM | DIASTOLIC BLOOD PRESSURE: 88 MMHG | WEIGHT: 227 LBS | BODY MASS INDEX: 31.78 KG/M2 | SYSTOLIC BLOOD PRESSURE: 182 MMHG

## 2019-03-21 DIAGNOSIS — I10 ESSENTIAL HYPERTENSION: ICD-10-CM

## 2019-03-21 DIAGNOSIS — E78.1 HYPERTRIGLYCERIDEMIA: ICD-10-CM

## 2019-03-21 DIAGNOSIS — E11.9 TYPE 2 DIABETES MELLITUS WITHOUT COMPLICATION, UNSPECIFIED WHETHER LONG TERM INSULIN USE (HCC): Primary | ICD-10-CM

## 2019-03-21 RX ORDER — PREGABALIN 100 MG/1
CAPSULE ORAL 2 TIMES DAILY
COMMUNITY
End: 2019-04-11

## 2019-03-21 NOTE — PROGRESS NOTES
Bree Arrington is a 49 y.o. male presenting today for Well Male (2 month follow up)  . HPI:  Bree Arrington presents to the office today for hypertension, diabetes and hyperlipidemia follow-up care. Patient presents today stating he is feeling well. Patient reports he was seen at the [facility] on February, 2019 and a lot of his medications have changed. Patient notes he was given written prescription and a separate instruction sheet on how to start the medications. Patient presents today without any complaints. He denies any chest pain, palpitation or lower extremity edema. Denies any cough, wheezing or congestion. Is negative for abdominal pain, nausea, vomiting, diarrhea or constipation. No headache or dizziness today. Patient notes he is scheduled for cataract surgery in the left eye in [facility] next week. Review of Systems   Respiratory: Negative for cough. Cardiovascular: Negative for chest pain and palpitations. Gastrointestinal: Negative for abdominal pain, diarrhea, nausea and vomiting. Musculoskeletal: Negative for myalgias. Neurological: Negative for dizziness, tingling and headaches. No Known Allergies    Current Outpatient Medications   Medication Sig Dispense Refill    aspirin 81 mg chewable tablet Take 1 Tab by mouth daily. 30 Tab 11    calcium carbonate-vitamin D3 600 mg(1,500mg) -800 unit tab Take  by mouth.  fish oil-dha-epa 1,200-144-216 mg cap Take  by mouth.  multivitamin (ONE A DAY) tablet Take 1 Tab by mouth daily.  cyclobenzaprine (FLEXERIL) 5 mg tablet Take 1 Tab by mouth nightly. 30 Tab 0    gabapentin (NEURONTIN) 300 mg capsule Take 1 Cap by mouth two (2) times a day. Indications: Neuropathic Pain 60 Cap 6    glipiZIDE (GLUCOTROL) 10 mg tablet Take 1 Tab by mouth daily (with breakfast).  30 Tab 6    lisinopril-hydroCHLOROthiazide (PRINZIDE, ZESTORETIC) 20-12.5 mg per tablet TAKE ONE TABLET BY MOUTH ONCE DAILY FOR HYPERTENSION 30 Tab 6    meloxicam (MOBIC) 15 mg tablet TAKE 1 TABLET BY MOUTH ONCE DAILY 30 Tab 3    metFORMIN (GLUCOPHAGE) 1,000 mg tablet Take 1 Tab by mouth two (2) times daily (with meals). 60 Tab 11    mometasone (NASONEX) 50 mcg/actuation nasal spray 2 Sprays by Both Nostrils route daily. Indications: inflammation of the nose due to an allergy 2 Container 0    rosuvastatin (CRESTOR) 20 mg tablet Take 1 Tab by mouth nightly. 30 Tab 6    predniSONE (DELTASONE) 20 mg tablet Take 3 tablets by mouth x 3 days, then 2 tabs x 3 days then 1 tab x 3 days then 1/2 tab x 6 days 21 Tab 0    valACYclovir (VALTREX) 1 gram tablet Take 1 Tab by mouth three (3) times daily for 7 days.  21 Tab 0       Past Medical History:   Diagnosis Date    Arthritis     Cataract, right 2010    Diabetes ([address]     H/O seasonal allergies     History of vitamin D deficiency 6/2016    Hypercholesterolemia     Hypertension     Hypertriglyceridemia 6/30/2016    Hypokalemia 6/30/2016    Low back pain     Numbness and tingling of foot June 2014    left toes and mid bottom of foot    Peripheral neuropathy     Scrotal mass 6/6/2016    Sleep apnea        Past Surgical History:   Procedure Laterality Date    HX HERNIA REPAIR  8879    umbilical, done at [address] TISS FACE/SCALP SUBQ 2+CM  1/4/16    scalp lesion removal, Dr. Suarez Card History     Socioeconomic History    Marital status: SINGLE     Spouse name: Not on file    Number of children: 0    Years of education: 6    Highest education level: Not on file   Occupational History    Occupation: unemployed and living with son and daughter-in-law, after incarceration 9/2011 out 4/2014   Social Needs    Financial resource strain: Not on file    Food insecurity:     Worry: Not on file     Inability: Not on file    Transportation needs:     Medical: Not on file     Non-medical: Not on file   Tobacco Use    Smoking status: Former Smoker    Smokeless tobacco: Never Used    Tobacco comment: stopped in HS, never heavy and never long term   Substance and Sexual Activity    Alcohol use: No     Alcohol/week: 0.0 oz     Comment: rarely holiday    Drug use: No    Sexual activity: Yes     Comment: no partner, spouse  2014   Lifestyle    Physical activity:     Days per week: Not on file     Minutes per session: Not on file    Stress: Not on file   Relationships    Social connections:     Talks on phone: Not on file     Gets together: Not on file     Attends [employer] service: Not on file     Active member of club or organization: Not on file     Attends meetings of clubs or organizations: Not on file     Relationship status: Not on file    Intimate partner violence:     Fear of current or ex partner: Not on file     Emotionally abused: Not on file     Physically abused: Not on file     Forced sexual activity: Not on file   Other Topics Concern   [address] Service Yes     Comment: [address], from 7096-9814, other than honorable discharge    Blood Transfusions No    Caffeine Concern No     Comment: decreased his 2-3 super big gulps    Occupational Exposure Yes     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[employer] [facility] Hazards No    Sleep Concern No    Stress Concern Yes     Comment: Life in general    Weight Concern No    Special Diet No    Back Care No    Exercise No     Comment: starting to do something but not currently    Bike Helmet No    Seat Belt Yes    Self-Exams No   Social History Narrative    Not on file       Patient does not have an advanced directive on file    Vitals:    19 1647   BP: 182/88   Pulse: 89   Resp: 16   Temp: 98.8 °F (37.1 °C)   TempSrc: Tympanic   SpO2: 95%   Weight: 227 lb (103 kg)   Height: 5' 11\" (1.803 m)   PainSc:   0 - No pain       Physical Exam   Neck: Normal range of motion. Neck supple. Cardiovascular: Normal rate, regular rhythm and normal heart sounds.    Pulmonary/Chest: Effort normal and breath sounds normal.   Abdominal: Bowel sounds are normal.   Musculoskeletal: He exhibits no edema. Neurological: He is alert. Nursing note and vitals reviewed. Hospital Outpatient Visit on 02/05/2019   Component Date Value Ref Range Status    WBC 02/05/2019 8.6  4.6 - 13.2 K/uL Final    RBC 02/05/2019 5.73* 4.70 - 5.50 M/uL Final    HGB 02/05/2019 16.9* 13.0 - 16.0 g/dL Final    HCT 02/05/2019 48.9* 36.0 - 48.0 % Final    MCV 02/05/2019 85.3  74.0 - 97.0 FL Final    MCH 02/05/2019 29.5  24.0 - 34.0 PG Final    MCHC 02/05/2019 34.6  31.0 - 37.0 g/dL Final    RDW 02/05/2019 13.3  11.6 - 14.5 % Final    PLATELET 43/50/0234 737  135 - 420 K/uL Final    MPV 02/05/2019 11.4  9.2 - 11.8 FL Final    NEUTROPHILS 02/05/2019 58  40 - 73 % Final    LYMPHOCYTES 02/05/2019 32  21 - 52 % Final    MONOCYTES 02/05/2019 6  3 - 10 % Final    EOSINOPHILS 02/05/2019 3  0 - 5 % Final    BASOPHILS 02/05/2019 1  0 - 2 % Final    ABS. NEUTROPHILS 02/05/2019 5.1  1.8 - 8.0 K/UL Final    ABS. LYMPHOCYTES 02/05/2019 2.8  0.9 - 3.6 K/UL Final    ABS. MONOCYTES 02/05/2019 0.5  0.05 - 1.2 K/UL Final    ABS. EOSINOPHILS 02/05/2019 0.2  0.0 - 0.4 K/UL Final    ABS. BASOPHILS 02/05/2019 0.1  0.0 - 0.1 K/UL Final    DF 02/05/2019 AUTOMATED    Final    LIPID PROFILE 02/05/2019        Final    Cholesterol, total 02/05/2019 181  <200 MG/DL Final    Triglyceride 02/05/2019 237* <150 MG/DL Final    Comment: The drugs N-acetylcysteine (NAC) and  Metamiszole have been found to cause falsely  low results in this chemical assay. Please  be sure to submit blood samples obtained  BEFORE administration of either of these  drugs to assure correct results.       HDL Cholesterol 02/05/2019 36* 40 - 60 MG/DL Final    LDL, calculated 02/05/2019 97.6  0 - 100 MG/DL Final    VLDL, calculated 02/05/2019 47.4  MG/DL Final    CHOL/HDL Ratio 02/05/2019 5.0  0 - 5.0   Final    Sodium 02/05/2019 142  136 - 145 mmol/L Final    Potassium 02/05/2019 3.5  3.5 - 5.5 mmol/L Final    Chloride 02/05/2019 102  100 - 108 mmol/L Final    CO2 02/05/2019 28  21 - 32 mmol/L Final    Anion gap 02/05/2019 12  3.0 - 18 mmol/L Final    Glucose 02/05/2019 153* 74 - 99 mg/dL Final    BUN 02/05/2019 19* 7.0 - 18 MG/DL Final    Creatinine 02/05/2019 0.96  0.6 - 1.3 MG/DL Final    BUN/Creatinine ratio 02/05/2019 20  12 - 20   Final    GFR est AA 02/05/2019 >60  >60 ml/min/1.73m2 Final    GFR est non-AA 02/05/2019 >60  >60 ml/min/1.73m2 Final    Comment: (NOTE)  Estimated GFR is calculated using the Modification of Diet in Renal   Disease (MDRD) Study equation, reported for both  Americans   (GFRAA) and non- Americans (GFRNA), and normalized to 1.73m2   body surface area. The physician must decide which value applies to   the patient. The MDRD study equation should only be used in   individuals age 25 or older. It has not been validated for the   following: pregnant women, patients with serious comorbid conditions,   or on certain medications, or persons with extremes of body size,   muscle mass, or nutritional status.  Calcium 02/05/2019 9.4  8.5 - 10.1 MG/DL Final    Bilirubin, total 02/05/2019 1.2* 0.2 - 1.0 MG/DL Final    ALT (SGPT) 02/05/2019 51  16 - 61 U/L Final    AST (SGOT) 02/05/2019 25  15 - 37 U/L Final    Alk.  phosphatase 02/05/2019 75  45 - 117 U/L Final    Protein, total 02/05/2019 7.9  6.4 - 8.2 g/dL Final    Albumin 02/05/2019 4.6  3.4 - 5.0 g/dL Final    Globulin 02/05/2019 3.3  2.0 - 4.0 g/dL Final    A-G Ratio 02/05/2019 1.4  0.8 - 1.7   Final    Microalbumin,urine random 02/05/2019 197.00* 0 - 3.0 MG/DL Final    Creatinine, urine 02/05/2019 96.00  30 - 125 mg/dL Final    Microalbumin/Creat ratio (mg/g cre* 02/05/2019 2,052* 0 - 30 mg/g Final    Hemoglobin A1c 02/05/2019 6.8* 4.2 - 5.6 % Final    Comment: (NOTE)  HbA1C Interpretive Ranges  <5.7              Normal  5.7 - 6.4         Consider Prediabetes  >6.5 Consider Diabetes      Est. average glucose 02/05/2019 148  mg/dL Final    Comment: (NOTE)  The eAG should be interpreted with patient characteristics in mind   since ethnicity, interindividual differences, red cell lifespan,   variation in rates of glycation, etc. may affect the validity of the   calculation.  Prostate Specific Ag 02/05/2019 2.2  0.0 - 4.0 ng/mL Final       .No results found for any visits on 03/21/19. Assessment / Plan:      ICD-10-CM ICD-9-CM    1. Type 2 diabetes mellitus without complication, unspecified whether long term insulin use (HCC) E11.9 250.00    2. Essential hypertension I10 401.9    3. Hypertriglyceridemia E78.1 272.1      Diabetes- last hgb A1C was 6.8 in February, 2019  HTN- elevated. Patient has snot taken is medication today  Lipid panel completed in February  F/u in 3 moth      Follow-up and Dispositions    · Return in about 3 months (around 6/21/2019). I asked the patient if he  had any questions and answered his  questions.   The patient stated that he understands the treatment plan and agrees with the treatment plan

## 2019-03-26 ENCOUNTER — OFFICE VISIT (OUTPATIENT)
Dept: NEUROLOGY | Age: 54
End: 2019-03-26

## 2019-03-26 VITALS
SYSTOLIC BLOOD PRESSURE: 142 MMHG | HEART RATE: 71 BPM | WEIGHT: 227 LBS | OXYGEN SATURATION: 97 % | BODY MASS INDEX: 31.78 KG/M2 | TEMPERATURE: 96.5 F | DIASTOLIC BLOOD PRESSURE: 92 MMHG | RESPIRATION RATE: 16 BRPM | HEIGHT: 71 IN

## 2019-03-26 DIAGNOSIS — I10 ESSENTIAL HYPERTENSION: ICD-10-CM

## 2019-03-26 DIAGNOSIS — E66.01 MORBID OBESITY (HCC): ICD-10-CM

## 2019-03-26 DIAGNOSIS — G47.33 OSA (OBSTRUCTIVE SLEEP APNEA): Primary | ICD-10-CM

## 2019-03-26 NOTE — PROGRESS NOTES
ROOM # 3    Luis Enrique Rodriguez presents today for   Chief Complaint   Patient presents with    Follow-up    CPAP       Luis Enrique Rodriguez preferred language for health care discussion is english/other. Depression Screening:  3 most recent [facility] Screens 3/26/2019 2/20/2019 1/16/2019   PHQ Not Done - Patient Decline -   Little interest or pleasure in doing things Not at all Not at all Not at all   Feeling down, depressed, irritable, or hopeless Not at all Not at all Not at all   Total Score PHQ 2 0 0 0       Learning Assessment:  Learning Assessment 1/25/2019 12/22/2015   PRIMARY LEARNER Patient Patient   HIGHEST LEVEL OF EDUCATION - PRIMARY LEARNER  GRADUATED HIGH SCHOOL OR GED -   BARRIERS PRIMARY LEARNER NONE -   [address] CAREGIVER No -   PRIMARY LANGUAGE ENGLISH ENGLISH   LEARNER PREFERENCE PRIMARY LISTENING LISTENING   ANSWERED BY patient patient   RELATIONSHIP SELF SELF       Abuse Screening:  Abuse Screening Questionnaire 1/25/2019   Do you ever feel afraid of your partner? N   Are you in a relationship with someone who physically or mentally threatens you? N   Is it safe for you to go home? Y       Fall Risk  No flowsheet data found. Visit Vitals  BP (!) 142/92 (BP 1 Location: Left arm, BP Patient Position: Sitting)   Pulse 71   Temp 96.5 °F (35.8 °C) (Oral)   Resp 16   Ht 5' 11\" (1.803 m)   Wt 227 lb (103 kg)   SpO2 97%   BMI 31.66 kg/m²       Health Maintenance reviewed and discussed per provider. Yes    Luis Enrique Rodriguez is due for   Health Maintenance Due   Topic Date Due    Pneumococcal 0-64 years (1 of 1 - PPSV23) 01/02/1972    EYE EXAM RETINAL OR DILATED  01/02/1976    DTaP/Tdap/Td series (1 - Tdap) 11/24/2011    Shingrix Vaccine Age 50> (1 of 2) 01/02/2016    FOOT EXAM Q1  12/20/2018     Please order/place referral if appropriate. Advance Directive:  1. Do you have an advance directive in place? Patient Reply: No    2.  If not, would you like material regarding how to put one in place? Patient Reply: No    Coordination of Care:  1. Have you been to the ER, urgent care clinic since your last visit? Hospitalized since your last visit?  No

## 2019-03-26 NOTE — PROGRESS NOTES
3/26/2019 9:56 AM    SSN: [id number]    Subjective:   61-year-old male coming for follow-up of a chief complaint of obstructive sleep apnea. Symptoms of disruptive snoring and excessive daytime sleepiness led to a sleep study on  showing an AHI of 44 with desaturations down to 77%. He was titrated to CPAP at 15 on a study which was a split. He was last here in . Download through 3/25 showed 27/30 days use with aver of [id number] and AHI of 1.5. He reports he is doing very well. He is comfortable for the most part, although he does have a little bit of dry mouth. Download does show a max leak of 97.4, which she thinks may be because he does not shut the machine down close to the restroom, but immediately is an acceptable 17.6 L/min.     Social History     Socioeconomic History    Marital status: SINGLE     Spouse name: Not on file    Number of children: 0    Years of education: 6    Highest education level: Not on file   Occupational History    Occupation: unemployed and living with son and daughter-in-law, after incarceration 2011 out 2014   Social Needs    Financial resource strain: Not on file    Food insecurity:     Worry: Not on file     Inability: Not on file    Transportation needs:     Medical: Not on file     Non-medical: Not on file   Tobacco Use    Smoking status: Former Smoker    Smokeless tobacco: Never Used    Tobacco comment: stopped in HS, never heavy and never long term   Substance and Sexual Activity    Alcohol use: No     Alcohol/week: 0.0 oz     Comment: rarely holiday    Drug use: No    Sexual activity: Yes     Comment: no partner, spouse  2014   Lifestyle    Physical activity:     Days per week: Not on file     Minutes per session: Not on file    Stress: Not on file   Relationships    Social connections:     Talks on phone: Not on file     Gets together: Not on file     Attends Buddhist service: Not on file     Active member of club or organization: Not on file     Attends meetings of clubs or organizations: Not on file     Relationship status: Not on file    Intimate partner violence:     Fear of current or ex partner: Not on file     Emotionally abused: Not on file     Physically abused: Not on file     Forced sexual activity: Not on file   Other Topics Concern   [address] Yes     Comment: [address], from 4420-0930, other than honorable discharge    Blood Transfusions No    Caffeine Concern No     Comment: decreased his 2-3 super big gulps    Occupational Exposure Yes     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[facility] Hazards No    Sleep Concern No    Stress Concern Yes     Comment: Life in general    Weight Concern No    Special Diet No    Back Care No    Exercise No     Comment: starting to do something but not currently    Bike Helmet No    Seat Belt Yes    Self-Exams No   Social History Narrative    Not on file       Family History   Problem Relation Age of Onset    No Known Problems Mother     No Known Problems Sister        Current Outpatient Medications   Medication Sig Dispense Refill    pregabalin (LYRICA) 100 mg capsule Take  by mouth two (2) times a day.  rosuvastatin (CRESTOR) 20 mg tablet Take 1 Tab by mouth nightly. 30 Tab 6    lisinopril-hydroCHLOROthiazide (PRINZIDE, ZESTORETIC) 20-12.5 mg per tablet TAKE ONE TABLET BY MOUTH ONCE DAILY FOR HYPERTENSION 30 Tab 6    cyclobenzaprine (FLEXERIL) 5 mg tablet Take 1 Tab by mouth nightly. 30 Tab 0    meloxicam (MOBIC) 15 mg tablet TAKE 1 TABLET BY MOUTH ONCE DAILY 30 Tab 3    calcium carbonate-vitamin D3 600 mg(1,500mg) -800 unit tab Take  by mouth.  mometasone (NASONEX) 50 mcg/actuation nasal spray 2 Sprays by Both Nostrils route daily. Indications: ALLERGIC RHINITIS 2 Container 0    metFORMIN (GLUCOPHAGE) 1,000 mg tablet Take 1 Tab by mouth two (2) times daily (with meals).  60 Tab 11    glipiZIDE (GLUCOTROL) 10 mg tablet Take 1 Tab by mouth daily (with breakfast). 30 Tab 6    gabapentin (NEURONTIN) 300 mg capsule Take 1 Cap by mouth two (2) times a day. 60 Cap 6    aspirin 81 mg chewable tablet Take 1 Tab by mouth daily. 30 Tab 11    fish oil-dha-epa 1,200-144-216 mg cap Take  by mouth.  multivitamin (ONE A DAY) tablet Take 1 Tab by mouth daily. Past Medical History:   Diagnosis Date    Arthritis     Cataract, right 2010    Diabetes ([address]     H/O seasonal allergies     History of vitamin D deficiency 6/2016    Hypercholesterolemia     Hypertension     Hypertriglyceridemia 6/30/2016    Hypokalemia 6/30/2016    Low back pain     Numbness and tingling of foot June 2014    left toes and mid bottom of foot    Peripheral neuropathy     Scrotal mass 6/6/2016    Sleep apnea        Past Surgical History:   Procedure Laterality Date    HX HERNIA REPAIR  8713    umbilical, done at [address] TISS FACE/SCALP SUBQ 2+CM  1/4/16    scalp lesion removal, Dr. Eldon Gee       No Known Allergies    Vital signs:    Visit Vitals  BP (!) 142/92 (BP 1 Location: Left arm, BP Patient Position: Sitting)   Pulse 71   Temp 96.5 °F (35.8 °C) (Oral)   Resp 16   Ht 5' 11\" (1.803 m)   Wt 103 kg (227 lb)   SpO2 97%   BMI 31.66 kg/m²       Review of Systems:   GENERAL: Denies fever or fatigue  CARDIAC: No CP or SOB  PULMONARY: No cough of SOB  MUSCULOSKELETAL: No new joint pain  NEURO: SEE HPI      EXAM: Alert, in NAD. Heart is regular. Oriented x3, EOM's are full, PERRL, no facial asymmetries. Strength and tone are normal. DTR's +2, gait symmetric       Assessment/Plan: Severe obstructive sleep apnea, off to a great start with excellent compliance on download on CPAP at 15 cm of H2O. I commended him on his compliance. I suggested tricks for better seal, humidifier adjustments, as well as Biotene products at night to deal with his dry mouth.   Otherwise he will continue our current pressure and return to see me in 3 months with another download. PLEASE NOTE:   Portions of this document may have been produced using voice recognition software. Unrecognized errors in transcription may be present. This note will not be viewable in [address]

## 2019-04-11 ENCOUNTER — OFFICE VISIT (OUTPATIENT)
Dept: FAMILY MEDICINE CLINIC | Facility: CLINIC | Age: 54
End: 2019-04-11

## 2019-04-11 VITALS
OXYGEN SATURATION: 94 % | HEART RATE: 80 BPM | RESPIRATION RATE: 16 BRPM | TEMPERATURE: 98.2 F | DIASTOLIC BLOOD PRESSURE: 82 MMHG | SYSTOLIC BLOOD PRESSURE: 134 MMHG | WEIGHT: 227 LBS | BODY MASS INDEX: 31.78 KG/M2 | HEIGHT: 71 IN

## 2019-04-11 DIAGNOSIS — E11.40 TYPE 2 DIABETES MELLITUS WITH DIABETIC NEUROPATHY, WITHOUT LONG-TERM CURRENT USE OF INSULIN (HCC): ICD-10-CM

## 2019-04-11 DIAGNOSIS — Z91.09 ENVIRONMENTAL ALLERGIES: ICD-10-CM

## 2019-04-11 DIAGNOSIS — J30.2 SEASONAL ALLERGIC RHINITIS: ICD-10-CM

## 2019-04-11 DIAGNOSIS — G25.81 RESTLESS LEG: ICD-10-CM

## 2019-04-11 DIAGNOSIS — G51.0 BELL PALSY: Primary | ICD-10-CM

## 2019-04-11 DIAGNOSIS — M17.0 PRIMARY OSTEOARTHRITIS OF BOTH KNEES: ICD-10-CM

## 2019-04-11 RX ORDER — MELOXICAM 15 MG/1
TABLET ORAL
Qty: 30 TAB | Refills: 3 | Status: SHIPPED | OUTPATIENT
Start: 2019-04-11 | End: 2019-09-02 | Stop reason: SDUPTHER

## 2019-04-11 RX ORDER — METFORMIN HYDROCHLORIDE 1000 MG/1
1000 TABLET ORAL 2 TIMES DAILY WITH MEALS
Qty: 60 TAB | Refills: 11 | Status: SHIPPED | OUTPATIENT
Start: 2019-04-11 | End: 2020-04-13

## 2019-04-11 RX ORDER — GLIPIZIDE 10 MG/1
10 TABLET ORAL
Qty: 30 TAB | Refills: 6 | Status: SHIPPED | OUTPATIENT
Start: 2019-04-11 | End: 2020-03-30 | Stop reason: SDUPTHER

## 2019-04-11 RX ORDER — GABAPENTIN 300 MG/1
300 CAPSULE ORAL 2 TIMES DAILY
Qty: 60 CAP | Refills: 6 | Status: SHIPPED | OUTPATIENT
Start: 2019-04-11 | End: 2019-10-25 | Stop reason: SDUPTHER

## 2019-04-11 RX ORDER — MOMETASONE FUROATE 50 UG/1
2 SPRAY, METERED NASAL DAILY
Qty: 2 CONTAINER | Refills: 0 | Status: SHIPPED | OUTPATIENT
Start: 2019-04-11 | End: 2020-06-04 | Stop reason: SDUPTHER

## 2019-04-11 RX ORDER — LISINOPRIL AND HYDROCHLOROTHIAZIDE 12.5; 2 MG/1; MG/1
TABLET ORAL
Qty: 30 TAB | Refills: 6 | Status: SHIPPED | OUTPATIENT
Start: 2019-04-11 | End: 2020-03-30 | Stop reason: SDUPTHER

## 2019-04-11 RX ORDER — CYCLOBENZAPRINE HCL 5 MG
5 TABLET ORAL
Qty: 30 TAB | Refills: 0 | Status: SHIPPED | OUTPATIENT
Start: 2019-04-11 | End: 2019-06-04 | Stop reason: SDUPTHER

## 2019-04-11 RX ORDER — ROSUVASTATIN CALCIUM 20 MG/1
20 TABLET, COATED ORAL
Qty: 30 TAB | Refills: 6 | Status: SHIPPED | OUTPATIENT
Start: 2019-04-11 | End: 2020-03-30 | Stop reason: SDUPTHER

## 2019-04-11 RX ORDER — PREDNISONE 20 MG/1
TABLET ORAL
Qty: 21 TAB | Refills: 0 | Status: SHIPPED | OUTPATIENT
Start: 2019-04-11 | End: 2020-06-04

## 2019-04-11 RX ORDER — VALACYCLOVIR HYDROCHLORIDE 1 G/1
1000 TABLET, FILM COATED ORAL 3 TIMES DAILY
Qty: 21 TAB | Refills: 0 | Status: SHIPPED | OUTPATIENT
Start: 2019-04-11 | End: 2019-04-18

## 2019-04-11 NOTE — PROGRESS NOTES
Chief Complaint   Patient presents with    Facial Droop    Headache         Is someone accompanying this pt? no    Is the patient using any DME equipment during OV? no    Depression Screening:  3 most recent PHQ Screens 3/26/2019 2/20/2019 1/16/2019   PHQ Not Done - Patient Decline -   Little interest or pleasure in doing things Not at all Not at all Not at all   Feeling down, depressed, irritable, or hopeless Not at all Not at all Not at all   Total Score PHQ 2 0 0 0       Learning Assessment:  Learning Assessment 1/25/2019 12/22/2015   PRIMARY LEARNER Patient Patient   HIGHEST LEVEL OF EDUCATION - PRIMARY LEARNER  GRADUATED HIGH SCHOOL OR GED -   BARRIERS PRIMARY LEARNER NONE -   [address] CAREGIVER No -   PRIMARY LANGUAGE ENGLISH ENGLISH   LEARNER PREFERENCE PRIMARY LISTENING LISTENING   ANSWERED BY patient patient   RELATIONSHIP SELF SELF       Abuse Screening:  Abuse Screening Questionnaire 1/25/2019   Do you ever feel afraid of your partner? N   Are you in a relationship with someone who physically or mentally threatens you? N   Is it safe for you to go home? Y       Fall Risk  No flowsheet data found. Health Maintenance reviewed and discussed per provider. Pt is due for   Health Maintenance Due   Topic    Pneumococcal 0-64 years (1 of 1 - PPSV23)    EYE EXAM RETINAL OR DILATED     DTaP/Tdap/Td series (1 - Tdap)    Shingrix Vaccine Age 50> (1 of 2)    FOOT EXAM Q1     Please order/place referral if appropriate. Pt currently taking Antiplatelet therapy? no      Coordination of Care:  1. Have you been to the ER, urgent care clinic since your last visit? Hospitalized since your last visit? no    2. Have you seen or consulted any other health care providers outside of the [address] [place] [address] since your last visit? Include any pap smears or colon screening. no    Please see Red banners under Allergies, Med rec, Immunizations to remove outside inquires.  All correct information has been verified with patient and added to chart.

## 2019-04-11 NOTE — PROGRESS NOTES
Meche Rubio is a 49 y.o. male presenting today for Facial Droop and Headache  . HPI:  Meche Rubio presents to the office today for facial droop and headache times 2 days. Patient reports he woke up on Tuesday morning with right-sided facial droop. He denies any upper or lower extremity weakness. He is negative for any slurred speech. He reports he knew he had an appointment today so he did not seek medical attention on Wednesday. He does have a mild headache and is complaining about posterior neck pain. He is negative for any chest pain, palpitation or lower extremity edema. He denies any cough, wheezing or congestion. He notes he went to bed with a mild headache and woke with right-sided facial weakness. Patient unable to raise his right eyebrow or close his right eyelid. He does have a right side    Review of Systems   Eyes: Positive for discharge (watery right eye) and redness (left eye redness secondary to recent eye surgery ). Negative for pain. Respiratory: Negative for cough, sputum production and shortness of breath. Cardiovascular: Negative for chest pain and palpitations. Musculoskeletal: Positive for neck pain. Neurological: Positive for focal weakness (right side of face) and headaches. Negative for tingling and speech change. No Known Allergies    Current Outpatient Medications   Medication Sig Dispense Refill    cyclobenzaprine (FLEXERIL) 5 mg tablet Take 1 Tab by mouth nightly. 30 Tab 0    gabapentin (NEURONTIN) 300 mg capsule Take 1 Cap by mouth two (2) times a day. Indications: Neuropathic Pain 60 Cap 6    glipiZIDE (GLUCOTROL) 10 mg tablet Take 1 Tab by mouth daily (with breakfast).  30 Tab 6    lisinopril-hydroCHLOROthiazide (PRINZIDE, ZESTORETIC) 20-12.5 mg per tablet TAKE ONE TABLET BY MOUTH ONCE DAILY FOR HYPERTENSION 30 Tab 6    meloxicam (MOBIC) 15 mg tablet TAKE 1 TABLET BY MOUTH ONCE DAILY 30 Tab 3    metFORMIN (GLUCOPHAGE) 1,000 mg tablet Take 1 Tab by mouth two (2) times daily (with meals). 60 Tab 11    mometasone (NASONEX) 50 mcg/actuation nasal spray 2 Sprays by Both Nostrils route daily. Indications: inflammation of the nose due to an allergy 2 Container 0    rosuvastatin (CRESTOR) 20 mg tablet Take 1 Tab by mouth nightly. 30 Tab 6    predniSONE (DELTASONE) 20 mg tablet Take 3 tablets by mouth x 3 days, then 2 tabs x 3 days then 1 tab x 3 days then 1/2 tab x 6 days 21 Tab 0    valACYclovir (VALTREX) 1 gram tablet Take 1 Tab by mouth three (3) times daily for 7 days. 21 Tab 0    aspirin 81 mg chewable tablet Take 1 Tab by mouth daily. 30 Tab 11    calcium carbonate-vitamin D3 600 mg(1,500mg) -800 unit tab Take  by mouth.  fish oil-dha-epa 1,200-144-216 mg cap Take  by mouth.  multivitamin (ONE A DAY) tablet Take 1 Tab by mouth daily.          Past Medical History:   Diagnosis Date    Arthritis     Cataract, right 2010    Diabetes ([address]     H/O seasonal allergies     History of vitamin D deficiency 6/2016    Hypercholesterolemia     Hypertension     Hypertriglyceridemia 6/30/2016    Hypokalemia 6/30/2016    Low back pain     Numbness and tingling of foot June 2014    left toes and mid bottom of foot    Peripheral neuropathy     Scrotal mass 6/6/2016    Sleep apnea        Past Surgical History:   Procedure Laterality Date    HX HERNIA REPAIR  0476    umbilical, done at [address] TISS FACE/SCALP SUBQ 2+CM  1/4/16    scalp lesion removal, Dr. Loulou Sandy History     Socioeconomic History    Marital status: SINGLE     Spouse name: Not on file    Number of children: 0    Years of education: 6    Highest education level: Not on file   Occupational History    Occupation: unemployed and living with son and daughter-in-law, after incarceration 9/2011 out 4/2014   Social Needs    Financial resource strain: Not on file    Food insecurity:     Worry: Not on file     Inability: Not on file    Transportation needs:     Medical: Not on file     Non-medical: Not on file   Tobacco Use    Smoking status: Former Smoker    Smokeless tobacco: Never Used    Tobacco comment: stopped in HS, never heavy and never long term   Substance and Sexual Activity    Alcohol use: No     Alcohol/week: 0.0 oz     Comment: rarely holiday    Drug use: No    Sexual activity: Yes     Comment: no partner, spouse  2014   Lifestyle    Physical activity:     Days per week: Not on file     Minutes per session: Not on file    Stress: Not on file   Relationships    Social connections:     Talks on phone: Not on file     Gets together: Not on file     Attends [employer] service: Not on file     Active member of club or organization: Not on file     Attends meetings of clubs or organizations: Not on file     Relationship status: Not on file    Intimate partner violence:     Fear of current or ex partner: Not on file     Emotionally abused: Not on file     Physically abused: Not on file     Forced sexual activity: Not on file   Other Topics Concern   [address] Service Yes     Comment: [address], from [id number], other than honorable discharge    Blood Transfusions No    Caffeine Concern No     Comment: decreased his 2-3 super big gulps    Occupational Exposure Yes     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[employer] [facility] Hazards No    Sleep Concern No    Stress Concern Yes     Comment: Life in general    Weight Concern No    Special Diet No    Back Care No    Exercise No     Comment: starting to do something but not currently    Bike Helmet No    Seat Belt Yes    Self-Exams No   Social History Narrative    Not on file       Patient does not have an advanced directive on file    Vitals:    19 1006   BP: 134/82   Pulse: 80   Resp: 16   Temp: 98.2 °F (36.8 °C)   TempSrc: Tympanic   SpO2: 94%   Weight: 227 lb (103 kg)   Height: 5' 11\" (1.803 m)   PainSc:   7   PainLoc: Neck       Physical Exam Constitutional: He is oriented to person, place, and time. Cardiovascular: Normal rate, regular rhythm and normal heart sounds. Pulmonary/Chest: Effort normal.   Neurological: He is alert and oriented to person, place, and time. He displays facial asymmetry. He displays no weakness and normal speech. A cranial nerve deficit (Cranial nerve 7) is present. He has a normal Romberg Test. He shows no pronator drift. Gait normal. Gait normal.   Negative for pronator drift, abnormal speech or abnormal stance   Nursing note and vitals reviewed. Hospital Outpatient Visit on 02/05/2019   Component Date Value Ref Range Status    WBC 02/05/2019 8.6  4.6 - 13.2 K/uL Final    RBC 02/05/2019 5.73* 4.70 - 5.50 M/uL Final    HGB 02/05/2019 16.9* 13.0 - 16.0 g/dL Final    HCT 02/05/2019 48.9* 36.0 - 48.0 % Final    MCV 02/05/2019 85.3  74.0 - 97.0 FL Final    MCH 02/05/2019 29.5  24.0 - 34.0 PG Final    MCHC 02/05/2019 34.6  31.0 - 37.0 g/dL Final    RDW 02/05/2019 13.3  11.6 - 14.5 % Final    PLATELET 11/38/4235 077  135 - 420 K/uL Final    MPV 02/05/2019 11.4  9.2 - 11.8 FL Final    NEUTROPHILS 02/05/2019 58  40 - 73 % Final    LYMPHOCYTES 02/05/2019 32  21 - 52 % Final    MONOCYTES 02/05/2019 6  3 - 10 % Final    EOSINOPHILS 02/05/2019 3  0 - 5 % Final    BASOPHILS 02/05/2019 1  0 - 2 % Final    ABS. NEUTROPHILS 02/05/2019 5.1  1.8 - 8.0 K/UL Final    ABS. LYMPHOCYTES 02/05/2019 2.8  0.9 - 3.6 K/UL Final    ABS. MONOCYTES 02/05/2019 0.5  0.05 - 1.2 K/UL Final    ABS. EOSINOPHILS 02/05/2019 0.2  0.0 - 0.4 K/UL Final    ABS. BASOPHILS 02/05/2019 0.1  0.0 - 0.1 K/UL Final    DF 02/05/2019 AUTOMATED    Final    LIPID PROFILE 02/05/2019        Final    Cholesterol, total 02/05/2019 181  <200 MG/DL Final    Triglyceride 02/05/2019 237* <150 MG/DL Final    Comment: The drugs N-acetylcysteine (NAC) and  Metamiszole have been found to cause falsely  low results in this chemical assay.  Please  be sure to submit blood samples obtained  BEFORE administration of either of these  drugs to assure correct results.  HDL Cholesterol 02/05/2019 36* 40 - 60 MG/DL Final    LDL, calculated 02/05/2019 97.6  0 - 100 MG/DL Final    VLDL, calculated 02/05/2019 47.4  MG/DL Final    CHOL/HDL Ratio 02/05/2019 5.0  0 - 5.0   Final    Sodium 02/05/2019 142  136 - 145 mmol/L Final    Potassium 02/05/2019 3.5  3.5 - 5.5 mmol/L Final    Chloride 02/05/2019 102  100 - 108 mmol/L Final    CO2 02/05/2019 28  21 - 32 mmol/L Final    Anion gap 02/05/2019 12  3.0 - 18 mmol/L Final    Glucose 02/05/2019 153* 74 - 99 mg/dL Final    BUN 02/05/2019 19* 7.0 - 18 MG/DL Final    Creatinine 02/05/2019 0.96  0.6 - 1.3 MG/DL Final    BUN/Creatinine ratio 02/05/2019 20  12 - 20   Final    GFR est AA 02/05/2019 >60  >60 ml/min/1.73m2 Final    GFR est non-AA 02/05/2019 >60  >60 ml/min/1.73m2 Final    Comment: (NOTE)  Estimated GFR is calculated using the Modification of Diet in Renal   Disease (MDRD) Study equation, reported for both  Americans   (GFRAA) and non- Americans (GFRNA), and normalized to 1.73m2   body surface area. The physician must decide which value applies to   the patient. The MDRD study equation should only be used in   individuals age 25 or older. It has not been validated for the   following: pregnant women, patients with serious comorbid conditions,   or on certain medications, or persons with extremes of body size,   muscle mass, or nutritional status.  Calcium 02/05/2019 9.4  8.5 - 10.1 MG/DL Final    Bilirubin, total 02/05/2019 1.2* 0.2 - 1.0 MG/DL Final    ALT (SGPT) 02/05/2019 51  16 - 61 U/L Final    AST (SGOT) 02/05/2019 25  15 - 37 U/L Final    Alk.  phosphatase 02/05/2019 75  45 - 117 U/L Final    Protein, total 02/05/2019 7.9  6.4 - 8.2 g/dL Final    Albumin 02/05/2019 4.6  3.4 - 5.0 g/dL Final    Globulin 02/05/2019 3.3  2.0 - 4.0 g/dL Final    A-G Ratio 02/05/2019 1.4  0.8 - 1.7 Final    Microalbumin,urine random 02/05/2019 197.00* 0 - 3.0 MG/DL Final    Creatinine, urine 02/05/2019 96.00  30 - 125 mg/dL Final    Microalbumin/Creat ratio (mg/g cre* 02/05/2019 2,052* 0 - 30 mg/g Final    Hemoglobin A1c 02/05/2019 6.8* 4.2 - 5.6 % Final    Comment: (NOTE)  HbA1C Interpretive Ranges  <5.7              Normal  5.7 - 6.4         Consider Prediabetes  >6.5              Consider Diabetes      Est. average glucose 02/05/2019 148  mg/dL Final    Comment: (NOTE)  The eAG should be interpreted with patient characteristics in mind   since ethnicity, interindividual differences, red cell lifespan,   variation in rates of glycation, etc. may affect the validity of the   calculation.  Prostate Specific Ag 02/05/2019 2.2  0.0 - 4.0 ng/mL Final       .No results found for any visits on 04/11/19. Assessment / Plan:      ICD-10-CM ICD-9-CM    1. Bell palsy G51.0 351.0 predniSONE (DELTASONE) 20 mg tablet      valACYclovir (VALTREX) 1 gram tablet      REFERRAL TO OPHTHALMOLOGY   2. Restless leg G25.81 333.94 cyclobenzaprine (FLEXERIL) 5 mg tablet   3. Type 2 diabetes mellitus with diabetic neuropathy, without long-term current use of insulin (HCC) E11.40 250.60 gabapentin (NEURONTIN) 300 mg capsule     357.2 glipiZIDE (GLUCOTROL) 10 mg tablet      lisinopril-hydroCHLOROthiazide (PRINZIDE, ZESTORETIC) 20-12.5 mg per tablet      metFORMIN (GLUCOPHAGE) 1,000 mg tablet      rosuvastatin (CRESTOR) 20 mg tablet   4. Primary osteoarthritis of both knees M17.0 715.16 meloxicam (MOBIC) 15 mg tablet   5. Seasonal allergic rhinitis J30.2 477.9    6. Environmental allergies Z91.09 V15.09 mometasone (NASONEX) 50 mcg/actuation nasal spray     Patient requesting refills from medication. Medication reviewed with patient and prescription sent to pharmacist  Bell palsy-prednisone taper dose given. Patient also given valacyclovir 1 g 3 times daily.   Referral to ophthalmology today  Refill med hypertension and hyperlipidemia meds. Refill seasonal allergy meds  Follow-up in 2 weeks      Follow-up and Dispositions    · Return in about 2 weeks (around 4/25/2019), or if symptoms worsen or fail to improve. I asked the patient if he  had any questions and answered his  questions.   The patient stated that he understands the treatment plan and agrees with the treatment plan

## 2019-05-06 ENCOUNTER — PATIENT OUTREACH (OUTPATIENT)
Dept: FAMILY MEDICINE CLINIC | Age: 54
End: 2019-05-06

## 2019-05-10 ENCOUNTER — OFFICE VISIT (OUTPATIENT)
Dept: FAMILY MEDICINE CLINIC | Facility: CLINIC | Age: 54
End: 2019-05-10

## 2019-05-10 VITALS
WEIGHT: 224 LBS | BODY MASS INDEX: 31.36 KG/M2 | TEMPERATURE: 98.2 F | HEIGHT: 71 IN | RESPIRATION RATE: 16 BRPM | SYSTOLIC BLOOD PRESSURE: 160 MMHG | OXYGEN SATURATION: 97 % | HEART RATE: 77 BPM | DIASTOLIC BLOOD PRESSURE: 100 MMHG

## 2019-05-10 DIAGNOSIS — I10 ESSENTIAL HYPERTENSION: Primary | ICD-10-CM

## 2019-05-10 DIAGNOSIS — G51.0 BELL PALSY: ICD-10-CM

## 2019-05-10 RX ORDER — AMLODIPINE BESYLATE 5 MG/1
5 TABLET ORAL DAILY
Qty: 30 TAB | Refills: 1 | Status: SHIPPED | OUTPATIENT
Start: 2019-05-10 | End: 2019-07-08 | Stop reason: SDUPTHER

## 2019-05-10 NOTE — PROGRESS NOTES
Dina Fair is a 49 y.o. male presenting today for Facial Droop (follow up)  . HPI:  Dina Fair presents to the office today for Bell's Palsy follow-up care. Patient was seen in the practice on April 11, 2019 for facial droop and headache for 2 days. Patient reported he woke up on on a Tuesday morning with right-sided facial droop. He was negative for any upper or lower extremity weakness. He denied any slurred speech. He reported that he knew he had an appointment with the office on the 4/11/2019 so he waited to seek medical attention. He did report a mild headache and was complaining of posterior neck pain. Patient was diagnosed with Bell's palsy on April 11, 2019 visit and he was given a prednisone taper dose as well as valacyclovir 1 g 3 times a day. Patient also was given referral to ophthalmology. Patient is asked to follow-up in 2 weeks. Patient presents today stating his symptoms have mildly improved. Patient was seen by ophthalmology and was given ophthalmic drops. Patient notes that he has completed his prednisone taper dose but reports he had only received valacyclovir tablets for twice a day despite being told that his dose was 3 times a day. Patient did not bring a list of his medications so this decreased quantity of meds per day cannot be verified. Patient wanted to verify that he had the right medication so he did go home to get his prescription bottle. At the review of his prescription bottle it does not appear that patient received the valacyclovir prescription. [facility] pharmacy was called, and per the pharmacist patient never picked up the valacyclovir prescription. The patient was very upset as he had been to [facility] several times and he notes he was never given the prescription. Review of Systems   Respiratory: Negative for cough, sputum production and shortness of breath. Cardiovascular: Negative for chest pain and palpitations. Gastrointestinal: Negative for abdominal pain, nausea and vomiting. Musculoskeletal: Negative for myalgias. Neurological: Positive for focal weakness (right side facial nerves). Negative for dizziness, weakness and headaches. No Known Allergies    Current Outpatient Medications   Medication Sig Dispense Refill    amLODIPine (NORVASC) 5 mg tablet Take 1 Tab by mouth daily. 30 Tab 1    cyclobenzaprine (FLEXERIL) 5 mg tablet Take 1 Tab by mouth nightly. 30 Tab 0    gabapentin (NEURONTIN) 300 mg capsule Take 1 Cap by mouth two (2) times a day. Indications: Neuropathic Pain 60 Cap 6    glipiZIDE (GLUCOTROL) 10 mg tablet Take 1 Tab by mouth daily (with breakfast). 30 Tab 6    lisinopril-hydroCHLOROthiazide (PRINZIDE, ZESTORETIC) 20-12.5 mg per tablet TAKE ONE TABLET BY MOUTH ONCE DAILY FOR HYPERTENSION 30 Tab 6    meloxicam (MOBIC) 15 mg tablet TAKE 1 TABLET BY MOUTH ONCE DAILY 30 Tab 3    metFORMIN (GLUCOPHAGE) 1,000 mg tablet Take 1 Tab by mouth two (2) times daily (with meals). 60 Tab 11    mometasone (NASONEX) 50 mcg/actuation nasal spray 2 Sprays by Both Nostrils route daily. Indications: inflammation of the nose due to an allergy 2 Container 0    rosuvastatin (CRESTOR) 20 mg tablet Take 1 Tab by mouth nightly. 30 Tab 6    predniSONE (DELTASONE) 20 mg tablet Take 3 tablets by mouth x 3 days, then 2 tabs x 3 days then 1 tab x 3 days then 1/2 tab x 6 days 21 Tab 0    calcium carbonate-vitamin D3 600 mg(1,500mg) -800 unit tab Take  by mouth.  fish oil-dha-epa 1,200-144-216 mg cap Take  by mouth.  multivitamin (ONE A DAY) tablet Take 1 Tab by mouth daily.  aspirin 81 mg chewable tablet Take 1 Tab by mouth daily.  27 Tab 11       Past Medical History:   Diagnosis Date    Arthritis     Cataract, right 2010    Diabetes ([facility] [address]     H/O seasonal allergies     History of vitamin D deficiency 6/2016    Hypercholesterolemia     Hypertension     Hypertriglyceridemia 6/30/2016    Hypokalemia 2016    Low back pain     Numbness and tingling of foot 2014    left toes and mid bottom of foot    Peripheral neuropathy     Scrotal mass 2016    Sleep apnea        Past Surgical History:   Procedure Laterality Date    HX HERNIA REPAIR  3812    umbilical, done at [address] TISS FACE/SCALP SUBQ 2+CM  16    scalp lesion removal, Dr. Yadira Gonzales History     Socioeconomic History    Marital status: SINGLE     Spouse name: Not on file    Number of children: 0    Years of education: 6    Highest education level: Not on file   Occupational History    Occupation: unemployed and living with son and daughter-in-law, after incarceration 2011 out 2014   Social Needs    Financial resource strain: Not on file    Food insecurity:     Worry: Not on file     Inability: Not on file    Transportation needs:     Medical: Not on file     Non-medical: Not on file   Tobacco Use    Smoking status: Former Smoker    Smokeless tobacco: Never Used    Tobacco comment: stopped in HS, never heavy and never long term   Substance and Sexual Activity    Alcohol use: No     Alcohol/week: 0.0 oz     Comment: rarely holiday    Drug use: No    Sexual activity: Yes     Comment: no partner, spouse  2014   Lifestyle    Physical activity:     Days per week: Not on file     Minutes per session: Not on file    Stress: Not on file   Relationships    Social connections:     Talks on phone: Not on file     Gets together: Not on file     Attends Anabaptism service: Not on file     Active member of club or organization: Not on file     Attends meetings of clubs or organizations: Not on file     Relationship status: Not on file    Intimate partner violence:     Fear of current or ex partner: Not on file     Emotionally abused: Not on file     Physically abused: Not on file     Forced sexual activity: Not on file   Other Topics Concern   [address] Service Yes Comment: [employer], from [id number], other than honorable discharge    Blood Transfusions No    Caffeine Concern No     Comment: decreased his 2-3 super big gulps    Occupational Exposure Yes     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[facility] Hazards No    Sleep Concern No    Stress Concern Yes     Comment: Life in general    Weight Concern No    Special Diet No    Back Care No    Exercise No     Comment: starting to do something but not currently    Bike Helmet No    Seat Belt Yes    Self-Exams No   Social History Narrative    Not on file       Patient does not have an advanced directive on file    Vitals:    05/10/19 1040   BP: (!) 160/100   Pulse: 77   Resp: 16   Temp: 98.2 °F (36.8 °C)   TempSrc: Tympanic   SpO2: 97%   Weight: 224 lb (101.6 kg)   Height: 5' 11\" (1.803 m)   PainSc:   0 - No pain       Physical Exam   Constitutional: No distress. Cardiovascular: Normal rate, regular rhythm and normal heart sounds. Pulmonary/Chest: Effort normal and breath sounds normal.   Neurological: He is alert. He is not agitated and not disoriented. He displays normal speech. A cranial nerve deficit (facial nerve deficit) is present. No sensory deficit. Coordination and gait normal.   Skin: Skin is warm. Nursing note and vitals reviewed. No visits with results within 3 Month(s) from this visit.    Latest known visit with results is:   Hospital Outpatient Visit on 02/05/2019   Component Date Value Ref Range Status    WBC 02/05/2019 8.6  4.6 - 13.2 K/uL Final    RBC 02/05/2019 5.73* 4.70 - 5.50 M/uL Final    HGB 02/05/2019 16.9* 13.0 - 16.0 g/dL Final    HCT 02/05/2019 48.9* 36.0 - 48.0 % Final    MCV 02/05/2019 85.3  74.0 - 97.0 FL Final    MCH 02/05/2019 29.5  24.0 - 34.0 PG Final    MCHC 02/05/2019 34.6  31.0 - 37.0 g/dL Final    RDW 02/05/2019 13.3  11.6 - 14.5 % Final    PLATELET 84/07/1853 732  135 - 420 K/uL Final    MPV 02/05/2019 11.4  9.2 - 11.8 FL Final    NEUTROPHILS 02/05/2019 58  40 - 73 % Final    LYMPHOCYTES 02/05/2019 32  21 - 52 % Final    MONOCYTES 02/05/2019 6  3 - 10 % Final    EOSINOPHILS 02/05/2019 3  0 - 5 % Final    BASOPHILS 02/05/2019 1  0 - 2 % Final    ABS. NEUTROPHILS 02/05/2019 5.1  1.8 - 8.0 K/UL Final    ABS. LYMPHOCYTES 02/05/2019 2.8  0.9 - 3.6 K/UL Final    ABS. MONOCYTES 02/05/2019 0.5  0.05 - 1.2 K/UL Final    ABS. EOSINOPHILS 02/05/2019 0.2  0.0 - 0.4 K/UL Final    ABS. BASOPHILS 02/05/2019 0.1  0.0 - 0.1 K/UL Final    DF 02/05/2019 AUTOMATED    Final    LIPID PROFILE 02/05/2019        Final    Cholesterol, total 02/05/2019 181  <200 MG/DL Final    Triglyceride 02/05/2019 237* <150 MG/DL Final    Comment: The drugs N-acetylcysteine (NAC) and  Metamiszole have been found to cause falsely  low results in this chemical assay. Please  be sure to submit blood samples obtained  BEFORE administration of either of these  drugs to assure correct results.       HDL Cholesterol 02/05/2019 36* 40 - 60 MG/DL Final    LDL, calculated 02/05/2019 97.6  0 - 100 MG/DL Final    VLDL, calculated 02/05/2019 47.4  MG/DL Final    CHOL/HDL Ratio 02/05/2019 5.0  0 - 5.0   Final    Sodium 02/05/2019 142  136 - 145 mmol/L Final    Potassium 02/05/2019 3.5  3.5 - 5.5 mmol/L Final    Chloride 02/05/2019 102  100 - 108 mmol/L Final    CO2 02/05/2019 28  21 - 32 mmol/L Final    Anion gap 02/05/2019 12  3.0 - 18 mmol/L Final    Glucose 02/05/2019 153* 74 - 99 mg/dL Final    BUN 02/05/2019 19* 7.0 - 18 MG/DL Final    Creatinine 02/05/2019 0.96  0.6 - 1.3 MG/DL Final    BUN/Creatinine ratio 02/05/2019 20  12 - 20   Final    GFR est AA 02/05/2019 >60  >60 ml/min/1.73m2 Final    GFR est non-AA 02/05/2019 >60  >60 ml/min/1.73m2 Final    Comment: (NOTE)  Estimated GFR is calculated using the Modification of Diet in Renal   Disease (MDRD) Study equation, reported for both  Americans   (GFRAA) and non- Americans (GFRNA), and normalized to 1.73m2 body surface area. The physician must decide which value applies to   the patient. The MDRD study equation should only be used in   individuals age 25 or older. It has not been validated for the   following: pregnant women, patients with serious comorbid conditions,   or on certain medications, or persons with extremes of body size,   muscle mass, or nutritional status.  Calcium 02/05/2019 9.4  8.5 - 10.1 MG/DL Final    Bilirubin, total 02/05/2019 1.2* 0.2 - 1.0 MG/DL Final    ALT (SGPT) 02/05/2019 51  16 - 61 U/L Final    AST (SGOT) 02/05/2019 25  15 - 37 U/L Final    Alk. phosphatase 02/05/2019 75  45 - 117 U/L Final    Protein, total 02/05/2019 7.9  6.4 - 8.2 g/dL Final    Albumin 02/05/2019 4.6  3.4 - 5.0 g/dL Final    Globulin 02/05/2019 3.3  2.0 - 4.0 g/dL Final    A-G Ratio 02/05/2019 1.4  0.8 - 1.7   Final    Microalbumin,urine random 02/05/2019 197.00* 0 - 3.0 MG/DL Final    Creatinine, urine 02/05/2019 96.00  30 - 125 mg/dL Final    Microalbumin/Creat ratio (mg/g cre* 02/05/2019 2,052* 0 - 30 mg/g Final    Hemoglobin A1c 02/05/2019 6.8* 4.2 - 5.6 % Final    Comment: (NOTE)  HbA1C Interpretive Ranges  <5.7              Normal  5.7 - 6.4         Consider Prediabetes  >6.5              Consider Diabetes      Est. average glucose 02/05/2019 148  mg/dL Final    Comment: (NOTE)  The eAG should be interpreted with patient characteristics in mind   since ethnicity, interindividual differences, red cell lifespan,   variation in rates of glycation, etc. may affect the validity of the   calculation.  Prostate Specific Ag 02/05/2019 2.2  0.0 - 4.0 ng/mL Final       .No results found for any visits on 05/10/19. Assessment / Plan:      ICD-10-CM ICD-9-CM    1. Essential hypertension I10 401.9 amLODIPine (NORVASC) 5 mg tablet   2. Bell palsy G51.0 351.0      Hypertension-patient blood pressure is elevated today. Blood pressure is 160/100.   Patient given amlodipine 5 mg  Bell palsy-symptoms mildly improved. Patient notes she is going to  his valacyclovir prescription and return in 1 week. If no improvement will send patient to neurology      Follow-up and Dispositions    · Return in about 2 weeks (around 5/24/2019), or if symptoms worsen or fail to improve. I asked the patient if he  had any questions and answered his  questions. The patient stated that he understands the treatment plan and agrees with the treatment plan    This document was created with a voice activated dictation system and may contain transcription errors.

## 2019-05-17 ENCOUNTER — OFFICE VISIT (OUTPATIENT)
Dept: FAMILY MEDICINE CLINIC | Facility: CLINIC | Age: 54
End: 2019-05-17

## 2019-05-17 VITALS
RESPIRATION RATE: 16 BRPM | SYSTOLIC BLOOD PRESSURE: 132 MMHG | OXYGEN SATURATION: 95 % | HEART RATE: 87 BPM | WEIGHT: 224.5 LBS | BODY MASS INDEX: 31.43 KG/M2 | HEIGHT: 71 IN | DIASTOLIC BLOOD PRESSURE: 84 MMHG | TEMPERATURE: 98.7 F

## 2019-05-17 DIAGNOSIS — G51.0 BELL'S PALSY: Primary | ICD-10-CM

## 2019-05-17 DIAGNOSIS — I10 ESSENTIAL HYPERTENSION: ICD-10-CM

## 2019-05-17 NOTE — PROGRESS NOTES
Ashtyn Rodriguez is a 49 y.o. male presenting today for Facial Droop (follow up)  . HPI:  Ashtyn Rodriguez presents to the office today for Bell's palsy follow-up care. Patient was originally seen in the practice on April 11, 2019 and was diagnosed with Bell's palsy. Prescription for prednisone and valacyclovir and asked to follow-up in 2 weeks. She also was given a referral to ophthalmology for evaluation. Patient return to the practice on April 10, 2019 after completing the prednisone Dosepak. Patient was concerned because the prescription he received was not the same as discussed on the on April 11, 2019 visit. After further discussion and patient gone home to  his prescription bottle it was discovered that he never received his valacyclovir prescription from [facility]. Rain was called and verified patient prescription was waiting for pickup and he noted he was started prescription today. Patient was negative for any cough, wheezing or dyspnea. He denies any extremity weakness or slurred speech. Review of Systems   Respiratory: Negative for cough. Cardiovascular: Negative for chest pain and palpitations. Neurological: Positive for focal weakness ( Right facial nerves). Negative for tremors, sensory change, speech change and headaches. No Known Allergies    Current Outpatient Medications   Medication Sig Dispense Refill    amLODIPine (NORVASC) 5 mg tablet Take 1 Tab by mouth daily. 30 Tab 1    cyclobenzaprine (FLEXERIL) 5 mg tablet Take 1 Tab by mouth nightly. 30 Tab 0    gabapentin (NEURONTIN) 300 mg capsule Take 1 Cap by mouth two (2) times a day. Indications: Neuropathic Pain 60 Cap 6    glipiZIDE (GLUCOTROL) 10 mg tablet Take 1 Tab by mouth daily (with breakfast).  30 Tab 6    lisinopril-hydroCHLOROthiazide (PRINZIDE, ZESTORETIC) 20-12.5 mg per tablet TAKE ONE TABLET BY MOUTH ONCE DAILY FOR HYPERTENSION 30 Tab 6    meloxicam (MOBIC) 15 mg tablet TAKE 1 TABLET BY MOUTH ONCE DAILY 30 Tab 3    metFORMIN (GLUCOPHAGE) 1,000 mg tablet Take 1 Tab by mouth two (2) times daily (with meals). 60 Tab 11    mometasone (NASONEX) 50 mcg/actuation nasal spray 2 Sprays by Both Nostrils route daily. Indications: inflammation of the nose due to an allergy 2 Container 0    rosuvastatin (CRESTOR) 20 mg tablet Take 1 Tab by mouth nightly. 30 Tab 6    aspirin 81 mg chewable tablet Take 1 Tab by mouth daily. 30 Tab 11    calcium carbonate-vitamin D3 600 mg(1,500mg) -800 unit tab Take  by mouth.  fish oil-dha-epa 1,200-144-216 mg cap Take  by mouth.  multivitamin (ONE A DAY) tablet Take 1 Tab by mouth daily.       predniSONE (DELTASONE) 20 mg tablet Take 3 tablets by mouth x 3 days, then 2 tabs x 3 days then 1 tab x 3 days then 1/2 tab x 6 days 21 Tab 0       Past Medical History:   Diagnosis Date    Arthritis     Cataract, right 2010    Diabetes ([facility] [address]     H/O seasonal allergies     History of vitamin D deficiency 6/2016    Hypercholesterolemia     Hypertension     Hypertriglyceridemia 6/30/2016    Hypokalemia 6/30/2016    Low back pain     Numbness and tingling of foot June 2014    left toes and mid bottom of foot    Peripheral neuropathy     Scrotal mass 6/6/2016    Sleep apnea        Past Surgical History:   Procedure Laterality Date    HX HERNIA REPAIR  8717    umbilical, done at [address] TISS FACE/SCALP SUBQ 2+CM  1/4/16    scalp lesion removal, Dr. Blake Lawson History     Socioeconomic History    Marital status: SINGLE     Spouse name: Not on file    Number of children: 0    Years of education: 6    Highest education level: Not on file   Occupational History    Occupation: unemployed and living with son and daughter-in-law, after incarceration 9/2011 out 4/2014   Social Needs    Financial resource strain: Not on file    Food insecurity:     Worry: Not on file     Inability: Not on file    Transportation needs: Medical: Not on file     Non-medical: Not on file   Tobacco Use    Smoking status: Former Smoker    Smokeless tobacco: Never Used    Tobacco comment: stopped in HS, never heavy and never long term   Substance and Sexual Activity    Alcohol use: No     Alcohol/week: 0.0 oz     Comment: rarely holiday    Drug use: No    Sexual activity: Yes     Comment: no partner, spouse  2014   Lifestyle    Physical activity:     Days per week: Not on file     Minutes per session: Not on file    Stress: Not on file   Relationships    Social connections:     Talks on phone: Not on file     Gets together: Not on file     Attends Restorationist service: Not on file     Active member of club or organization: Not on file     Attends meetings of clubs or organizations: Not on file     Relationship status: Not on file    Intimate partner violence:     Fear of current or ex partner: Not on file     Emotionally abused: Not on file     Physically abused: Not on file     Forced sexual activity: Not on file   Other Topics Concern   [address] Service Yes     Comment: [address], from 7739-2210, other than honorable discharge    Blood Transfusions No    Caffeine Concern No     Comment: decreased his 2-3 super big gulps    Occupational Exposure Yes     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[facility] Hazards No    Sleep Concern No    Stress Concern Yes     Comment: Life in general    Weight Concern No    Special Diet No    Back Care No    Exercise No     Comment: starting to do something but not currently    Bike Helmet No    Seat Belt Yes    Self-Exams No   Social History Narrative    Not on file       Patient does not have an advanced directive on file    Vitals:    19 0940   BP: 132/84   Pulse: 87   Resp: 16   Temp: 98.7 °F (37.1 °C)   TempSrc: Tympanic   SpO2: 95%   Weight: 224 lb 8 oz (101.8 kg)   Height: 5' 11\" (1.803 m)   PainSc:   0 - No pain       Physical Exam   Constitutional: He is oriented to person, place, and time. Cardiovascular: Normal rate, regular rhythm and normal heart sounds. Pulmonary/Chest: Effort normal and breath sounds normal.   Neurological: He is alert and oriented to person, place, and time. A cranial nerve deficit ( Facial nerve-cranial nerve VII) is present. Coordination normal.   Skin: Skin is warm. Vitals reviewed. No visits with results within 3 Month(s) from this visit. Latest known visit with results is:   Hospital Outpatient Visit on 02/05/2019   Component Date Value Ref Range Status    WBC 02/05/2019 8.6  4.6 - 13.2 K/uL Final    RBC 02/05/2019 5.73* 4.70 - 5.50 M/uL Final    HGB 02/05/2019 16.9* 13.0 - 16.0 g/dL Final    HCT 02/05/2019 48.9* 36.0 - 48.0 % Final    MCV 02/05/2019 85.3  74.0 - 97.0 FL Final    MCH 02/05/2019 29.5  24.0 - 34.0 PG Final    MCHC 02/05/2019 34.6  31.0 - 37.0 g/dL Final    RDW 02/05/2019 13.3  11.6 - 14.5 % Final    PLATELET 87/09/7924 560  135 - 420 K/uL Final    MPV 02/05/2019 11.4  9.2 - 11.8 FL Final    NEUTROPHILS 02/05/2019 58  40 - 73 % Final    LYMPHOCYTES 02/05/2019 32  21 - 52 % Final    MONOCYTES 02/05/2019 6  3 - 10 % Final    EOSINOPHILS 02/05/2019 3  0 - 5 % Final    BASOPHILS 02/05/2019 1  0 - 2 % Final    ABS. NEUTROPHILS 02/05/2019 5.1  1.8 - 8.0 K/UL Final    ABS. LYMPHOCYTES 02/05/2019 2.8  0.9 - 3.6 K/UL Final    ABS. MONOCYTES 02/05/2019 0.5  0.05 - 1.2 K/UL Final    ABS. EOSINOPHILS 02/05/2019 0.2  0.0 - 0.4 K/UL Final    ABS. BASOPHILS 02/05/2019 0.1  0.0 - 0.1 K/UL Final    DF 02/05/2019 AUTOMATED    Final    LIPID PROFILE 02/05/2019        Final    Cholesterol, total 02/05/2019 181  <200 MG/DL Final    Triglyceride 02/05/2019 237* <150 MG/DL Final    Comment: The drugs N-acetylcysteine (NAC) and  Metamiszole have been found to cause falsely  low results in this chemical assay.  Please  be sure to submit blood samples obtained  BEFORE administration of either of these  drugs to assure correct results.  HDL Cholesterol 02/05/2019 36* 40 - 60 MG/DL Final    LDL, calculated 02/05/2019 97.6  0 - 100 MG/DL Final    VLDL, calculated 02/05/2019 47.4  MG/DL Final    CHOL/HDL Ratio 02/05/2019 5.0  0 - 5.0   Final    Sodium 02/05/2019 142  136 - 145 mmol/L Final    Potassium 02/05/2019 3.5  3.5 - 5.5 mmol/L Final    Chloride 02/05/2019 102  100 - 108 mmol/L Final    CO2 02/05/2019 28  21 - 32 mmol/L Final    Anion gap 02/05/2019 12  3.0 - 18 mmol/L Final    Glucose 02/05/2019 153* 74 - 99 mg/dL Final    BUN 02/05/2019 19* 7.0 - 18 MG/DL Final    Creatinine 02/05/2019 0.96  0.6 - 1.3 MG/DL Final    BUN/Creatinine ratio 02/05/2019 20  12 - 20   Final    GFR est AA 02/05/2019 >60  >60 ml/min/1.73m2 Final    GFR est non-AA 02/05/2019 >60  >60 ml/min/1.73m2 Final    Comment: (NOTE)  Estimated GFR is calculated using the Modification of Diet in Renal   Disease (MDRD) Study equation, reported for both  Americans   (GFRAA) and non- Americans (GFRNA), and normalized to 1.73m2   body surface area. The physician must decide which value applies to   the patient. The MDRD study equation should only be used in   individuals age 25 or older. It has not been validated for the   following: pregnant women, patients with serious comorbid conditions,   or on certain medications, or persons with extremes of body size,   muscle mass, or nutritional status.  Calcium 02/05/2019 9.4  8.5 - 10.1 MG/DL Final    Bilirubin, total 02/05/2019 1.2* 0.2 - 1.0 MG/DL Final    ALT (SGPT) 02/05/2019 51  16 - 61 U/L Final    AST (SGOT) 02/05/2019 25  15 - 37 U/L Final    Alk.  phosphatase 02/05/2019 75  45 - 117 U/L Final    Protein, total 02/05/2019 7.9  6.4 - 8.2 g/dL Final    Albumin 02/05/2019 4.6  3.4 - 5.0 g/dL Final    Globulin 02/05/2019 3.3  2.0 - 4.0 g/dL Final    A-G Ratio 02/05/2019 1.4  0.8 - 1.7   Final    Microalbumin,urine random 02/05/2019 197.00* 0 - 3.0 MG/DL Final    Creatinine, urine 02/05/2019 96.00  30 - 125 mg/dL Final    Microalbumin/Creat ratio (mg/g cre* 02/05/2019 2,052* 0 - 30 mg/g Final    Hemoglobin A1c 02/05/2019 6.8* 4.2 - 5.6 % Final    Comment: (NOTE)  HbA1C Interpretive Ranges  <5.7              Normal  5.7 - 6.4         Consider Prediabetes  >6.5              Consider Diabetes      Est. average glucose 02/05/2019 148  mg/dL Final    Comment: (NOTE)  The eAG should be interpreted with patient characteristics in mind   since ethnicity, interindividual differences, red cell lifespan,   variation in rates of glycation, etc. may affect the validity of the   calculation.  Prostate Specific Ag 02/05/2019 2.2  0.0 - 4.0 ng/mL Final       .No results found for any visits on 05/17/19. Assessment / Plan:      ICD-10-CM ICD-9-CM    1. Bell's palsy G51.0 351.0 REFERRAL TO NEUROLOGY   2. Essential hypertension I10 401.9    Patient reports he has a follow-up appointment with ophthalmology today  Minimum improvement in facial asymmetry-referral to neurology for evaluation  Continue valacyclovir  Hypertension-blood pressure controlled. No changes to current treatment plan. Patient is tolerating amlodipine 5 mg last office visit, 5/10/2019  Follow-up in 3 weeks    Follow-up and Dispositions    · Return in about 3 weeks (around 6/7/2019). I asked the patient if he  had any questions and answered his  questions. The patient stated that he understands the treatment plan and agrees with the treatment plan    This document was created with a voice activated dictation system and may contain transcription errors. Discussed the patient's BMI with him. The BMI follow up plan is as follows:     dietary management education, guidance, and counseling  encourage exercise  monitor weight  prescribed dietary intake    An After Visit Summary was printed and given to the patient.

## 2019-05-17 NOTE — PATIENT INSTRUCTIONS
Body Mass Index: Care Instructions  Your Care Instructions    Body mass index (BMI) can help you see if your weight is raising your risk for health problems. It uses a formula to compare how much you weigh with how tall you are. · A BMI lower than 18.5 is considered underweight. · A BMI between 18.5 and 24.9 is considered healthy. · A BMI between 25 and 29.9 is considered overweight. A BMI of 30 or higher is considered obese. If your BMI is in the normal range, it means that you have a lower risk for weight-related health problems. If your BMI is in the overweight or obese range, you may be at increased risk for weight-related health problems, such as high blood pressure, heart disease, stroke, arthritis or joint pain, and diabetes. If your BMI is in the underweight range, you may be at increased risk for health problems such as fatigue, lower protection (immunity) against illness, muscle loss, bone loss, hair loss, and hormone problems. BMI is just one measure of your risk for weight-related health problems. You may be at higher risk for health problems if you are not active, you eat an unhealthy diet, or you drink too much alcohol or use tobacco products. Follow-up care is a key part of your treatment and safety. Be sure to make and go to all appointments, and call your doctor if you are having problems. It's also a good idea to know your test results and keep a list of the medicines you take. How can you care for yourself at home? · Practice healthy eating habits. This includes eating plenty of fruits, vegetables, whole grains, lean protein, and low-fat dairy. · If your doctor recommends it, get more exercise. Walking is a good choice. Bit by bit, increase the amount you walk every day. Try for at least 30 minutes on most days of the week. · Do not smoke. Smoking can increase your risk for health problems. If you need help quitting, talk to your doctor about stop-smoking programs and medicines. These can increase your chances of quitting for good. · Limit alcohol to 2 drinks a day for men and 1 drink a day for women. Too much alcohol can cause health problems. If you have a BMI higher than 25  · Your doctor may do other tests to check your risk for weight-related health problems. This may include measuring the distance around your waist. A waist measurement of more than 40 inches in men or 35 inches in women can increase the risk of weight-related health problems. · Talk with your doctor about steps you can take to stay healthy or improve your health. You may need to make lifestyle changes to lose weight and stay healthy, such as changing your diet and getting regular exercise. If you have a BMI lower than 18.5  · Your doctor may do other tests to check your risk for health problems. · Talk with your doctor about steps you can take to stay healthy or improve your health. You may need to make lifestyle changes to gain or maintain weight and stay healthy, such as getting more healthy foods in your diet and doing exercises to build muscle. Where can you learn more? Go to http://todd-brayden.info/. Enter S176 in the search box to learn more about \"Body Mass Index: Care Instructions. \"  Current as of: October 13, 2016  Content Version: 11.4  © 3505-7318 [employer]. Care instructions adapted under license by [employer] (which disclaims liability or warranty for this information). If you have questions about a medical condition or this instruction, always ask your healthcare professional. [address] any warranty or liability for your use of this information.

## 2019-06-04 DIAGNOSIS — G25.81 RESTLESS LEG: ICD-10-CM

## 2019-06-05 RX ORDER — CYCLOBENZAPRINE HCL 5 MG
TABLET ORAL
Qty: 30 TAB | Refills: 0 | Status: SHIPPED | OUTPATIENT
Start: 2019-06-05 | End: 2019-07-08 | Stop reason: SDUPTHER

## 2019-06-26 ENCOUNTER — OFFICE VISIT (OUTPATIENT)
Dept: NEUROLOGY | Age: 54
End: 2019-06-26

## 2019-06-26 VITALS
TEMPERATURE: 98 F | SYSTOLIC BLOOD PRESSURE: 120 MMHG | HEIGHT: 71 IN | WEIGHT: 219 LBS | DIASTOLIC BLOOD PRESSURE: 70 MMHG | RESPIRATION RATE: 16 BRPM | OXYGEN SATURATION: 98 % | BODY MASS INDEX: 30.66 KG/M2 | HEART RATE: 70 BPM

## 2019-06-26 DIAGNOSIS — E66.01 MORBID OBESITY (HCC): ICD-10-CM

## 2019-06-26 DIAGNOSIS — G47.33 OSA (OBSTRUCTIVE SLEEP APNEA): Primary | ICD-10-CM

## 2019-06-26 DIAGNOSIS — G51.0 BELL'S PALSY: ICD-10-CM

## 2019-06-26 DIAGNOSIS — I10 ESSENTIAL HYPERTENSION: ICD-10-CM

## 2019-06-26 NOTE — PROGRESS NOTES
ROOM # 2    Ginger Kendall presents today for   Chief Complaint   Patient presents with    Follow-up    Sleep Apnea         Visit Vitals  /70 (BP 1 Location: Left arm, BP Patient Position: Sitting)   Pulse 70   Temp 98 °F (36.7 °C) (Oral)   Resp 16   Ht 5' 11\" (1.803 m)   Wt 219 lb (99.3 kg)   SpO2 98%   BMI 30.54 kg/m²         Advance Directive:  1. Do you have an advance directive in place? Patient Reply: No    2. If not, would you like material regarding how to put one in place? Patient Reply: No    Coordination of Care:  1. Have you been to the ER, urgent care clinic since your last visit? Hospitalized since your last visit?  No

## 2019-06-26 NOTE — LETTER
6/26/19 Patient: Lenin Blank YOB: 1965 Date of Visit: 6/26/2019 Ren Birch NP 
[address] [place] [address] VIA In Basket Dear Ren Birch NP, Thank you for referring Mr. Álvaro Jain to [facility] for evaluation. My notes for this consultation are attached. If you have questions, please do not hesitate to call me. I look forward to following your patient along with you.  
 
 
Sincerely, 
 
Minerva Key MD

## 2019-06-26 NOTE — PROGRESS NOTES
2019 9:47 AM    SSN: [id number]    Subjective:   13-year-old male coming for follow-up of history of obstructive sleep apnea. On his last visit in March we reviewed a sleep study which in November had shown an AHI of 44 with desaturations down to 77% consistent with severe obstructive sleep apnea, after which she was titrated to CPAP at 15 cm of H2O. He was doing very well with excellent compliance last time here, we did address room for improvement of CL, which was still within acceptable limits. His download through  showed 53/90 days of use with aver use of [id number] with AHI of 1.2 median leak at 10.8, max at 80.9. He is struggling since he presented circa April 10 with right sided weakness, woke up with it, unable to close eyes, raise eyebrows. Denies taste changes. Sounds have been louder on the right. Symptoms have improved a bit since onset. He is diabetic. Did get prednisone within 72 hrs, no valacyclovir as pharmacy did not fill it.        Social History     Socioeconomic History    Marital status: SINGLE     Spouse name: Not on file    Number of children: 0    Years of education: 6    Highest education level: Not on file   Occupational History    Occupation: unemployed and living with son and daughter-in-law, after incarceration 2011 out 2014   Social Needs    Financial resource strain: Not on file    Food insecurity:     Worry: Not on file     Inability: Not on file    Transportation needs:     Medical: Not on file     Non-medical: Not on file   Tobacco Use    Smoking status: Former Smoker    Smokeless tobacco: Never Used    Tobacco comment: stopped in HS, never heavy and never long term   Substance and Sexual Activity    Alcohol use: No     Alcohol/week: 0.0 oz     Comment: rarely holiday    Drug use: No    Sexual activity: Yes     Comment: no partner, spouse  2014   Lifestyle    Physical activity:     Days per week: Not on file Minutes per session: Not on file    Stress: Not on file   Relationships    Social connections:     Talks on phone: Not on file     Gets together: Not on file     Attends [employer] service: Not on file     Active member of club or organization: Not on file     Attends meetings of clubs or organizations: Not on file     Relationship status: Not on file    Intimate partner violence:     Fear of current or ex partner: Not on file     Emotionally abused: Not on file     Physically abused: Not on file     Forced sexual activity: Not on file   Other Topics Concern   [employer] Yes     Comment: [employer], from [phone], other than honorable discharge    Blood Transfusions No    Caffeine Concern No     Comment: decreased his 2-3 super big gulps    Occupational Exposure Yes     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[employer] Haz[employer] No    Sleep Concern No    Stress Concern Yes     Comment: Life in general    Weight Concern No    Special Diet No    Back Care No    Exercise No     Comment: starting to do something but not currently    Bike Helmet No    Seat Belt Yes    Self-Exams No   Social History Narrative    Not on file       Family History   Problem Relation Age of Onset    No Known Problems Mother     No Known Problems Sister        Current Outpatient Medications   Medication Sig Dispense Refill    cyclobenzaprine (FLEXERIL) 5 mg tablet TAKE 1 TABLET BY MOUTH NIGHTLY 30 Tab 0    amLODIPine (NORVASC) 5 mg tablet Take 1 Tab by mouth daily. 30 Tab 1    gabapentin (NEURONTIN) 300 mg capsule Take 1 Cap by mouth two (2) times a day. Indications: Neuropathic Pain 60 Cap 6    metFORMIN (GLUCOPHAGE) 1,000 mg tablet Take 1 Tab by mouth two (2) times daily (with meals). 60 Tab 11    mometasone (NASONEX) 50 mcg/actuation nasal spray 2 Sprays by Both Nostrils route daily.  Indications: inflammation of the nose due to an allergy 2 Container 0    rosuvastatin (CRESTOR) 20 mg tablet Take 1 Tab by mouth nightly. 30 Tab 6    aspirin 81 mg chewable tablet Take 1 Tab by mouth daily. 30 Tab 11    calcium carbonate-vitamin D3 600 mg(1,500mg) -800 unit tab Take  by mouth.  fish oil-dha-epa 1,200-144-216 mg cap Take  by mouth.  multivitamin (ONE A DAY) tablet Take 1 Tab by mouth daily.  glipiZIDE (GLUCOTROL) 10 mg tablet Take 1 Tab by mouth daily (with breakfast). 30 Tab 6    lisinopril-hydroCHLOROthiazide (PRINZIDE, ZESTORETIC) 20-12.5 mg per tablet TAKE ONE TABLET BY MOUTH ONCE DAILY FOR HYPERTENSION 30 Tab 6    meloxicam (MOBIC) 15 mg tablet TAKE 1 TABLET BY MOUTH ONCE DAILY 30 Tab 3    predniSONE (DELTASONE) 20 mg tablet Take 3 tablets by mouth x 3 days, then 2 tabs x 3 days then 1 tab x 3 days then 1/2 tab x 6 days 21 Tab 0       Past Medical History:   Diagnosis Date    Arthritis     Cataract, right 2010    Diabetes ([facility] [address]     H/O seasonal allergies     History of vitamin D deficiency 6/2016    Hypercholesterolemia     Hypertension     Hypertriglyceridemia 6/30/2016    Hypokalemia 6/30/2016    Low back pain     Numbness and tingling of foot June 2014    left toes and mid bottom of foot    Peripheral neuropathy     Scrotal mass 6/6/2016    Sleep apnea        Past Surgical History:   Procedure Laterality Date    HX HERNIA REPAIR  0124    umbilical, done at [address] TISS FACE/SCALP SUBQ 2+CM  1/4/16    scalp lesion removal, Dr. Gaxiola Else       No Known Allergies    Vital signs:    Visit Vitals  /70 (BP 1 Location: Left arm, BP Patient Position: Sitting)   Pulse 70   Temp 98 °F (36.7 °C) (Oral)   Resp 16   Ht 5' 11\" (1.803 m)   Wt 99.3 kg (219 lb)   SpO2 98%   BMI 30.54 kg/m²       Review of Systems:   GENERAL: Denies fever or fatigue  CARDIAC: No CP or SOB  PULMONARY: No cough of SOB  MUSCULOSKELETAL: No new joint pain  NEURO: SEE HPI      EXAM: Alert, in NAD. Heart is regular.  Oriented x3, EOM's are full, PERRL, right peripheral VII with minimal eye closure and eyebrow muscle strength, hyperacusis AD  Strength and tone are normal. DTR's +2, gait symmetric       Assessment/Plan: EVELIN, has had good compliance until recently, as he is struggling with a FF mask given his recent Bell's palsy. Hyperacusis symptom means lesion was proximal to takeoff of cauda equina. Diabetes may predict slower improvement, but he is already improving. If improvement plateaus by mid July he would need an MRI. Otherwise for his EVELIN will get him a nasal mask. I will see him for his EVELIN in 3 months, sooner for needs related to his Bell's. PLEASE NOTE:   Portions of this document may have been produced using voice recognition software. Unrecognized errors in transcription may be present. This note will not be viewable in [address]

## 2019-07-03 ENCOUNTER — OFFICE VISIT (OUTPATIENT)
Dept: FAMILY MEDICINE CLINIC | Facility: CLINIC | Age: 54
End: 2019-07-03

## 2019-07-03 VITALS
WEIGHT: 216.2 LBS | DIASTOLIC BLOOD PRESSURE: 76 MMHG | TEMPERATURE: 98.8 F | BODY MASS INDEX: 30.27 KG/M2 | HEART RATE: 79 BPM | HEIGHT: 71 IN | RESPIRATION RATE: 12 BRPM | SYSTOLIC BLOOD PRESSURE: 120 MMHG | OXYGEN SATURATION: 96 %

## 2019-07-03 DIAGNOSIS — I10 ESSENTIAL HYPERTENSION: ICD-10-CM

## 2019-07-03 DIAGNOSIS — E11.9 TYPE 2 DIABETES MELLITUS WITHOUT COMPLICATION, UNSPECIFIED WHETHER LONG TERM INSULIN USE (HCC): Primary | ICD-10-CM

## 2019-07-03 DIAGNOSIS — G51.0 BELL PALSY: ICD-10-CM

## 2019-07-03 DIAGNOSIS — E78.1 HYPERTRIGLYCERIDEMIA: ICD-10-CM

## 2019-07-03 LAB — HBA1C MFR BLD HPLC: 6.3 %

## 2019-07-03 NOTE — PROGRESS NOTES
Murray Li is a 49 y.o. male presenting today for Facial Droop; Hypertension; and Skin Problem (Pt noticed some skin tags on back this week)  . HPI:  Murray Li presents to the office today for hypertension , hyperlipidemia and diabetes follow-up care. Patient was diagnosed with [place] Palsy Patient was diagnosed with [place] Palsy in April, 2019 after he  reported he woke up with right-sided facial droop. Patient was treated with antiviral medications and asked to follow-up for reevaluation. Patient last visit was May, 2019 and he had very little improvement and was referred to Neurology. He continued to have very mild improvement in his ability to close his right eye and had no improvement in his lip drooping. He was evaluated by Dr. Lilliana Tapia on June 26, 2019 and has a follow-up appointment if no improvement. He presents today stating he has been compliant with taking his medications daily. His blood pressure is controlled and he is negative for chest pain, palpations, lower extremity edema, polyuria or polydipsia. Review of Systems   Respiratory: Negative for cough. Cardiovascular: Negative for chest pain, palpitations and leg swelling. Gastrointestinal: Negative for nausea and vomiting. Genitourinary: Negative for dysuria. Musculoskeletal: Negative for back pain. Neurological: Positive for weakness (facial weakness). Negative for dizziness, speech change and headaches. Endo/Heme/Allergies: Negative for polydipsia. Psychiatric/Behavioral: Negative for depression. No Known Allergies    Current Outpatient Medications   Medication Sig Dispense Refill    gabapentin (NEURONTIN) 300 mg capsule Take 1 Cap by mouth two (2) times a day. Indications: Neuropathic Pain 60 Cap 6    glipiZIDE (GLUCOTROL) 10 mg tablet Take 1 Tab by mouth daily (with breakfast).  30 Tab 6    lisinopril-hydroCHLOROthiazide (PRINZIDE, ZESTORETIC) 20-12.5 mg per tablet TAKE ONE TABLET BY MOUTH ONCE DAILY FOR HYPERTENSION 30 Tab 6    meloxicam (MOBIC) 15 mg tablet TAKE 1 TABLET BY MOUTH ONCE DAILY 30 Tab 3    metFORMIN (GLUCOPHAGE) 1,000 mg tablet Take 1 Tab by mouth two (2) times daily (with meals). 60 Tab 11    mometasone (NASONEX) 50 mcg/actuation nasal spray 2 Sprays by Both Nostrils route daily. Indications: inflammation of the nose due to an allergy 2 Container 0    rosuvastatin (CRESTOR) 20 mg tablet Take 1 Tab by mouth nightly. 30 Tab 6    calcium carbonate-vitamin D3 600 mg(1,500mg) -800 unit tab Take  by mouth.  multivitamin (ONE A DAY) tablet Take 1 Tab by mouth daily.  amLODIPine (NORVASC) 5 mg tablet TAKE 1 TABLET BY MOUTH ONCE DAILY 30 Tab 1    cyclobenzaprine (FLEXERIL) 5 mg tablet TAKE 1 TABLET BY MOUTH NIGHTLY 30 Tab 0    predniSONE (DELTASONE) 20 mg tablet Take 3 tablets by mouth x 3 days, then 2 tabs x 3 days then 1 tab x 3 days then 1/2 tab x 6 days 21 Tab 0    aspirin 81 mg chewable tablet Take 1 Tab by mouth daily. 30 Tab 11    fish oil-dha-epa 1,200-144-216 mg cap Take  by mouth.          Past Medical History:   Diagnosis Date    Arthritis     Cataract, right 2010    Diabetes ([address]     H/O seasonal allergies     History of vitamin D deficiency 6/2016    Hypercholesterolemia     Hypertension     Hypertriglyceridemia 6/30/2016    Hypokalemia 6/30/2016    Low back pain     Numbness and tingling of foot June 2014    left toes and mid bottom of foot    Peripheral neuropathy     Scrotal mass 6/6/2016    Sleep apnea        Past Surgical History:   Procedure Laterality Date    HX HERNIA REPAIR  5843    umbilical, done at [facility] [address] SOFT TISS FACE/SCALP SUBQ 2+CM  1/4/16    scalp lesion removal, Dr. Aron Harada History     Socioeconomic History    Marital status: SINGLE     Spouse name: Not on file    Number of children: 0    Years of education: 6    Highest education level: Not on file   Occupational History    Occupation: unemployed and living with son and daughter-in-law, after incarceration 2011 out 2014   Social Needs    Financial resource strain: Not on file    Food insecurity:     Worry: Not on file     Inability: Not on file    Transportation needs:     Medical: Not on file     Non-medical: Not on file   Tobacco Use    Smoking status: Former Smoker    Smokeless tobacco: Never Used    Tobacco comment: stopped in HS, never heavy and never long term   Substance and Sexual Activity    Alcohol use: No     Alcohol/week: 0.0 oz     Comment: rarely holiday    Drug use: No    Sexual activity: Yes     Comment: no partner, spouse  2014   Lifestyle    Physical activity:     Days per week: Not on file     Minutes per session: Not on file    Stress: Not on file   Relationships    Social connections:     Talks on phone: Not on file     Gets together: Not on file     Attends [employer] service: Not on file     Active member of club or organization: Not on file     Attends meetings of clubs or organizations: Not on file     Relationship status: Not on file    Intimate partner violence:     Fear of current or ex partner: Not on file     Emotionally abused: Not on file     Physically abused: Not on file     Forced sexual activity: Not on file   Other Topics Concern   [address] Yes     Comment: [email or web address], from 8577-6892, other than honorable discharge    Blood Transfusions No    Caffeine Concern No     Comment: decreased his 2-3 super big gulps    Occupational Exposure Yes     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 [facility] Hazards No    Sleep Concern No    Stress Concern Yes     Comment: Life in general    Weight Concern No    Special Diet No    Back Care No    Exercise No     Comment: starting to do something but not currently    Bike Helmet No    Seat Belt Yes    Self-Exams No   Social History Narrative    Not on file       Patient does not have an advanced directive on file    Vitals:    07/03/19 1517   BP: 120/76   Pulse: 79   Resp: 12   Temp: 98.8 °F (37.1 °C)   TempSrc: Oral   SpO2: 96%   Weight: 216 lb 3.2 oz (98.1 kg)   Height: 5' 11\" (1.803 m)   PainSc:   0 - No pain       Physical Exam   Constitutional: He is oriented to person, place, and time. No distress. Cardiovascular: Normal rate, regular rhythm and normal heart sounds. Pulmonary/Chest: Effort normal and breath sounds normal.   Abdominal: Soft. Neurological: He is alert and oriented to person, place, and time. He displays facial asymmetry. Nursing note and vitals reviewed. Office Visit on 07/03/2019   Component Date Value Ref Range Status    Hemoglobin A1c (POC) 07/03/2019 6.3  % Final       .  Results for orders placed or performed in visit on 07/03/19   AMB POC HEMOGLOBIN A1C   Result Value Ref Range    Hemoglobin A1c (POC) 6.3 %       Assessment / Plan:      ICD-10-CM ICD-9-CM    1. Type 2 diabetes mellitus without complication, unspecified whether long term insulin use (HCC) E11.9 250.00 AMB POC HEMOGLOBIN A1C   2. Essential hypertension I10 401.9    3. Hypertriglyceridemia E78.1 272.1    4. Bell palsy G51.0 351.0        Hgb A1C 6.3  HTN- controlled  Will follow-up with neuro at the scheduled time for [place] Palsy  F/u in 4 months  Follow-up and Dispositions    · Return in about 4 months (around 11/3/2019). I asked the patient if he  had any questions and answered his  questions. The patient stated that he understands the treatment plan and agrees with the treatment plan    This document was created with a voice activated dictation system and may contain transcription errors.

## 2019-07-03 NOTE — PROGRESS NOTES
ROOM # 2    Ida Montiel presents today for   Chief Complaint   Patient presents with    Facial Droop    Hypertension    Skin Problem     Pt noticed some skin tags on back this week       Ida Montiel preferred language for health care discussion is english/other. Is someone accompanying this pt? no    Is the patient using any DME equipment during [address]? no    Depression Screening:  3 most recent PHQ Screens 7/3/2019 3/26/2019 2/20/2019 1/16/2019   PHQ Not Done - - Patient Decline -   Little interest or pleasure in doing things Not at all Not at all Not at all Not at all   Feeling down, depressed, irritable, or hopeless Not at all Not at all Not at all Not at all   Total Score PHQ 2 0 0 0 0       Learning Assessment:  Learning Assessment 1/25/2019 12/22/2015   PRIMARY LEARNER Patient Patient   HIGHEST LEVEL OF EDUCATION - PRIMARY LEARNER  GRADUATED HIGH SCHOOL OR GED -   BARRIERS PRIMARY LEARNER NONE -   [address] CAREGIVER No -   PRIMARY LANGUAGE ENGLISH ENGLISH   LEARNER PREFERENCE PRIMARY LISTENING LISTENING   ANSWERED BY patient patient   RELATIONSHIP SELF SELF       Abuse Screening:  Abuse Screening Questionnaire 1/25/2019   Do you ever feel afraid of your partner? N   Are you in a relationship with someone who physically or mentally threatens you? N   Is it safe for you to go home? Y       Fall Risk  No flowsheet data found. Health Maintenance reviewed and discussed per provider. Yes    Ida Montiel is due for   Health Maintenance Due   Topic Date Due    Pneumococcal 0-64 years (1 of 1 - PPSV23) 01/02/1972    EYE EXAM RETINAL OR DILATED  01/02/1976    DTaP/Tdap/Td series (1 - Tdap) 11/24/2011    Shingrix Vaccine Age 50> (1 of 2) 01/02/2016    FOOT EXAM Q1  12/20/2018         Please order/place referral if appropriate. Advance Directive:  1. Do you have an advance directive in place? Patient Reply: no    2.  If not, would you like material regarding how to put one in place? Patient Reply: no    Coordination of Care:  1. Have you been to the ER, urgent care clinic since your last visit? Hospitalized since your last visit? no    2. Have you seen or consulted any other health care providers outside of the [address] [place] [address] since your last visit? Include any pap smears or colon screening.  no

## 2019-07-08 DIAGNOSIS — G25.81 RESTLESS LEG: ICD-10-CM

## 2019-07-08 DIAGNOSIS — I10 ESSENTIAL HYPERTENSION: ICD-10-CM

## 2019-07-08 RX ORDER — CYCLOBENZAPRINE HCL 5 MG
TABLET ORAL
Qty: 30 TAB | Refills: 0 | Status: SHIPPED | OUTPATIENT
Start: 2019-07-08 | End: 2019-08-11 | Stop reason: SDUPTHER

## 2019-07-08 RX ORDER — AMLODIPINE BESYLATE 5 MG/1
TABLET ORAL
Qty: 30 TAB | Refills: 1 | Status: SHIPPED | OUTPATIENT
Start: 2019-07-08 | End: 2019-09-16 | Stop reason: SDUPTHER

## 2019-07-09 PROBLEM — G51.0 BELL PALSY: Status: ACTIVE | Noted: 2019-07-09

## 2019-08-11 DIAGNOSIS — G25.81 RESTLESS LEG: ICD-10-CM

## 2019-08-11 RX ORDER — CYCLOBENZAPRINE HCL 5 MG
TABLET ORAL
Qty: 30 TAB | Refills: 0 | Status: SHIPPED | OUTPATIENT
Start: 2019-08-11 | End: 2019-09-16 | Stop reason: SDUPTHER

## 2019-08-21 ENCOUNTER — OFFICE VISIT (OUTPATIENT)
Dept: NEUROLOGY | Age: 54
End: 2019-08-21

## 2019-08-21 VITALS
OXYGEN SATURATION: 96 % | HEART RATE: 76 BPM | WEIGHT: 218 LBS | SYSTOLIC BLOOD PRESSURE: 142 MMHG | TEMPERATURE: 97.8 F | RESPIRATION RATE: 16 BRPM | BODY MASS INDEX: 30.52 KG/M2 | HEIGHT: 71 IN | DIASTOLIC BLOOD PRESSURE: 84 MMHG

## 2019-08-21 DIAGNOSIS — G51.0 BELL'S PALSY: ICD-10-CM

## 2019-08-21 DIAGNOSIS — I10 ESSENTIAL HYPERTENSION: ICD-10-CM

## 2019-08-21 DIAGNOSIS — E11.42 DIABETIC POLYNEUROPATHY ASSOCIATED WITH TYPE 2 DIABETES MELLITUS (HCC): ICD-10-CM

## 2019-08-21 DIAGNOSIS — G47.33 OSA (OBSTRUCTIVE SLEEP APNEA): Primary | ICD-10-CM

## 2019-08-21 NOTE — PROGRESS NOTES
8/21/2019 9:47 AM    SSN: [id number]    Subjective:   50-year-old male coming for follow-up of history of obstructive sleep apnea. His last visit was on June 26. He was recovering from a right-sided facial paralysis which occurred on April 10. He has continued to have some improvement. He is hearing sensitivity has resolved, he is closing his eyes a little better, but he certainly has not fully recovered and still has significant weakness. This has prevented him from returning to his CPAP machine. He had a full facemask and the medical equipment company could not provide him with a nasal option until about next week, despite the fact that he had a full face mask in an open the box he was willing to trade before opening. We reviewed his sleep study from earlier this year which showed an AHI of 44 with desaturations down to 77% consistent with severe obstructive sleep apnea, after which she was titrated to CPAP at 15 cm of H2O. He was doing very well with excellent compliance onto the mask issue with the facial palsy surfaced, with a download through 6/20 showed 53/90 days of use with aver use of [id number] with AHI of 1.2 median leak at 10.8, max at 80.9. Social History     Socioeconomic History    Marital status: SINGLE     Spouse name: Not on file    Number of children: 0    Years of education: 6    Highest education level: Not on file   Occupational History    Occupation: unemployed and living with son and daughter-in-law, after incarceration 9/2011 out 4/2014   Social Needs    Financial resource strain: Not on file    Food insecurity:     Worry: Not on file     Inability: Not on file    Transportation needs:     Medical: Not on file     Non-medical: Not on file   Tobacco Use    Smoking status: Former Smoker    Smokeless tobacco: Never Used    Tobacco comment: stopped in HS, never heavy and never long term   Substance and Sexual Activity    Alcohol use:  No Alcohol/week: 0.0 standard drinks     Comment: rarely holiday    Drug use: No    Sexual activity: Yes     Comment: no partner, spouse  2014   Lifestyle    Physical activity:     Days per week: Not on file     Minutes per session: Not on file    Stress: Not on file   Relationships    Social connections:     Talks on phone: Not on file     Gets together: Not on file     Attends Orthodox service: Not on file     Active member of club or organization: Not on file     Attends meetings of clubs or organizations: Not on file     Relationship status: Not on file    Intimate partner violence:     Fear of current or ex partner: Not on file     Emotionally abused: Not on file     Physically abused: Not on file     Forced sexual activity: Not on file   Other Topics Concern     Service Yes     Comment: [address], from 8986-9404, other than honorable discharge    Blood Transfusions No    Caffeine Concern No     Comment: decreased his 2-3 super big gulps    Occupational Exposure Yes     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[facility] Hazards No    Sleep Concern No    Stress Concern Yes     Comment: Life in general    Weight Concern No    Special Diet No    Back Care No    Exercise No     Comment: starting to do something but not currently    Bike Helmet No    Seat Belt Yes    Self-Exams No   Social History Narrative    Not on file       Family History   Problem Relation Age of Onset    No Known Problems Mother     No Known Problems Sister        Current Outpatient Medications   Medication Sig Dispense Refill    cyclobenzaprine (FLEXERIL) 5 mg tablet TAKE 1 TABLET BY MOUTH NIGHTLY 30 Tab 0    amLODIPine (NORVASC) 5 mg tablet TAKE 1 TABLET BY MOUTH ONCE DAILY 30 Tab 1    gabapentin (NEURONTIN) 300 mg capsule Take 1 Cap by mouth two (2) times a day. Indications: Neuropathic Pain 60 Cap 6    glipiZIDE (GLUCOTROL) 10 mg tablet Take 1 Tab by mouth daily (with breakfast).  30 Tab 6    lisinopril-hydroCHLOROthiazide (PRINZIDE, ZESTORETIC) 20-12.5 mg per tablet TAKE ONE TABLET BY MOUTH ONCE DAILY FOR HYPERTENSION 30 Tab 6    meloxicam (MOBIC) 15 mg tablet TAKE 1 TABLET BY MOUTH ONCE DAILY 30 Tab 3    metFORMIN (GLUCOPHAGE) 1,000 mg tablet Take 1 Tab by mouth two (2) times daily (with meals). 60 Tab 11    mometasone (NASONEX) 50 mcg/actuation nasal spray 2 Sprays by Both Nostrils route daily. Indications: inflammation of the nose due to an allergy 2 Container 0    calcium carbonate-vitamin D3 600 mg(1,500mg) -800 unit tab Take  by mouth.  rosuvastatin (CRESTOR) 20 mg tablet Take 1 Tab by mouth nightly. 30 Tab 6    predniSONE (DELTASONE) 20 mg tablet Take 3 tablets by mouth x 3 days, then 2 tabs x 3 days then 1 tab x 3 days then 1/2 tab x 6 days 21 Tab 0    aspirin 81 mg chewable tablet Take 1 Tab by mouth daily. 30 Tab 11    fish oil-dha-epa 1,200-144-216 mg cap Take  by mouth.  multivitamin (ONE A DAY) tablet Take 1 Tab by mouth daily.          Past Medical History:   Diagnosis Date    Arthritis     Cataract, right 2010    Diabetes ([address]     H/O seasonal allergies     History of vitamin D deficiency 6/2016    Hypercholesterolemia     Hypertension     Hypertriglyceridemia 6/30/2016    Hypokalemia 6/30/2016    Low back pain     Numbness and tingling of foot June 2014    left toes and mid bottom of foot    Peripheral neuropathy     Scrotal mass 6/6/2016    Sleep apnea        Past Surgical History:   Procedure Laterality Date    HX HERNIA REPAIR  1865    umbilical, done at [address] TISS FACE/SCALP SUBQ 2+CM  1/4/16    scalp lesion removal, Dr. Suha Weaver       No Known Allergies    Vital signs:    Visit Vitals  /84 (BP 1 Location: Left arm, BP Patient Position: Sitting)   Pulse 76   Temp 97.8 °F (36.6 °C) (Oral)   Resp 16   Ht 5' 11\" (1.803 m)   Wt 98.9 kg (218 lb)   SpO2 96%   BMI 30.40 kg/m²       Review of Systems:   GENERAL: Denies fever or fatigue  CARDIAC: No CP or SOB  PULMONARY: No cough of SOB  MUSCULOSKELETAL: No new joint pain  NEURO: SEE HPI      EXAM: Alert, in NAD. Heart is regular. Oriented x3, EOM's are full, PERRL, right peripheral VII with minimal eye closure and eyebrow muscle strength, hyperacusis AD  Strength and tone are normal. DTR's +2, gait symmetric       Assessment/Plan: EVELIN, has had good compliance until recently when he started to have problems with a full facemask because of a recovering right facial palsy. I think it is unfortunate that the medical equipment was not able to provide him with a nasal option even after he offered to return an open a full facemask to them. This is certainly compromised the treatment of his very severe obstructive sleep apnea because of financial concerns. Fortunately he will be able to get a nasal mask in a few days and will start again. I advised him that since he has not plateaued and he has diabetes that he may take 6 to 12 months for recover and that there is a chance that he will not fully recover. I will see him in 2 months with another CPAP download. PLEASE NOTE:   Portions of this document may have been produced using voice recognition software. Unrecognized errors in transcription may be present. This note will not be viewable in [address]

## 2019-08-21 NOTE — PROGRESS NOTES
Nelia Boothe presents today for   Chief Complaint   Patient presents with    Follow-up    Facial Droop       Is someone accompanying this pt? No    Is the patient using any DME equipment during OV? No    Depression Screening:  3 most recent PHQ Screens 7/3/2019   PHQ Not Done -   Little interest or pleasure in doing things Not at all   Feeling down, depressed, irritable, or hopeless Not at all   Total Score PHQ 2 0       Learning Assessment:  Learning Assessment 1/25/2019   PRIMARY LEARNER Patient   HIGHEST LEVEL OF EDUCATION - PRIMARY LEARNER  GRADUATED HIGH SCHOOL OR GED   BARRIERS PRIMARY LEARNER NONE   CO-LEARNER CAREGIVER No   PRIMARY LANGUAGE ENGLISH   LEARNER PREFERENCE PRIMARY LISTENING   ANSWERED BY patient   RELATIONSHIP SELF       Abuse Screening:  Abuse Screening Questionnaire 1/25/2019   Do you ever feel afraid of your partner? N   Are you in a relationship with someone who physically or mentally threatens you? N   Is it safe for you to go home? Y         Coordination of Care:  1. Have you been to the ER, urgent care clinic since your last visit? Hospitalized since your last visit? No    2. Have you seen or consulted any other health care providers outside of the [address] [place] [address] since your last visit? Include any pap smears or colon screening.  No

## 2019-09-02 DIAGNOSIS — M17.0 PRIMARY OSTEOARTHRITIS OF BOTH KNEES: ICD-10-CM

## 2019-09-03 RX ORDER — MELOXICAM 15 MG/1
TABLET ORAL
Qty: 30 TAB | Refills: 3 | Status: SHIPPED | OUTPATIENT
Start: 2019-09-03 | End: 2020-01-29

## 2019-09-16 DIAGNOSIS — G25.81 RESTLESS LEG: ICD-10-CM

## 2019-09-16 DIAGNOSIS — I10 ESSENTIAL HYPERTENSION: ICD-10-CM

## 2019-09-17 RX ORDER — CYCLOBENZAPRINE HCL 5 MG
TABLET ORAL
Qty: 30 TAB | Refills: 0 | Status: SHIPPED | OUTPATIENT
Start: 2019-09-17 | End: 2019-10-21 | Stop reason: SDUPTHER

## 2019-09-17 RX ORDER — AMLODIPINE BESYLATE 5 MG/1
TABLET ORAL
Qty: 30 TAB | Refills: 1 | Status: SHIPPED | OUTPATIENT
Start: 2019-09-17 | End: 2019-11-19 | Stop reason: SDUPTHER

## 2019-09-26 ENCOUNTER — OFFICE VISIT (OUTPATIENT)
Dept: NEUROLOGY | Age: 54
End: 2019-09-26

## 2019-09-26 VITALS
HEART RATE: 72 BPM | HEIGHT: 72 IN | SYSTOLIC BLOOD PRESSURE: 130 MMHG | WEIGHT: 215 LBS | OXYGEN SATURATION: 98 % | RESPIRATION RATE: 22 BRPM | TEMPERATURE: 98 F | BODY MASS INDEX: 29.12 KG/M2 | DIASTOLIC BLOOD PRESSURE: 88 MMHG

## 2019-09-26 DIAGNOSIS — G51.0 BELL'S PALSY: ICD-10-CM

## 2019-09-26 DIAGNOSIS — G47.33 OSA (OBSTRUCTIVE SLEEP APNEA): Primary | ICD-10-CM

## 2019-09-26 NOTE — PROGRESS NOTES
Caryn Ng is a 49 y.o. male in today for follow-up on sleep apnea. Learning assessment previously completed 1/25/2019; primary language is [facility]. 1. Have you been to the ER, urgent care clinic since your last visit? Hospitalized since your last visit? No    2. Have you seen or consulted any other health care providers outside of the [address] [place] [address] since your last visit? Include any pap smears or colon screening.  No

## 2019-09-26 NOTE — PROGRESS NOTES
9/26/2019 9:47 AM    SSN: [id number]    Subjective:   71-year-old male last here on August 21 coming for follow-up of history of obstructive sleep apnea. He also came for follow-up of right sided facial paralysis which occurred on April 10, I felt the protracted and slow improvement was related to the fact that he is diabetic. Last time we also reviewed the fact he had problems changing to a nasal mask as he was having problems getting used to the full facemask because of this facial paralysis and a  sleep study from earlier this year which showed an AHI of 44 with desaturations down to 77% consistent with severe obstructive sleep apnea, after which she was titrated to CPAP at 15 cm of H2O. He was doing very well with excellent compliance onto the mask issue with the facial palsy surfaced, with a download through 6/20 showed 53/90 days of use with aver use of [id number] with AHI of 1.2 median leak at 10.8, max at 80.9. On his last appointment he was a few days from qualifying for a nasal mask based on insurance. However he in contact with the medical equipment company and the last time he spoke with them 2 weeks ago, when they called him because of concerns about his compliance, he explained the situation, they said that they did not have an order (an order was sent back in June), and that they were going to check on it and get back to him. However it has been 2 weeks and they have not made contact according to the patient. Therefore he continues without a solution for his mask issue and problems with compliance.     Social History     Socioeconomic History    Marital status: SINGLE     Spouse name: Not on file    Number of children: 0    Years of education: 6    Highest education level: Not on file   Occupational History    Occupation: unemployed and living with son and daughter-in-law, after incarceration 9/2011 out 4/2014   Social Needs    Financial resource strain: Not on file    Food insecurity:     Worry: Not on file     Inability: Not on file    Transportation needs:     Medical: Not on file     Non-medical: Not on file   Tobacco Use    Smoking status: Former Smoker    Smokeless tobacco: Never Used    Tobacco comment: stopped in HS, never heavy and never long term   Substance and Sexual Activity    Alcohol use: No     Alcohol/week: 0.0 standard drinks     Comment: rarely holiday    Drug use: No    Sexual activity: Yes     Comment: no partner, spouse  2014   Lifestyle    Physical activity:     Days per week: Not on file     Minutes per session: Not on file    Stress: Not on file   Relationships    Social connections:     Talks on phone: Not on file     Gets together: Not on file     Attends [employer] service: Not on file     Active member of club or organization: Not on file     Attends meetings of clubs or organizations: Not on file     Relationship status: Not on file    Intimate partner violence:     Fear of current or ex partner: Not on file     Emotionally abused: Not on file     Physically abused: Not on file     Forced sexual activity: Not on file   Other Topics Concern   [address] Service Yes     Comment: [address] [facility], from [phone], other than honorable discharge    Blood Transfusions No    Caffeine Concern No     Comment: decreased his 2-3 super big gulps    Occupational Exposure Yes     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[employer] Hazards No    Sleep Concern No    Stress Concern Yes     Comment: Life in general    Weight Concern No    Special Diet No    Back Care No    Exercise No     Comment: starting to do something but not currently    Bike Helmet No    Seat Belt Yes    Self-Exams No   Social History Narrative    Not on file       Family History   Problem Relation Age of Onset    No Known Problems Mother     No Known Problems Sister        Current Outpatient Medications   Medication Sig Dispense Refill    cyclobenzaprine (FLEXERIL) 5 mg tablet TAKE 1 TABLET BY MOUTH NIGHTLY 30 Tab 0    amLODIPine (NORVASC) 5 mg tablet TAKE 1 TABLET BY MOUTH ONCE DAILY 30 Tab 1    meloxicam (MOBIC) 15 mg tablet TAKE 1 TABLET BY MOUTH ONCE DAILY 30 Tab 3    gabapentin (NEURONTIN) 300 mg capsule Take 1 Cap by mouth two (2) times a day. Indications: Neuropathic Pain 60 Cap 6    glipiZIDE (GLUCOTROL) 10 mg tablet Take 1 Tab by mouth daily (with breakfast). 30 Tab 6    lisinopril-hydroCHLOROthiazide (PRINZIDE, ZESTORETIC) 20-12.5 mg per tablet TAKE ONE TABLET BY MOUTH ONCE DAILY FOR HYPERTENSION 30 Tab 6    metFORMIN (GLUCOPHAGE) 1,000 mg tablet Take 1 Tab by mouth two (2) times daily (with meals). 60 Tab 11    mometasone (NASONEX) 50 mcg/actuation nasal spray 2 Sprays by Both Nostrils route daily. Indications: inflammation of the nose due to an allergy 2 Container 0    rosuvastatin (CRESTOR) 20 mg tablet Take 1 Tab by mouth nightly. 30 Tab 6    predniSONE (DELTASONE) 20 mg tablet Take 3 tablets by mouth x 3 days, then 2 tabs x 3 days then 1 tab x 3 days then 1/2 tab x 6 days 21 Tab 0    aspirin 81 mg chewable tablet Take 1 Tab by mouth daily. 30 Tab 11    calcium carbonate-vitamin D3 600 mg(1,500mg) -800 unit tab Take  by mouth.  fish oil-dha-epa 1,200-144-216 mg cap Take  by mouth.  multivitamin (ONE A DAY) tablet Take 1 Tab by mouth daily.          Past Medical History:   Diagnosis Date    Arthritis     Cataract, right 2010    Diabetes ([address]     H/O seasonal allergies     History of vitamin D deficiency 6/2016    Hypercholesterolemia     Hypertension     Hypertriglyceridemia 6/30/2016    Hypokalemia 6/30/2016    Low back pain     Numbness and tingling of foot June 2014    left toes and mid bottom of foot    Peripheral neuropathy     Scrotal mass 6/6/2016    Sleep apnea        Past Surgical History:   Procedure Laterality Date    HX HERNIA REPAIR  1592    umbilical, done at [facility] [address] SOFT TISS FACE/SCALP SUBQ 2+CM  1/4/16    scalp lesion removal, Dr. Jaziel Bradford       No Known Allergies    Vital signs: There were no vitals taken for this visit. Review of Systems:   GENERAL: Denies fever or fatigue  CARDIAC: No CP or SOB  PULMONARY: No cough of SOB  MUSCULOSKELETAL: No new joint pain  NEURO: SEE HPI      EXAM: Alert, in NAD. Heart is regular. Oriented x3, EOM's are full, PERRL, right peripheral VII with minimal eye closure and eyebrow muscle strength, hyperacusis AD  Strength and tone are normal. DTR's +2, gait symmetric       Assessment/Plan: EVELIN, has had good compliance until  he started to have problems with a full facemask because of a recovering right facial palsy, a problem that I had expected to be sought by now with a switch to a nasal mask. I will write an order to the medical equipment company explaining the situation. I have instructed my staff to make sure that this order is faxed and that we contact the medical equipment company to make sure that they are not missing anything to make this happen without fail this time around. He will resume as soon as this mask situation is addressed. I did  him that it may be that he could continue to have some improvement of his right facial paralysis up to a year, but at this point 5 months out it is likely that he will remain with some residual weakness on the right side of his face. I will see him again in 3 months with a download. PLEASE NOTE:   Portions of this document may have been produced using voice recognition software. Unrecognized errors in transcription may be present. This note will not be viewable in [address]

## 2019-09-27 ENCOUNTER — HOSPITAL ENCOUNTER (OUTPATIENT)
Dept: LAB | Age: 54
Discharge: HOME | End: 2019-09-27
Payer: COMMERCIAL

## 2019-09-27 DIAGNOSIS — E11.9 TYPE 2 DIABETES MELLITUS WITHOUT COMPLICATION, UNSPECIFIED WHETHER LONG TERM INSULIN USE (HCC): ICD-10-CM

## 2019-09-27 DIAGNOSIS — I10 ESSENTIAL HYPERTENSION: ICD-10-CM

## 2019-09-27 LAB
ALBUMIN SERPL-MCNC: 4.3 G/DL (ref 3.4–5)
ALBUMIN/GLOB SERPL: 1.4 {RATIO} (ref 0.8–1.7)
ALP SERPL-CCNC: 62 U/L (ref 45–117)
ALT SERPL-CCNC: 43 U/L (ref 16–61)
ANION GAP SERPL CALC-SCNC: 6 MMOL/L (ref 3–18)
AST SERPL-CCNC: 14 U/L (ref 10–38)
BASOPHILS # BLD: 0.1 K/UL (ref 0–0.1)
BASOPHILS NFR BLD: 1 % (ref 0–2)
BILIRUB SERPL-MCNC: 0.8 MG/DL (ref 0.2–1)
BUN SERPL-MCNC: 18 MG/DL (ref 7–18)
BUN/CREAT SERPL: 19 (ref 12–20)
CALCIUM SERPL-MCNC: 9.2 MG/DL (ref 8.5–10.1)
CHLORIDE SERPL-SCNC: 107 MMOL/L (ref 100–111)
CO2 SERPL-SCNC: 30 MMOL/L (ref 21–32)
CREAT SERPL-MCNC: 0.97 MG/DL (ref 0.6–1.3)
DIFFERENTIAL METHOD BLD: NORMAL
EOSINOPHIL # BLD: 0.3 K/UL (ref 0–0.4)
EOSINOPHIL NFR BLD: 3 % (ref 0–5)
ERYTHROCYTE [DISTWIDTH] IN BLOOD BY AUTOMATED COUNT: 13.1 % (ref 11.6–14.5)
GLOBULIN SER CALC-MCNC: 3.1 G/DL (ref 2–4)
GLUCOSE SERPL-MCNC: 140 MG/DL (ref 74–99)
HBA1C MFR BLD: 6.4 % (ref 4.2–5.6)
HCT VFR BLD AUTO: 45.9 % (ref 36–48)
HGB BLD-MCNC: 15.1 G/DL (ref 13–16)
LYMPHOCYTES # BLD: 2.4 K/UL (ref 0.9–3.6)
LYMPHOCYTES NFR BLD: 30 % (ref 21–52)
MCH RBC QN AUTO: 29.7 PG (ref 24–34)
MCHC RBC AUTO-ENTMCNC: 32.9 G/DL (ref 31–37)
MCV RBC AUTO: 90.4 FL (ref 74–97)
MONOCYTES # BLD: 0.6 K/UL (ref 0.05–1.2)
MONOCYTES NFR BLD: 8 % (ref 3–10)
NEUTS SEG # BLD: 4.6 K/UL (ref 1.8–8)
NEUTS SEG NFR BLD: 58 % (ref 40–73)
PLATELET # BLD AUTO: 230 K/UL (ref 135–420)
PMV BLD AUTO: 10.3 FL (ref 9.2–11.8)
POTASSIUM SERPL-SCNC: 4.5 MMOL/L (ref 3.5–5.5)
PROT SERPL-MCNC: 7.4 G/DL (ref 6.4–8.2)
RBC # BLD AUTO: 5.08 M/UL (ref 4.7–5.5)
SODIUM SERPL-SCNC: 143 MMOL/L (ref 136–145)
WBC # BLD AUTO: 7.9 K/UL (ref 4.6–13.2)

## 2019-09-27 PROCEDURE — 83036 HEMOGLOBIN GLYCOSYLATED A1C: CPT

## 2019-09-27 PROCEDURE — 80053 COMPREHEN METABOLIC PANEL: CPT

## 2019-09-27 PROCEDURE — 85025 COMPLETE CBC W/AUTO DIFF WBC: CPT

## 2019-09-27 PROCEDURE — 36415 COLL VENOUS BLD VENIPUNCTURE: CPT

## 2019-09-27 PROCEDURE — 80061 LIPID PANEL: CPT

## 2019-09-28 LAB
CHOLEST SERPL-MCNC: 105 MG/DL
HDLC SERPL-MCNC: 31 MG/DL (ref 40–60)
HDLC SERPL: 3.4 {RATIO} (ref 0–5)
LDLC SERPL CALC-MCNC: 37.4 MG/DL (ref 0–100)
LIPID PROFILE,FLP: ABNORMAL
TRIGL SERPL-MCNC: 183 MG/DL (ref ?–150)
VLDLC SERPL CALC-MCNC: 36.6 MG/DL

## 2019-10-09 ENCOUNTER — OFFICE VISIT (OUTPATIENT)
Dept: FAMILY MEDICINE CLINIC | Facility: CLINIC | Age: 54
End: 2019-10-09

## 2019-10-09 VITALS — TEMPERATURE: 97.2 F | HEIGHT: 72 IN | BODY MASS INDEX: 29.39 KG/M2 | WEIGHT: 217 LBS

## 2019-10-09 DIAGNOSIS — E11.49 OTHER DIABETIC NEUROLOGICAL COMPLICATION ASSOCIATED WITH TYPE 2 DIABETES MELLITUS (HCC): Primary | ICD-10-CM

## 2019-10-09 DIAGNOSIS — I10 ESSENTIAL HYPERTENSION: ICD-10-CM

## 2019-10-09 DIAGNOSIS — Z23 ENCOUNTER FOR IMMUNIZATION: ICD-10-CM

## 2019-10-09 NOTE — PROGRESS NOTES
Mirian Jin presents today for   Chief Complaint   Patient presents with    Labs     follow-up     Visit Vitals  Temp 97.2 °F (36.2 °C) (Oral)   Ht 6' (1.829 m)   Wt 217 lb (98.4 kg)   BMI 29.43 kg/m²       Is someone accompanying this pt? no    Is the patient using any DME equipment during OV? no    Depression Screening:  3 most recent PHQ Screens 10/9/2019   PHQ Not Done -   Little interest or pleasure in doing things Not at all   Feeling down, depressed, irritable, or hopeless Not at all   Total Score PHQ 2 0       Learning Assessment:  Learning Assessment 1/25/2019   PRIMARY LEARNER Patient   HIGHEST LEVEL OF EDUCATION - PRIMARY LEARNER  GRADUATED HIGH SCHOOL OR GED   BARRIERS PRIMARY LEARNER NONE   CO-LEARNER CAREGIVER No   PRIMARY LANGUAGE ENGLISH   LEARNER PREFERENCE PRIMARY LISTENING   ANSWERED BY patient   RELATIONSHIP SELF       Abuse Screening:  Abuse Screening Questionnaire 1/25/2019   Do you ever feel afraid of your partner? N   Are you in a relationship with someone who physically or mentally threatens you? N   Is it safe for you to go home? Y       Fall Risk  No flowsheet data found. Health Maintenance reviewed and discussed and ordered per Provider. Health Maintenance Due   Topic Date Due    Pneumococcal 0-64 years (1 of 1 - PPSV23) 01/02/1972    EYE EXAM RETINAL OR DILATED  01/02/1976    DTaP/Tdap/Td series (1 - Tdap) 11/24/2011    Shingrix Vaccine Age 50> (1 of 2) 01/02/2016    FOOT EXAM Q1  12/20/2018    Influenza Age 9 to Adult  08/01/2019           Coordination of Care:  1. Have you been to the ER, urgent care clinic since your last visit? Hospitalized since your last visit? no    2. Have you seen or consulted any other health care providers outside of the [address] [place] [address] since your last visit? Include any pap smears or colon screening.  no

## 2019-10-09 NOTE — PROGRESS NOTES
Antonieta Fernandez is a 49 y.o. male presenting today for Labs (follow-up)  . Labs   Pertinent negatives include no chest pain and no headaches.   :  Antonieta Fernandez presents to the office today for hypertension , hyperlipidemia and diabetes follow-up care. Patient was diagnosed witht was diagnosed with Bell's palsy in April 2019 and is being followed by neuro. He has had some improvement in his ability to blink and the facial weakness is improving as well. He presents today with a controlled blood pressure and noting he feels well. He is negative for chest pain, palpations, lower extremity edema, polyuria or polydipsia. He is compliant with taking his medication daily      Review of Systems   Respiratory: Negative for cough. Cardiovascular: Negative for chest pain, palpitations and leg swelling. Gastrointestinal: Negative for nausea and vomiting. Genitourinary: Negative for dysuria. Musculoskeletal: Negative for back pain. Neurological: Positive for weakness (improved facial weakness). Negative for dizziness, speech change and headaches. Endo/Heme/Allergies: Negative for polydipsia. Psychiatric/Behavioral: Negative for depression. No Known Allergies    Current Outpatient Medications   Medication Sig Dispense Refill    cyclobenzaprine (FLEXERIL) 5 mg tablet TAKE 1 TABLET BY MOUTH NIGHTLY 30 Tab 0    amLODIPine (NORVASC) 5 mg tablet TAKE 1 TABLET BY MOUTH ONCE DAILY 30 Tab 1    meloxicam (MOBIC) 15 mg tablet TAKE 1 TABLET BY MOUTH ONCE DAILY 30 Tab 3    gabapentin (NEURONTIN) 300 mg capsule Take 1 Cap by mouth two (2) times a day. Indications: Neuropathic Pain 60 Cap 6    glipiZIDE (GLUCOTROL) 10 mg tablet Take 1 Tab by mouth daily (with breakfast).  30 Tab 6    lisinopril-hydroCHLOROthiazide (PRINZIDE, ZESTORETIC) 20-12.5 mg per tablet TAKE ONE TABLET BY MOUTH ONCE DAILY FOR HYPERTENSION 30 Tab 6    metFORMIN (GLUCOPHAGE) 1,000 mg tablet Take 1 Tab by mouth two (2) times daily (with meals). 60 Tab 11    mometasone (NASONEX) 50 mcg/actuation nasal spray 2 Sprays by Both Nostrils route daily. Indications: inflammation of the nose due to an allergy 2 Container 0    rosuvastatin (CRESTOR) 20 mg tablet Take 1 Tab by mouth nightly. 30 Tab 6    calcium carbonate-vitamin D3 600 mg(1,500mg) -800 unit tab Take  by mouth.  multivitamin (ONE A DAY) tablet Take 1 Tab by mouth daily.  predniSONE (DELTASONE) 20 mg tablet Take 3 tablets by mouth x 3 days, then 2 tabs x 3 days then 1 tab x 3 days then 1/2 tab x 6 days 21 Tab 0    aspirin 81 mg chewable tablet Take 1 Tab by mouth daily. 30 Tab 11    fish oil-dha-epa 1,200-144-216 mg cap Take  by mouth.          Past Medical History:   Diagnosis Date    Arthritis     Cataract, right 2010    Diabetes ([facility] [address]     H/O seasonal allergies     History of vitamin D deficiency 6/2016    Hypercholesterolemia     Hypertension     Hypertriglyceridemia 6/30/2016    Hypokalemia 6/30/2016    Low back pain     Numbness and tingling of foot June 2014    left toes and mid bottom of foot    Peripheral neuropathy     Scrotal mass 6/6/2016    Sleep apnea        Past Surgical History:   Procedure Laterality Date    HX HERNIA REPAIR  4711    umbilical, done at [address] TISS FACE/SCALP SUBQ 2+CM  1/4/16    scalp lesion removal, Dr. Arleen Chaves History     Socioeconomic History    Marital status: SINGLE     Spouse name: Not on file    Number of children: 0    Years of education: 6    Highest education level: Not on file   Occupational History    Occupation: unemployed and living with son and daughter-in-law, after incarceration 9/2011 out 4/2014   Social Needs    Financial resource strain: Not on file    Food insecurity:     Worry: Not on file     Inability: Not on file    Transportation needs:     Medical: Not on file     Non-medical: Not on file   Tobacco Use    Smoking status: Former Smoker    Smokeless tobacco: Never Used    Tobacco comment: stopped in HS, never heavy and never long term   Substance and Sexual Activity    Alcohol use: No     Alcohol/week: 0.0 standard drinks     Comment: rarely holiday    Drug use: No    Sexual activity: Yes     Comment: no partner, spouse  2014   Lifestyle    Physical activity:     Days per week: Not on file     Minutes per session: Not on file    Stress: Not on file   Relationships    Social connections:     Talks on phone: Not on file     Gets together: Not on file     Attends Taoism service: Not on file     Active member of club or organization: Not on file     Attends meetings of clubs or organizations: Not on file     Relationship status: Not on file    Intimate partner violence:     Fear of current or ex partner: Not on file     Emotionally abused: Not on file     Physically abused: Not on file     Forced sexual activity: Not on file   Other Topics Concern   [address] Service Yes     Comment: [address], from 1975[address], other than honorable discharge    Blood Transfusions No    Caffeine Concern No     Comment: decreased his 2-3 super big gulps    Occupational Exposure Yes     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 [facility] Hazards No    Sleep Concern No    Stress Concern Yes     Comment: Life in general    Weight Concern No    Special Diet No    Back Care No    Exercise No     Comment: starting to do something but not currently    Bike Helmet No    Seat Belt Yes    Self-Exams No   Social History Narrative    Not on file       Patient does not have an advanced directive on file    Vitals:    10/09/19 1624   Temp: 97.2 °F (36.2 °C)   TempSrc: Oral   Weight: 217 lb (98.4 kg)   Height: 6' (1.829 m)   PainSc:   0 - No pain       Physical Exam   Constitutional: He is oriented to person, place, and time. No distress. Cardiovascular: Normal rate, regular rhythm and normal heart sounds.    Pulmonary/Chest: Effort normal and breath sounds normal.   Abdominal: Soft. Neurological: He is alert and oriented to person, place, and time. He displays facial asymmetry. Nursing note and vitals reviewed. Hospital Outpatient Visit on 09/27/2019   Component Date Value Ref Range Status    LIPID PROFILE 09/27/2019        Final    Cholesterol, total 09/27/2019 105  <200 MG/DL Final    Triglyceride 09/27/2019 183* <150 MG/DL Final    Comment: The drugs N-acetylcysteine (NAC) and  Metamiszole have been found to cause falsely  low results in this chemical assay. Please  be sure to submit blood samples obtained  BEFORE administration of either of these  drugs to assure correct results.  HDL Cholesterol 09/27/2019 31* 40 - 60 MG/DL Final    LDL, calculated 09/27/2019 37.4  0 - 100 MG/DL Final    VLDL, calculated 09/27/2019 36.6  MG/DL Final    CHOL/HDL Ratio 09/27/2019 3.4  0 - 5.0   Final    WBC 09/27/2019 7.9  4.6 - 13.2 K/uL Final    RBC 09/27/2019 5.08  4.70 - 5.50 M/uL Final    HGB 09/27/2019 15.1  13.0 - 16.0 g/dL Final    HCT 09/27/2019 45.9  36.0 - 48.0 % Final    MCV 09/27/2019 90.4  74.0 - 97.0 FL Final    MCH 09/27/2019 29.7  24.0 - 34.0 PG Final    MCHC 09/27/2019 32.9  31.0 - 37.0 g/dL Final    RDW 09/27/2019 13.1  11.6 - 14.5 % Final    PLATELET 78/35/8337 714  135 - 420 K/uL Final    MPV 09/27/2019 10.3  9.2 - 11.8 FL Final    NEUTROPHILS 09/27/2019 58  40 - 73 % Final    LYMPHOCYTES 09/27/2019 30  21 - 52 % Final    MONOCYTES 09/27/2019 8  3 - 10 % Final    EOSINOPHILS 09/27/2019 3  0 - 5 % Final    BASOPHILS 09/27/2019 1  0 - 2 % Final    ABS. NEUTROPHILS 09/27/2019 4.6  1.8 - 8.0 K/UL Final    ABS. LYMPHOCYTES 09/27/2019 2.4  0.9 - 3.6 K/UL Final    ABS. MONOCYTES 09/27/2019 0.6  0.05 - 1.2 K/UL Final    ABS. EOSINOPHILS 09/27/2019 0.3  0.0 - 0.4 K/UL Final    ABS.  BASOPHILS 09/27/2019 0.1  0.0 - 0.1 K/UL Final    DF 09/27/2019 AUTOMATED    Final    Sodium 09/27/2019 143  136 - 145 mmol/L Final    Potassium 09/27/2019 4.5  3.5 - 5.5 mmol/L Final    Chloride 09/27/2019 107  100 - 111 mmol/L Final    CO2 09/27/2019 30  21 - 32 mmol/L Final    Anion gap 09/27/2019 6  3.0 - 18 mmol/L Final    Glucose 09/27/2019 140* 74 - 99 mg/dL Final    BUN 09/27/2019 18  7.0 - 18 MG/DL Final    Creatinine 09/27/2019 0.97  0.6 - 1.3 MG/DL Final    BUN/Creatinine ratio 09/27/2019 19  12 - 20   Final    GFR est AA 09/27/2019 >60  >60 ml/min/1.73m2 Final    GFR est non-AA 09/27/2019 >60  >60 ml/min/1.73m2 Final    Comment: (NOTE)  Estimated GFR is calculated using the Modification of Diet in Renal   Disease (MDRD) Study equation, reported for both  Americans   (GFRAA) and non- Americans (GFRNA), and normalized to 1.73m2   body surface area. The physician must decide which value applies to   the patient. The MDRD study equation should only be used in   individuals age 25 or older. It has not been validated for the   following: pregnant women, patients with serious comorbid conditions,   or on certain medications, or persons with extremes of body size,   muscle mass, or nutritional status.  Calcium 09/27/2019 9.2  8.5 - 10.1 MG/DL Final    Bilirubin, total 09/27/2019 0.8  0.2 - 1.0 MG/DL Final    ALT (SGPT) 09/27/2019 43  16 - 61 U/L Final    AST (SGOT) 09/27/2019 14  10 - 38 U/L Final    Alk. phosphatase 09/27/2019 62  45 - 117 U/L Final    Protein, total 09/27/2019 7.4  6.4 - 8.2 g/dL Final    Albumin 09/27/2019 4.3  3.4 - 5.0 g/dL Final    Globulin 09/27/2019 3.1  2.0 - 4.0 g/dL Final    A-G Ratio 09/27/2019 1.4  0.8 - 1.7   Final    Hemoglobin A1c 09/27/2019 6.4* 4.2 - 5.6 % Final    Comment: (NOTE)  HbA1C Interpretive Ranges  <5.7              Normal  5.7 - 6.4         Consider Prediabetes  >6.5              Consider Diabetes         . No results found for any visits on 10/09/19. Assessment / Plan:      ICD-10-CM ICD-9-CM    1.  Other diabetic neurological complication associated with type 2 diabetes mellitus (HCC) E11.49 250.60 REFERRAL TO PODIATRY   2. Encounter for immunization Z23 V03.89 INFLUENZA VIRUS VAC QUAD,SPLIT,PRESV FREE SYRINGE IM      CANCELED: PNEUMOCOCCAL POLYSACCHARIDE VACCINE, 23-VALENT, ADULT OR IMMUNOSUPPRESSED PT DOSE,   3. Essential hypertension I10 401.9        Hgb A1C 6.4  HTN- controlled  He has a follow-up appointment with Neuro in 3 months  F/u in 4 months  Follow-up and Dispositions    · Return in about 4 months (around 2/9/2020), or if symptoms worsen or fail to improve. I asked the patient if he  had any questions and answered his  questions. The patient stated that he understands the treatment plan and agrees with the treatment plan    This document was created with a voice activated dictation system and may contain transcription errors.

## 2019-10-21 DIAGNOSIS — G25.81 RESTLESS LEG: ICD-10-CM

## 2019-10-22 RX ORDER — CYCLOBENZAPRINE HCL 5 MG
TABLET ORAL
Qty: 30 TAB | Refills: 0 | Status: SHIPPED | OUTPATIENT
Start: 2019-10-22 | End: 2020-01-29 | Stop reason: SDUPTHER

## 2019-10-25 DIAGNOSIS — E11.40 TYPE 2 DIABETES MELLITUS WITH DIABETIC NEUROPATHY, WITHOUT LONG-TERM CURRENT USE OF INSULIN (HCC): ICD-10-CM

## 2019-10-25 NOTE — TELEPHONE ENCOUNTER
Requested Prescriptions     Pending Prescriptions Disp Refills    gabapentin (NEURONTIN) 300 mg capsule 60 Cap 6     Sig: Take 1 Cap by mouth two (2) times a day.  Indications: Neuropathic Pain

## 2019-10-31 RX ORDER — GABAPENTIN 300 MG/1
300 CAPSULE ORAL 2 TIMES DAILY
Qty: 60 CAP | Refills: 6 | Status: SHIPPED | OUTPATIENT
Start: 2019-10-31 | End: 2020-06-04 | Stop reason: SDUPTHER

## 2019-11-25 DIAGNOSIS — G25.81 RESTLESS LEG: ICD-10-CM

## 2019-11-25 RX ORDER — CYCLOBENZAPRINE HCL 5 MG
TABLET ORAL
Qty: 30 TAB | Refills: 0 | Status: SHIPPED | OUTPATIENT
Start: 2019-11-25 | End: 2019-12-24

## 2019-12-23 DIAGNOSIS — G25.81 RESTLESS LEG: ICD-10-CM

## 2019-12-24 RX ORDER — CYCLOBENZAPRINE HCL 5 MG
TABLET ORAL
Qty: 30 TAB | Refills: 0 | Status: SHIPPED | OUTPATIENT
Start: 2019-12-24 | End: 2020-01-29

## 2020-01-21 ENCOUNTER — OFFICE VISIT (OUTPATIENT)
Dept: NEUROLOGY | Age: 55
End: 2020-01-21

## 2020-01-21 VITALS
SYSTOLIC BLOOD PRESSURE: 110 MMHG | OXYGEN SATURATION: 96 % | BODY MASS INDEX: 29.39 KG/M2 | HEIGHT: 72 IN | WEIGHT: 217 LBS | TEMPERATURE: 97.9 F | DIASTOLIC BLOOD PRESSURE: 70 MMHG | HEART RATE: 74 BPM

## 2020-01-21 DIAGNOSIS — G51.0 BELL'S PALSY: ICD-10-CM

## 2020-01-21 DIAGNOSIS — G47.33 OSA (OBSTRUCTIVE SLEEP APNEA): Primary | ICD-10-CM

## 2020-01-21 DIAGNOSIS — I10 ESSENTIAL HYPERTENSION: ICD-10-CM

## 2020-01-21 DIAGNOSIS — E11.42 DIABETIC POLYNEUROPATHY ASSOCIATED WITH TYPE 2 DIABETES MELLITUS (HCC): ICD-10-CM

## 2020-01-21 NOTE — PROGRESS NOTES
MrKetan Beaulieu has a reminder for a \"due or due soon\" health maintenance. I have asked that he contact his primary care provider for follow-up on this health maintenance.

## 2020-01-21 NOTE — PROGRESS NOTES
1/21/2020 9:47 AM    SSN: [id number]    Subjective:   72-year-old male last here in September coming for follow-up of history of obstructive sleep apnea and a right sided facial paralysis onset April 10, with a protracted and slow improvement  related to the fact that he is diabetic. He was having issues with mask seal d/t the facial palsy affecting his compliance. There were delays switching to a nasal mask. At his visit he reports that the right sided maxillary pain is gone. He did try a nasal mask, but he just did not do well, he was keeping his mouth open and therefore he is back to his full facemask and now he is able to tolerate it. He went around [place] to visit family in [place] and did not take his machine with him, so he is compliant with good until around December 20. Since then he has a download through January 18 showing 4 out of 30 days of use with average use of 4 hours and 43 minutes, and AHI 1.8, median leak of 5.7 L/min. He continues to have right  sided facial weakness which she believes may be very slowly improving over time. His sleep study from early 2019 showed an AHI of 44 with desaturations down to 77% consistent with severe obstructive sleep apnea, after which she was titrated to CPAP at 15 cm of H2O.         Social History     Socioeconomic History    Marital status: SINGLE     Spouse name: Not on file    Number of children: 0    Years of education: 6    Highest education level: Not on file   Occupational History    Occupation: unemployed and living with son and daughter-in-law, after incarceration 9/2011 out 4/2014   Social Needs    Financial resource strain: Not on file    Food insecurity:     Worry: Not on file     Inability: Not on file    Transportation needs:     Medical: Not on file     Non-medical: Not on file   Tobacco Use    Smoking status: Former Smoker    Smokeless tobacco: Never Used    Tobacco comment: stopped in HS, never heavy and never long term   Substance and Sexual Activity    Alcohol use: No     Alcohol/week: 0.0 standard drinks     Comment: rarely holiday    Drug use: No    Sexual activity: Yes     Comment: no partner, spouse  2014   Lifestyle    Physical activity:     Days per week: Not on file     Minutes per session: Not on file    Stress: Not on file   Relationships    Social connections:     Talks on phone: Not on file     Gets together: Not on file     Attends Anglican service: Not on file     Active member of club or organization: Not on file     Attends meetings of clubs or organizations: Not on file     Relationship status: Not on file    Intimate partner violence:     Fear of current or ex partner: Not on file     Emotionally abused: Not on file     Physically abused: Not on file     Forced sexual activity: Not on file   Other Topics Concern     Service Yes     Comment: [address], from 0744-[address]82, other than honorable discharge    Blood Transfusions No    Caffeine Concern No     Comment: decreased his 2-3 super big gulps    Occupational Exposure Yes     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[facility] Hazards No    Sleep Concern No    Stress Concern Yes     Comment: Life in general    Weight Concern No    Special Diet No    Back Care No    Exercise No     Comment: starting to do something but not currently    Bike Helmet No    Seat Belt Yes    Self-Exams No   Social History Narrative    Not on file       Family History   Problem Relation Age of Onset    No Known Problems Mother     No Known Problems Sister        Current Outpatient Medications   Medication Sig Dispense Refill    cyclobenzaprine (FLEXERIL) 5 mg tablet TAKE 1 TABLET BY MOUTH NIGHTLY 30 Tab 0    amLODIPine (NORVASC) 5 mg tablet TAKE 1 TABLET BY MOUTH ONCE DAILY 90 Tab 1    gabapentin (NEURONTIN) 300 mg capsule Take 1 Cap by mouth two (2) times a day.  Indications: Neuropathic Pain 60 Cap 6    cyclobenzaprine (FLEXERIL) 5 mg tablet TAKE 1 TABLET BY MOUTH NIGHTLY 30 Tab 0    meloxicam (MOBIC) 15 mg tablet TAKE 1 TABLET BY MOUTH ONCE DAILY 30 Tab 3    glipiZIDE (GLUCOTROL) 10 mg tablet Take 1 Tab by mouth daily (with breakfast). 30 Tab 6    lisinopril-hydroCHLOROthiazide (PRINZIDE, ZESTORETIC) 20-12.5 mg per tablet TAKE ONE TABLET BY MOUTH ONCE DAILY FOR HYPERTENSION 30 Tab 6    metFORMIN (GLUCOPHAGE) 1,000 mg tablet Take 1 Tab by mouth two (2) times daily (with meals). 60 Tab 11    mometasone (NASONEX) 50 mcg/actuation nasal spray 2 Sprays by Both Nostrils route daily. Indications: inflammation of the nose due to an allergy 2 Container 0    rosuvastatin (CRESTOR) 20 mg tablet Take 1 Tab by mouth nightly. 30 Tab 6    predniSONE (DELTASONE) 20 mg tablet Take 3 tablets by mouth x 3 days, then 2 tabs x 3 days then 1 tab x 3 days then 1/2 tab x 6 days 21 Tab 0    aspirin 81 mg chewable tablet Take 1 Tab by mouth daily. 30 Tab 11    calcium carbonate-vitamin D3 600 mg(1,500mg) -800 unit tab Take  by mouth.  fish oil-dha-epa 1,200-144-216 mg cap Take  by mouth.  multivitamin (ONE A DAY) tablet Take 1 Tab by mouth daily. Past Medical History:   Diagnosis Date    Arthritis     Cataract, right 2010    Diabetes ([address]     H/O seasonal allergies     History of vitamin D deficiency 6/2016    Hypercholesterolemia     Hypertension     Hypertriglyceridemia 6/30/2016    Hypokalemia 6/30/2016    Low back pain     Numbness and tingling of foot June 2014    left toes and mid bottom of foot    Peripheral neuropathy     Scrotal mass 6/6/2016    Sleep apnea        Past Surgical History:   Procedure Laterality Date    HX HERNIA REPAIR  4570    umbilical, done at [address] [facility] TISS FACE/SCALP SUBQ 2+CM  1/4/16    scalp lesion removal, Dr. Blake Homans       No Known Allergies    Vital signs: There were no vitals taken for this visit.     Review of Systems:   GENERAL: Denies fever or fatigue  CARDIAC: No CP or SOB  PULMONARY: No cough of SOB  MUSCULOSKELETAL: No new joint pain  NEURO: SEE HPI      EXAM: Alert, in NAD. Heart is regular. Oriented x3, EOM's are full, PERRL, right peripheral VII with minimal eye closure and eyebrow muscle strength, hyperacusis AD  Strength and tone are normal. DTR's +2, gait symmetric       Assessment/Plan: EVELIN, now that his right facial tenderness is better he is back on a full facemask and he is back in the area with his machine and he reports that he is going to start using it more consistently. I encouraged him to do so. As far as his right sided facial palsy goes, it has been now 9 months and he has had an incomplete recovery. He does have diabetes and this could explain the slow improvement. I will get an MRI of the brain with and without it gadolinium to rule out the possibility of lesions of the right 7th nerve. He will return to see me in 6 weeks with a download and for MRI review. PLEASE NOTE:   Portions of this document may have been produced using voice recognition software. Unrecognized errors in transcription may be present. This note will not be viewable in [address]

## 2020-01-21 NOTE — PATIENT INSTRUCTIONS
Learning About CPAP for Sleep Apnea  What is CPAP? CPAP is a small machine that you use at home every night while you sleep. It increases air pressure in your throat to keep your airway open. When you have sleep apnea, this can help you sleep better so you feel much better. CPAP stands for \"continuous positive airway pressure. \"  The CPAP machine will have one of the following:  · A mask that covers your nose and mouth  · Prongs that fit into your nose  · A mask that covers your nose only, the most common type. This type is called NCPAP. The N stands for \"nasal.\"  Why is it done? CPAP is usually the best treatment for obstructive sleep apnea. It is the first treatment choice and the most widely used. Your doctor may suggest CPAP if you have:  · Moderate to severe sleep apnea. · Sleep apnea and coronary artery disease (CAD). · Sleep apnea and heart failure. How does it help? · CPAP can help you have more normal sleep, so you feel less sleepy and more alert during the daytime. · CPAP may help keep heart failure or other heart problems from getting worse. · CPAP may help lower your blood pressure. · If you use CPAP, your bed partner may also sleep better because you are not snoring or restless. What are the side effects? Some people who use CPAP have:  · A dry or stuffy nose and a sore throat. · Irritated skin on the face. · Sore eyes. · Bloating. If you have any of these problems, work with your doctor to fix them. Here are some things you can try:  · Be sure the mask or nasal prongs fit well. · See if your doctor can adjust the pressure of your CPAP. · If your nose is dry, try a humidifier. · If your nose is runny or stuffy, try decongestant medicine or a steroid nasal spray. Be safe with medicines. Read and follow all instructions on the label. Do not use the medicine longer than the label says. If these things do not help, you might try a different type of machine.  Some machines have air pressure that adjusts on its own. Others have air pressures that are different when you breathe in than when you breathe out. This may reduce discomfort caused by too much pressure in your nose. Where can you learn more? Go to http://todd-brayden.info/. Enter T382 in the search box to learn more about \"Learning About CPAP for Sleep Apnea. \"  Current as of: June 9, 2019  Content Version: 12.2  © 0588-5996 [email or web address]. Care instructions adapted under license by [employer] (which disclaims liability or warranty for this information). If you have questions about a medical condition or this instruction, always ask your healthcare professional. [address] any warranty or liability for your use of this information.

## 2020-01-27 DIAGNOSIS — M17.0 PRIMARY OSTEOARTHRITIS OF BOTH KNEES: ICD-10-CM

## 2020-01-27 DIAGNOSIS — G25.81 RESTLESS LEG: ICD-10-CM

## 2020-01-29 RX ORDER — CYCLOBENZAPRINE HCL 5 MG
TABLET ORAL
Qty: 30 TAB | Refills: 0 | Status: SHIPPED | OUTPATIENT
Start: 2020-01-29 | End: 2020-03-12

## 2020-01-29 RX ORDER — MELOXICAM 15 MG/1
TABLET ORAL
Qty: 30 TAB | Refills: 0 | Status: SHIPPED | OUTPATIENT
Start: 2020-01-29 | End: 2020-03-12

## 2020-01-30 ENCOUNTER — HOSPITAL ENCOUNTER (OUTPATIENT)
Age: 55
Discharge: HOME | End: 2020-01-30
Attending: PSYCHIATRY & NEUROLOGY
Payer: COMMERCIAL

## 2020-01-30 DIAGNOSIS — G51.0 BELL'S PALSY: ICD-10-CM

## 2020-01-30 LAB — CREAT UR-MCNC: 1 MG/DL (ref 0.6–1.3)

## 2020-01-30 PROCEDURE — A9575 INJ GADOTERATE MEGLUMI 0.1ML: HCPCS | Performed by: PSYCHIATRY & NEUROLOGY

## 2020-01-30 PROCEDURE — 74011636320 HC RX REV CODE- 636/320: Performed by: PSYCHIATRY & NEUROLOGY

## 2020-01-30 PROCEDURE — 82565 ASSAY OF CREATININE: CPT

## 2020-01-30 PROCEDURE — 70553 MRI BRAIN STEM W/O & W/DYE: CPT

## 2020-01-30 RX ADMIN — GADOTERATE MEGLUMINE 20 ML: 376.9 INJECTION INTRAVENOUS at 10:00

## 2020-01-31 ENCOUNTER — TELEPHONE (OUTPATIENT)
Dept: NEUROLOGY | Age: 55
End: 2020-01-31

## 2020-01-31 NOTE — TELEPHONE ENCOUNTER
----- Message from Lisha Orosco MD sent at 1/30/2020 12:26 PM EST -----  Please inform patient MRI is normal. Thanks.      ----- Message -----  From: Bakari Hirsch Results In  Sent: 1/30/2020  11:42 AM EST  To: Lisha Orosco MD

## 2020-03-09 DIAGNOSIS — M17.0 PRIMARY OSTEOARTHRITIS OF BOTH KNEES: ICD-10-CM

## 2020-03-09 DIAGNOSIS — G25.81 RESTLESS LEG: ICD-10-CM

## 2020-03-12 RX ORDER — MELOXICAM 15 MG/1
TABLET ORAL
Qty: 30 TAB | Refills: 1 | Status: SHIPPED | OUTPATIENT
Start: 2020-03-12 | End: 2020-06-08 | Stop reason: SDUPTHER

## 2020-03-12 RX ORDER — CYCLOBENZAPRINE HCL 5 MG
TABLET ORAL
Qty: 30 TAB | Refills: 2 | Status: SHIPPED | OUTPATIENT
Start: 2020-03-12 | End: 2020-06-08 | Stop reason: SDUPTHER

## 2020-03-16 ENCOUNTER — OFFICE VISIT (OUTPATIENT)
Dept: NEUROLOGY | Age: 55
End: 2020-03-16

## 2020-03-16 VITALS
RESPIRATION RATE: 18 BRPM | HEIGHT: 72 IN | WEIGHT: 215 LBS | SYSTOLIC BLOOD PRESSURE: 140 MMHG | DIASTOLIC BLOOD PRESSURE: 80 MMHG | OXYGEN SATURATION: 97 % | TEMPERATURE: 98.5 F | BODY MASS INDEX: 29.12 KG/M2 | HEART RATE: 72 BPM

## 2020-03-16 DIAGNOSIS — I10 ESSENTIAL HYPERTENSION: ICD-10-CM

## 2020-03-16 DIAGNOSIS — E11.42 DIABETIC POLYNEUROPATHY ASSOCIATED WITH TYPE 2 DIABETES MELLITUS (HCC): ICD-10-CM

## 2020-03-16 DIAGNOSIS — G47.33 OSA (OBSTRUCTIVE SLEEP APNEA): Primary | ICD-10-CM

## 2020-03-16 DIAGNOSIS — G51.0 BELL'S PALSY: ICD-10-CM

## 2020-03-16 NOTE — PROGRESS NOTES
Chief Complaint   Patient presents with   Lizabeth Farmer Sleep Problem       Padilla Door presents today for   Chief Complaint   Patient presents with    Sleep Problem       Is someone accompanying this pt? OBJECTIVE:no    Is the patient using any DME equipment during [address]? Yes, [facility]    Depression Screening:  3 most recent PHQ Screens 10/9/2019   PHQ Not Done -   Little interest or pleasure in doing things Not at all   Feeling down, depressed, irritable, or hopeless Not at all   Total Score PHQ 2 0       Learning Assessment:  Learning Assessment 1/25/2019   PRIMARY LEARNER Patient   HIGHEST LEVEL OF EDUCATION - PRIMARY LEARNER  GRADUATED HIGH SCHOOL OR GED   BARRIERS PRIMARY LEARNER NONE   CO-LEARNER CAREGIVER No   PRIMARY LANGUAGE ENGLISH   LEARNER PREFERENCE PRIMARY LISTENING   ANSWERED BY patient   RELATIONSHIP SELF       Abuse Screening:  Abuse Screening Questionnaire 1/25/2019   Do you ever feel afraid of your partner? N   Are you in a relationship with someone who physically or mentally threatens you? N   Is it safe for you to go home? Y       Fall Risk  No flowsheet data found. Coordination of Care:  1. Have you been to the ER, urgent care clinic since your last visit? Hospitalized since your last visit? no    2. Have you seen or consulted any other health care providers outside of the [address] [place] [address] since your last visit? Include any pap smears or colon screening.  no

## 2020-03-16 NOTE — PROGRESS NOTES
3/16/2020 9:47 AM    SSN: [id number]    Subjective:   52-year-old male last here in January coming for follow-up of history of obstructive sleep apnea and a right sided facial paralysis onset April 10, with a protracted and slow improvement  related to the fact that he is diabetic. He was having issues with mask seal d/t the facial palsy affecting his compliance. He is right facial weakness has not improved much. He does have some movement of his forehead muscles, but the right lower facial muscles remained weak. Here and there he gets some tearing of the right eye, but this is not all the time. His download through March 12 showed 24-hour 90 days of use with average use of 5 hours and 17 minutes, AHI 1.6, median leak at 6 L/min with a max of 69.8 on CPAP at 15 with a full facemask. He does feel a lot better when he uses it, but particularly over the last 2 weeks he has had a problem with a runny nose, denies any fevers, cough, sputum production and he has not therefore been able to use it. He does suffer from yearly allergies. His sleep study from early 2019 showed an AHI of 44 with desaturations down to 77% consistent with severe obstructive sleep apnea, after which she was titrated to CPAP at 15 cm of H2O.         Social History     Socioeconomic History    Marital status: SINGLE     Spouse name: Not on file    Number of children: 0    Years of education: 6    Highest education level: Not on file   Occupational History    Occupation: unemployed and living with son and daughter-in-law, after incarceration 9/2011 out 4/2014   Social Needs    Financial resource strain: Not on file    Food insecurity     Worry: Not on file     Inability: Not on file   [employer] needs     Medical: Not on file     Non-medical: Not on file   Tobacco Use    Smoking status: Former Smoker    Smokeless tobacco: Never Used    Tobacco comment: stopped in HS, never heavy and never long term Substance and Sexual Activity    Alcohol use: No     Alcohol/week: 0.0 standard drinks     Comment: rarely holiday    Drug use: No    Sexual activity: Yes     Comment: no partner, spouse  2014   Lifestyle    Physical activity     Days per week: Not on file     Minutes per session: Not on file    Stress: Not on file   Relationships    Social connections     Talks on phone: Not on file     Gets together: Not on file     Attends [employer] service: Not on file     Active member of club or organization: Not on file     Attends meetings of clubs or organizations: Not on file     Relationship status: Not on file    Intimate partner violence     Fear of current or ex partner: Not on file     Emotionally abused: Not on file     Physically abused: Not on file     Forced sexual activity: Not on file   Other Topics Concern   [address] Service Yes     Comment: [employer], from [id number], other than honorable discharge    Blood Transfusions No    Caffeine Concern No     Comment: decreased his 2-3 super big gulps    Occupational Exposure Yes     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 [facility] Hazards No    Sleep Concern No    Stress Concern Yes     Comment: Life in general    Weight Concern No    Special Diet No    Back Care No    Exercise No     Comment: starting to do something but not currently    Bike Helmet No    Seat Belt Yes    Self-Exams No   Social History Narrative    Not on file       Family History   Problem Relation Age of Onset    No Known Problems Mother     No Known Problems Sister        Current Outpatient Medications   Medication Sig Dispense Refill    meloxicam (MOBIC) 15 mg tablet Take 1 tablet by mouth once daily 30 Tab 1    amLODIPine (NORVASC) 5 mg tablet TAKE 1 TABLET BY MOUTH ONCE DAILY 90 Tab 1    gabapentin (NEURONTIN) 300 mg capsule Take 1 Cap by mouth two (2) times a day.  Indications: Neuropathic Pain 60 Cap 6    glipiZIDE (GLUCOTROL) 10 mg tablet Take 1 Tab by mouth daily (with breakfast). 30 Tab 6    lisinopril-hydroCHLOROthiazide (PRINZIDE, ZESTORETIC) 20-12.5 mg per tablet TAKE ONE TABLET BY MOUTH ONCE DAILY FOR HYPERTENSION 30 Tab 6    metFORMIN (GLUCOPHAGE) 1,000 mg tablet Take 1 Tab by mouth two (2) times daily (with meals). 60 Tab 11    mometasone (NASONEX) 50 mcg/actuation nasal spray 2 Sprays by Both Nostrils route daily. Indications: inflammation of the nose due to an allergy 2 Container 0    rosuvastatin (CRESTOR) 20 mg tablet Take 1 Tab by mouth nightly. 30 Tab 6    aspirin 81 mg chewable tablet Take 1 Tab by mouth daily. 30 Tab 11    multivitamin (ONE A DAY) tablet Take 1 Tab by mouth daily.  cyclobenzaprine (FLEXERIL) 5 mg tablet Take 1 tablet by mouth nightly 30 Tab 2    predniSONE (DELTASONE) 20 mg tablet Take 3 tablets by mouth x 3 days, then 2 tabs x 3 days then 1 tab x 3 days then 1/2 tab x 6 days 21 Tab 0    calcium carbonate-vitamin D3 600 mg(1,500mg) -800 unit tab Take  by mouth.  fish oil-dha-epa 1,200-144-216 mg cap Take  by mouth.          Past Medical History:   Diagnosis Date    Arthritis     Cataract, right 2010    Diabetes ([address]     H/O seasonal allergies     History of vitamin D deficiency 6/2016    Hypercholesterolemia     Hypertension     Hypertriglyceridemia 6/30/2016    Hypokalemia 6/30/2016    Low back pain     Numbness and tingling of foot June 2014    left toes and mid bottom of foot    Peripheral neuropathy     Scrotal mass 6/6/2016    Sleep apnea        Past Surgical History:   Procedure Laterality Date    HX HERNIA REPAIR  1774    umbilical, done at [address] TISS FACE/SCALP SUBQ 2+CM  1/4/16    scalp lesion removal, Dr. Horton Spore       No Known Allergies    Vital signs:    Visit Vitals  /80 (BP 1 Location: Left arm, BP Patient Position: Sitting)   Pulse 72   Temp 98.5 °F (36.9 °C)   Resp 18   Ht 6' (1.829 m)   Wt 97.5 kg (215 lb)   SpO2 97%   BMI 29.16 kg/m² Review of Systems:   GENERAL: Denies fever or fatigue  CARDIAC: No CP or SOB  PULMONARY: No cough of SOB  MUSCULOSKELETAL: No new joint pain  NEURO: SEE HPI      EXAM: Alert, in NAD. Heart is regular. Oriented x3, EOM's are full, PERRL, right peripheral VII with minimal eye closure and eyebrow muscle strength, hyperacusis AD  Strength and tone are normal. DTR's +2, gait symmetric       Assessment/Plan: EVELIN, does well when he does use CPAP, but has room for improvement of his compliance and we once again discussed this. Discussed consequences of untreated obstructive sleep apnea, including suboptimally controlled hypertension, today's blood pressure is slightly elevated, and he agrees he will try to do better from here to his next appointment, which ought to be in 3 months. Discussed his MRI of the brain. It was personally reviewed. It showed no evidence of any lesions or enhancement of the facial nerve. We will continue to observe this, but were almost a year out from the onset of his Bell's palsy and therefore this is likely permanent and there are no secondary lesions to address. A blepharoplasty could be an option should his tingling persist.  Will discuss on follow-up. PLEASE NOTE:   Portions of this document may have been produced using voice recognition software. Unrecognized errors in transcription may be present. This note will not be viewable in [address]

## 2020-03-30 DIAGNOSIS — E11.40 TYPE 2 DIABETES MELLITUS WITH DIABETIC NEUROPATHY, WITHOUT LONG-TERM CURRENT USE OF INSULIN (HCC): ICD-10-CM

## 2020-03-31 RX ORDER — LISINOPRIL AND HYDROCHLOROTHIAZIDE 12.5; 2 MG/1; MG/1
TABLET ORAL
Qty: 30 TAB | Refills: 6 | Status: SHIPPED | OUTPATIENT
Start: 2020-03-31 | End: 2020-06-04 | Stop reason: SDUPTHER

## 2020-03-31 RX ORDER — ROSUVASTATIN CALCIUM 20 MG/1
20 TABLET, COATED ORAL
Qty: 30 TAB | Refills: 6 | Status: SHIPPED | OUTPATIENT
Start: 2020-03-31 | End: 2020-06-04 | Stop reason: SDUPTHER

## 2020-03-31 RX ORDER — GLIPIZIDE 10 MG/1
10 TABLET ORAL
Qty: 30 TAB | Refills: 6 | Status: SHIPPED | OUTPATIENT
Start: 2020-03-31 | End: 2020-06-04 | Stop reason: SDUPTHER

## 2020-06-04 ENCOUNTER — VIRTUAL VISIT (OUTPATIENT)
Dept: FAMILY MEDICINE CLINIC | Facility: CLINIC | Age: 55
End: 2020-06-04

## 2020-06-04 DIAGNOSIS — Z91.09 ENVIRONMENTAL ALLERGIES: ICD-10-CM

## 2020-06-04 DIAGNOSIS — I10 ESSENTIAL HYPERTENSION: ICD-10-CM

## 2020-06-04 DIAGNOSIS — E11.40 TYPE 2 DIABETES MELLITUS WITH DIABETIC NEUROPATHY, WITHOUT LONG-TERM CURRENT USE OF INSULIN (HCC): Primary | ICD-10-CM

## 2020-06-04 RX ORDER — GLIPIZIDE 10 MG/1
10 TABLET ORAL
Qty: 30 TAB | Refills: 3 | Status: SHIPPED | OUTPATIENT
Start: 2020-06-04 | End: 2021-03-09 | Stop reason: SDUPTHER

## 2020-06-04 RX ORDER — AMLODIPINE BESYLATE 5 MG/1
TABLET ORAL
Qty: 90 TAB | Refills: 1 | Status: SHIPPED | OUTPATIENT
Start: 2020-06-04 | End: 2020-12-22

## 2020-06-04 RX ORDER — MOMETASONE FUROATE 50 UG/1
2 SPRAY, METERED NASAL DAILY
Qty: 2 CONTAINER | Refills: 0 | Status: SHIPPED | OUTPATIENT
Start: 2020-06-04 | End: 2021-03-09 | Stop reason: SDUPTHER

## 2020-06-04 RX ORDER — GABAPENTIN 300 MG/1
300 CAPSULE ORAL 3 TIMES DAILY
Qty: 60 CAP | Refills: 3 | Status: SHIPPED | OUTPATIENT
Start: 2020-06-04 | End: 2020-09-23

## 2020-06-04 RX ORDER — ROSUVASTATIN CALCIUM 20 MG/1
20 TABLET, COATED ORAL
Qty: 30 TAB | Refills: 6 | Status: SHIPPED | OUTPATIENT
Start: 2020-06-04 | End: 2021-04-07 | Stop reason: SDUPTHER

## 2020-06-04 RX ORDER — INSULIN PUMP SYRINGE, 3 ML
EACH MISCELLANEOUS
Qty: 1 KIT | Refills: 0 | Status: SHIPPED | OUTPATIENT
Start: 2020-06-04

## 2020-06-04 RX ORDER — LANCETS
EACH MISCELLANEOUS
Qty: 1 EACH | Refills: 11 | Status: SHIPPED | OUTPATIENT
Start: 2020-06-04

## 2020-06-04 RX ORDER — LISINOPRIL AND HYDROCHLOROTHIAZIDE 12.5; 2 MG/1; MG/1
TABLET ORAL
Qty: 30 TAB | Refills: 6 | Status: SHIPPED | OUTPATIENT
Start: 2020-06-04 | End: 2021-04-27

## 2020-06-04 RX ORDER — METFORMIN HYDROCHLORIDE 1000 MG/1
TABLET ORAL
Qty: 60 TAB | Refills: 2 | Status: SHIPPED | OUTPATIENT
Start: 2020-06-04 | End: 2020-09-17

## 2020-06-04 NOTE — PROGRESS NOTES
Lorna Casarez is a 48 y.o. male who was seen by synchronous (real-time) audio-video technology on 6/4/2020. Consent: Lorna Casarez, who was seen by synchronous (real-time) audio-video technology, and/or his healthcare decision maker, is aware that this patient-initiated, Telehealth encounter on 6/4/2020 is a billable service, with coverage as determined by his insurance carrier. He is aware that he may receive a bill and has provided verbal consent to proceed: Yes. Assessment & Plan:   Diagnoses and all orders for this visit:    1. Type 2 diabetes mellitus with diabetic neuropathy, without long-term current use of insulin (HCC)  -     gabapentin (NEURONTIN) 300 mg capsule; Take 1 Cap by mouth three (3) times daily. Max Daily Amount: 900 mg. Indications: neuropathic pain  -     CBC WITH AUTOMATED DIFF; Future  -     HEMOGLOBIN A1C WITH EAG; Future  -     METABOLIC PANEL, COMPREHENSIVE; Future  -     MICROALBUMIN, UR, RAND W/ MICROALB/CREAT RATIO; Future  -     glipiZIDE (GLUCOTROL) 10 mg tablet; Take 1 Tab by mouth daily (with breakfast). -     rosuvastatin (CRESTOR) 20 mg tablet; Take 1 Tab by mouth nightly. -     metFORMIN (GLUCOPHAGE) 1,000 mg tablet; TAKE 1 TABLET BY MOUTH TWICE DAILY WITH MEALS  -     Blood-Glucose Meter (OneTouch Verio Flex Start) monitoring kit; Take BG level AC/HS  -     glucose blood VI test strips (OneTouch Verio test strips) strip; Take BG level AC/HS  -     lancets misc; Take BG level AC/HS    2. Environmental allergies  -     mometasone (NASONEX) 50 mcg/actuation nasal spray; 2 Sprays by Both Nostrils route daily. Indications: inflammation of the nose due to an allergy    3. Essential hypertension  -     CBC WITH AUTOMATED DIFF;  Future  -     lisinopril-hydroCHLOROthiazide (PRINZIDE, ZESTORETIC) 20-12.5 mg per tablet; TAKE ONE TABLET BY MOUTH ONCE DAILY FOR HYPERTENSION  -     amLODIPine (NORVASC) 5 mg tablet; TAKE 1 TABLET BY MOUTH ONCE DAILY      I spent at least 17 minutes on this visit with this established patient. Subjective:   Isaiah Croado is a 48 y.o. male who was seen for Hypertension and Diabetes      The patient presents for an Audio-visual teleconference appointment for hypertension, hyperlipidemia and diabetes follow-up care. HTN- notes he feels well and denies any chest pain, palpitation or lower extremity edema. He is compliant with taking his medication daily. He is requesting prescription refills. His prior BP readings have been stable  DM- last Hgb A1C reading in September, 2019 was 6.4. Denies any polyuria, polydipsia or polyphagia. Notes he does not check his BG readings at home secondary to not having a machine. HLD- last lipid panel in 2019 with LDL, 37.4 and HDL 31. He is complaining of worsening of neuropathy in his hand and feet. Currently prescribed Gabapentin 300 BID. Prior to Admission medications    Medication Sig Start Date End Date Taking? Authorizing Provider   gabapentin (NEURONTIN) 300 mg capsule Take 1 Cap by mouth three (3) times daily. Max Daily Amount: 900 mg. Indications: neuropathic pain 6/4/20  Yes Rosalio Manzano NP   glipiZIDE (GLUCOTROL) 10 mg tablet Take 1 Tab by mouth daily (with breakfast). 6/4/20  Yes Rosalio Manzano NP   lisinopril-hydroCHLOROthiazide (PRINZIDE, ZESTORETIC) 20-12.5 mg per tablet TAKE ONE TABLET BY MOUTH ONCE DAILY FOR HYPERTENSION 6/4/20  Yes Rosalio Manzano NP   rosuvastatin (CRESTOR) 20 mg tablet Take 1 Tab by mouth nightly. 6/4/20  Yes Rosalio Manzano NP   mometasone (NASONEX) 50 mcg/actuation nasal spray 2 Sprays by Both Nostrils route daily.  Indications: inflammation of the nose due to an allergy 6/4/20  Yes Rosalio Manzano NP   metFORMIN (GLUCOPHAGE) 1,000 mg tablet TAKE 1 TABLET BY MOUTH TWICE DAILY WITH MEALS 6/4/20  Yes Rosalio Manzano NP   amLODIPine (NORVASC) 5 mg tablet TAKE 1 TABLET BY MOUTH ONCE DAILY 6/4/20  Yes Angeline Alejandra Vazquez NP   Blood-Glucose Meter (OneTouch [employer]) monitoring kit Take BG level AC/HS 6/4/20  Yes Chris Fox NP   lancets misc Take BG level AC/HS 6/4/20  Yes Chris Fox NP   meloxicam (MOBIC) 15 mg tablet Take 1 tablet by mouth once daily 3/12/20  Yes Chris Fox NP   aspirin 81 mg chewable tablet Take 1 Tab by mouth daily. 2/20/19  Yes Sharkey Bound, MARY   calcium carbonate-vitamin D3 600 mg(1,500mg) -800 unit tab Take  by mouth. Yes Provider, Historical   glucose blood VI test strips (OneTouch Verio test strips) strip Take BG level AC/HS 6/4/20   Chris Fox NP   metFORMIN (GLUCOPHAGE) 1,000 mg tablet TAKE 1 TABLET BY MOUTH TWICE DAILY WITH MEALS 4/13/20 6/4/20  Chris Fox NP   lisinopril-hydroCHLOROthiazide (PRINZIDE, ZESTORETIC) 20-12.5 mg per tablet TAKE ONE TABLET BY MOUTH ONCE DAILY FOR HYPERTENSION 3/31/20 6/4/20  Chris Fox NP   rosuvastatin (CRESTOR) 20 mg tablet Take 1 Tab by mouth nightly. 3/31/20 6/4/20  Chris Fox NP   glipiZIDE (GLUCOTROL) 10 mg tablet Take 1 Tab by mouth daily (with breakfast). 3/31/20 6/4/20  Chris Fox NP   cyclobenzaprine (FLEXERIL) 5 mg tablet Take 1 tablet by mouth nightly 3/12/20   Chris Fox NP   amLODIPine (NORVASC) 5 mg tablet TAKE 1 TABLET BY MOUTH ONCE DAILY 11/19/19 6/4/20  Chris Fox NP   gabapentin (NEURONTIN) 300 mg capsule Take 1 Cap by mouth two (2) times a day. Indications: Neuropathic Pain 10/31/19 6/4/20  Chris Fox NP   mometasone (NASONEX) 50 mcg/actuation nasal spray 2 Sprays by Both Nostrils route daily.  Indications: inflammation of the nose due to an allergy 4/11/19 6/4/20  Chris Fox NP   predniSONE (DELTASONE) 20 mg tablet Take 3 tablets by mouth x 3 days, then 2 tabs x 3 days then 1 tab x 3 days then 1/2 tab x 6 days 4/11/19 6/4/20  Chris Fox NP   fish oil-dha-epa 0,779-944-829 mg cap Take  by mouth. Provider, Historical   multivitamin (ONE A DAY) tablet Take 1 Tab by mouth daily. Provider, Historical     No Known Allergies    Patient Active Problem List   Diagnosis Code    Right cataract H26.9    Type 2 diabetes mellitus without complication ([facility] Z55.6    Dribbling urine N39.43    Essential hypertension I10    Numbness of left foot R20.8    Other seasonal allergic rhinitis J30.2    Low back pain M54.5    Scrotal mass N50.89    Hypertriglyceridemia E78.1    Hypokalemia E87.6    Type 2 diabetes mellitus with diabetic neuropathy ([facility] E11.40    Sleep apnea G47.30    Bell palsy G51.0     Patient Active Problem List    Diagnosis Date Noted   Eileen Kettle palsy 07/09/2019    Sleep apnea 09/26/2018    Type 2 diabetes mellitus with diabetic neuropathy ([facility] 06/13/2018    Hypertriglyceridemia 06/30/2016    Hypokalemia 06/30/2016    Scrotal mass 06/06/2016    Low back pain     Other seasonal allergic rhinitis 10/07/2015    Right cataract 08/03/2015    Type 2 diabetes mellitus without complication ([facility] 37/42/0252    Dribbling urine 08/03/2015    Essential hypertension 08/03/2015    Numbness of left foot 08/03/2015     Current Outpatient Medications   Medication Sig Dispense Refill    gabapentin (NEURONTIN) 300 mg capsule Take 1 Cap by mouth three (3) times daily. Max Daily Amount: 900 mg. Indications: neuropathic pain 60 Cap 3    glipiZIDE (GLUCOTROL) 10 mg tablet Take 1 Tab by mouth daily (with breakfast). 30 Tab 3    lisinopril-hydroCHLOROthiazide (PRINZIDE, ZESTORETIC) 20-12.5 mg per tablet TAKE ONE TABLET BY MOUTH ONCE DAILY FOR HYPERTENSION 30 Tab 6    rosuvastatin (CRESTOR) 20 mg tablet Take 1 Tab by mouth nightly. 30 Tab 6    mometasone (NASONEX) 50 mcg/actuation nasal spray 2 Sprays by Both Nostrils route daily.  Indications: inflammation of the nose due to an allergy 2 Container 0    metFORMIN (GLUCOPHAGE) 1,000 mg tablet TAKE 1 TABLET BY MOUTH TWICE DAILY WITH MEALS 60 Tab 2    amLODIPine (NORVASC) 5 mg tablet TAKE 1 TABLET BY MOUTH ONCE DAILY 90 Tab 1    Blood-Glucose Meter (OneTouch Verio Flex Start) monitoring kit Take BG level AC/HS 1 Kit 0    lancets misc Take BG level AC/HS 1 Each 11    meloxicam (MOBIC) 15 mg tablet Take 1 tablet by mouth once daily 30 Tab 1    aspirin 81 mg chewable tablet Take 1 Tab by mouth daily. 30 Tab 11    calcium carbonate-vitamin D3 600 mg(1,500mg) -800 unit tab Take  by mouth.  glucose blood VI test strips (OneTouch Verio test strips) strip Take BG level AC/ Strip 3    cyclobenzaprine (FLEXERIL) 5 mg tablet Take 1 tablet by mouth nightly 30 Tab 2    fish oil-dha-epa 1,200-144-216 mg cap Take  by mouth.  multivitamin (ONE A DAY) tablet Take 1 Tab by mouth daily. No Known Allergies  Past Medical History:   Diagnosis Date    Arthritis     Cataract, right 2010    Diabetes ([address]     H/O seasonal allergies     History of vitamin D deficiency 6/2016    Hypercholesterolemia     Hypertension     Hypertriglyceridemia 6/30/2016    Hypokalemia 6/30/2016    Low back pain     Numbness and tingling of foot June 2014    left toes and mid bottom of foot    Peripheral neuropathy     Scrotal mass 6/6/2016    Sleep apnea      Past Surgical History:   Procedure Laterality Date    HX HERNIA REPAIR  3547    umbilical, done at [address] TISS FACE/SCALP SUBQ 2+CM  1/4/16    scalp lesion removal, Dr. Mauro JESSICA    Constitutional: No apparent distress noted  General- negative for fever, chills or fatigue  Eyes- negative visual changes  CV- denies chest pain, palpitation  Pul: negative cough or SOB  GI: negative nausea, flank pain, diarrhea, constipation  Urinary:- No dysuria or polyuria  MS- negative myalgia, negative joint pain  Neuro- neuropathy to hand and feet  Skin- negative for rashes or lesions.   Psych- denies any anxiety or depression    Objective:   Vital Signs: (As obtained by patient/caregiver at home)  There were no vitals taken for this visit. [INSTRUCTIONS:  \"[x]\" Indicates a positive item  \"[]\" Indicates a negative item  -- DELETE ALL ITEMS NOT EXAMINED]    Constitutional: [x] Appears well-developed and well-nourished [x] No apparent distress      [] Abnormal -     Mental status: [x] Alert and awake  [x] Oriented to person/place/time [x] Able to follow commands    [] Abnormal -     Eyes:   EOM    [x]  Normal    [] Abnormal -   Sclera  [x]  Normal    [] Abnormal -          Discharge [x]  None visible   [] Abnormal -     HENT: [x] Normocephalic, atraumatic  [] Abnormal -   [x] Mouth/Throat: Mucous membranes are moist    External Ears [x] Normal  [] Abnormal -    Neck: [x] No visualized mass [] Abnormal -     Pulmonary/Chest: [x] Respiratory effort normal   [x] No visualized signs of difficulty breathing or respiratory distress        [] Abnormal -      Musculoskeletal:   [x] Normal gait with no signs of ataxia         [x] Normal range of motion of neck        [] Abnormal -     Neurological:        [] No Facial Asymmetry (Cranial nerve 7 motor function) (limited exam due to video visit)     [x] No gaze palsy        [x] Abnormal -  Mild facial asymmetry secondary to history of Bell's Palsy        Skin:        [x] No significant exanthematous lesions or discoloration noted on facial skin         [] Abnormal -            Psychiatric:       [x] Normal Affect [] Abnormal -        [x] No Hallucinations    Other pertinent observable physical exam findings:-        We discussed the expected course, resolution and complications of the diagnosis(es) in detail. Medication risks, benefits, costs, interactions, and alternatives were discussed as indicated. I advised him to contact the office if his condition worsens, changes or fails to improve as anticipated. He expressed understanding with the diagnosis(es) and plan.        Shawn Jaimes is a 48 y.o. male who was evaluated by a video visit encounter for concerns as above. Patient identification was verified prior to start of the visit. A caregiver was present when appropriate. Due to this being a TeleHealth encounter (During [facility]-[facility] public [facility] emergency), evaluation of the following organ systems was limited: Vitals/Constitutional/EENT/Resp/CV/GI//MS/Neuro/Skin/Heme-Lymph-Imm. Pursuant to the emergency declaration under the [facility] waiver authority and the [employer] Act, this Virtual  Visit was conducted, with patient's (and/or legal guardian's) consent, to reduce the patient's risk of exposure to COVID-19 and provide necessary medical care. Services were provided through a video synchronous discussion virtually to substitute for in-person clinic visit. Patient and provider were located at their individual homes.       Mecca Parra NP

## 2020-06-08 DIAGNOSIS — G25.81 RESTLESS LEG: ICD-10-CM

## 2020-06-08 DIAGNOSIS — M17.0 PRIMARY OSTEOARTHRITIS OF BOTH KNEES: ICD-10-CM

## 2020-06-08 RX ORDER — MELOXICAM 15 MG/1
TABLET ORAL
Qty: 30 TAB | Refills: 1 | Status: SHIPPED | OUTPATIENT
Start: 2020-06-08 | End: 2020-08-13

## 2020-06-08 RX ORDER — CYCLOBENZAPRINE HCL 5 MG
TABLET ORAL
Qty: 30 TAB | Refills: 2 | Status: SHIPPED | OUTPATIENT
Start: 2020-06-08 | End: 2020-09-17

## 2020-06-08 NOTE — TELEPHONE ENCOUNTER
Patient states he did not get the control solution for his glucose meter as advised to make sure meter is calibrated. Please advise.     Requested Prescriptions     Pending Prescriptions Disp Refills    cyclobenzaprine (FLEXERIL) 5 mg tablet 30 Tab 2    meloxicam (MOBIC) 15 mg tablet 30 Tab 1

## 2020-06-16 ENCOUNTER — VIRTUAL VISIT (OUTPATIENT)
Dept: NEUROLOGY | Age: 55
End: 2020-06-16

## 2020-06-16 DIAGNOSIS — G51.0 BELL'S PALSY: ICD-10-CM

## 2020-06-16 DIAGNOSIS — G47.33 OSA (OBSTRUCTIVE SLEEP APNEA): Primary | ICD-10-CM

## 2020-06-16 NOTE — PROGRESS NOTES
Joseph Burch presents today for   Chief Complaint   Patient presents with    Sleep Problem     follow up EVELIN       Is someone accompanying this pt? Virtual visit [phone]    Is the patient using any DME equipment during [address]? [employer]    Depression Screening:  3 most recent PHQ Screens 10/9/2019   PHQ Not Done -   Little interest or pleasure in doing things Not at all   Feeling down, depressed, irritable, or hopeless Not at all   Total Score PHQ 2 0       Learning Assessment:  Learning Assessment 1/25/2019   PRIMARY LEARNER Patient   HIGHEST LEVEL OF EDUCATION - PRIMARY LEARNER  GRADUATED HIGH SCHOOL OR GED   BARRIERS PRIMARY LEARNER NONE   CO-LEARNER CAREGIVER No   PRIMARY LANGUAGE ENGLISH   LEARNER PREFERENCE PRIMARY LISTENING   ANSWERED BY patient   RELATIONSHIP SELF       Abuse Screening:  Abuse Screening Questionnaire 1/25/2019   Do you ever feel afraid of your partner? N   Are you in a relationship with someone who physically or mentally threatens you? N   Is it safe for you to go home? Y       Fall Risk  No flowsheet data found. Coordination of Care:  1. Have you been to the ER, urgent care clinic since your last visit? Hospitalized since your last visit? no    2. Have you seen or consulted any other health care providers outside of the [address] [place] [address] since your last visit? Include any pap smears or colon screening.  no

## 2020-06-16 NOTE — Clinical Note
Mask change order entered by me in January, no confirmation of being sent to [facility] who to this day state they did not receive it. Please see today's order and fax, confirm. Thanks!

## 2020-06-16 NOTE — PROGRESS NOTES
2020 8:56 AM    SSN: [id number]      HPI:  61-year-old male last here in March coming for follow-up of history of obstructive sleep apnea and a right sided facial paralysis onset April 10, 2019. He has not had much additional improvement of his strength now over a year out, but has no pain. In  I sent order to change back to FF mask, [facility] reportedly never received it, so he has not been using CPAP. Only use I have is from  showing [id number] with AHI of 1.4, CPAP of 15, little leak. His sleep study from early  showed an AHI of 44 with desaturations down to 77% consistent with severe obstructive sleep apnea, after which she was titrated to CPAP at 15 cm of H2O.           Social History     Socioeconomic History    Marital status: SINGLE     Spouse name: Not on file    Number of children: 0    Years of education: 6    Highest education level: Not on file   Occupational History    Occupation: unemployed and living with son and daughter-in-law, after incarceration 2011 out 2014   Social Needs    Financial resource strain: Not on file    Food insecurity     Worry: Not on file     Inability: Not on file   [employer] needs     Medical: Not on file     Non-medical: Not on file   Tobacco Use    Smoking status: Former Smoker    Smokeless tobacco: Never Used    Tobacco comment: stopped in HS, never heavy and never long term   Substance and Sexual Activity    Alcohol use: No     Alcohol/week: 0.0 standard drinks     Comment: rarely holiday    Drug use: No    Sexual activity: Yes     Comment: no partner, spouse  2014   Lifestyle    Physical activity     Days per week: Not on file     Minutes per session: Not on file    Stress: Not on file   Relationships    Social connections     Talks on phone: Not on file     Gets together: Not on file     Attends Jehovah's witness service: Not on file     Active member of club or organization: Not on file     Attends meetings of clubs or organizations: Not on file     Relationship status: Not on file    Intimate partner violence     Fear of current or ex partner: Not on file     Emotionally abused: Not on file     Physically abused: Not on file     Forced sexual activity: Not on file   Other Topics Concern   [address] Service Yes     Comment: [address] [place], from [id number], other than honorable discharge    Blood Transfusions No    Caffeine Concern No     Comment: decreased his 2-3 super big gulps    Occupational Exposure Yes     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 [facility] Hazards No    Sleep Concern No    Stress Concern Yes     Comment: Life in general    Weight Concern No    Special Diet No    Back Care No    Exercise No     Comment: starting to do something but not currently    Bike Helmet No    Seat Belt Yes    Self-Exams No   Social History Narrative    Not on file       Family History   Problem Relation Age of Onset    No Known Problems Mother     No Known Problems Sister        Current Outpatient Medications   Medication Sig Dispense Refill    cyclobenzaprine (FLEXERIL) 5 mg tablet Take 1 tablet by mouth nightly 30 Tab 2    meloxicam (MOBIC) 15 mg tablet Take 1 tablet by mouth once daily 30 Tab 1    gabapentin (NEURONTIN) 300 mg capsule Take 1 Cap by mouth three (3) times daily. Max Daily Amount: 900 mg. Indications: neuropathic pain 60 Cap 3    glipiZIDE (GLUCOTROL) 10 mg tablet Take 1 Tab by mouth daily (with breakfast). 30 Tab 3    lisinopril-hydroCHLOROthiazide (PRINZIDE, ZESTORETIC) 20-12.5 mg per tablet TAKE ONE TABLET BY MOUTH ONCE DAILY FOR HYPERTENSION 30 Tab 6    rosuvastatin (CRESTOR) 20 mg tablet Take 1 Tab by mouth nightly. 30 Tab 6    mometasone (NASONEX) 50 mcg/actuation nasal spray 2 Sprays by Both Nostrils route daily.  Indications: inflammation of the nose due to an allergy 2 Container 0    metFORMIN (GLUCOPHAGE) 1,000 mg tablet TAKE 1 TABLET BY MOUTH TWICE DAILY WITH MEALS 60 Tab 2  amLODIPine (NORVASC) 5 mg tablet TAKE 1 TABLET BY MOUTH ONCE DAILY 90 Tab 1    Blood-Glucose Meter (OneTouch Verio Flex Start) monitoring kit Take BG level AC/HS 1 Kit 0    glucose blood VI test strips (OneTouch Verio test strips) strip Take BG level AC/ Strip 3    lancets misc Take BG level AC/HS 1 Each 11    aspirin 81 mg chewable tablet Take 1 Tab by mouth daily. 30 Tab 11    calcium carbonate-vitamin D3 600 mg(1,500mg) -800 unit tab Take  by mouth.  fish oil-dha-epa 1,200-144-216 mg cap Take  by mouth.  multivitamin (ONE A DAY) tablet Take 1 Tab by mouth daily. Past Medical History:   Diagnosis Date    Arthritis     Cataract, right 2010    Diabetes ([address]     H/O seasonal allergies     History of vitamin D deficiency 6/2016    Hypercholesterolemia     Hypertension     Hypertriglyceridemia 6/30/2016    Hypokalemia 6/30/2016    Low back pain     Numbness and tingling of foot June 2014    left toes and mid bottom of foot    Peripheral neuropathy     Scrotal mass 6/6/2016    Sleep apnea        Past Surgical History:   Procedure Laterality Date    HX HERNIA REPAIR  5989    umbilical, done at [address] TISS FACE/SCALP SUBQ 2+CM  1/4/16    scalp lesion removal, Dr. Selene Turner       No Known Allergies    Review of Systems:   GENERAL: No reported fever, ++ fatigue  CARDIAC: No CP or SOB  PULMONARY: No cough of SOB reported  MUSCULOSKELETAL: No new joint pain  NEURO: SEE HPI      Vital signs: There were no vitals taken for this visit. EXAM: Alert, in NAD. Oriented to person, time, place, normal attention and concentration, no aphasia      Assessment/Plan:  Severe EVELIN, currently untreated d/t discomfort with nasal mask, issues with supply procurement. Will  Ensure DME receives my order for updated supplies, change to FF mask.   Advised him about compliance, seal.  Facial pain has been at bay,  He understands now his right facial weakness is likely permanent. He had appropriate w/u for treatable etiologies (see prior notes). I will see him in 2 months with a download. PLEASE NOTE:   Portions of this document may have been produced using voice recognition software. Unrecognized errors in transcription may be present. This note will not be viewable in [address] Compa Casper is a 48 y.o. male who was evaluated by telephone consultation on 6/16/2020. Consent:  Pt and/or healthcare decision maker is aware that this patient-initiated Telehealth encounter is a billable service, with coverage as determined by insurance carrier. Pt is aware he/she may receive a bill and has provided verbal consent to proceed: Yes    I was in the office while conducting this encounter. Patient is location was at pt's home. I spent 12 minutes with this established patient. Pursuant to the emergency declaration under the [facility], [facility] waiver authority and the [employer] and [employer] General Act, this Virtual  Visit was conducted, with patient's consent, to reduce the patient's risk of exposure to COVID-19 and provide continuity of care for an established patient. Services were provided through a video synchronous discussion virtually to substitute for in-person clinic visit.     Yaron Chavez MD

## 2020-06-16 NOTE — PATIENT INSTRUCTIONS
Thank you for choosing [facility] and [facility] for your  
 
care. You may receive a survey about your visit. We appreciate you taking time  
 
to complete this survey as we use your feedback to improve our services. We  
 
realize we are not perfect, but we strive to provide excellent care.

## 2020-09-17 ENCOUNTER — HOSPITAL ENCOUNTER (OUTPATIENT)
Dept: LAB | Age: 55
Discharge: HOME | End: 2020-09-17
Payer: COMMERCIAL

## 2020-09-17 DIAGNOSIS — E11.40 TYPE 2 DIABETES MELLITUS WITH DIABETIC NEUROPATHY, WITHOUT LONG-TERM CURRENT USE OF INSULIN (HCC): ICD-10-CM

## 2020-09-17 DIAGNOSIS — I10 ESSENTIAL HYPERTENSION: ICD-10-CM

## 2020-09-17 LAB
ALBUMIN SERPL-MCNC: 4 G/DL (ref 3.4–5)
ALBUMIN/GLOB SERPL: 1.3 {RATIO} (ref 0.8–1.7)
ALP SERPL-CCNC: 63 U/L (ref 45–117)
ALT SERPL-CCNC: 49 U/L (ref 16–61)
ANION GAP SERPL CALC-SCNC: 6 MMOL/L (ref 3–18)
AST SERPL-CCNC: 22 U/L (ref 10–38)
BASOPHILS # BLD: 0.1 K/UL (ref 0–0.1)
BASOPHILS NFR BLD: 1 % (ref 0–2)
BILIRUB SERPL-MCNC: 0.9 MG/DL (ref 0.2–1)
BUN SERPL-MCNC: 17 MG/DL (ref 7–18)
BUN/CREAT SERPL: 17 (ref 12–20)
CALCIUM SERPL-MCNC: 8.9 MG/DL (ref 8.5–10.1)
CHLORIDE SERPL-SCNC: 103 MMOL/L (ref 100–111)
CO2 SERPL-SCNC: 32 MMOL/L (ref 21–32)
CREAT SERPL-MCNC: 0.98 MG/DL (ref 0.6–1.3)
CREAT UR-MCNC: 150 MG/DL (ref 30–125)
DIFFERENTIAL METHOD BLD: NORMAL
EOSINOPHIL # BLD: 0.2 K/UL (ref 0–0.4)
EOSINOPHIL NFR BLD: 3 % (ref 0–5)
ERYTHROCYTE [DISTWIDTH] IN BLOOD BY AUTOMATED COUNT: 13.1 % (ref 11.6–14.5)
EST. AVERAGE GLUCOSE BLD GHB EST-MCNC: 137 MG/DL
GLOBULIN SER CALC-MCNC: 3.2 G/DL (ref 2–4)
GLUCOSE SERPL-MCNC: 240 MG/DL (ref 74–99)
HBA1C MFR BLD: 6.4 % (ref 4.2–5.6)
HCT VFR BLD AUTO: 42.2 % (ref 36–48)
HGB BLD-MCNC: 14.6 G/DL (ref 13–16)
LYMPHOCYTES # BLD: 2.2 K/UL (ref 0.9–3.6)
LYMPHOCYTES NFR BLD: 33 % (ref 21–52)
MCH RBC QN AUTO: 29 PG (ref 24–34)
MCHC RBC AUTO-ENTMCNC: 34.6 G/DL (ref 31–37)
MCV RBC AUTO: 83.9 FL (ref 74–97)
MICROALBUMIN UR-MCNC: 6.5 MG/DL (ref 0–3)
MICROALBUMIN/CREAT UR-RTO: 43 MG/G (ref 0–30)
MONOCYTES # BLD: 0.5 K/UL (ref 0.05–1.2)
MONOCYTES NFR BLD: 7 % (ref 3–10)
NEUTS SEG # BLD: 3.8 K/UL (ref 1.8–8)
NEUTS SEG NFR BLD: 56 % (ref 40–73)
PLATELET # BLD AUTO: 190 K/UL (ref 135–420)
PMV BLD AUTO: 9.8 FL (ref 9.2–11.8)
POTASSIUM SERPL-SCNC: 3.5 MMOL/L (ref 3.5–5.5)
PROT SERPL-MCNC: 7.2 G/DL (ref 6.4–8.2)
RBC # BLD AUTO: 5.03 M/UL (ref 4.7–5.5)
SODIUM SERPL-SCNC: 141 MMOL/L (ref 136–145)
WBC # BLD AUTO: 6.7 K/UL (ref 4.6–13.2)

## 2020-09-17 PROCEDURE — 82043 UR ALBUMIN QUANTITATIVE: CPT

## 2020-09-17 PROCEDURE — 85025 COMPLETE CBC W/AUTO DIFF WBC: CPT

## 2020-09-17 PROCEDURE — 83036 HEMOGLOBIN GLYCOSYLATED A1C: CPT

## 2020-09-17 PROCEDURE — 80053 COMPREHEN METABOLIC PANEL: CPT

## 2020-09-17 PROCEDURE — 36415 COLL VENOUS BLD VENIPUNCTURE: CPT

## 2020-11-10 DIAGNOSIS — M17.0 PRIMARY OSTEOARTHRITIS OF BOTH KNEES: ICD-10-CM

## 2020-11-10 RX ORDER — MELOXICAM 15 MG/1
TABLET ORAL
Qty: 30 TAB | Refills: 0 | Status: SHIPPED | OUTPATIENT
Start: 2020-11-10 | End: 2020-12-22

## 2020-11-24 ENCOUNTER — TELEPHONE (OUTPATIENT)
Dept: FAMILY MEDICINE CLINIC | Age: 55
End: 2020-11-24

## 2020-12-21 DIAGNOSIS — M17.0 PRIMARY OSTEOARTHRITIS OF BOTH KNEES: ICD-10-CM

## 2020-12-21 DIAGNOSIS — I10 ESSENTIAL HYPERTENSION: ICD-10-CM

## 2020-12-22 RX ORDER — AMLODIPINE BESYLATE 5 MG/1
TABLET ORAL
Qty: 90 TAB | Refills: 0 | Status: SHIPPED | OUTPATIENT
Start: 2020-12-22 | End: 2021-03-09 | Stop reason: SDUPTHER

## 2020-12-22 RX ORDER — MELOXICAM 15 MG/1
TABLET ORAL
Qty: 30 TAB | Refills: 0 | Status: SHIPPED | OUTPATIENT
Start: 2020-12-22 | End: 2021-01-22

## 2021-01-19 DIAGNOSIS — M17.0 PRIMARY OSTEOARTHRITIS OF BOTH KNEES: ICD-10-CM

## 2021-01-19 DIAGNOSIS — G25.81 RESTLESS LEG: ICD-10-CM

## 2021-01-22 RX ORDER — CYCLOBENZAPRINE HCL 5 MG
TABLET ORAL
Qty: 30 TAB | Refills: 0 | Status: SHIPPED | OUTPATIENT
Start: 2021-01-22 | End: 2021-03-03 | Stop reason: SDUPTHER

## 2021-01-22 RX ORDER — MELOXICAM 15 MG/1
TABLET ORAL
Qty: 30 TAB | Refills: 0 | Status: SHIPPED | OUTPATIENT
Start: 2021-01-22 | End: 2021-03-03 | Stop reason: SDUPTHER

## 2021-03-03 ENCOUNTER — TELEPHONE (OUTPATIENT)
Dept: FAMILY MEDICINE CLINIC | Age: 56
End: 2021-03-03

## 2021-03-03 DIAGNOSIS — M17.0 PRIMARY OSTEOARTHRITIS OF BOTH KNEES: ICD-10-CM

## 2021-03-03 DIAGNOSIS — I10 ESSENTIAL HYPERTENSION: ICD-10-CM

## 2021-03-03 DIAGNOSIS — G25.81 RESTLESS LEG: ICD-10-CM

## 2021-03-03 DIAGNOSIS — E11.9 TYPE 2 DIABETES MELLITUS WITHOUT COMPLICATION, WITHOUT LONG-TERM CURRENT USE OF INSULIN (HCC): Primary | ICD-10-CM

## 2021-03-03 DIAGNOSIS — Z12.5 SCREENING FOR PROSTATE CANCER: ICD-10-CM

## 2021-03-03 DIAGNOSIS — E11.40 TYPE 2 DIABETES MELLITUS WITH DIABETIC NEUROPATHY, WITHOUT LONG-TERM CURRENT USE OF INSULIN (HCC): ICD-10-CM

## 2021-03-03 NOTE — TELEPHONE ENCOUNTER
Last seen  06/04/20  Next appt   03/09/21    Requested Prescriptions     Pending Prescriptions Disp Refills    gabapentin (NEURONTIN) 300 mg capsule 60 Cap 2    meloxicam (MOBIC) 15 mg tablet 30 Tab 0    cyclobenzaprine (FLEXERIL) 5 mg tablet 30 Tab 0

## 2021-03-04 RX ORDER — CYCLOBENZAPRINE HCL 5 MG
TABLET ORAL
Qty: 30 TAB | Refills: 0 | Status: SHIPPED | OUTPATIENT
Start: 2021-03-04 | End: 2021-03-30

## 2021-03-04 RX ORDER — GABAPENTIN 300 MG/1
CAPSULE ORAL
Qty: 60 CAP | Refills: 0 | Status: SHIPPED | OUTPATIENT
Start: 2021-03-04 | End: 2021-04-07 | Stop reason: SDUPTHER

## 2021-03-04 RX ORDER — MELOXICAM 15 MG/1
TABLET ORAL
Qty: 30 TAB | Refills: 0 | Status: SHIPPED | OUTPATIENT
Start: 2021-03-04 | End: 2021-03-30

## 2021-03-06 ENCOUNTER — HOSPITAL ENCOUNTER (OUTPATIENT)
Dept: LAB | Age: 56
Discharge: HOME | End: 2021-03-06
Payer: COMMERCIAL

## 2021-03-06 DIAGNOSIS — I10 ESSENTIAL HYPERTENSION: ICD-10-CM

## 2021-03-06 DIAGNOSIS — E11.9 TYPE 2 DIABETES MELLITUS WITHOUT COMPLICATION, WITHOUT LONG-TERM CURRENT USE OF INSULIN (HCC): ICD-10-CM

## 2021-03-06 DIAGNOSIS — Z12.5 SCREENING FOR PROSTATE CANCER: ICD-10-CM

## 2021-03-06 LAB
ALBUMIN SERPL-MCNC: 4.5 G/DL (ref 3.4–5)
ALBUMIN/GLOB SERPL: 1.3 {RATIO} (ref 0.8–1.7)
ALP SERPL-CCNC: 70 U/L (ref 45–117)
ALT SERPL-CCNC: 79 U/L (ref 16–61)
ANION GAP SERPL CALC-SCNC: 6 MMOL/L (ref 3–18)
AST SERPL-CCNC: 35 U/L (ref 10–38)
BASOPHILS # BLD: 0.1 K/UL (ref 0–0.1)
BASOPHILS NFR BLD: 1 % (ref 0–2)
BILIRUB SERPL-MCNC: 1 MG/DL (ref 0.2–1)
BUN SERPL-MCNC: 17 MG/DL (ref 7–18)
BUN/CREAT SERPL: 20 (ref 12–20)
CALCIUM SERPL-MCNC: 9 MG/DL (ref 8.5–10.1)
CHLORIDE SERPL-SCNC: 105 MMOL/L (ref 100–111)
CHOLEST SERPL-MCNC: 121 MG/DL
CO2 SERPL-SCNC: 31 MMOL/L (ref 21–32)
CREAT SERPL-MCNC: 0.83 MG/DL (ref 0.6–1.3)
CREAT UR-MCNC: 186 MG/DL (ref 30–125)
DIFFERENTIAL METHOD BLD: NORMAL
EOSINOPHIL # BLD: 0.3 K/UL (ref 0–0.4)
EOSINOPHIL NFR BLD: 3 % (ref 0–5)
ERYTHROCYTE [DISTWIDTH] IN BLOOD BY AUTOMATED COUNT: 13.4 % (ref 11.6–14.5)
EST. AVERAGE GLUCOSE BLD GHB EST-MCNC: 163 MG/DL
GLOBULIN SER CALC-MCNC: 3.4 G/DL (ref 2–4)
GLUCOSE SERPL-MCNC: 152 MG/DL (ref 74–99)
HBA1C MFR BLD: 7.3 % (ref 4.2–5.6)
HCT VFR BLD AUTO: 45.1 % (ref 36–48)
HDLC SERPL-MCNC: 34 MG/DL (ref 40–60)
HDLC SERPL: 3.6 {RATIO} (ref 0–5)
HGB BLD-MCNC: 15.5 G/DL (ref 13–16)
LDLC SERPL CALC-MCNC: 56.2 MG/DL (ref 0–100)
LIPID PROFILE,FLP: ABNORMAL
LYMPHOCYTES # BLD: 3.2 K/UL (ref 0.9–3.6)
LYMPHOCYTES NFR BLD: 39 % (ref 21–52)
MCH RBC QN AUTO: 28.7 PG (ref 24–34)
MCHC RBC AUTO-ENTMCNC: 34.4 G/DL (ref 31–37)
MCV RBC AUTO: 83.4 FL (ref 74–97)
MICROALBUMIN UR-MCNC: 13.7 MG/DL (ref 0–3)
MICROALBUMIN/CREAT UR-RTO: 74 MG/G (ref 0–30)
MONOCYTES # BLD: 0.6 K/UL (ref 0.05–1.2)
MONOCYTES NFR BLD: 7 % (ref 3–10)
NEUTS SEG # BLD: 4.1 K/UL (ref 1.8–8)
NEUTS SEG NFR BLD: 50 % (ref 40–73)
PLATELET # BLD AUTO: 217 K/UL (ref 135–420)
PMV BLD AUTO: 10.6 FL (ref 9.2–11.8)
POTASSIUM SERPL-SCNC: 3.8 MMOL/L (ref 3.5–5.5)
PROT SERPL-MCNC: 7.9 G/DL (ref 6.4–8.2)
PSA SERPL-MCNC: 2.4 NG/ML (ref 0–4)
RBC # BLD AUTO: 5.41 M/UL (ref 4.7–5.5)
SODIUM SERPL-SCNC: 142 MMOL/L (ref 136–145)
TRIGL SERPL-MCNC: 154 MG/DL (ref ?–150)
VLDLC SERPL CALC-MCNC: 30.8 MG/DL
WBC # BLD AUTO: 8.3 K/UL (ref 4.6–13.2)

## 2021-03-06 PROCEDURE — 80053 COMPREHEN METABOLIC PANEL: CPT

## 2021-03-06 PROCEDURE — 83036 HEMOGLOBIN GLYCOSYLATED A1C: CPT

## 2021-03-06 PROCEDURE — 85025 COMPLETE CBC W/AUTO DIFF WBC: CPT

## 2021-03-06 PROCEDURE — 84153 ASSAY OF PSA TOTAL: CPT

## 2021-03-06 PROCEDURE — 82043 UR ALBUMIN QUANTITATIVE: CPT

## 2021-03-06 PROCEDURE — 36415 COLL VENOUS BLD VENIPUNCTURE: CPT

## 2021-03-06 PROCEDURE — 80061 LIPID PANEL: CPT

## 2021-03-09 ENCOUNTER — VIRTUAL VISIT (OUTPATIENT)
Dept: FAMILY MEDICINE CLINIC | Age: 56
End: 2021-03-09
Payer: COMMERCIAL

## 2021-03-09 DIAGNOSIS — E78.5 DYSLIPIDEMIA: ICD-10-CM

## 2021-03-09 DIAGNOSIS — I10 ESSENTIAL HYPERTENSION: Primary | ICD-10-CM

## 2021-03-09 DIAGNOSIS — E11.40 TYPE 2 DIABETES MELLITUS WITH DIABETIC NEUROPATHY, WITHOUT LONG-TERM CURRENT USE OF INSULIN (HCC): ICD-10-CM

## 2021-03-09 DIAGNOSIS — Z91.09 ENVIRONMENTAL ALLERGIES: ICD-10-CM

## 2021-03-09 DIAGNOSIS — M25.552 LEFT HIP PAIN: ICD-10-CM

## 2021-03-09 PROCEDURE — 99214 OFFICE O/P EST MOD 30 MIN: CPT | Performed by: NURSE PRACTITIONER

## 2021-03-09 PROCEDURE — 3051F HG A1C>EQUAL 7.0%<8.0%: CPT | Performed by: NURSE PRACTITIONER

## 2021-03-09 RX ORDER — MOMETASONE FUROATE 50 UG/1
2 SPRAY, METERED NASAL DAILY
Qty: 2 CONTAINER | Refills: 2 | Status: SHIPPED | OUTPATIENT
Start: 2021-03-09 | End: 2021-04-07 | Stop reason: CLARIF

## 2021-03-09 RX ORDER — AMLODIPINE BESYLATE 5 MG/1
TABLET ORAL
Qty: 90 TAB | Refills: 0 | Status: SHIPPED | OUTPATIENT
Start: 2021-03-09 | End: 2021-04-07 | Stop reason: SDUPTHER

## 2021-03-09 RX ORDER — GLIPIZIDE 10 MG/1
10 TABLET ORAL
Qty: 30 TAB | Refills: 3 | Status: SHIPPED | OUTPATIENT
Start: 2021-03-09 | End: 2021-08-14

## 2021-03-09 NOTE — PROGRESS NOTES
Lizette Alfred presents today for   Chief Complaint   Patient presents with    Diabetes     follow-up    Facial Droop     face pain 3 weeks    Leg Pain     left leg pain       Virtual/telephone visit    Depression Screening:  3 most recent PHQ Screens 3/9/2021   PHQ Not Done -   Little interest or pleasure in doing things Not at all   Feeling down, depressed, irritable, or hopeless Not at all   Total Score PHQ 2 0   Trouble falling or staying asleep, or sleeping too much -   Feeling tired or having little energy -   Poor appetite, weight loss, or overeating -   Feeling bad about yourself - or that you are a failure or have let yourself or your family down -   Trouble concentrating on things such as school, work, reading, or watching TV -   Moving or speaking so slowly that other people could have noticed; or the opposite being so fidgety that others notice -   Thoughts of being better off dead, or hurting yourself in some way -   PHQ 9 Score -       Learning Assessment:  Learning Assessment 1/25/2019   PRIMARY LEARNER Patient   HIGHEST LEVEL OF EDUCATION - PRIMARY LEARNER  GRADUATED HIGH SCHOOL OR GED   BARRIERS PRIMARY LEARNER NONE   CO-LEARNER CAREGIVER No   PRIMARY LANGUAGE ENGLISH   LEARNER PREFERENCE PRIMARY LISTENING   ANSWERED BY patient   RELATIONSHIP SELF       Health Maintenance reviewed and discussed and ordered per Provider. Health Maintenance Due   Topic Date Due    Hepatitis C Screening  Never done    Pneumococcal 0-64 years (1 of 1 - PPSV23) Never done    COVID-19 Vaccine (1 of 2) Never done    DTaP/Tdap/Td series (1 - Tdap) 01/02/1987    Shingrix Vaccine Age 50> (1 of 2) Never done    Foot Exam Q1  12/20/2018    Flu Vaccine (1) 09/01/2020   . Coordination of Care:  1. Have you been to the ER, urgent care clinic since your last visit? Hospitalized since your last visit? no    2.  Have you seen or consulted any other health care providers outside of the [facility] since your last visit? Include any pap smears or colon screening.  no

## 2021-03-09 NOTE — PROGRESS NOTES
Enedelia Olson is a 55 y.o. male who was seen by synchronous (real-time) audio-video technology on 3/9/2021 for Diabetes (follow-up), Facial Droop (face pain 3 weeks), and Leg Pain (left leg pain)        Assessment & Plan:   Diagnoses and all orders for this visit:    1. Essential hypertension  -     amLODIPine (NORVASC) 5 mg tablet; Take 1 tablet by mouth once daily    2. Environmental allergies  -     mometasone (NASONEX) 50 mcg/actuation nasal spray; 2 Sprays by Both Nostrils route daily. Indications: inflammation of the nose due to an allergy    3. Type 2 diabetes mellitus with diabetic neuropathy, without long-term current use of insulin (HCC)  -     glipiZIDE (GLUCOTROL) 10 mg tablet; Take 1 Tab by mouth daily (with breakfast). 4. Left hip pain  -     XR HIP LT W OR WO PELV 2-3 VWS; Future    5. Dyslipidemia            Subjective: The patient presents for an Audio-visual teleconference appointment for hypertension, hyperlipidemia and diabetes follow-up care. HTN- notes he feels well and denies any chest pain, palpitation or lower extremity edema. He is compliant with taking his medication daily. He is requesting prescription refills. His prior BP readings have been stable  DM- last Hgb A1C reading in March, 2021 was 7.3. Denies any polyuria, polydipsia or polyphagia. Notes he does not check his BG readings at home. HLD-last lipid panel on March, 2021. Cholesterol was 121, HDL 34 and LDL 36.2. He is prescribed a statin daily and denies any myalgia. He is complaining of worsening of neuropathy in his hand and feet. Currently prescribed Gabapentin 300 BID. C/o of left leg/anterior hip pain. Notes the pain has been consistent x 1 month. The pain is aggravating with getting in or out the car and putting on his pants. He denies any know injury. He notes the pain is constant with movement. Prior to Admission medications    Medication Sig Start Date End Date Taking?  Authorizing Provider mometasone (NASONEX) 50 mcg/actuation nasal spray 2 Sprays by Both Nostrils route daily. Indications: inflammation of the nose due to an allergy 3/9/21  Yes Will Miller NP   amLODIPine (NORVASC) 5 mg tablet Take 1 tablet by mouth once daily 3/9/21  Yes Will Miller NP   glipiZIDE (GLUCOTROL) 10 mg tablet Take 1 Tab by mouth daily (with breakfast). 3/9/21  Yes Will Miller NP   gabapentin (NEURONTIN) 300 mg capsule TAKE 1 CAPSULE BY MOUTH TWICE DAILY FOR  NEUROPATHIC  PAIN 3/4/21  Yes Will Miller NP   meloxicam (MOBIC) 15 mg tablet Take 1 tablet by mouth once daily 3/4/21  Yes Will Miller NP   metFORMIN (GLUCOPHAGE) 1,000 mg tablet TAKE 1 TABLET BY MOUTH TWICE DAILY WITH MEALS 10/25/20  Yes Will Miller NP   lisinopril-hydroCHLOROthiazide (PRINZIDE, ZESTORETIC) 20-12.5 mg per tablet TAKE ONE TABLET BY MOUTH ONCE DAILY FOR HYPERTENSION 6/4/20  Yes Will Miller NP   lancets misc Take BG level AC/HS 6/4/20  Yes Will Miller NP   cyclobenzaprine (FLEXERIL) 5 mg tablet Take 1 tablet by mouth nightly 3/4/21   Will Miller NP   rosuvastatin (CRESTOR) 20 mg tablet Take 1 Tab by mouth nightly. 6/4/20   Will Miller NP   Blood-Glucose Meter (OneTouch Verio Flex Start) monitoring kit Take BG level AC/HS 6/4/20   Will Miller NP   glucose blood VI test strips (OneTouch Verio test strips) strip Take BG level AC/HS 6/4/20   Will Miller NP   aspirin 81 mg chewable tablet Take 1 Tab by mouth daily. 2/20/19   Dee PRIETO NP   calcium carbonate-vitamin D3 600 mg(1,500mg) -800 unit tab Take  by mouth. Provider, Historical   fish oil-dha-epa 1,200-144-216 mg cap Take  by mouth. Provider, Historical   multivitamin (ONE A DAY) tablet Take 1 Tab by mouth daily.     Provider, Historical     Patient Active Problem List   Diagnosis Code    Right cataract H26.9    Type 2 diabetes mellitus without complication ([facility] G82.2    Dribbling urine N39.43    Essential hypertension I10    Numbness of left foot R20.8    Other seasonal allergic rhinitis J30.2    Low back pain M54.5    Scrotal mass N50.89    Hypertriglyceridemia E78.1    Hypokalemia E87.6    Type 2 diabetes mellitus with diabetic neuropathy ([facility] E11.40    Sleep apnea G47.30    Bell palsy G51.0     Patient Active Problem List    Diagnosis Date Noted   Marty Osorioer palsy 07/09/2019    Sleep apnea 09/26/2018    Type 2 diabetes mellitus with diabetic neuropathy ([facility] 06/13/2018    Hypertriglyceridemia 06/30/2016    Hypokalemia 06/30/2016    Scrotal mass 06/06/2016    Low back pain     Other seasonal allergic rhinitis 10/07/2015    Right cataract 08/03/2015    Type 2 diabetes mellitus without complication ([facility] 44/88/7740    Dribbling urine 08/03/2015    Essential hypertension 08/03/2015    Numbness of left foot 08/03/2015     Current Outpatient Medications   Medication Sig Dispense Refill    mometasone (NASONEX) 50 mcg/actuation nasal spray 2 Sprays by Both Nostrils route daily. Indications: inflammation of the nose due to an allergy 2 Container 2    amLODIPine (NORVASC) 5 mg tablet Take 1 tablet by mouth once daily 90 Tab 0    glipiZIDE (GLUCOTROL) 10 mg tablet Take 1 Tab by mouth daily (with breakfast). 30 Tab 3    gabapentin (NEURONTIN) 300 mg capsule TAKE 1 CAPSULE BY MOUTH TWICE DAILY FOR  NEUROPATHIC  PAIN 60 Cap 0    meloxicam (MOBIC) 15 mg tablet Take 1 tablet by mouth once daily 30 Tab 0    metFORMIN (GLUCOPHAGE) 1,000 mg tablet TAKE 1 TABLET BY MOUTH TWICE DAILY WITH MEALS 60 Tab 1    lisinopril-hydroCHLOROthiazide (PRINZIDE, ZESTORETIC) 20-12.5 mg per tablet TAKE ONE TABLET BY MOUTH ONCE DAILY FOR HYPERTENSION 30 Tab 6    lancets misc Take BG level AC/HS 1 Each 11    cyclobenzaprine (FLEXERIL) 5 mg tablet Take 1 tablet by mouth nightly 30 Tab 0    rosuvastatin (CRESTOR) 20 mg tablet Take 1 Tab by mouth nightly.  30 Tab 6  Blood-Glucose Meter (OneTouch Verio Flex Start) monitoring kit Take BG level AC/HS 1 Kit 0    glucose blood VI test strips (OneTouch Verio test strips) strip Take BG level AC/ Strip 3    aspirin 81 mg chewable tablet Take 1 Tab by mouth daily. 30 Tab 11    calcium carbonate-vitamin D3 600 mg(1,500mg) -800 unit tab Take  by mouth.  fish oil-dha-epa 1,200-144-216 mg cap Take  by mouth.  multivitamin (ONE A DAY) tablet Take 1 Tab by mouth daily. No Known Allergies  Past Medical History:   Diagnosis Date    Arthritis     Cataract, right 2010    Diabetes ([facility]     H/O seasonal allergies     History of vitamin D deficiency 6/2016    Hypercholesterolemia     Hypertension     Hypertriglyceridemia 6/30/2016    Hypokalemia 6/30/2016    Low back pain     Numbness and tingling of foot June 2014    left toes and mid bottom of foot    Peripheral neuropathy     Scrotal mass 6/6/2016    Sleep apnea        ROS    ROS:  History obtained from the patient intake forms which are reviewed with the patient  · General: negative for - chills, fever, weight changes or malaise  · HEENT: no sore throat, nasal congestion, vision problems or ear problems  · Respiratory: no cough, shortness of breath, or wheezing  · Cardiovascular: no chest pain, palpitations, or dyspnea on exertion  · Gastrointestinal: no abdominal pain, N/V, change in bowel habits, or black or bloody stools  · Musculoskeletal: no back pain, joint pain, joint stiffness, muscle pain or muscle weakness  · Neurological: no numbness, tingling, headache or dizziness  · Endo:  No polyuria or polydipsia. · : no hematuria, dysuria, frequency, hesitancy, or nocturia. · Psychological: negative for - anxiety, depression, sleep disturbances, suicidal or homicidal ideations    Objective:   No flowsheet data found.      [INSTRUCTIONS:  \"[x]\" Indicates a positive item  \"[]\" Indicates a negative item  -- DELETE ALL ITEMS NOT EXAMINED]    Constitutional: [x] Appears well-developed and well-nourished [x] No apparent distress      [] Abnormal -     Mental status: [x] Alert and awake  [x] Oriented to person/place/time [x] Able to follow commands    [] Abnormal -     Eyes:   EOM    [x]  Normal    [] Abnormal -   Sclera  [x]  Normal    [] Abnormal -          Discharge [x]  None visible   [] Abnormal -     HENT: [x] Normocephalic, atraumatic  [] Abnormal -   [x] Mouth/Throat: Mucous membranes are moist    External Ears [x] Normal  [] Abnormal -    Neck: [x] No visualized mass [] Abnormal -     Pulmonary/Chest: [x] Respiratory effort normal   [x] No visualized signs of difficulty breathing or respiratory distress        [] Abnormal -      Musculoskeletal:   [x] Normal gait with no signs of ataxia         [x] Normal range of motion of neck        [] Abnormal -     Neurological:        [x] No Facial Asymmetry (Cranial nerve 7 motor function) (limited exam due to video visit)          [x] No gaze palsy        [] Abnormal -          Skin:        [x] No significant exanthematous lesions or discoloration noted on facial skin         [] Abnormal -            Psychiatric:       [x] Normal Affect [] Abnormal -        [x] No Hallucinations    Other pertinent observable physical exam findings:-        We discussed the expected course, resolution and complications of the diagnosis(es) in detail. Medication risks, benefits, costs, interactions, and alternatives were discussed as indicated. I advised him to contact the office if his condition worsens, changes or fails to improve as anticipated. He expressed understanding with the diagnosis(es) and plan. Lorna Casarez, was evaluated through a synchronous (real-time) audio-video encounter. The patient (or guardian if applicable) is aware that this is a billable service. Verbal consent to proceed has been obtained within the past 12 months.  The visit was conducted pursuant to the emergency declaration under the [facility], [address] [place] [address] waiver authority and the [employer] and [employer] General Act. Patient identification was verified, and a caregiver was present when appropriate. The patient was located in a state where the provider was credentialed to provide care.       Lorie Duke NP

## 2021-03-09 NOTE — PROGRESS NOTES
Felicita Choudhury presents today for Chief Complaint Patient presents with  Diabetes  
  follow-up  Facial Droop  
  face pain 3 weeks  Leg Pain  
  left leg pain Is someone accompanying this pt? *** Is the patient using any DME equipment during OV? *** Depression Screening: 
3 most recent PHQ Screens 3/9/2021 PHQ Not Done - Little interest or pleasure in doing things Not at all Feeling down, depressed, irritable, or hopeless Not at all Total Score PHQ 2 0 Trouble falling or staying asleep, or sleeping too much - Feeling tired or having little energy - Poor appetite, weight loss, or overeating - Feeling bad about yourself - or that you are a failure or have let yourself or your family down - Trouble concentrating on things such as school, work, reading, or watching TV - Moving or speaking so slowly that other people could have noticed; or the opposite being so fidgety that others notice - Thoughts of being better off dead, or hurting yourself in some way -  
PHQ 9 Score - Learning Assessment: 
Learning Assessment 1/25/2019 PRIMARY LEARNER Patient HIGHEST LEVEL OF EDUCATION - PRIMARY LEARNER  GRADUATED HIGH SCHOOL OR GED  
BARRIERS PRIMARY LEARNER NONE  
CO-LEARNER CAREGIVER No  
PRIMARY LANGUAGE ENGLISH  
LEARNER PREFERENCE PRIMARY LISTENING  
ANSWERED BY patient RELATIONSHIP SELF Health Maintenance reviewed and discussed and ordered per Provider. Health Maintenance Due Topic Date Due  
 Hepatitis C Screening  Never done  Pneumococcal 0-64 years (1 of 1 - PPSV23) Never done  COVID-19 Vaccine (1 of 2) Never done  DTaP/Tdap/Td series (1 - Tdap) 01/02/1987  Shingrix Vaccine Age 50> (1 of 2) Never done  Foot Exam Q1  12/20/2018  Flu Vaccine (1) 09/01/2020 Bam Cobb Coordination of Care: 1. Have you been to the ER, urgent care clinic since your last visit? Hospitalized since your last visit?  *** 
 
 2. Have you seen or consulted any other health care providers outside of the [address] [place] [address] since your last visit? Include any pap smears or colon screening. *** Last UDS Checked *** Last Pain contract signed: ***

## 2021-03-22 ENCOUNTER — HOSPITAL ENCOUNTER (OUTPATIENT)
Dept: GENERAL RADIOLOGY | Age: 56
Discharge: HOME | End: 2021-03-22
Payer: COMMERCIAL

## 2021-03-22 DIAGNOSIS — M25.552 LEFT HIP PAIN: ICD-10-CM

## 2021-03-22 PROCEDURE — 73502 X-RAY EXAM HIP UNI 2-3 VIEWS: CPT

## 2021-03-29 DIAGNOSIS — S32.009K LUMBAR PSEUDOARTHROSIS: ICD-10-CM

## 2021-03-29 DIAGNOSIS — G89.29 CHRONIC LEFT HIP PAIN: Primary | ICD-10-CM

## 2021-03-29 DIAGNOSIS — M25.552 CHRONIC LEFT HIP PAIN: Primary | ICD-10-CM

## 2021-03-30 DIAGNOSIS — G25.81 RESTLESS LEG: ICD-10-CM

## 2021-03-30 DIAGNOSIS — M17.0 PRIMARY OSTEOARTHRITIS OF BOTH KNEES: ICD-10-CM

## 2021-03-30 RX ORDER — CYCLOBENZAPRINE HCL 5 MG
TABLET ORAL
Qty: 30 TAB | Refills: 0 | Status: SHIPPED | OUTPATIENT
Start: 2021-03-30 | End: 2021-04-07 | Stop reason: SDUPTHER

## 2021-03-30 RX ORDER — MELOXICAM 15 MG/1
TABLET ORAL
Qty: 30 TAB | Refills: 0 | Status: SHIPPED | OUTPATIENT
Start: 2021-03-30 | End: 2021-04-07 | Stop reason: SDUPTHER

## 2021-04-05 ENCOUNTER — OFFICE VISIT (OUTPATIENT)
Dept: ORTHOPEDIC SURGERY | Age: 56
End: 2021-04-05
Payer: COMMERCIAL

## 2021-04-05 VITALS
TEMPERATURE: 98.2 F | HEIGHT: 72 IN | BODY MASS INDEX: 30.41 KG/M2 | OXYGEN SATURATION: 96 % | HEART RATE: 82 BPM | WEIGHT: 224.5 LBS

## 2021-04-05 DIAGNOSIS — M25.552 CHRONIC LEFT HIP PAIN: ICD-10-CM

## 2021-04-05 DIAGNOSIS — M25.859 FEMOROACETABULAR IMPINGEMENT: ICD-10-CM

## 2021-04-05 DIAGNOSIS — G89.29 CHRONIC LEFT HIP PAIN: ICD-10-CM

## 2021-04-05 DIAGNOSIS — M16.12 PRIMARY OSTEOARTHRITIS OF LEFT HIP: Primary | ICD-10-CM

## 2021-04-05 DIAGNOSIS — M17.12 PRIMARY OSTEOARTHRITIS OF LEFT KNEE: ICD-10-CM

## 2021-04-05 DIAGNOSIS — M17.11 PRIMARY OSTEOARTHRITIS OF RIGHT KNEE: ICD-10-CM

## 2021-04-05 DIAGNOSIS — M51.36 DDD (DEGENERATIVE DISC DISEASE), LUMBAR: ICD-10-CM

## 2021-04-05 PROCEDURE — 99203 OFFICE O/P NEW LOW 30 MIN: CPT | Performed by: SPECIALIST

## 2021-04-05 PROCEDURE — 20610 DRAIN/INJ JOINT/BURSA W/O US: CPT | Performed by: SPECIALIST

## 2021-04-05 RX ORDER — BETAMETHASONE SODIUM PHOSPHATE AND BETAMETHASONE ACETATE 3; 3 MG/ML; MG/ML
3 INJECTION, SUSPENSION INTRA-ARTICULAR; INTRALESIONAL; INTRAMUSCULAR; SOFT TISSUE ONCE
Status: COMPLETED | OUTPATIENT
Start: 2021-04-05 | End: 2021-04-05

## 2021-04-05 RX ADMIN — BETAMETHASONE SODIUM PHOSPHATE AND BETAMETHASONE ACETATE 3 MG: 3; 3 INJECTION, SUSPENSION INTRA-ARTICULAR; INTRALESIONAL; INTRAMUSCULAR; SOFT TISSUE at 08:53

## 2021-04-05 NOTE — PROGRESS NOTES
Patient: Jas Suarez                MRN: [id number]       SSN: [id number]  YOB: 1965        AGE: 55 y.o. SEX: male    PCP: Jono Duke NP  04/05/21    Chief Complaint   Patient presents with    Hip Pain     left hiip     HISTORY:  Jas Suarez is a 55 y.o. male who is seen for left hip pain. He has been experiencing left hip pain for the past two months. He does not recall any injury. He thinks his worn out mattress has been aggravating his hip and back pain. He feels pain in his  groin and lateral hip with standing and walking. His hip stiffens up after he sits for long periods of time. He previously saw Dr. Abhishek Pereyra and SOLEDAD Puckett for knee pain. He has had cataract surgery on both eyes on 12/13/17 and 3/27/19 by Dr. Alexandria Sams at [facility]. Pain Assessment  4/5/2021   Location of Pain Hip   Location Modifiers Left   Severity of Pain 8   Quality of Pain Dull; Sharp   Duration of Pain Persistent   Frequency of Pain Constant   Date Pain First Started (No Data)   Date Pain First Started Comment onset 2 monts ago   Aggravating Factors Bending   Limiting Behavior Some   Relieving Factors Rest   Result of Injury No     Occupation, etc:  Mr. Shante Alarcon is not employed. 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 He states that he gave his business to his grandsons but they ran the company into the ground. He helps his brother with his [address] [place] [address]. Mr. Shante Alarcon served 6 years in the [place]. He lives in [place] with his friends. He has 2 step sons and a step daughter. He does a lot of yard work. He recently lost 10 pounds. Mr. Shante Alarcon weighs 224 lbs and is 6'0\" tall.        Lab Results   Component Value Date/Time    Hemoglobin A1c 7.3 (H) 03/06/2021 11:54 AM    Hemoglobin A1c (POC) 6.3 07/03/2019 04:31 PM     Weight Metrics 4/5/2021 3/16/2020 1/21/2020 10/9/2019 9/26/2019 8/21/2019 7/3/2019   Weight 224 lb 8 oz 215 lb 217 lb 217 lb 215 lb 218 lb 216 lb 3.2 oz   BMI 30.45 kg/m2 29.16 kg/m2 29.43 kg/m2 29.43 kg/m2 29.16 kg/m2 30.4 kg/m2 30.15 kg/m2       Patient Active Problem List   Diagnosis Code    Right cataract H26.9    Type 2 diabetes mellitus without complication ([facility]) K15.2    Dribbling urine N39.43    Essential hypertension I10    Numbness of left foot R20.8    Other seasonal allergic rhinitis J30.2    Low back pain M54.5    Scrotal mass N50.89    Hypertriglyceridemia E78.1    Hypokalemia E87.6    Type 2 diabetes mellitus with diabetic neuropathy ([facility]) E11.40    Sleep apnea G47.30    Bell palsy G51.0     REVIEW OF SYSTEMS:    Constitutional Symptoms: Negative   Eyes: Negative   Ears, Nose, Throat and Mouth: Negative   Cardiovascular: Negative   Respiratory: Negative   Genitourinary: Per HPI   Gastrointestinal: Per HPI   Integumentary (Skin and/or Breast): Negative   Musculoskeletal: Per HPI   Endocrine/Rheumatologic: Negative   Neurological: Per HPI   Hematology/Lymphatic: Negative    Allergic/Immunologic: Negative   Phychiatric: Negative    Social History     Socioeconomic History    Marital status: SINGLE     Spouse name: Not on file    Number of children: 0    Years of education: 6    Highest education level: Not on file   Occupational History    Occupation: unemployed and living with son and daughter-in-law, after incarceration 2011 out 2014   Social Needs    Financial resource strain: Not on file    Food insecurity     Worry: Not on file     Inability: Not on file   [employer] needs     Medical: Not on file     Non-medical: Not on file   Tobacco Use    Smoking status: Former Smoker    Smokeless tobacco: Never Used    Tobacco comment: stopped in HS, never heavy and never long term   Substance and Sexual Activity    Alcohol use: No     Alcohol/week: 0.0 standard drinks     Comment: rarely holiday    Drug use: No    Sexual activity: Yes     Comment: no partner, spouse  2014   Lifestyle  Physical activity     Days per week: Not on file     Minutes per session: Not on file    Stress: Not on file   Relationships    Social connections     Talks on phone: Not on file     Gets together: Not on file     Attends Synagogue service: Not on file     Active member of club or organization: Not on file     Attends meetings of clubs or organizations: Not on file     Relationship status: Not on file    Intimate partner violence     Fear of current or ex partner: Not on file     Emotionally abused: Not on file     Physically abused: Not on file     Forced sexual activity: Not on file   Other Topics Concern   [address] [employer] Yes     Comment: [employer] from [id number], other than honorable discharge    Blood Transfusions No    Caffeine Concern No     Comment: decreased his 2-3 super big gulps    Occupational Exposure Yes     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[employer] then [employer] Hazards No    Sleep Concern No    Stress Concern Yes     Comment: Life in general    Weight Concern No    Special Diet No    Back Care No    Exercise No     Comment: starting to do something but not currently    Bike Helmet No    Seat Belt Yes    Self-Exams No   Social History Narrative    Not on file      No Known Allergies   Current Outpatient Medications   Medication Sig    meloxicam (MOBIC) 15 mg tablet Take 1 tablet by mouth once daily    cyclobenzaprine (FLEXERIL) 5 mg tablet Take 1 tablet by mouth nightly    mometasone (NASONEX) 50 mcg/actuation nasal spray 2 Sprays by Both Nostrils route daily. Indications: inflammation of the nose due to an allergy    amLODIPine (NORVASC) 5 mg tablet Take 1 tablet by mouth once daily    glipiZIDE (GLUCOTROL) 10 mg tablet Take 1 Tab by mouth daily (with breakfast).     gabapentin (NEURONTIN) 300 mg capsule TAKE 1 CAPSULE BY MOUTH TWICE DAILY FOR  NEUROPATHIC  PAIN    metFORMIN (GLUCOPHAGE) 1,000 mg tablet TAKE 1 TABLET BY MOUTH TWICE DAILY WITH MEALS    lisinopril-hydroCHLOROthiazide (PRINZIDE, ZESTORETIC) 20-12.5 mg per tablet TAKE ONE TABLET BY MOUTH ONCE DAILY FOR HYPERTENSION    rosuvastatin (CRESTOR) 20 mg tablet Take 1 Tab by mouth nightly.  Blood-Glucose Meter (OneTouch Verio Flex Start) monitoring kit Take BG level AC/HS    glucose blood VI test strips (OneTouch Verio test strips) strip Take BG level AC/HS    lancets misc Take BG level AC/HS    aspirin 81 mg chewable tablet Take 1 Tab by mouth daily.  multivitamin (ONE A DAY) tablet Take 1 Tab by mouth daily.  calcium carbonate-vitamin D3 600 mg(1,500mg) -800 unit tab Take  by mouth.  fish oil-dha-epa 1,200-144-216 mg cap Take  by mouth. No current facility-administered medications for this visit.        PHYSICAL EXAMINATION:  Visit Vitals  Pulse 82   Temp 98.2 °F (36.8 °C) (Temporal)   Ht 6' (1.829 m)   Wt 224 lb 8 oz (101.8 kg)   SpO2 96%   BMI 30.45 kg/m²      ORTHO EXAMINATION:  Examination Right hip Left hip   Skin Intact Intact   External Rotation ROM 20 10   Internal Rotation ROM 10 10   Trochanteric tenderness - -   Hip flexion contracture - -   Antalgic gait - -   Trendelenberg sign - -   Lumbar tenderness - -   Straight leg raise - -   Calf tenderness - -   Neurovascular Intact Intact      Examination Lumbar Thoracic   Skin Intact Intact   Tenderness + -   Tightness - -   Lordosis Normal N/A   Kyphosis N/A Normal   Scoliosis - -   Flexion Fingertips to ankle N/A   Extension 10 N/A   Knee reflexes Normal N/A   Ankle reflexes Normal N/A   Straight leg raise - N/A   Calf tenderness - N/A        TIME OUT:  Chart reviewed for the following:   I, Shine Stephen MD, have reviewed the History, Physical and updated the Allergic reactions for [address] performed immediately prior to start of procedure:  Kely Adhikari MD, have performed the following reviews on Amparo Irvin prior to the start of the procedure:          * Patient was identified by name and date of birth   * Agreement on procedure being performed was verified  * Risks and Benefits explained to the patient  * Procedure site verified and marked as necessary  * Patient was positioned for comfort  * Consent was obtained     Time: 8:39 AM     Date of procedure: 4/5/2021  Procedure performed by:  Ofe Pineda MD  Mr. Mati Harris tolerated the procedure well with no complications. RADIOGRAPHS:  XR LEFT HIP 3/22/21 [facility] RAD  IMPRESSION  No acute osseous findings.     Mild/moderate bilateral hip osteoarthritis.     Right-sided transitional lumbosacral anatomy with pseudarthrosis. -I have independently reviewed these images during this office visit. -Dr. Asya Rust:  AP pelvis and two views - No fractures, moderate L>R joint space narrowing, + osteophytes present c/w pincher RICHARD. Tonnis grade 2, lumbar DDD     XR BILAT KNEE 8/3/16 [facility]  IMPRESSION:  Three views with bilateral knees on AP view - No fractures, no effusion, moderate medial joint space narrowing, + osteophytes present. Kellgren Tylor grade 2     IMPRESSION:      ICD-10-CM ICD-9-CM    1. Primary osteoarthritis of left hip  M16.12 715.15 DRAIN/INJECT LARGE JOINT/BURSA      betamethasone (CELESTONE) injection 3 mg   2. Chronic left hip pain  M25.552 719.45     G89.29 338.29    3. DDD (degenerative disc disease), lumbar  M51.36 722.52    4. Primary osteoarthritis of left knee  M17.12 715.16 PROCEDURE AUTHORIZATION TO    5. Primary osteoarthritis of right knee  M17.11 715.16 PROCEDURE AUTHORIZATION TO    6. Femoroacetabular impingement  M25.859 719.85      PLAN:  Consider visco supplementation if pain continues. Dietary counseling provided today. Start weight loss with low carb diet and intermittent fasting. After discussing treatment options, patient's left lateral hip was injected with 4 cc Marcaine and 1/2 cc Celestone. There is no need for surgery at this time. He will follow up as needed.       Scribed by Kacey Aceves ([address]) as dictated by Rubén Loaz MD

## 2021-04-07 ENCOUNTER — HOSPITAL ENCOUNTER (OUTPATIENT)
Dept: LAB | Age: 56
Discharge: HOME | End: 2021-04-07
Payer: COMMERCIAL

## 2021-04-07 ENCOUNTER — OFFICE VISIT (OUTPATIENT)
Dept: FAMILY MEDICINE CLINIC | Age: 56
End: 2021-04-07
Payer: COMMERCIAL

## 2021-04-07 VITALS
SYSTOLIC BLOOD PRESSURE: 169 MMHG | TEMPERATURE: 97.9 F | WEIGHT: 222 LBS | BODY MASS INDEX: 30.07 KG/M2 | HEIGHT: 72 IN | HEART RATE: 68 BPM | DIASTOLIC BLOOD PRESSURE: 98 MMHG | RESPIRATION RATE: 16 BRPM | OXYGEN SATURATION: 97 %

## 2021-04-07 DIAGNOSIS — G25.81 RESTLESS LEG: ICD-10-CM

## 2021-04-07 DIAGNOSIS — I10 ESSENTIAL HYPERTENSION: Primary | ICD-10-CM

## 2021-04-07 DIAGNOSIS — M17.0 PRIMARY OSTEOARTHRITIS OF BOTH KNEES: ICD-10-CM

## 2021-04-07 DIAGNOSIS — G47.33 OSA (OBSTRUCTIVE SLEEP APNEA): ICD-10-CM

## 2021-04-07 DIAGNOSIS — E11.40 TYPE 2 DIABETES MELLITUS WITH DIABETIC NEUROPATHY, WITHOUT LONG-TERM CURRENT USE OF INSULIN (HCC): ICD-10-CM

## 2021-04-07 DIAGNOSIS — R09.81 NASAL CONGESTION: ICD-10-CM

## 2021-04-07 PROCEDURE — 80307 DRUG TEST PRSMV CHEM ANLYZR: CPT

## 2021-04-07 PROCEDURE — 3051F HG A1C>EQUAL 7.0%<8.0%: CPT | Performed by: NURSE PRACTITIONER

## 2021-04-07 PROCEDURE — 99214 OFFICE O/P EST MOD 30 MIN: CPT | Performed by: NURSE PRACTITIONER

## 2021-04-07 RX ORDER — CYCLOBENZAPRINE HCL 5 MG
TABLET ORAL
Qty: 30 TAB | Refills: 1 | Status: SHIPPED | OUTPATIENT
Start: 2021-04-07 | End: 2021-06-14

## 2021-04-07 RX ORDER — METFORMIN HYDROCHLORIDE 1000 MG/1
TABLET ORAL
Qty: 60 TAB | Refills: 2 | Status: SHIPPED | OUTPATIENT
Start: 2021-04-07 | End: 2021-05-20

## 2021-04-07 RX ORDER — AMLODIPINE BESYLATE 5 MG/1
TABLET ORAL
Qty: 90 TAB | Refills: 0 | Status: SHIPPED | OUTPATIENT
Start: 2021-04-07 | End: 2021-04-27

## 2021-04-07 RX ORDER — GABAPENTIN 300 MG/1
CAPSULE ORAL
Qty: 60 CAP | Refills: 2 | Status: SHIPPED | OUTPATIENT
Start: 2021-04-07 | End: 2021-07-13

## 2021-04-07 RX ORDER — ROSUVASTATIN CALCIUM 20 MG/1
20 TABLET, COATED ORAL
Qty: 30 TAB | Refills: 6 | Status: SHIPPED | OUTPATIENT
Start: 2021-04-07 | End: 2021-12-02

## 2021-04-07 RX ORDER — FLUTICASONE PROPIONATE 50 MCG
SPRAY, SUSPENSION (ML) NASAL
Qty: 1 BOTTLE | Refills: 1 | Status: SHIPPED | OUTPATIENT
Start: 2021-04-07 | End: 2021-07-28 | Stop reason: SDUPTHER

## 2021-04-07 RX ORDER — MELOXICAM 15 MG/1
TABLET ORAL
Qty: 30 TAB | Refills: 1 | Status: SHIPPED | OUTPATIENT
Start: 2021-04-07 | End: 2021-06-03

## 2021-04-07 NOTE — LETTER
Name:Tianna Manley :1965 MR #:[id number] [facility] MA[facility] Page 1 of 5 CONTROLLED SUBSTANCE AGREEMENT I may be prescribed medications that are controlled substances as part  of my treatment plan for management of my medical condition(s). The goal of my treatment plan is to maintain and/or improve my health and wellbeing. Because controlled substances have an increased risk of abuse or harm, continual re-evaluation is needed determine if the goals of my treatment plan are being met for my safety and the safety of others. Kavon Sorto  am entering into this Controlled Substance Agreement with my provider, Dr. Nakia Renee NP at [facility] . I understand that successful treatment requires mutual trust and honesty between me and my provider. I understand that there are state and federal laws and regulations which apply to the medications that my provider may prescribe that must be followed. I understand there are risks and benefits ts of taking the medicines that my provider may prescribe. I understand and agree that following this Agreement is necessary in continuing my provider-patient relationship and success of my treatment plan. As a part of my treatment plan, I agree to the following: COMMUNICATION: 
 
1. I will communicate fully with my provider about my medical condition(s), including the effect on my daily life and how well my medications are helping. I will tell my provider all of the medications that I take for any reason, including medications I receive from another health care provider, and will notify my provider about all issues, problems or concerns, including any side effects, which may be related to my medications. I understand that this information allows my provider to adjust my treatment plan to help manage my medical condition.  I understand that this information will become part of my permanent medical record. 2. I will notify my provider if I have a history of alcohol/drug misuse/addiction or if I have had treatment for alcohol/drug addiction in the past, or if I have a new problem with or concern about alcohol/drug use/addiction, because this increases the likelihood of high risk behaviors and may lead to serious medical conditions. 3. Females Only: I will notify my provider if I am or become pregnant, or if I intend to become pregnant, or if I intend to breastfeed. I understand that communication of these issues with my provider is important, due to possible effects my medication could have on an unborn fetus or breastfeeding child. Initials_____ Name:Tianna Delarosa :1965 MR #:[id number] Office:[facility] MAIN OFFICE Page 2 of 5 MISUSE OF MEDICATIONS / DRUGS: 
 
1. I agree to take all controlled substances as prescribed, and will not misuse or abuse any controlled substances prescribed by my provider. For my safety, I will not increase the amount of medicine I take without first talking with and getting permission from my provider. 2. If I have a medical emergency, another health care provider may prescribe me medication. If I seek emergency treatment, I will notify my provider within seventy-two (72) hours. 3. I understand that my provider may discuss my use and/or possible misuse/abuse of controlled substances and alcohol, as appropriate, with any health care provider involved in my care, pharmacist or legal authority. ILLEGAL DRUGS: 
 
1. I will not use illegal drugs of any kind, including but not limited to marijuana, heroin, cocaine, or any prescription drug which is not prescribed to me. DRUG DIVERSION / PRESCRIPTION FRAUD: 
 
1. I will not share, sell, trade, give away, or otherwise misuse my prescriptions or medications. 2. I will not alter any prescriptions provided to me by my provider. SINGLE PROVIDER: 
 
1. I agree that all controlled substances that I take will be prescribed only by my provider (or his/her covering provider) under this Agreement. This agreement does not prevent me from seeking emergency medical treatment or receiving pain management related to a surgery. PROTECTING MEDICATIONS: 
 
1. I am responsible for keeping my prescriptions and medications in a safe and secure place including safeguarding them from loss or theft. I understand that lost, stolen or damaged/destroyed prescriptions or medications will not be replaced. Initials____ Name:.Hoang Manley :1965 MR #:[id number] [facility] MA[facility] OFF[facility] Page 3 of 5 PRESCRIPTION RENEWALS/REFILLS: 
 
1. I will follow my controlled substance medication schedule as prescribed by my provider. 2. I understand and agree that I will make any requests for renewals or refills of my prescriptions only at the time of an office visit or during my providers regular office hours subject to the prescription refill requirements of the individual practice. 3. I understand that my provider may not call in prescriptions for controlled substances to my pharmacy. 4. I understand that my provider may adjust or discontinue these medications as deemed appropriate for my medical treatment plan. This Agreement does not guarantee the prescription of controlled medications. 5. I agree that if my medications are adjusted or discontinued, I will properly dispose of any remaining medications. I understand that I will be required to dispose of any remaining controlled medications prior to being provided with any prescriptions for other controlled medications. 6. I understand that the renewal of my prescription depends on my medical condition, my consistent participation, and my adherence with my treatment plan and this Agreement.  
 
7. I understand that if I do not keep an appointment with my provider, I may not receive a renewal or refill for my controlled substance medication. PRESCRIPTION MONITORING / DRUG TESTIN. I understand that my provider may require me to provide urine, saliva or blood for testing at any time. I understand that this testing will be used to monitor for safety and adherence with my treatment plan and this Agreement. 2. I understand that my provider may ask me to provide an observed urine specimen, which means that a nurse or other health care provider may watch me provide urine, and I agree to cooperate if I am asked to provide an observed specimen. 3. I understand that if I do not provide urine, saliva or blood samples within two (2) hours of my providers request, or other timeframe decided by my provider, my treatment plan could be changed, or my prescriptions and medications may be changed or ended. 4. I understand that urine, saliva and blood test results will be a part of my permanent medical record. Initials_____ Name:Tianna Lyons :1965 MR #:[id number] Office:[facility] MAIN OFFICE Page 4 of 5 
 
5. I understand that my provider is required to obtain a copy of my State Prescription Monitoring Program () Report at any time in order to safely prescribe medications. 6. I will bring all of my prescribed controlled substance medications in their original bottles to all of my scheduled appointments. 7. I understand that my provider may ask me to come to the practice with all of my prescribed medications for a random pill count at any time. I agree to cooperate if I am asked to come in for a random pill count. I understand that if I do not arrive in the timeframe decided by my provider, my treatment plan could be changed, or my prescriptions and medications may be changed or ended.  
 
COOPERATION WITH INVESTIGATIONS: 
 
1. I authorize my provider and my pharmacy to cooperate fully with any local, state, or federal law enforcement agency in the investigation of any possible misuse, sale, or other diversion of my controlled substance prescriptions or medications. RISKS: 
 
1. I understand that my level of consciousness may be affected from the use of controlled substances, and I understand that there are risks, benefits, effects and potential alternatives (including no treatment) to the medications that my provider has prescribed. 2. I understand that I may become drowsy, tired, dizzy, constipated, and sick to my stomach, or have changes in my mood or in my sleep while taking my medications. I have talked with my provider about these possible side effects, risks, benefits, and alternative treatments, and my provider has answered all of my questions. 3. I understand that I should not suddenly stop taking my medications without first speaking with my provider. I understand that if I suddenly stop taking my medications, I may experience nausea, vomiting, sweating,anxiety, sleeplessness, itching or other uncomfortable feelings. 4. I will not take my medications with alcohol of any kind, including beer, wine or liquor. I understand that drinking alcohol with my medications increases the chances of side effects, including breathing problems or even death. 5. I understand that if I have a history of alcoholism or other drug addiction I may be at increased risk of addiction to my medications. Signs of addiction might include craving, compulsive use, and continued use despite harm. Since addiction is a disease, I understand my provider may decide to change my medications and refer me to appropriate treatment services. I understand that this information would become part of my permanent medical record. Initials_____ Name:Tianna Shah Betzy :1965 MR #:[id number] Office:[facility] MAIN OFFICE Page 5 of   
 
 
6.  Females only: Children born to women who regularly take controlled substances are likely to have physical problems and suffer withdrawal symptoms at birth. If I am of child-bearing age, I understand that I should use safe and effective birth control while taking any controlled substances to avoid the impact of medications on an unborn fetus or  child. I agree to notify my provider immediately if I should become pregnant so that my treatment plan can be adjusted. 7. Males only: I understand that chronic use of controlled substances has been associated with low testosterone levels in males which may affect my mood, stamina, sexual desire, and general health. I understand that my provider may order the appropriate laboratory test to determine my testosterone level,and I agree to this testing. ADHERENCE: 
 
1. I understand that if I do not adhere to this Agreement in any way, my provider may change my prescriptions, stop prescribing controlled substances or end our provider-patient relationship. 2. If my provider decides to stop prescribing medication, or decides to end our provider-patient relationship,my provider may require that I taper my medications slowly. If necessary, my provider may also provide a prescription for other medications to treat my withdrawal symptoms. UNDERSTANDING THIS AGREEMENT: 
 
I understand that my provider may adjust or stop my prescriptions for controlled substances based on my medical condition and my treatment plan. I understand that this Agreement does not guarantee that I will be prescribed medications or controlled substances. I understand that controlled substances may be just one part 
of my treatment plan. My initial on each page and my signature below shows that I have read each page of this Agreement, I have had an opportunity to ask questions, and all of my questions have been answered to my satisfaction by my provider.  
 
By signing below, I agree to comply with this Agreement, and I understand that if I do not follow the Agreements listed above, my provider may stop 
 
_________________________________________  Date/Time 4/7/2021 9:08 AM   
             (Patient Signature) 
 
 
_________________________________________ Date/Time 4/7/2021 9:08 AM 
 (Provider Signature)

## 2021-04-07 NOTE — PROGRESS NOTES
Tom Cabrera presents today for   Chief Complaint   Patient presents with    Diabetes     follow-up       Is someone accompanying this pt? no    Is the patient using any DME equipment during OV? no    Depression Screening:  3 most recent PHQ Screens 4/7/2021   PHQ Not Done -   Little interest or pleasure in doing things Not at all   Feeling down, depressed, irritable, or hopeless Not at all   Total Score PHQ 2 0   Trouble falling or staying asleep, or sleeping too much -   Feeling tired or having little energy -   Poor appetite, weight loss, or overeating -   Feeling bad about yourself - or that you are a failure or have let yourself or your family down -   Trouble concentrating on things such as school, work, reading, or watching TV -   Moving or speaking so slowly that other people could have noticed; or the opposite being so fidgety that others notice -   Thoughts of being better off dead, or hurting yourself in some way -   PHQ 9 Score -       Learning Assessment:  Learning Assessment 1/25/2019   PRIMARY LEARNER Patient   HIGHEST LEVEL OF EDUCATION - PRIMARY LEARNER  GRADUATED HIGH SCHOOL OR GED   BARRIERS PRIMARY LEARNER NONE   CO-LEARNER CAREGIVER No   PRIMARY LANGUAGE ENGLISH   LEARNER PREFERENCE PRIMARY LISTENING   ANSWERED BY patient   RELATIONSHIP SELF       Health Maintenance reviewed and discussed and ordered per Provider. Health Maintenance Due   Topic Date Due    Hepatitis C Screening  Never done    Pneumococcal 0-64 years (1 of 1 - PPSV23) Never done    Eye Exam Retinal or Dilated  Never done    COVID-19 Vaccine (1) Never done    DTaP/Tdap/Td series (1 - Tdap) 01/02/1987    Shingrix Vaccine Age 50> (1 of 2) Never done    Foot Exam Q1  12/20/2018   . Coordination of Care:  1. Have you been to the ER, urgent care clinic since your last visit? Hospitalized since your last visit? no    2.  Have you seen or consulted any other health care providers outside of the [facility] since your last visit? Include any pap smears or colon screening.  no

## 2021-04-07 NOTE — PROGRESS NOTES
Aleisha Elena is a 55 y.o. male presenting today for Diabetes (follow-up)    HPI:  Aleisha Elena presents to the office today for routine follow-up care. He carries a medical diagnosis for hypertension, diabetes mellitus, hyperlipidemia, neuropathy and EVELIN. He presents today complaining of bilateral knee pain and hip pain. He denies any cough, fever, loss of smell or taste or GI upset. C/O of bilateral knee pain and left hip pain. He was referred to Ortho and received an injection in the right hip. He is waiting approval from insurance for bilateral knee injections. Hypertension-his BP is elevated today his BP readings are 169/98 147 over 90s. He did not take his BP medication this morning but is negative for chest pain or palpitation. He notes he is compliant with the medication treatment plan. Hyperlipidemia-fasting labs done prior to today's visit. His cholesterol is 121, HDL 34 and LDL 56.2. He is prescribed a statin daily. Diabetes mellitus-his hemoglobin A1c on March 6, 2021 was 7.3 and previous hemoglobin A1c in September, 2019 was 6.4. He is negative for polyuria polydipsia. He is compliant with the medication treatment plan. ROS  ROS:  History obtained from the patient intake forms which are reviewed with the patient  · General: negative for - chills, fever, weight changes or malaise  · HEENT: no sore throat, nasal congestion, vision problems or ear problems  · Respiratory: no cough, shortness of breath, or wheezing  · Cardiovascular: no chest pain, palpitations, or dyspnea on exertion  · Gastrointestinal: no abdominal pain, N/V, change in bowel habits, or black or bloody stools  · Musculoskeletal: no back pain, joint pain, joint stiffness, muscle pain or muscle weakness  · Neurological: no numbness, tingling, headache or dizziness  · Endo:  No polyuria or polydipsia. · : no hematuria, dysuria, frequency, hesitancy, or nocturia.     · Psychological: negative for - anxiety, depression, sleep disturbances, suicidal or homicidal ideations      No Known Allergies    Current Outpatient Medications   Medication Sig Dispense Refill    meloxicam (MOBIC) 15 mg tablet Take 1 tablet by mouth once daily 30 Tab 1    metFORMIN (GLUCOPHAGE) 1,000 mg tablet TAKE 1 TABLET BY MOUTH TWICE DAILY WITH MEALS 60 Tab 2    amLODIPine (NORVASC) 5 mg tablet Take 1 tablet by mouth once daily 90 Tab 0    cyclobenzaprine (FLEXERIL) 5 mg tablet Take 1 tablet by mouth nightly 30 Tab 1    gabapentin (NEURONTIN) 300 mg capsule TAKE 1 CAPSULE BY MOUTH TWICE DAILY FOR  NEUROPATHIC  PAIN 60 Cap 2    rosuvastatin (CRESTOR) 20 mg tablet Take 1 Tab by mouth nightly. 30 Tab 6    fluticasone propionate (FLONASE) 50 mcg/actuation nasal spray 1 spray each nostril daily 1 Bottle 1    glipiZIDE (GLUCOTROL) 10 mg tablet Take 1 Tab by mouth daily (with breakfast). 30 Tab 3    lisinopril-hydroCHLOROthiazide (PRINZIDE, ZESTORETIC) 20-12.5 mg per tablet TAKE ONE TABLET BY MOUTH ONCE DAILY FOR HYPERTENSION 30 Tab 6    Blood-Glucose Meter (OneTouch Verio Flex Start) monitoring kit Take BG level AC/HS 1 Kit 0    glucose blood VI test strips (OneTouch Verio test strips) strip Take BG level AC/ Strip 3    lancets misc Take BG level AC/HS 1 Each 11    aspirin 81 mg chewable tablet Take 1 Tab by mouth daily. 30 Tab 11    calcium carbonate-vitamin D3 600 mg(1,500mg) -800 unit tab Take  by mouth.  fish oil-dha-epa 1,200-144-216 mg cap Take  by mouth.  multivitamin (ONE A DAY) tablet Take 1 Tab by mouth daily.          Past Medical History:   Diagnosis Date    Arthritis     Cataract, right 2010    Diabetes ([address]     H/O seasonal allergies     History of vitamin D deficiency 6/2016    Hypercholesterolemia     Hypertension     Hypertriglyceridemia 6/30/2016    Hypokalemia 6/30/2016    Low back pain     Numbness and tingling of foot June 2014    left toes and mid bottom of foot    Peripheral neuropathy     Scrotal mass 2016    Sleep apnea        Past Surgical History:   Procedure Laterality Date    HX HERNIA REPAIR  0795    umbilical, done at [address] TISS FACE/SCALP SUBQ 2+CM  16    scalp lesion removal, Dr. Thornton Bending History     Socioeconomic History    Marital status: SINGLE     Spouse name: Not on file    Number of children: 0    Years of education: 6    Highest education level: Not on file   Occupational History    Occupation: unemployed and living with son and daughter-in-law, after incarceration 2011 out 2014   Social Needs    Financial resource strain: Not on file    Food insecurity     Worry: Not on file     Inability: Not on file    Transportation needs     Medical: Not on file     Non-medical: Not on file   Tobacco Use    Smoking status: Former Smoker    Smokeless tobacco: Never Used    Tobacco comment: stopped in HS, never heavy and never long term   Substance and Sexual Activity    Alcohol use: No     Alcohol/week: 0.0 standard drinks     Comment: rarely holiday    Drug use: No    Sexual activity: Yes     Comment: no partner, spouse  2014   Lifestyle    Physical activity     Days per week: Not on file     Minutes per session: Not on file    Stress: Not on file   Relationships    Social connections     Talks on phone: Not on file     Gets together: Not on file     Attends [employer] service: Not on file     Active member of club or organization: Not on file     Attends meetings of clubs or organizations: Not on file     Relationship status: Not on file    Intimate partner violence     Fear of current or ex partner: Not on file     Emotionally abused: Not on file     Physically abused: Not on file     Forced sexual activity: Not on file   Other Topics Concern   [address] Service Yes     Comment: [address] [facility], from 1140-8990, other than honorable discharge    Blood Transfusions No    Caffeine Concern No     Comment: decreased his 2-3 super big gulps    Occupational Exposure Yes     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[employer], [place] then [facility] [employer] No    Sleep Concern No    Stress Concern Yes     Comment: Life in general    Weight Concern No    Special Diet No    Back Care No    Exercise No     Comment: starting to do something but not currently    Bike Helmet No    Seat Belt Yes    Self-Exams No   Social History Narrative    Not on file         Vitals:    04/07/21 0853 04/07/21 0939   BP: (!) 147/92 (!) 169/98   Pulse: 68    Resp: 16    Temp: 97.9 °F (36.6 °C)    TempSrc: Oral    SpO2: 97%    Weight: 222 lb (100.7 kg)    Height: 6' (1.829 m)    PainSc:   0 - No pain        Physical Exam  Vitals signs and nursing note reviewed. Constitutional:       Appearance: Normal appearance. HENT:      Head: Normocephalic. Neck:      Musculoskeletal: Normal range of motion and neck supple. Cardiovascular:      Rate and Rhythm: Normal rate and regular rhythm. Pulses: Normal pulses. Heart sounds: Normal heart sounds. Pulmonary:      Effort: Pulmonary effort is normal. No respiratory distress. Breath sounds: Normal breath sounds. Skin:     General: Skin is warm and dry. Neurological:      General: No focal deficit present. Mental Status: He is alert. Psychiatric:         Mood and Affect: Mood normal.         Hospital Outpatient Visit on 03/06/2021   Component Date Value Ref Range Status    LIPID PROFILE 03/06/2021        Final    Cholesterol, total 03/06/2021 121  <200 MG/DL Final    Triglyceride 03/06/2021 154* <150 MG/DL Final    Comment: The drugs N-acetylcysteine (NAC) and  Metamiszole have been found to cause falsely  low results in this chemical assay. Please  be sure to submit blood samples obtained  BEFORE administration of either of these  drugs to assure correct results.       HDL Cholesterol 03/06/2021 34* 40 - 60 MG/DL Final    LDL, calculated 03/06/2021 56.2  0 - 100 MG/DL Final  VLDL, calculated 03/06/2021 30.8  MG/DL Final    CHOL/HDL Ratio 03/06/2021 3.6  0 - 5.0   Final    Sodium 03/06/2021 142  136 - 145 mmol/L Final    Potassium 03/06/2021 3.8  3.5 - 5.5 mmol/L Final    Chloride 03/06/2021 105  100 - 111 mmol/L Final    CO2 03/06/2021 31  21 - 32 mmol/L Final    Anion gap 03/06/2021 6  3.0 - 18 mmol/L Final    Glucose 03/06/2021 152* 74 - 99 mg/dL Final    BUN 03/06/2021 17  7.0 - 18 MG/DL Final    Creatinine 03/06/2021 0.83  0.6 - 1.3 MG/DL Final    BUN/Creatinine ratio 03/06/2021 20  12 - 20   Final    GFR est AA 03/06/2021 >60  >60 ml/min/1.73m2 Final    GFR est non-AA 03/06/2021 >60  >60 ml/min/1.73m2 Final    Comment: (NOTE)  Estimated GFR is calculated using the Modification of Diet in Renal   Disease (MDRD) Study equation, reported for both  Americans   (GFRAA) and non- Americans (GFRNA), and normalized to 1.73m2   body surface area. The physician must decide which value applies to   the patient. The MDRD study equation should only be used in   individuals age 25 or older. It has not been validated for the   following: pregnant women, patients with serious comorbid conditions,   or on certain medications, or persons with extremes of body size,   muscle mass, or nutritional status.  Calcium 03/06/2021 9.0  8.5 - 10.1 MG/DL Final    Bilirubin, total 03/06/2021 1.0  0.2 - 1.0 MG/DL Final    ALT (SGPT) 03/06/2021 79* 16 - 61 U/L Final    AST (SGOT) 03/06/2021 35  10 - 38 U/L Final    Alk.  phosphatase 03/06/2021 70  45 - 117 U/L Final    Protein, total 03/06/2021 7.9  6.4 - 8.2 g/dL Final    Albumin 03/06/2021 4.5  3.4 - 5.0 g/dL Final    Globulin 03/06/2021 3.4  2.0 - 4.0 g/dL Final    A-G Ratio 03/06/2021 1.3  0.8 - 1.7   Final    WBC 03/06/2021 8.3  4.6 - 13.2 K/uL Final    RBC 03/06/2021 5.41  4.70 - 5.50 M/uL Final    HGB 03/06/2021 15.5  13.0 - 16.0 g/dL Final    HCT 03/06/2021 45.1  36.0 - 48.0 % Final    MCV 03/06/2021 83.4 74.0 - 97.0 FL Final    MCH 03/06/2021 28.7  24.0 - 34.0 PG Final    MCHC 03/06/2021 34.4  31.0 - 37.0 g/dL Final    RDW 03/06/2021 13.4  11.6 - 14.5 % Final    PLATELET 47/99/4202 121  135 - 420 K/uL Final    MPV 03/06/2021 10.6  9.2 - 11.8 FL Final    NEUTROPHILS 03/06/2021 50  40 - 73 % Final    LYMPHOCYTES 03/06/2021 39  21 - 52 % Final    MONOCYTES 03/06/2021 7  3 - 10 % Final    EOSINOPHILS 03/06/2021 3  0 - 5 % Final    BASOPHILS 03/06/2021 1  0 - 2 % Final    ABS. NEUTROPHILS 03/06/2021 4.1  1.8 - 8.0 K/UL Final    ABS. LYMPHOCYTES 03/06/2021 3.2  0.9 - 3.6 K/UL Final    ABS. MONOCYTES 03/06/2021 0.6  0.05 - 1.2 K/UL Final    ABS. EOSINOPHILS 03/06/2021 0.3  0.0 - 0.4 K/UL Final    ABS. BASOPHILS 03/06/2021 0.1  0.0 - 0.1 K/UL Final    DF 03/06/2021 AUTOMATED    Final    Microalbumin,urine random 03/06/2021 13.70* 0 - 3.0 MG/DL Final    Creatinine, urine 03/06/2021 186.00* 30 - 125 mg/dL Final    Microalbumin/Creat ratio (mg/g cre* 03/06/2021 74* 0 - 30 mg/g Final    Hemoglobin A1c 03/06/2021 7.3* 4.2 - 5.6 % Final    Comment: (NOTE)  HbA1C Interpretive Ranges  <5.7              Normal  5.7 - 6.4         Consider Prediabetes  >6.5              Consider Diabetes      Est. average glucose 03/06/2021 163  mg/dL Final    Comment: (NOTE)  The eAG should be interpreted with patient characteristics in mind   since ethnicity, interindividual differences, red cell lifespan,   variation in rates of glycation, etc. may affect the validity of the   calculation.  Prostate Specific Ag 03/06/2021 2.4  0.0 - 4.0 ng/mL Final       .No results found for any visits on 04/07/21. Assessment / Plan:      ICD-10-CM ICD-9-CM    1. Essential hypertension  I10 401.9 amLODIPine (NORVASC) 5 mg tablet   2. Primary osteoarthritis of both knees  M17.0 715.16 meloxicam (MOBIC) 15 mg tablet   3.  Type 2 diabetes mellitus with diabetic neuropathy, without long-term current use of insulin (HCC)  E11.40 250.60 metFORMIN (GLUCOPHAGE) 1,000 mg tablet     357.2 gabapentin (NEURONTIN) 300 mg capsule      rosuvastatin (CRESTOR) 20 mg tablet   4. Restless leg  G25.81 333.94 cyclobenzaprine (FLEXERIL) 5 mg tablet      14-DRUG SCREEN, UR   5. EVELIN (obstructive sleep apnea)  G47.33 327.23 REFERRAL TO SLEEP STUDIES   6. Nasal congestion  R09.81 478.19 fluticasone propionate (FLONASE) 50 mcg/actuation nasal spray         Follow-up and Dispositions    · Return in about 3 weeks (around 4/28/2021). I asked the patient if he  had any questions and answered his  questions. The patient stated that he understands the treatment plan and agrees with the treatment plan    This document was created with a voice activated dictation system and may contain transcription errors.

## 2021-04-08 LAB
6MAM UR QL SCN: NEGATIVE NG/ML
AMPHETAMINE SCREEN, URINE, 734836: NEGATIVE NG/ML
BARBITURATES UR QL SCN: NEGATIVE NG/ML
BENZODIAZ UR QL: NEGATIVE NG/ML
BUPRENORPHINE, URINE: NEGATIVE NG/ML
BZE UR QL: NEGATIVE NG/ML
CANNABINOIDS UR QL SCN: NEGATIVE NG/ML
CREAT UR-MCNC: 125.8 MG/DL (ref 20–300)
EDDP UR QL: NEGATIVE NG/ML
ETHANOL UR-MCNC: NEGATIVE %
ETHANOL UR-MCNC: NORMAL %
METHADONE UR QL SCN: NEGATIVE NG/ML
NITRITE UR QL STRIP: NORMAL MCG/ML
OPIATES UR QL SCN: NEGATIVE NG/ML
OXYCODONE+OXYMORPHONE UR QL SCN: NEGATIVE NG/ML
PCP UR QL: NEGATIVE NG/ML
PH UR: 7.1 [PH] (ref 4.5–8.9)
PROPOXYPH UR QL: NEGATIVE NG/ML

## 2021-04-27 DIAGNOSIS — I10 ESSENTIAL HYPERTENSION: ICD-10-CM

## 2021-04-27 RX ORDER — LISINOPRIL AND HYDROCHLOROTHIAZIDE 12.5; 2 MG/1; MG/1
TABLET ORAL
Qty: 30 TAB | Refills: 0 | Status: SHIPPED | OUTPATIENT
Start: 2021-04-27 | End: 2021-06-03

## 2021-04-27 RX ORDER — AMLODIPINE BESYLATE 5 MG/1
TABLET ORAL
Qty: 90 TAB | Refills: 0 | Status: SHIPPED | OUTPATIENT
Start: 2021-04-27 | End: 2021-07-02

## 2021-04-28 ENCOUNTER — OFFICE VISIT (OUTPATIENT)
Dept: FAMILY MEDICINE CLINIC | Age: 56
End: 2021-04-28
Payer: COMMERCIAL

## 2021-04-28 VITALS
WEIGHT: 218 LBS | SYSTOLIC BLOOD PRESSURE: 132 MMHG | RESPIRATION RATE: 16 BRPM | OXYGEN SATURATION: 97 % | BODY MASS INDEX: 29.53 KG/M2 | HEART RATE: 73 BPM | DIASTOLIC BLOOD PRESSURE: 88 MMHG | HEIGHT: 72 IN | TEMPERATURE: 97.6 F

## 2021-04-28 DIAGNOSIS — I10 ESSENTIAL HYPERTENSION: Primary | ICD-10-CM

## 2021-04-28 DIAGNOSIS — R29.898 BILATERAL ARM WEAKNESS: ICD-10-CM

## 2021-04-28 PROCEDURE — 99213 OFFICE O/P EST LOW 20 MIN: CPT | Performed by: NURSE PRACTITIONER

## 2021-04-28 NOTE — PROGRESS NOTES
[facility] presents today for   Chief Complaint   Patient presents with    Hypertension     follow-up       Is someone accompanying this pt? no    Is the patient using any DME equipment during OV? no    Depression Screening:  3 most recent PHQ Screens 4/28/2021   PHQ Not Done -   Little interest or pleasure in doing things Not at all   Feeling down, depressed, irritable, or hopeless Not at all   Total Score PHQ 2 0   Trouble falling or staying asleep, or sleeping too much -   Feeling tired or having little energy -   Poor appetite, weight loss, or overeating -   Feeling bad about yourself - or that you are a failure or have let yourself or your family down -   Trouble concentrating on things such as school, work, reading, or watching TV -   Moving or speaking so slowly that other people could have noticed; or the opposite being so fidgety that others notice -   Thoughts of being better off dead, or hurting yourself in some way -   PHQ 9 Score -       Learning Assessment:  Learning Assessment 1/25/2019   PRIMARY LEARNER Patient   HIGHEST LEVEL OF EDUCATION - PRIMARY LEARNER  GRADUATED HIGH SCHOOL OR GED   BARRIERS PRIMARY LEARNER NONE   CO-LEARNER CAREGIVER No   PRIMARY LANGUAGE ENGLISH   LEARNER PREFERENCE PRIMARY LISTENING   ANSWERED BY patient   RELATIONSHIP SELF       Health Maintenance reviewed and discussed and ordered per Provider. Health Maintenance Due   Topic Date Due    Hepatitis C Screening  Never done    Pneumococcal 0-64 years (1 of 1 - PPSV23) Never done    Eye Exam Retinal or Dilated  Never done    COVID-19 Vaccine (1) Never done    DTaP/Tdap/Td series (1 - Tdap) 01/02/1987    Shingrix Vaccine Age 50> (1 of 2) Never done    Foot Exam Q1  12/20/2018   . Coordination of Care:  1. Have you been to the ER, urgent care clinic since your last visit? Hospitalized since your last visit? no    2.  Have you seen or consulted any other health care providers outside of the [facility] since your last visit? Include any pap smears or colon screening.  no

## 2021-04-28 NOTE — PROGRESS NOTES
Maru Hinkle is a 55 y.o. male presenting today for Hypertension (follow-up)    HPI:  Maru Hinkle presents to the office today for hypertension follow-up care. He was seen in the practice on April 7, 2021 and his BP was 169/98. Per patient he did not take his BP medication that morning. His BP this morning is 130/80. He is negative for chest pain or palpitation and notes he is compliant with the medication treatment plan. Patient complaints of bilateral upper extremity weakness. He has dropped objects secondary to the sensation of numbness. He denies any pain or injury. ROS  ROS:  History obtained from the patient intake forms which are reviewed with the patient  · General: negative for - chills, fever, weight changes or malaise  · HEENT: no sore throat, nasal congestion, vision problems or ear problems  · Respiratory: no cough, shortness of breath, or wheezing  · Cardiovascular: no chest pain, palpitations, or dyspnea on exertion  · Gastrointestinal: no abdominal pain, N/V, change in bowel habits, or black or bloody stools  · Musculoskeletal: no back pain, joint pain, joint stiffness, muscle pain or muscle weakness  · Neurological: no numbness, tingling, headache or dizziness  · Endo:  No polyuria or polydipsia. · : no hematuria, dysuria, frequency, hesitancy, or nocturia.     · Psychological: negative for - anxiety, depression, sleep disturbances, suicidal or homicidal ideations    No Known Allergies    Current Outpatient Medications   Medication Sig Dispense Refill    amLODIPine (NORVASC) 5 mg tablet Take 1 tablet by mouth once daily 90 Tab 0    lisinopril-hydroCHLOROthiazide (PRINZIDE, ZESTORETIC) 20-12.5 mg per tablet TAKE 1 TABLET BY MOUTH ONCE DAILY FOR  HYPERTENSION 30 Tab 0    meloxicam (MOBIC) 15 mg tablet Take 1 tablet by mouth once daily 30 Tab 1    metFORMIN (GLUCOPHAGE) 1,000 mg tablet TAKE 1 TABLET BY MOUTH TWICE DAILY WITH MEALS 60 Tab 2    cyclobenzaprine (FLEXERIL) 5 mg tablet Take 1 tablet by mouth nightly 30 Tab 1    gabapentin (NEURONTIN) 300 mg capsule TAKE 1 CAPSULE BY MOUTH TWICE DAILY FOR  NEUROPATHIC  PAIN 60 Cap 2    rosuvastatin (CRESTOR) 20 mg tablet Take 1 Tab by mouth nightly. 30 Tab 6    fluticasone propionate (FLONASE) 50 mcg/actuation nasal spray 1 spray each nostril daily 1 Bottle 1    glipiZIDE (GLUCOTROL) 10 mg tablet Take 1 Tab by mouth daily (with breakfast). 30 Tab 3    Blood-Glucose Meter (OneTouch Verio Flex Start) monitoring kit Take BG level AC/HS 1 Kit 0    glucose blood VI test strips (OneTouch Verio test strips) strip Take BG level AC/ Strip 3    lancets misc Take BG level AC/HS 1 Each 11    aspirin 81 mg chewable tablet Take 1 Tab by mouth daily. 30 Tab 11    calcium carbonate-vitamin D3 600 mg(1,500mg) -800 unit tab Take  by mouth.  fish oil-dha-epa 1,200-144-216 mg cap Take  by mouth.  multivitamin (ONE A DAY) tablet Take 1 Tab by mouth daily.          Past Medical History:   Diagnosis Date    Arthritis     Cataract, right 2010    Diabetes ([address]     H/O seasonal allergies     History of vitamin D deficiency 6/2016    Hypercholesterolemia     Hypertension     Hypertriglyceridemia 6/30/2016    Hypokalemia 6/30/2016    Low back pain     Numbness and tingling of foot June 2014    left toes and mid bottom of foot    Peripheral neuropathy     Scrotal mass 6/6/2016    Sleep apnea        Past Surgical History:   Procedure Laterality Date    HX HERNIA REPAIR  3673    umbilical, done at [address] TISS FACE/SCALP SUBQ 2+CM  1/4/16    scalp lesion removal, Dr. Magdy Masterson History     Socioeconomic History    Marital status: SINGLE     Spouse name: Not on file    Number of children: 0    Years of education: 6    Highest education level: Not on file   Occupational History    Occupation: unemployed and living with son and daughter-in-law, after incarceration 9/2011 out 2014   Social Needs    Financial resource strain: Not on file    Food insecurity     Worry: Not on file     Inability: Not on file    Transportation needs     Medical: Not on file     Non-medical: Not on file   Tobacco Use    Smoking status: Former Smoker    Smokeless tobacco: Never Used    Tobacco comment: stopped in HS, never heavy and never long term   Substance and Sexual Activity    Alcohol use: No     Alcohol/week: 0.0 standard drinks     Comment: rarely holiday    Drug use: No    Sexual activity: Yes     Comment: no partner, spouse  2014   Lifestyle    Physical activity     Days per week: Not on file     Minutes per session: Not on file    Stress: Not on file   Relationships    Social connections     Talks on phone: Not on file     Gets together: Not on file     Attends [employer] service: Not on file     Active member of club or organization: Not on file     Attends meetings of clubs or organizations: Not on file     Relationship status: Not on file    Intimate partner violence     Fear of current or ex partner: Not on file     Emotionally abused: Not on file     Physically abused: Not on file     Forced sexual activity: Not on file   Other Topics Concern   [address] Service Yes     Comment: [address] [facility], from [id number], other than honorable discharge    Blood Transfusions No    Caffeine Concern No     Comment: decreased his 2-3 super big gulps    Occupational Exposure Yes     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 Constitutional:       Appearance: Normal appearance. Cardiovascular:      Rate and Rhythm: Normal rate and regular rhythm. Pulses: Normal pulses. Heart sounds: Normal heart sounds. Pulmonary:      Effort: Pulmonary effort is normal.      Breath sounds: Normal breath sounds. Neurological:      General: No focal deficit present. Mental Status: He is alert. Motor: No weakness (normal strength bilaterally- upper  ). Hospital Outpatient Visit on 04/07/2021   Component Date Value Ref Range Status    Amphetamine screen, urine 04/07/2021 Negative  Qjiitt=596 ng/mL Final    Comment: (NOTE)  Amphetamine test includes Amphetamine and Methamphetamine.  Barbiturates 04/07/2021 Negative  Yfhmnn=741 ng/mL Final    Benzodiazepines 04/07/2021 Negative  Irtjnx=763 ng/mL Final    Cannabinoids 04/07/2021 Negative  Cutoff=20 ng/mL Final    Cocaine metabolite, urine 04/07/2021 Negative  Iefpqg=311 ng/mL Final    Opiates 04/07/2021 Negative  Vpiymx=715 ng/mL Final    Comment: (NOTE)  Opiate test includes Codeine, Morphine, Hydromorphone, Hydrocodone.       6-Acetylmorphone, urine 04/07/2021 Negative  Cutoff=10 ng/mL Final    Oxycodone/Oxymorphone, urine 04/07/2021 Negative  Jijnnv=780 ng/mL Final    Comment: (NOTE)  Test includes Oxycodone and Oxymorphone      Phencyclidine 04/07/2021 Negative  Cutoff=25 ng/mL Final    Methadone Screen, Urine 04/07/2021 Negative  Owbuwr=249 ng/mL Final    EDDP, urine 04/07/2021 Negative  Iajvni=190 ng/mL Final    PROPOXYPHENE 04/07/2021 Negative  Vhvitg=859 ng/mL Final    Buprenorphine, urine 04/07/2021 Negative  Cutoff=10 ng/mL Final    Ethanol, urine 04/07/2021 Negative  Cutoff=0.020 % Final    Creatinine, urine 04/07/2021 125.8  20.0 - 300.0 mg/dL Final    Nitrites, urine 04/07/2021 <<DO NOT REPORT>>  mcg/mL Final    pH, urine 04/07/2021 7.1  4.5 - 8.9   Final    Comment: (NOTE)  Performed At:  [facility]  [address] [place], [place] [id number]  Trish Dwyer PhD [id number]      Ethanol, urine QT 04/07/2021 <<DO NOT REPORT>>  % Final   Hospital Outpatient Visit on 03/06/2021   Component Date Value Ref Range Status    LIPID PROFILE 03/06/2021        Final    Cholesterol, total 03/06/2021 121  <200 MG/DL Final    Triglyceride 03/06/2021 154* <150 MG/DL Final    Comment: The drugs N-acetylcysteine (NAC) and  Metamiszole have been found to cause falsely  low results in this chemical assay. Please  be sure to submit blood samples obtained  BEFORE administration of either of these  drugs to assure correct results.  HDL Cholesterol 03/06/2021 34* 40 - 60 MG/DL Final    LDL, calculated 03/06/2021 56.2  0 - 100 MG/DL Final    VLDL, calculated 03/06/2021 30.8  MG/DL Final    CHOL/HDL Ratio 03/06/2021 3.6  0 - 5.0   Final    Sodium 03/06/2021 142  136 - 145 mmol/L Final    Potassium 03/06/2021 3.8  3.5 - 5.5 mmol/L Final    Chloride 03/06/2021 105  100 - 111 mmol/L Final    CO2 03/06/2021 31  21 - 32 mmol/L Final    Anion gap 03/06/2021 6  3.0 - 18 mmol/L Final    Glucose 03/06/2021 152* 74 - 99 mg/dL Final    BUN 03/06/2021 17  7.0 - 18 MG/DL Final    Creatinine 03/06/2021 0.83  0.6 - 1.3 MG/DL Final    BUN/Creatinine ratio 03/06/2021 20  12 - 20   Final    GFR est AA 03/06/2021 >60  >60 ml/min/1.73m2 Final    GFR est non-AA 03/06/2021 >60  >60 ml/min/1.73m2 Final    Comment: (NOTE)  Estimated GFR is calculated using the Modification of Diet in Renal   Disease (MDRD) Study equation, reported for both  Americans   (GFRAA) and non- Americans (GFRNA), and normalized to 1.73m2   body surface area. The physician must decide which value applies to   the patient. The MDRD study equation should only be used in   individuals age 25 or older.  It has not been validated for the   following: pregnant women, patients with serious comorbid conditions,   or on certain medications, or persons with extremes of body size,   muscle mass, or nutritional status.  Calcium 03/06/2021 9.0  8.5 - 10.1 MG/DL Final    Bilirubin, total 03/06/2021 1.0  0.2 - 1.0 MG/DL Final    ALT (SGPT) 03/06/2021 79* 16 - 61 U/L Final    AST (SGOT) 03/06/2021 35  10 - 38 U/L Final    Alk. phosphatase 03/06/2021 70  45 - 117 U/L Final    Protein, total 03/06/2021 7.9  6.4 - 8.2 g/dL Final    Albumin 03/06/2021 4.5  3.4 - 5.0 g/dL Final    Globulin 03/06/2021 3.4  2.0 - 4.0 g/dL Final    A-G Ratio 03/06/2021 1.3  0.8 - 1.7   Final    WBC 03/06/2021 8.3  4.6 - 13.2 K/uL Final    RBC 03/06/2021 5.41  4.70 - 5.50 M/uL Final    HGB 03/06/2021 15.5  13.0 - 16.0 g/dL Final    HCT 03/06/2021 45.1  36.0 - 48.0 % Final    MCV 03/06/2021 83.4  74.0 - 97.0 FL Final    MCH 03/06/2021 28.7  24.0 - 34.0 PG Final    MCHC 03/06/2021 34.4  31.0 - 37.0 g/dL Final    RDW 03/06/2021 13.4  11.6 - 14.5 % Final    PLATELET 93/64/8385 242  135 - 420 K/uL Final    MPV 03/06/2021 10.6  9.2 - 11.8 FL Final    NEUTROPHILS 03/06/2021 50  40 - 73 % Final    LYMPHOCYTES 03/06/2021 39  21 - 52 % Final    MONOCYTES 03/06/2021 7  3 - 10 % Final    EOSINOPHILS 03/06/2021 3  0 - 5 % Final    BASOPHILS 03/06/2021 1  0 - 2 % Final    ABS. NEUTROPHILS 03/06/2021 4.1  1.8 - 8.0 K/UL Final    ABS. LYMPHOCYTES 03/06/2021 3.2  0.9 - 3.6 K/UL Final    ABS. MONOCYTES 03/06/2021 0.6  0.05 - 1.2 K/UL Final    ABS. EOSINOPHILS 03/06/2021 0.3  0.0 - 0.4 K/UL Final    ABS.  BASOPHILS 03/06/2021 0.1  0.0 - 0.1 K/UL Final    DF 03/06/2021 AUTOMATED    Final    Microalbumin,urine random 03/06/2021 13.70* 0 - 3.0 MG/DL Final    Creatinine, urine 03/06/2021 186.00* 30 - 125 mg/dL Final    Microalbumin/Creat ratio (mg/g cre* 03/06/2021 74* 0 - 30 mg/g Final    Hemoglobin A1c 03/06/2021 7.3* 4.2 - 5.6 % Final    Comment: (NOTE)  HbA1C Interpretive Ranges  <5.7              Normal  5.7 - 6.4         Consider Prediabetes  >6.5              Consider Diabetes      Est. average glucose 03/06/2021 163  mg/dL Final    Comment: (NOTE)  The eAG should be interpreted with patient characteristics in mind   since ethnicity, interindividual differences, red cell lifespan,   variation in rates of glycation, etc. may affect the validity of the   calculation.  Prostate Specific Ag 03/06/2021 2.4  0.0 - 4.0 ng/mL Final       .No results found for any visits on 04/28/21. Assessment / Plan:      ICD-10-CM ICD-9-CM    1. Essential hypertension  I10 401.9    2. Bilateral arm weakness  R29.898 729.89 REFERRAL TO NEUROLOGY     HTN- continue current treatment plan. Refilled Amlodipine and Lisinopril  Follow-up and Dispositions    · Return in about 4 months (around 8/28/2021). I asked the patient if he  had any questions and answered his  questions. The patient stated that he understands the treatment plan and agrees with the treatment plan    This document was created with a voice activated dictation system and may contain transcription errors.

## 2021-06-01 DIAGNOSIS — I10 ESSENTIAL HYPERTENSION: ICD-10-CM

## 2021-06-01 DIAGNOSIS — M17.0 PRIMARY OSTEOARTHRITIS OF BOTH KNEES: ICD-10-CM

## 2021-06-02 ENCOUNTER — TELEPHONE (OUTPATIENT)
Dept: FAMILY MEDICINE CLINIC | Age: 56
End: 2021-06-02

## 2021-06-02 DIAGNOSIS — Z86.69 HISTORY OF BELL'S PALSY: Primary | ICD-10-CM

## 2021-06-02 NOTE — TELEPHONE ENCOUNTER
Patient called neurology to schedule his appointment. He was told referral stated weakness in arms was the only problem listed. 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 Neurology told him he needed updated referral to schedule. Please advise.

## 2021-06-03 RX ORDER — LISINOPRIL AND HYDROCHLOROTHIAZIDE 12.5; 2 MG/1; MG/1
TABLET ORAL
Qty: 30 TABLET | Refills: 2 | Status: SHIPPED | OUTPATIENT
Start: 2021-06-03 | End: 2021-09-01

## 2021-06-03 RX ORDER — MELOXICAM 15 MG/1
TABLET ORAL
Qty: 30 TABLET | Refills: 0 | Status: SHIPPED | OUTPATIENT
Start: 2021-06-03 | End: 2021-06-14

## 2021-06-07 ENCOUNTER — HOSPITAL ENCOUNTER (OUTPATIENT)
Dept: LAB | Age: 56
Discharge: HOME | End: 2021-06-07
Payer: COMMERCIAL

## 2021-06-07 LAB
FERRITIN SERPL-MCNC: 66 NG/ML (ref 8–388)
FOLATE SERPL-MCNC: >20 NG/ML (ref 3.1–17.5)
VIT B12 SERPL-MCNC: 366 PG/ML (ref 211–911)

## 2021-06-07 PROCEDURE — 82728 ASSAY OF FERRITIN: CPT

## 2021-06-07 PROCEDURE — 36415 COLL VENOUS BLD VENIPUNCTURE: CPT

## 2021-06-07 PROCEDURE — 82607 VITAMIN B-12: CPT

## 2021-06-28 DIAGNOSIS — I10 ESSENTIAL HYPERTENSION: ICD-10-CM

## 2021-07-02 RX ORDER — AMLODIPINE BESYLATE 5 MG/1
TABLET ORAL
Qty: 90 TABLET | Refills: 0 | Status: SHIPPED | OUTPATIENT
Start: 2021-07-02 | End: 2021-08-04 | Stop reason: DRUGHIGH

## 2021-07-28 DIAGNOSIS — R09.81 NASAL CONGESTION: ICD-10-CM

## 2021-07-28 NOTE — TELEPHONE ENCOUNTER
Requested Prescriptions     Pending Prescriptions Disp Refills    fluticasone propionate (FLONASE) 50 mcg/actuation nasal spray 1 Bottle 1     Si spray each nostril daily

## 2021-07-29 RX ORDER — FLUTICASONE PROPIONATE 50 MCG
SPRAY, SUSPENSION (ML) NASAL
Qty: 1 BOTTLE | Refills: 1 | Status: SHIPPED | OUTPATIENT
Start: 2021-07-29 | End: 2022-07-26 | Stop reason: SDUPTHER

## 2021-08-04 ENCOUNTER — OFFICE VISIT (OUTPATIENT)
Dept: FAMILY MEDICINE CLINIC | Age: 56
End: 2021-08-04
Payer: COMMERCIAL

## 2021-08-04 VITALS
HEIGHT: 72 IN | TEMPERATURE: 98.2 F | SYSTOLIC BLOOD PRESSURE: 153 MMHG | BODY MASS INDEX: 29.39 KG/M2 | RESPIRATION RATE: 16 BRPM | OXYGEN SATURATION: 97 % | DIASTOLIC BLOOD PRESSURE: 84 MMHG | HEART RATE: 73 BPM | WEIGHT: 217 LBS

## 2021-08-04 DIAGNOSIS — I10 ESSENTIAL HYPERTENSION: ICD-10-CM

## 2021-08-04 DIAGNOSIS — E11.40 TYPE 2 DIABETES MELLITUS WITH DIABETIC NEUROPATHY, WITHOUT LONG-TERM CURRENT USE OF INSULIN (HCC): Primary | ICD-10-CM

## 2021-08-04 DIAGNOSIS — E78.5 DYSLIPIDEMIA: ICD-10-CM

## 2021-08-04 LAB — HBA1C MFR BLD HPLC: 7.4 %

## 2021-08-04 PROCEDURE — 99214 OFFICE O/P EST MOD 30 MIN: CPT | Performed by: NURSE PRACTITIONER

## 2021-08-04 PROCEDURE — 3051F HG A1C>EQUAL 7.0%<8.0%: CPT | Performed by: NURSE PRACTITIONER

## 2021-08-04 PROCEDURE — 83036 HEMOGLOBIN GLYCOSYLATED A1C: CPT | Performed by: NURSE PRACTITIONER

## 2021-08-04 RX ORDER — AMLODIPINE BESYLATE 10 MG/1
10 TABLET ORAL DAILY
Qty: 90 TABLET | Refills: 0 | Status: SHIPPED | OUTPATIENT
Start: 2021-08-04 | End: 2021-11-19 | Stop reason: SDUPTHER

## 2021-08-04 NOTE — PROGRESS NOTES
Luca Bynum presents today for   Chief Complaint   Patient presents with    Diabetes     follow-up    Hypertension       Is someone accompanying this pt? no    Is the patient using any DME equipment during OV? no    Depression Screening:  3 most recent PHQ Screens 8/4/2021   PHQ Not Done -   Little interest or pleasure in doing things Not at all   Feeling down, depressed, irritable, or hopeless Not at all   Total Score PHQ 2 0   Trouble falling or staying asleep, or sleeping too much -   Feeling tired or having little energy -   Poor appetite, weight loss, or overeating -   Feeling bad about yourself - or that you are a failure or have let yourself or your family down -   Trouble concentrating on things such as school, work, reading, or watching TV -   Moving or speaking so slowly that other people could have noticed; or the opposite being so fidgety that others notice -   Thoughts of being better off dead, or hurting yourself in some way -   PHQ 9 Score -       Learning Assessment:  Learning Assessment 1/25/2019   PRIMARY LEARNER Patient   HIGHEST LEVEL OF EDUCATION - PRIMARY LEARNER  GRADUATED HIGH SCHOOL OR GED   BARRIERS PRIMARY LEARNER NONE   CO-LEARNER CAREGIVER No   PRIMARY LANGUAGE ENGLISH   LEARNER PREFERENCE PRIMARY LISTENING   ANSWERED BY patient   RELATIONSHIP SELF       Health Maintenance reviewed and discussed and ordered per Provider. Health Maintenance Due   Topic Date Due    Hepatitis C Screening  Never done    Eye Exam Retinal or Dilated  Never done    DTaP/Tdap/Td series (1 - Tdap) 11/24/2011    Shingrix Vaccine Age 50> (1 of 2) Never done    Foot Exam Q1  12/20/2018   . Coordination of Care:  1. Have you been to the ER, urgent care clinic since your last visit? Hospitalized since your last visit? No    2. Have you seen or consulted any other health care providers outside of the [address] [place] [address] since your last visit? Include any pap smears or colon screening. no

## 2021-08-04 NOTE — PROGRESS NOTES
Zac Tamez is a 55 y.o. male presenting today for Diabetes (follow-up) and Hypertension    HPI:  Zac Tamez presents to the office today for routine follow-up care. He carries a medical diagnosis for hypertension, diabetes mellitus, hyperlipidemia, neuropathy and EVELIN. He presents today complaining of bilateral knee pain and hip pain. He denies any cough, fever, loss of smell or taste or GI upset. C/O of lumbar back x over the last few days. He bent over and he had pain. Hypertension-his BP is elevated today his BP reading are 153/84. He did not take his BP medication this morning but is negative for chest pain or palpitation. He notes he is compliant with the medication treatment plan. Hyperlipidemia-fasting labs done prior to today's visit. His cholesterol is 121, HDL 34 and LDL 56.2. He is prescribed a statin daily. Diabetes mellitus-his hemoglobin A1c on March 6, 2021 was 7.3 and his Hbg A1C today 7.4. He is negative for polyuria or polydipsia. He is compliant with the medication treatment plan. ROS  ROS:  History obtained from the patient intake forms which are reviewed with the patient  · General: negative for - chills, fever, weight changes or malaise  · HEENT: no sore throat, nasal congestion, vision problems or ear problems  · Respiratory: no cough, shortness of breath, or wheezing  · Cardiovascular: no chest pain, palpitations, or dyspnea on exertion  · Gastrointestinal: no abdominal pain, N/V, change in bowel habits, or black or bloody stools  · Musculoskeletal: Intermittent lumbar pain  · Neurological: no numbness, tingling, headache or dizziness  · Endo:  No polyuria or polydipsia. · : no hematuria, dysuria, frequency, hesitancy, or nocturia.     · Psychological: negative for - anxiety, depression, sleep disturbances, suicidal or homicidal ideations      No Known Allergies    Current Outpatient Medications   Medication Sig Dispense Refill    amLODIPine (NORVASC) 10 mg tablet Take 1 Tablet by mouth daily. 90 Tablet 0    gabapentin (NEURONTIN) 300 mg capsule TAKE 1 CAPSULE BY MOUTH TWICE DAILY FOR  NEUROPATHIC  PAIN 60 Capsule 3    meloxicam (MOBIC) 15 mg tablet Take 1 tablet by mouth once daily 30 Tablet 1    lisinopril-hydroCHLOROthiazide (PRINZIDE, ZESTORETIC) 20-12.5 mg per tablet TAKE 1 TABLET BY MOUTH ONCE DAILY FOR HIGH BLOOD PRESSURE 30 Tablet 2    metFORMIN (GLUCOPHAGE) 1,000 mg tablet TAKE 1 TABLET BY MOUTH TWICE DAILY WITH MEALS 60 Tablet 3    glipiZIDE (GLUCOTROL) 10 mg tablet Take 1 Tab by mouth daily (with breakfast). 30 Tab 3    glucose blood VI test strips (OneTouch Verio test strips) strip Take BG level AC/ Strip 3    fish oil-dha-epa 1,200-144-216 mg cap Take  by mouth.  fluticasone propionate (FLONASE) 50 mcg/actuation nasal spray 1 spray each nostril daily (Patient not taking: Reported on 8/4/2021) 1 Bottle 1    cyclobenzaprine (FLEXERIL) 5 mg tablet Take 1 tablet by mouth nightly (Patient not taking: Reported on 8/4/2021) 30 Tablet 1    rosuvastatin (CRESTOR) 20 mg tablet Take 1 Tab by mouth nightly. (Patient not taking: Reported on 8/4/2021) 30 Tab 6    Blood-Glucose Meter (OneTouch Verio Flex Start) monitoring kit Take BG level AC/HS 1 Kit 0    lancets misc Take BG level AC/HS (Patient not taking: Reported on 8/4/2021) 1 Each 11    aspirin 81 mg chewable tablet Take 1 Tab by mouth daily. (Patient not taking: Reported on 8/4/2021) 30 Tab 11    calcium carbonate-vitamin D3 600 mg(1,500mg) -800 unit tab Take  by mouth. (Patient not taking: Reported on 8/4/2021)      multivitamin (ONE A DAY) tablet Take 1 Tab by mouth daily.  (Patient not taking: Reported on 8/4/2021)         Past Medical History:   Diagnosis Date    Arthritis     Cataract, right 2010    Diabetes ([address]     H/O seasonal allergies     History of vitamin D deficiency 6/2016    Hypercholesterolemia     Hypertension     Hypertriglyceridemia 6/30/2016    Hypokalemia 2016    Low back pain     Numbness and tingling of foot 2014    left toes and mid bottom of foot    Peripheral neuropathy     Scrotal mass 2016    Sleep apnea        Past Surgical History:   Procedure Laterality Date    HX HERNIA REPAIR  0538    umbilical, done at [address] TISS FACE/SCALP SUBQ 2+CM  16    scalp lesion removal, Dr. Hairston Ros History     Socioeconomic History    Marital status: SINGLE     Spouse name: Not on file    Number of children: 0    Years of education: 6    Highest education level: Not on file   Occupational History    Occupation: unemployed and living with son and daughter-in-law, after incarceration 2011 out 2014   Tobacco Use    Smoking status: Former Smoker    Smokeless tobacco: Never Used    Tobacco comment: stopped in HS, never heavy and never long term   Substance and Sexual Activity    Alcohol use: No     Alcohol/week: 0.0 standard drinks     Comment: rarely holiday    Drug use: No    Sexual activity: Yes     Comment: no partner, spouse  2014   Other Topics Concern   [address] Service Yes     Comment: [address] [facility], from [id number], other than honorable discharge    Blood Transfusions No    Caffeine Concern No     Comment: decreased his 2-3 super big gulps    Occupational Exposure Yes     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[place] [address] then [place] [facility] Hazards No    Sleep Concern No    Stress Concern Yes     Comment: Life in general    Weight Concern No    Special Diet No    Back Care No    Exercise No     Comment: starting to do something but not currently    Bike Helmet No    Seat Belt Yes    Self-Exams No   Social History Narrative    Not on file     Social Determinants of Health     Financial Resource Strain:     Difficulty of Paying Living Expenses:    Food Insecurity:     Worried About Running Out of Food in the Last Year:     [address] in the Last Year:    Transportation Needs:     Lack of Transportation (Medical):  Lack of Transportation (Non-Medical):    Physical Activity:     Days of Exercise per Week:     Minutes of Exercise per Session:    Stress:     Feeling of Stress :    Social Connections:     Frequency of Communication with Friends and Family:     Frequency of Social Gatherings with Friends and Family:     Attends Jew Services:     Active Member of Clubs or Organizations:     Attends Club or Organization Meetings:     Marital Status:    Intimate Partner Violence:     Fear of Current or Ex-Partner:     Emotionally Abused:     Physically Abused:     Sexually Abused:          Vitals:    08/04/21 0947   BP: (!) 153/84   Pulse: 73   Resp: 16   Temp: 98.2 °F (36.8 °C)   TempSrc: Oral   SpO2: 97%   Weight: 217 lb (98.4 kg)   Height: 6' (1.829 m)   PainSc:   0 - No pain       Physical Exam  Vitals and nursing note reviewed. Constitutional:       Appearance: Normal appearance. HENT:      Head: Normocephalic. Cardiovascular:      Rate and Rhythm: Normal rate and regular rhythm. Pulses: Normal pulses. Heart sounds: Normal heart sounds. Pulmonary:      Effort: Pulmonary effort is normal. No respiratory distress. Breath sounds: Normal breath sounds. Musculoskeletal:      Cervical back: Normal range of motion and neck supple. Skin:     General: Skin is warm and dry. Neurological:      General: No focal deficit present. Mental Status: He is alert.    Psychiatric:         Mood and Affect: Mood normal.         Office Visit on 08/04/2021   Component Date Value Ref Range Status    Hemoglobin A1c (POC) 08/04/2021 7.4  % Final   Hospital Outpatient Visit on 06/07/2021   Component Date Value Ref Range Status    Vitamin B12 06/07/2021 366  211 - 911 pg/mL Final    Folate 06/07/2021 >20.0* 3.10 - 17.50 ng/mL Final    Ferritin 06/07/2021 66  8 - 388 NG/ML Final       .  Results for orders placed or performed in visit on 08/04/21   AMB POC HEMOGLOBIN A1C   Result Value Ref Range    Hemoglobin A1c (POC) 7.4 %       Assessment / Plan:      ICD-10-CM ICD-9-CM    1. Type 2 diabetes mellitus with diabetic neuropathy, without long-term current use of insulin (HCC)  E11.40 250.60 AMB POC HEMOGLOBIN A1C     357.2    2. Essential hypertension  I10 401.9 amLODIPine (NORVASC) 10 mg tablet   3. Dyslipidemia  E78.5 272.4        Continue current treatment plan  Denies any side effects or adverse reaction to the medications  Medication refills  Follow-up in 4 months  Follow-up and Dispositions    · Return in about 4 months (around 12/4/2021). I asked the patient if he  had any questions and answered his  questions. The patient stated that he understands the treatment plan and agrees with the treatment plan    This document was created with a voice activated dictation system and may contain transcription errors.

## 2021-08-23 ENCOUNTER — VIRTUAL VISIT (OUTPATIENT)
Dept: NEUROLOGY | Age: 56
End: 2021-08-23
Payer: COMMERCIAL

## 2021-08-23 DIAGNOSIS — M79.601 RIGHT ARM PAIN: ICD-10-CM

## 2021-08-23 DIAGNOSIS — R53.1 WEAKNESS: ICD-10-CM

## 2021-08-23 DIAGNOSIS — G47.33 OSA (OBSTRUCTIVE SLEEP APNEA): ICD-10-CM

## 2021-08-23 DIAGNOSIS — M79.2 RADICULAR PAIN IN RIGHT ARM: ICD-10-CM

## 2021-08-23 DIAGNOSIS — Z86.69 HISTORY OF BELL'S PALSY: ICD-10-CM

## 2021-08-23 DIAGNOSIS — R20.0 NUMBNESS AND TINGLING OF RIGHT ARM: Primary | ICD-10-CM

## 2021-08-23 DIAGNOSIS — R20.0 NUMBNESS AND TINGLING OF RIGHT ARM: ICD-10-CM

## 2021-08-23 DIAGNOSIS — R20.2 NUMBNESS AND TINGLING OF RIGHT ARM: ICD-10-CM

## 2021-08-23 DIAGNOSIS — R20.2 NUMBNESS AND TINGLING OF RIGHT ARM: Primary | ICD-10-CM

## 2021-08-23 DIAGNOSIS — R51.9 HEADACHE DISORDER: ICD-10-CM

## 2021-08-23 DIAGNOSIS — R29.810 MOUTH DROOP DUE TO FACIAL WEAKNESS: ICD-10-CM

## 2021-08-23 PROCEDURE — 99214 OFFICE O/P EST MOD 30 MIN: CPT | Performed by: NURSE PRACTITIONER

## 2021-08-23 RX ORDER — TOPIRAMATE 25 MG/1
TABLET ORAL
Qty: 84 TABLET | Refills: 0 | Status: SHIPPED | OUTPATIENT
Start: 2021-08-23 | End: 2021-09-29 | Stop reason: SDUPTHER

## 2021-08-23 NOTE — PROGRESS NOTES
Agueda Song is a 55 y.o. male who will be cell phone for today's VV. 1. Have you been to the ER, urgent care clinic since your last visit? Hospitalized since your last visit? No    2. Have you seen or consulted any other health care providers outside of the [address] [place] [address] since your last visit? Include any pap smears or colon screening.  No     This will be the cell phone n umber [phone]

## 2021-08-23 NOTE — PROGRESS NOTES
Hui Em is a 55 y.o. male who was seen by synchronous (real-time) audio-video technology on 8/23/2021 for No chief complaint on file. Assessment & Plan:   Diagnoses and all orders for this visit:    1. Numbness and tingling of right arm  -     MRI CERV SPINE WO CONT; Future    2. Right arm pain  -     MRI CERV SPINE WO CONT; Future    3. Radicular pain in right arm  -     MRI CERV SPINE WO CONT; Future    4. Weakness  -     MRI CERV SPINE WO CONT; Future    5. Headache disorder    6. History of Bell's palsy    7. Mouth droop due to facial weakness    8. EVELIN (obstructive sleep apnea)    Other orders  -     topiramate (TOPAMAX) 25 mg tablet; Take one tab at bedtime for a week, then take two tabs at bedtime for a week, then take three tabs at bedtime thereafter. Patient presents for evaluation of right-sided head, neck, and arm pain. Endorses significant history of right-sided Bell's palsy 2 years ago. Does have some frequent right facial weakness. Endorses several months ago developing right-sided head pain. Can be dull and right parietal in location. Can have pain into right mandible that is more severe and can last an hour. Denies focal deficits. Denies light sensitivity, nausea, or vomiting. Denies routine use of over-the-counter analgesic. Significant history of obstructive sleep apnea and has not utilized his CPAP in 6 months. He is under the care of sleep medicine provider Dr. Patti Rodríguez. He will maintain follow-ups and tells me he is planning to have a repeat sleep study. Discussed headache may be secondary to TMJ vs not wearing his CPAP vs neuralgia vs other. Did have MRI of the brain from 1/2020 with no abnormality in CNs. Will treat with Topamax to determine if we can achieve headache control. Right neck pain and arm pain with weakness and dropping things. Will evaluate MRI of the C spine.  He will follow up closely in office and 4 weeks to determine further need of testing such as EMG. I spent at least 30 minutes on this visit with this established patient. 712  Subjective: This is a 28-year-old male who presents for follow-up. Last seen by Dr. Alejandro Thomas in June 2020. Previously seen for obstructive sleep apnea. Today endorses right sided head pain. Mercedez Murphy this started a couple months ago. Denies inciting factor injury. Mercedez Murphy he can have a dull pain on the right side of his head and endorses pain going into the right side of his face and can feel like he has locked jaw. He says it can be sharp at times and last an hour. He says that he will have trouble opening his mouth. Denies grinding his teeth. Is working to establish with a new dentist.  Headache not associated with light sensitivity, sensitivity to sound, nausea, or vomiting. Denies focal deficits. Headache can be exacerbated by warm water in the shower. Denies head or jaw pain exacerbated by talking, chewing, or cold air. Denies sharp shooting pains that repeat and relieved multiple times a day. Denies blurred vision or vision loss. Does not report conjunctival injection, lacrimation, or rhinorrhea. Using aspirin sparingly for headache with some improvement. Endorses significant history of right-sided Bell's palsy with some right side facial weakness. He said he was diagnosed 2 years ago. He is diabetic. Also mentions some right side neck and right arm pain. Can feel pain going from the neck down the arm. Can have weakness. Can drop things. He is right-hand dominant. Denies inciting factor injury. Has not had a recent evaluation of the cervical spine. Was previously seen by Dr. Alejandro Thomas for treatment of obstructive sleep apnea. He endorses being off of his CPAP for 6 months. He is currently establishing with a new sleep medicine provider. He thinks he sees Dr. Joanna Smyth. Prior to Admission medications    Medication Sig Start Date End Date Taking?  Authorizing Provider   topiramate (TOPAMAX) 25 mg tablet Take one tab at bedtime for a week, then take two tabs at bedtime for a week, then take three tabs at bedtime thereafter. 8/23/21  Yes Corey Richmond NP   glipiZIDE (GLUCOTROL) 10 mg tablet Take 1 tablet by mouth once daily with breakfast 8/14/21  Yes Marci Lynne NP   amLODIPine (NORVASC) 10 mg tablet Take 1 Tablet by mouth daily. 8/4/21  Yes Marci Lynne NP   fluticasone propionate (FLONASE) 50 mcg/actuation nasal spray 1 spray each nostril daily 7/29/21  Yes Marci Lynne NP   gabapentin (NEURONTIN) 300 mg capsule TAKE 1 CAPSULE BY MOUTH TWICE DAILY FOR  NEUROPATHIC  PAIN 7/13/21  Yes Marci Lynne NP   meloxicam (MOBIC) 15 mg tablet Take 1 tablet by mouth once daily 6/14/21  Yes Marci Lynne NP   cyclobenzaprine (FLEXERIL) 5 mg tablet Take 1 tablet by mouth nightly 6/14/21  Yes Marci Lynne NP   lisinopril-hydroCHLOROthiazide (PRINZIDE, ZESTORETIC) 20-12.5 mg per tablet TAKE 1 TABLET BY MOUTH ONCE DAILY FOR HIGH BLOOD PRESSURE 6/3/21  Yes Marci Lynne NP   metFORMIN (GLUCOPHAGE) 1,000 mg tablet TAKE 1 TABLET BY MOUTH TWICE DAILY WITH MEALS 5/20/21  Yes Marci Lynne NP   rosuvastatin (CRESTOR) 20 mg tablet Take 1 Tab by mouth nightly. 4/7/21  Yes Marci Lynne NP   Blood-Glucose Meter (OneTouch Verio Flex Start) monitoring kit Take BG level AC/HS 6/4/20  Yes Marci Lynne NP   glucose blood VI test strips (OneTouch Verio test strips) strip Take BG level AC/HS 6/4/20  Yes Marci Lynen NP   lancets misc Take BG level AC/HS 6/4/20  Yes Marci Lynne NP   aspirin 81 mg chewable tablet Take 1 Tab by mouth daily. 2/20/19  Yes Rogerio PRIETO NP   fish oil-dha-epa 2,540-651-624 mg cap Take  by mouth. Yes Provider, Historical   calcium carbonate-vitamin D3 600 mg(1,500mg) -800 unit tab Take  by mouth.   Patient not taking: Reported on 8/4/2021    Provider, Historical multivitamin (ONE A DAY) tablet Take 1 Tab by mouth daily. Patient not taking: Reported on 8/23/2021    Provider, Historical     Patient Active Problem List   Diagnosis Code    Right cataract H26.9    Type 2 diabetes mellitus without complication ([facility] G33.6    Dribbling urine N39.43    Essential hypertension I10    Numbness of left foot R20.8    Other seasonal allergic rhinitis J30.2    Low back pain M54.5    Scrotal mass N50.89    Hypertriglyceridemia E78.1    Hypokalemia E87.6    Type 2 diabetes mellitus with diabetic neuropathy ([facility] E11.40    Sleep apnea G47.30    Bell palsy G51.0     Patient Active Problem List    Diagnosis Date Noted   Ledell Reining palsy 07/09/2019    Sleep apnea 09/26/2018    Type 2 diabetes mellitus with diabetic neuropathy ([facility] 06/13/2018    Hypertriglyceridemia 06/30/2016    Hypokalemia 06/30/2016    Scrotal mass 06/06/2016    Low back pain     Other seasonal allergic rhinitis 10/07/2015    Right cataract 08/03/2015    Type 2 diabetes mellitus without complication ([facility] 53/18/5730    Dribbling urine 08/03/2015    Essential hypertension 08/03/2015    Numbness of left foot 08/03/2015     Current Outpatient Medications   Medication Sig Dispense Refill    topiramate (TOPAMAX) 25 mg tablet Take one tab at bedtime for a week, then take two tabs at bedtime for a week, then take three tabs at bedtime thereafter. 84 Tablet 0    glipiZIDE (GLUCOTROL) 10 mg tablet Take 1 tablet by mouth once daily with breakfast 90 Tablet 1    amLODIPine (NORVASC) 10 mg tablet Take 1 Tablet by mouth daily.  90 Tablet 0    fluticasone propionate (FLONASE) 50 mcg/actuation nasal spray 1 spray each nostril daily 1 Bottle 1    gabapentin (NEURONTIN) 300 mg capsule TAKE 1 CAPSULE BY MOUTH TWICE DAILY FOR  NEUROPATHIC  PAIN 60 Capsule 3    meloxicam (MOBIC) 15 mg tablet Take 1 tablet by mouth once daily 30 Tablet 1    cyclobenzaprine (FLEXERIL) 5 mg tablet Take 1 tablet by mouth nightly 30 Tablet 1    lisinopril-hydroCHLOROthiazide (PRINZIDE, ZESTORETIC) 20-12.5 mg per tablet TAKE 1 TABLET BY MOUTH ONCE DAILY FOR HIGH BLOOD PRESSURE 30 Tablet 2    metFORMIN (GLUCOPHAGE) 1,000 mg tablet TAKE 1 TABLET BY MOUTH TWICE DAILY WITH MEALS 60 Tablet 3    rosuvastatin (CRESTOR) 20 mg tablet Take 1 Tab by mouth nightly. 30 Tab 6    Blood-Glucose Meter (OneTouch Verio Flex Start) monitoring kit Take BG level AC/HS 1 Kit 0    glucose blood VI test strips (OneTouch Verio test strips) strip Take BG level AC/ Strip 3    lancets misc Take BG level AC/HS 1 Each 11    aspirin 81 mg chewable tablet Take 1 Tab by mouth daily. 30 Tab 11    fish oil-dha-epa 1,200-144-216 mg cap Take  by mouth.  calcium carbonate-vitamin D3 600 mg(1,500mg) -800 unit tab Take  by mouth. (Patient not taking: Reported on 8/4/2021)      multivitamin (ONE A DAY) tablet Take 1 Tab by mouth daily. (Patient not taking: Reported on 8/23/2021)       No Known Allergies  Past Medical History:   Diagnosis Date    Arthritis     Cataract, right 2010    Diabetes ([address]     H/O seasonal allergies     History of vitamin D deficiency 6/2016    Hypercholesterolemia     Hypertension     Hypertriglyceridemia 6/30/2016    Hypokalemia 6/30/2016    Low back pain     Numbness and tingling of foot June 2014    left toes and mid bottom of foot    Peripheral neuropathy     Scrotal mass 6/6/2016    Sleep apnea      Past Surgical History:   Procedure Laterality Date    HX HERNIA REPAIR  8632    umbilical, done at [address] TISS FACE/SCALP SUBQ 2+CM  1/4/16    scalp lesion removal, Dr. Duane Almeida     Family History   Problem Relation Age of Onset    No Known Problems Mother     No Known Problems Sister      Social History     Tobacco Use    Smoking status: Former Smoker    Smokeless tobacco: Never Used    Tobacco comment: stopped in HS, never heavy and never long term   Substance Use Topics    Alcohol use:  No Alcohol/week: 0.0 standard drinks     Comment: rarely holiday       Review of Systems  GENERAL: Denies fever or fatigue  CARDIAC: No CP or SOB  PULMONARY: No cough of SOB  MUSCULOSKELETAL: No new joint pain  NEURO: SEE HPI      Objective:     Patient-Reported Vitals 8/23/2021   Patient-Reported Weight 217.0      General: alert, cooperative, no distress   Mental  status: normal mood, behavior, speech, dress, motor activity, and thought processes, able to follow commands   HENT: NCAT   Neck: no visualized mass   Resp: no respiratory distress   Neuro: no gross deficits   Skin: no discoloration or lesions of concern on visible areas   Psychiatric: normal affect, consistent with stated mood, no evidence of hallucinations     Additional exam findings: This is a very pleasant 46-year-old male. He is alert and in no apparent distress. Full fund of knowledge. Speech is clear. Lower right sided facial weakness. Able to squeeze eyes closed bilaterally. Extraocular movements intact. Tongue midline. Equal shoulder shrug. Finger-nose-finger intact. Ambulates steady without use of assistive device. No signs of ataxia or incoordination. We discussed the expected course, resolution and complications of the diagnosis(es) in detail. Medication risks, benefits, costs, interactions, and alternatives were discussed as indicated. I advised him to contact the office if his condition worsens, changes or fails to improve as anticipated. He expressed understanding with the diagnosis(es) and plan. Bret Casarez, was evaluated through a synchronous (real-time) audio-video encounter. The patient (or guardian if applicable) is aware that this is a billable service. Verbal consent to proceed has been obtained within the past 12 months.  The visit was conducted pursuant to the emergency declaration under the [facility] waiver authority and the [facility] and Response Supplemental Appropriations Act. Patient identification was verified, and a caregiver was present when appropriate. The patient was located in a state where the provider was credentialed to provide care.     Nish Radford NP

## 2021-08-31 DIAGNOSIS — I10 ESSENTIAL HYPERTENSION: ICD-10-CM

## 2021-09-01 ENCOUNTER — TELEPHONE (OUTPATIENT)
Dept: NEUROLOGY | Age: 56
End: 2021-09-01

## 2021-09-01 RX ORDER — LISINOPRIL AND HYDROCHLOROTHIAZIDE 12.5; 2 MG/1; MG/1
TABLET ORAL
Qty: 30 TABLET | Refills: 0 | Status: SHIPPED | OUTPATIENT
Start: 2021-09-01 | End: 2021-09-28

## 2021-09-03 DIAGNOSIS — R20.2 NUMBNESS AND TINGLING: ICD-10-CM

## 2021-09-03 DIAGNOSIS — M79.2 RADICULAR PAIN IN RIGHT ARM: ICD-10-CM

## 2021-09-03 DIAGNOSIS — M79.601 RIGHT ARM PAIN: Primary | ICD-10-CM

## 2021-09-03 DIAGNOSIS — R20.0 NUMBNESS AND TINGLING: ICD-10-CM

## 2021-09-11 ENCOUNTER — HOSPITAL ENCOUNTER (OUTPATIENT)
Age: 56
Discharge: HOME | End: 2021-09-11
Attending: NURSE PRACTITIONER
Payer: COMMERCIAL

## 2021-09-11 PROCEDURE — 72141 MRI NECK SPINE W/O DYE: CPT

## 2021-09-24 ENCOUNTER — OFFICE VISIT (OUTPATIENT)
Dept: ORTHOPEDIC SURGERY | Age: 56
End: 2021-09-24
Payer: COMMERCIAL

## 2021-09-24 VITALS
WEIGHT: 215 LBS | BODY MASS INDEX: 29.12 KG/M2 | TEMPERATURE: 98.5 F | OXYGEN SATURATION: 97 % | SYSTOLIC BLOOD PRESSURE: 147 MMHG | HEART RATE: 76 BPM | DIASTOLIC BLOOD PRESSURE: 76 MMHG | HEIGHT: 72 IN | RESPIRATION RATE: 16 BRPM

## 2021-09-24 DIAGNOSIS — R29.898 RIGHT ARM WEAKNESS: ICD-10-CM

## 2021-09-24 DIAGNOSIS — M79.601 RIGHT ARM PAIN: Primary | ICD-10-CM

## 2021-09-24 DIAGNOSIS — M79.601 RIGHT ARM PAIN: ICD-10-CM

## 2021-09-24 DIAGNOSIS — R94.131 ABNORMAL EMG: ICD-10-CM

## 2021-09-24 PROCEDURE — 95886 MUSC TEST DONE W/N TEST COMP: CPT | Performed by: PHYSICAL MEDICINE & REHABILITATION

## 2021-09-24 PROCEDURE — 95912 NRV CNDJ TEST 11-12 STUDIES: CPT | Performed by: PHYSICAL MEDICINE & REHABILITATION

## 2021-09-24 NOTE — PROGRESS NOTES
[address]  [address], [address]  [address]  Phone: [phone]  Fax: [phone]        Ravi Alaniz  : 1965  PCP: Claudia Ruff NP  2021    ELECTROMYOGRAPHY AND NERVE CONDUCTION STUDIES    Christa Paige was referred by MARY Richmond for BUE EMG electrodiagnostic evaluation of RUE pain and weakness. NCV & EMG Findings:  Evaluation of the right median sensory nerve showed decreased conduction velocity (Wrist-2nd Digit, 37 m/s). The right median/ulnar (dig IV) comparison nerve showed prolonged distal peak latency (Median Wr, 4.0 ms) and abnormal peak latency difference (Median Wr-Ulnar Wr, 0.9 ms). All remaining nerves (as indicated in the following tables) were within normal limits. Left vs. Right side comparison data for the median sensory nerve indicates abnormal L-R latency difference (0.6 ms). All remaining left vs. right side differences were within normal limits. Needle evaluation of the right triceps muscle showed increased motor unit amplitude. The right pronator teres muscle showed slightly increased polyphasic potentials. The right Ext Digitorum muscle showed moderately increased polyphasic potentials. All remaining muscles (as indicated in the following table) showed no evidence of electrical instability. INTERPRETATION    This is an abnormal electrodiagnostic examination. These findings may be consistent with:   1. Mild-to-moderate median mononeuropathy at the right wrist (carpal tunnel syndrome)   2. Chronic right C7 radiculopathy - no active denervation - this is based on mild findings of increased amplitude and polyphasic waves at the triceps, EDC, and PT. There are no signs of muscle membrane instability. There is no electrodiagnostic evidence of any brachial plexopathy, peripheral polyneuropathy, or any other mononeuropathy.     CLINICAL INTERPRETATION    His electrodiagnostic findings of right sided carpal tunnel and chronic C7 radiculopathy may be consistent with his right arm symptoms. HISTORY OF PRESENT ILLNESS  Adolfo Stein is a 55 y.o. male. Pt presents today for BUE EMG evaluation of RUE pain and weakness. He notes that he has been dropping things. He reports history of right-sided Bell's palsy 2 years ago with some continued right facial weakness. Cervical MRI 9/11/21:  IMPRESSION  Disproportionate discogenic degenerative change at C5-C6 and C6-C7. Moderate  and/or severe foraminal and central canal narrowing at these levels.     Please refer to the body of the report for a comprehensive segmental analysis of  the cervical spinal column.     Straightening of the expected cervical lordosis. PAST MEDICAL HISTORY   Past Medical History:   Diagnosis Date    Arthritis     Cataract, right 2010    Diabetes ([facility] [address]     H/O seasonal allergies     History of vitamin D deficiency 6/2016    Hypercholesterolemia     Hypertension     Hypertriglyceridemia 6/30/2016    Hypokalemia 6/30/2016    Low back pain     Numbness and tingling of foot June 2014    left toes and mid bottom of foot    Peripheral neuropathy     Scrotal mass 6/6/2016    Sleep apnea        Past Surgical History:   Procedure Laterality Date    HX HERNIA REPAIR  7938    umbilical, done at [address] TISS FACE/SCALP SUBQ 2+CM  1/4/16    scalp lesion removal, Dr. Spencer Rangel   .       MEDICATIONS    Current Outpatient Medications   Medication Sig Dispense Refill    metFORMIN (GLUCOPHAGE) 1,000 mg tablet TAKE 1 TABLET BY MOUTH TWICE DAILY WITH MEALS 60 Tablet 2    cyclobenzaprine (FLEXERIL) 5 mg tablet Take 1 tablet by mouth nightly 30 Tablet 2    lisinopril-hydroCHLOROthiazide (PRINZIDE, ZESTORETIC) 20-12.5 mg per tablet TAKE 1 TABLET BY MOUTH ONCE DAILY FOR HIGH BLOOD PRESSURE 30 Tablet 0    topiramate (TOPAMAX) 25 mg tablet Take one tab at bedtime for a week, then take two tabs at bedtime for a week, then take three tabs at bedtime thereafter. 84 Tablet 0    glipiZIDE (GLUCOTROL) 10 mg tablet Take 1 tablet by mouth once daily with breakfast 90 Tablet 1    amLODIPine (NORVASC) 10 mg tablet Take 1 Tablet by mouth daily. 90 Tablet 0    fluticasone propionate (FLONASE) 50 mcg/actuation nasal spray 1 spray each nostril daily 1 Bottle 1    gabapentin (NEURONTIN) 300 mg capsule TAKE 1 CAPSULE BY MOUTH TWICE DAILY FOR  NEUROPATHIC  PAIN 60 Capsule 3    meloxicam (MOBIC) 15 mg tablet Take 1 tablet by mouth once daily 30 Tablet 1    rosuvastatin (CRESTOR) 20 mg tablet Take 1 Tab by mouth nightly. 30 Tab 6    Blood-Glucose Meter (OneTouch Verio Flex Start) monitoring kit Take BG level AC/HS 1 Kit 0    glucose blood VI test strips (OneTouch Verio test strips) strip Take BG level AC/ Strip 3    lancets misc Take BG level AC/HS 1 Each 11    aspirin 81 mg chewable tablet Take 1 Tab by mouth daily. 30 Tab 11    calcium carbonate-vitamin D3 600 mg(1,500mg) -800 unit tab Take  by mouth. (Patient not taking: Reported on 8/4/2021)      fish oil-dha-epa 1,200-144-216 mg cap Take  by mouth.  multivitamin (ONE A DAY) tablet Take 1 Tab by mouth daily.  (Patient not taking: Reported on 8/23/2021)          ALLERGIES  No Known Allergies       SOCIAL HISTORY    Social History     Socioeconomic History    Marital status:      Spouse name: Not on file    Number of children: 0    Years of education: 6    Highest education level: Not on file   Occupational History    Occupation: unemployed and living with son and daughter-in-law, after incarceration 9/2011 out 4/2014   Tobacco Use    Smoking status: Former Smoker    Smokeless tobacco: Never Used    Tobacco comment: stopped in HS, never heavy and never long term   Substance and Sexual Activity    Alcohol use: No     Alcohol/week: 0.0 standard drinks     Comment: rarely holiday    Drug use: No    Sexual activity: Yes Comment: no partner, spouse  2014   Other Topics Concern     Service Yes     Comment: [address], from 2749-9447, other than honorable discharge    Blood Transfusions No    Caffeine Concern No     Comment: decreased his 2-3 super big gulps    Occupational Exposure Yes     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[employer] Hazards No    Sleep Concern No    Stress Concern Yes     Comment: Life in general    Weight Concern No    Special Diet No    Back Care No    Exercise No     Comment: starting to do something but not currently    Bike Helmet No    Seat Belt Yes    Self-Exams No     Social Determinants of Health     Financial Resource Strain:     Difficulty of Paying Living Expenses:    Food Insecurity:     Worried About Running Out of Food in the Last Year:     [address] in the Last Year:    Transportation Needs:     Lack of Transportation (Medical):  Lack of Transportation (Non-Medical):    Physical Activity:     Days of Exercise per Week:     Minutes of Exercise per Session:    Stress:     Feeling of Stress :    Social Connections:     Frequency of Communication with Friends and Family:     Frequency of Social Gatherings with Friends and Family:     Attends Druze Services:     Active Member of Clubs or Organizations:     Attends Club or Organization Meetings:     Marital Status:        FAMILY HISTORY  Family History   Problem Relation Age of Onset    No Known Problems Mother     No Known Problems Sister          PHYSICAL EXAMINATION  Visit Vitals  BP (!) 147/76 (BP 1 Location: Right arm, BP Patient Position: Sitting, BP Cuff Size: Large adult)   Pulse 76   Temp 98.5 °F (36.9 °C) (Skin)   Resp 16   Ht 6' (1.829 m)   Wt 215 lb (97.5 kg)   SpO2 97%   BMI 29.16 kg/m²       Pain Assessment  2021   Location of Pain Neck; Other (comment)   Pain Location Comment head   Location Modifiers -   Severity of Pain 10   Quality of Pain Locking   Duration of Pain - Frequency of Pain Intermittent   Date Pain First Started -   Date Pain First Started Comment -   Aggravating Factors -   Limiting Behavior -   Relieving Factors -   Result of Injury -           Constitutional:  Well developed, well nourished, in no acute distress. Psychiatric: Affect and mood are appropriate. Integumentary: No rashes or abrasions noted on exposed areas.         SPINE/MUSCULOSKELETAL EXAM    On brief examination: Negative Spurling sign on the R.      NCV & EMG Findings:  Nerve Conduction Studies  Anti Sensory Summary Table     Stim Site NR Peak (ms) Norm Peak (ms) O-P Amp (µV) Norm O-P Amp Site1 Site2 Delta-P (ms) Dist (cm) Saturnino (m/s) Norm Saturnino (m/s)   Left Median Anti Sensory (2nd Digit)   Wrist    3.2 <4 19.2 >13 Wrist 2nd Digit 3.2 14.0 44 >39   Right Median Anti Sensory (2nd Digit)   Wrist    3.8 <4 13.7 >13 Wrist 2nd Digit 3.8 14.0 37 >39   Left Radial Anti Sensory (Base 1st Digit)   Wrist    2.1 2.8 44.2 11 Wrist Base 1st Digit 2.1 10.0 48    Right Radial Anti Sensory (Base 1st Digit)   Wrist    2.1 2.8 35.5 11 Wrist Base 1st Digit 2.1 10.0 48    Left Ulnar Anti Sensory (5th Digit)   Wrist    3.2 <4.0 15.3 >9 Wrist 5th Digit 3.2 14.0 44 >38   Right Ulnar Anti Sensory (5th Digit)   Wrist    3.3 <4.0 12.7 >9 Wrist 5th Digit 3.3 14.0 42 >38     Motor Summary Table     Stim Site NR Onset (ms) Norm Onset (ms) O-P Amp (mV) Norm O-P Amp Site1 Site2 Delta-0 (ms) Dist (cm) Saturnino (m/s) Norm Saturnino (m/s)   Left Median Motor (Abd Poll Brev)   Wrist    3.8 <4.5 9.4 >4.1 Elbow Wrist 4.6 24.0 52 >49   Elbow    8.4  9.0          Right Median Motor (Abd Poll Brev)   Wrist    4.5 <4.5 11.0 >4.1 Elbow Wrist 4.9 24.0 49 >49   Elbow    9.4  8.9          Left Ulnar Motor (Abd Dig Min)   Wrist    3.1 <3.7 8.2 >7.9 B Elbow Wrist 3.5 20.5 59 >52   B Elbow    6.6  7.1  A Elbow B Elbow 2.2 10.0 45 >43   A Elbow    8.8  8.3          Right Ulnar Motor (Abd Dig Min)   Wrist    2.7 <3.7 9.3 >7.9 B Elbow Wrist 3.6 20.0 56 >52 B Elbow    6.3  7.5  A Elbow B Elbow 2.1 10.0 48 >43   A Elbow    8.4  7.4            Comparison Summary Table     Stim Site NR Peak (ms) Norm Peak (ms) O-P Amp (µV) Site1 Site2 Delta-P (ms) Norm Delta (ms)   Right Median/Ulnar Dig IV Comparison (Digit 4 - 14cm)   Median Wr    4.0 <3.3 14.7 Median Wr Ulnar Wr 0.9 <0.4   Ulnar Wr    3.1 <3.3 7.3         EMG     Side Muscle Nerve Root Ins Act Fibs Psw Amp Dur Poly Recrt Int Orlando Bun Comment   Right Biceps Musculocut C5-6 Nml Nml Nml Nml Nml 0 Nml Nml    Right Triceps Radial C6-7-8 Nml Nml Nml Incr Nml 0 Nml Nml    Right PronatorTeres Median C6-7 Nml Nml Nml Nml Nml 1+ Nml Nml    Right Ext Digitorum Radial (Post Int) C7-8 Nml Nml Nml Nml Nml 2+ Nml Nml    Right Abd Poll Brev Median C8-T1 Nml Nml Nml Nml Nml 0 Nml Nml    Right 1stDorInt Ulnar C8-T1 Nml Nml Nml Nml Nml 0 Nml Nml    Left Biceps Musculocut C5-6 Nml Nml Nml Nml Nml 0 Nml Nml    Left Triceps Radial C6-7-8 Nml Nml Nml Nml Nml 0 Nml Nml    Left PronatorTeres Median C6-7 Nml Nml Nml Nml Nml 0 Nml Nml    Left Ext Digitorum Radial (Post Int) C7-8 Nml Nml Nml Nml Nml 0 Nml Nml    Left Abd Poll Brev Median C8-T1 Nml Nml Nml Nml Nml 0 Nml Nml    Left 1stDorInt Ulnar C8-T1 Nml Nml Nml Nml Nml 0 Nml Nml    Right Cervical Parasp Up Rami C1-3 Nml Nml Nml         Right Cervical Parasp Mid Rami C4-6 Nml Nml Nml         Right Cervical Parasp Low Rami C7-8 Nml Nml Nml         Left Cervical Parasp Up Rami C1-3 Nml Nml Nml         Left Cervical Parasp Mid Rami C4-6 Nml Nml Nml         Left Cervical Parasp Low Rami C7-8 Nml Nml Nml             Nerve Conduction Studies  Anti Sensory Left/Right Comparison     Stim Site L Lat (ms) R Lat (ms) L-R Lat (ms) L Amp (µV) R Amp (µV) L-R Amp (%) Site1 Site2 L Saturnino (m/s) R Saturnino (m/s) L-R Saturnino (m/s)   Median Anti Sensory (2nd Digit)   Wrist 3.2 3.8 0.6 19.2 13.7 28.6 Wrist 2nd Digit 44 37 7   Radial Anti Sensory (Base 1st Digit)   Wrist 2.1 2.1 0.0 44.2 35.5 19.7 Wrist Base 1st Digit 48 48 0 Ulnar Anti Sensory (5th Digit)   Wrist 3.2 3.3 0.1 15.3 12.7 17.0 Wrist 5th Digit 44 42 2     Motor Left/Right Comparison     Stim Site L Lat (ms) R Lat (ms) L-R Lat (ms) L Amp (mV) R Amp (mV) L-R Amp (%) Site1 Site2 L Saturnino (m/s) R Saturnino (m/s) L-R Saturnino (m/s)   Median Motor (Abd Poll Brev)   Wrist 3.8 4.5 0.7 9.4 11.0 14.5 Elbow Wrist 52 49 3   Elbow 8.4 9.4 1.0 9.0 8.9 1.1        Ulnar Motor (Abd Dig Min)   Wrist 3.1 2.7 0.4 8.2 9.3 11.8 B Elbow Wrist 59 56 3   B Elbow 6.6 6.3 0.3 7.1 7.5 5.3 A Elbow B Elbow 45 48 3   A Elbow 8.8 8.4 0.4 8.3 7.4 10.8          Comparison Left/Right Comparison     Stim Site L Lat (ms) R Lat (ms) L-R Lat (ms) L Amp (µV) R Amp (µV) L-R Amp (%)   Median/Ulnar Dig IV Comparison (Digit 4 - 14cm)   Median Wr  4.0   14.7    Ulnar Wr  3.1   7.3          Waveforms:                                   [facility]  OFFICE PROCEDURE PROGRESS NOTE        Chart reviewed for the following:   I, [place], have reviewed the History, Physical and updated the Allergic reactions for [address] performed immediately prior to start of procedure:   Paulino HO, have performed the following reviews on Sharan  prior to the start of the procedure:            * Patient was identified by name and date of birth   * Agreement on procedure being performed was verified  * Risks and Benefits explained to the patient  * Procedure site verified and marked as necessary  * Patient was positioned for comfort  * Consent was signed and verified     Time: 9:46 AM    Date of procedure: 9/24/2021    Procedure performed by:  Helder Walker MD    Provider accompanied by: Elizabeth. Patient accompanied by: Self.     How tolerated by patient: tolerated the procedure well with no complications    Post Procedural Pain Scale: 0 - No Hurt    Comments: none    Written by [facility] as dictated by Paulino Avila MD

## 2021-09-24 NOTE — PROGRESS NOTES
Beverley Moore presents today for   Chief Complaint   Patient presents with   [employer] Procedure     EMG on fingers       Is someone accompanying this pt? no    Is the patient using any DME equipment during OV? no    Depression Screening:  3 most recent PHQ Screens 8/4/2021   PHQ Not Done -   Little interest or pleasure in doing things Not at all   Feeling down, depressed, irritable, or hopeless Not at all   Total Score PHQ 2 0   Trouble falling or staying asleep, or sleeping too much -   Feeling tired or having little energy -   Poor appetite, weight loss, or overeating -   Feeling bad about yourself - or that you are a failure or have let yourself or your family down -   Trouble concentrating on things such as school, work, reading, or watching TV -   Moving or speaking so slowly that other people could have noticed; or the opposite being so fidgety that others notice -   Thoughts of being better off dead, or hurting yourself in some way -   PHQ 9 Score -       Learning Assessment:  Learning Assessment 1/25/2019   PRIMARY LEARNER Patient   HIGHEST LEVEL OF EDUCATION - PRIMARY LEARNER  GRADUATED HIGH SCHOOL OR GED   BARRIERS PRIMARY LEARNER NONE   CO-LEARNER CAREGIVER No   PRIMARY LANGUAGE ENGLISH   LEARNER PREFERENCE PRIMARY LISTENING   ANSWERED BY patient   RELATIONSHIP SELF       Abuse Screening:  Abuse Screening Questionnaire 1/25/2019   Do you ever feel afraid of your partner? N   Are you in a relationship with someone who physically or mentally threatens you? N   Is it safe for you to go home? Y       Fall Risk  No flowsheet data found. OPIOID RISK TOOL  No flowsheet data found. Coordination of Care:  1. Have you been to the ER, urgent care clinic since your last visit? no  Hospitalized since your last visit? no    2. Have you seen or consulted any other health care providers outside of the [address] [place] [address] since your last visit? no Include any pap smears or colon screening.  no

## 2021-09-29 ENCOUNTER — OFFICE VISIT (OUTPATIENT)
Dept: NEUROLOGY | Age: 56
End: 2021-09-29
Payer: COMMERCIAL

## 2021-09-29 ENCOUNTER — TELEPHONE (OUTPATIENT)
Dept: FAMILY MEDICINE CLINIC | Age: 56
End: 2021-09-29

## 2021-09-29 VITALS
WEIGHT: 214 LBS | OXYGEN SATURATION: 97 % | DIASTOLIC BLOOD PRESSURE: 66 MMHG | BODY MASS INDEX: 29.02 KG/M2 | SYSTOLIC BLOOD PRESSURE: 124 MMHG | HEART RATE: 78 BPM

## 2021-09-29 DIAGNOSIS — G47.30 SLEEP APNEA, UNSPECIFIED TYPE: Primary | ICD-10-CM

## 2021-09-29 DIAGNOSIS — G47.33 OSA (OBSTRUCTIVE SLEEP APNEA): ICD-10-CM

## 2021-09-29 DIAGNOSIS — M54.12 CERVICAL RADICULOPATHY AT C7: Primary | ICD-10-CM

## 2021-09-29 DIAGNOSIS — M79.2 NEURALGIA: ICD-10-CM

## 2021-09-29 DIAGNOSIS — Z86.69 HISTORY OF BELL'S PALSY: ICD-10-CM

## 2021-09-29 DIAGNOSIS — G56.01 CARPAL TUNNEL SYNDROME OF RIGHT WRIST: ICD-10-CM

## 2021-09-29 PROCEDURE — 99214 OFFICE O/P EST MOD 30 MIN: CPT | Performed by: NURSE PRACTITIONER

## 2021-09-29 RX ORDER — TOPIRAMATE 50 MG/1
TABLET, FILM COATED ORAL
Qty: 90 TABLET | Refills: 5 | Status: SHIPPED | OUTPATIENT
Start: 2021-09-29 | End: 2022-04-19 | Stop reason: SDUPTHER

## 2021-09-29 NOTE — PROGRESS NOTES
[facility]  [address] [facility], [address], [place], [address]  [address], [address]  Office:  [phone]  Fax: [phone]  Chief Complaint   Patient presents with    Follow-up     This is a 43-year-old male who presents for follow-up neck pain and right arm numbness and tingling. Last seen in August. In the interim endorses completing MRI of the neck and EMG. Continues to have neck pain and right arm pain along with numbness and tingling. Having numbness to the right hand and dropping things. Also having right sided head aches. Can be exacerbated from hot water in the shower. Pain can make him wince. He endorses some decrease in pain from sharp to more a a dullness to the right side of head and face since starting Topamax. Has been tolerating 100 mg at bedtime with no concern. He has significant history of right-sided Bell's palsy with some mild right lower facial weakness. He says when he gets worked up or angry he can notice that weakness has the ability to affect his speech. He still has not heard from sleep medicine provider's office. He is going to stop over there after his visit today because he was told he needed to have a repeat sleep study. He is here for results. No other concerns at this time.     Past Medical History:   Diagnosis Date    Arthritis     Cataract, right 2010    Diabetes ([address]     H/O seasonal allergies     History of vitamin D deficiency 6/2016    Hypercholesterolemia     Hypertension     Hypertriglyceridemia 6/30/2016    Hypokalemia 6/30/2016    Low back pain     Numbness and tingling of foot June 2014    left toes and mid bottom of foot    Peripheral neuropathy     Scrotal mass 6/6/2016    Sleep apnea        Past Surgical History:   Procedure Laterality Date    HX HERNIA REPAIR  1549    umbilical, done at [address] TISS FACE/SCALP SUBQ 2+CM  1/4/16    scalp lesion removal, Dr. Evertt Gosselin Current Outpatient Medications   Medication Sig Dispense Refill    topiramate (TOPAMAX) 50 mg tablet Take 1 tab in the morning and 2 tabs at bedtime. 90 Tablet 5    lisinopril-hydroCHLOROthiazide (PRINZIDE, ZESTORETIC) 20-12.5 mg per tablet TAKE 1 TABLET BY MOUTH ONCE DAILY FOR HIGH BLOOD PRESSURE 90 Tablet 0    metFORMIN (GLUCOPHAGE) 1,000 mg tablet TAKE 1 TABLET BY MOUTH TWICE DAILY WITH MEALS 60 Tablet 2    cyclobenzaprine (FLEXERIL) 5 mg tablet Take 1 tablet by mouth nightly 30 Tablet 2    glipiZIDE (GLUCOTROL) 10 mg tablet Take 1 tablet by mouth once daily with breakfast 90 Tablet 1    amLODIPine (NORVASC) 10 mg tablet Take 1 Tablet by mouth daily. 90 Tablet 0    gabapentin (NEURONTIN) 300 mg capsule TAKE 1 CAPSULE BY MOUTH TWICE DAILY FOR  NEUROPATHIC  PAIN 60 Capsule 3    meloxicam (MOBIC) 15 mg tablet Take 1 tablet by mouth once daily 30 Tablet 1    calcium carbonate-vitamin D3 600 mg(1,500mg) -800 unit tab Take  by mouth.  fish oil-dha-epa 1,200-144-216 mg cap Take  by mouth.  multivitamin (ONE A DAY) tablet Take 1 Tablet by mouth daily.  fluticasone propionate (FLONASE) 50 mcg/actuation nasal spray 1 spray each nostril daily (Patient not taking: Reported on 9/29/2021) 1 Bottle 1    rosuvastatin (CRESTOR) 20 mg tablet Take 1 Tab by mouth nightly. (Patient not taking: Reported on 9/29/2021) 30 Tab 6    Blood-Glucose Meter (OneTouch Verio Flex Start) monitoring kit Take BG level AC/HS 1 Kit 0    glucose blood VI test strips (OneTouch Verio test strips) strip Take BG level AC/ Strip 3    lancets misc Take BG level AC/HS 1 Each 11    aspirin 81 mg chewable tablet Take 1 Tab by mouth daily.  (Patient not taking: Reported on 9/29/2021) 30 Tab 11        No Known Allergies    Social History     Tobacco Use    Smoking status: Former Smoker    Smokeless tobacco: Never Used    Tobacco comment: stopped in HS, never heavy and never long term   Substance Use Topics    Alcohol use: No     Alcohol/week: 0.0 standard drinks     Comment: rarely holiday    Drug use: No       Family History   Problem Relation Age of Onset    No Known Problems Mother     No Known Problems Sister        Review of Systems  GENERAL: Denies fever or fatigue  CARDIAC: No CP or SOB  PULMONARY: No cough of SOB  MUSCULOSKELETAL: No new joint pain  NEURO: SEE HPI    Examination  Visit Vitals  /66 (BP 1 Location: Left upper arm, BP Patient Position: Sitting, BP Cuff Size: Adult)   Pulse 78   Ht (P) 6' (1.829 m)   Wt 97.1 kg (214 lb)   SpO2 97%   BMI (P) 29.02 kg/m²       This is a very pleasant 50-year-old male. He is alert and in no apparent distress. Full fund of knowledge. Speech is clear. Extraocular movements intact. Mild right lower facial weakness. Tongue midline. Freely moves the upper and lower extremities. No signs of ataxia or incoordination. Steady gait. Able to tandem walk. Result Information    Status: Final result (Exam End: 9/11/2021 12:24) Provider Status: Reviewed   Study Result    Narrative & Impression   MRI CERV SPINE WO CONT: 9/11/2021 12:24 PM     CLINICAL INFORMATION  right arm pain, dropping things.     COMPARISON  Cervical spine radiographs 14 June 2017     TECHNIQUE  Multiplanar MR images of the cervical spine obtained including T1 and  T2-weighted sequences.     FINDINGS   Vertebral body height and alignment: Straightening of the expected cervical  lordosis. No evidence of acute fracture.     Bone marrow signal: Heterogeneous, low T1 signal is nonspecific.     Intervertebral disc height: Disproportionate intervertebral disc space narrowing  at C6-C7.     Spinal cord: Normal in thickness and signal intensity. Cervical medullary  junction is normal.     Paraspinal tissues: The paraspinous soft tissues are within normal limits.     Specific segmental anatomy is as follows:     C2-3: Mild broad-based disc bulge. Mild facet arthropathy.  Central canal and  neural foramina appear patent.     C3-4: Broad-based disc osteophyte complex with prominent uncovertebral joint  hypertrophy, right greater than left. Moderate right foraminal narrowing. Mild/moderate left foraminal narrowing. Mild central canal narrowing.     C4-5: Broad-based disc osteophyte complex. Mild/moderate bilateral foraminal  narrowing, right greater than left. Mild central canal narrowing.     C5-6: Broad-based disc osteophyte complex results in moderate central canal  narrowing and moderate bilateral foraminal narrowing.     C6-7: Broad-based disc osteophyte complex superimposed on mild facet  arthropathy, left greater than right. Moderate/severe central canal narrowing. Severe bilateral foraminal narrowing.     C7-T1: Central canal and neural foramina appear patent.     IMPRESSION  Disproportionate discogenic degenerative change at C5-C6 and C6-C7. Moderate  and/or severe foraminal and central canal narrowing at these levels.     Please refer to the body of the report for a comprehensive segmental analysis of  the cervical spinal column.     Straightening of the expected cervical lordosis. Excerpt from EMG results:  INTERPRETATION     This is an abnormal electrodiagnostic examination. These findings may be consistent with:   1. Mild-to-moderate median mononeuropathy at the right wrist (carpal tunnel syndrome)   2. Chronic right C7 radiculopathy - no active denervation - this is based on mild findings of increased amplitude and polyphasic waves at the triceps, EDC, and PT. There are no signs of muscle membrane instability. There is no electrodiagnostic evidence of any brachial plexopathy, peripheral polyneuropathy, or any other mononeuropathy. Impression/Plan  Adolfo Stein is a 55 y.o. male whose history and physical are consistent with cervical radiculopathy at C7, right wrist carpal tunnel syndrome, and neuralgia. Patient presents for follow-up results.   In the interim had MRI of the cervical spine complete. We discussed diagnostic test results consistent with multilevel degenerative disc disease and central canal stenosis. Disproportionate discogenic degenerative change at C5-C6 and C6-C7. Moderate and/or severe foraminal and central canal narrowing at these levels. We further reviewed EMG findings consistent with a right side C7 cervical radiculopathy and findings of right wrist carpal tunnel syndrome. We will place referral to Dr. Dl Foley for evaluation and management of carpal tunnel syndrome. Offered referral to spine specialist and patient would like to go the route of physical therapy at this point. Physical therapy referral placed for cervical radiculopathy and carpal tunnel syndrome. Regarding headaches, improvement from sharp pain to more of a dull pain right sided headaches. Discussed this is most consistent with a neuralgia. Interestingly, he does have history of right side Bell's palsy but did not have facial pain with this. Endorses parietal head pain that can go into the face. Occipital neuralgia versus trigeminal neuralgia. We discussed increasing Topamax to 50 mg in the morning and 100 mg at bedtime. He will reach out to his sleep medicine provider regarding a repeat sleep study as EVELIN can certainly can be contributing to worsening headaches. Continue to monitor symptoms and follow-up in 3 months or sooner as need be. All questions addressed and patient is agreeable plan of care. Diagnoses and all orders for this visit:    1. Cervical radiculopathy at C7  -     REFERRAL TO PHYSICAL THERAPY    2. Carpal tunnel syndrome of right wrist  -     REFERRAL TO PHYSICAL THERAPY  -     REFERRAL TO HAND SURGERY    3. History of Bell's palsy    4. Neuralgia    5. EVELIN (obstructive sleep apnea)    Other orders  -     topiramate (TOPAMAX) 50 mg tablet; Take 1 tab in the morning and 2 tabs at bedtime.         Signed By: Ab Acosta NP        PLEASE NOTE:   Portions of this document may have been produced using voice recognition software. Unrecognized errors in transcription may be present.

## 2021-09-29 NOTE — TELEPHONE ENCOUNTER
Patient states he went to appointment with sleep apnea and they do not participate with his insurance. He was advised to contact his insurance to inquire who he can see. He will call us back with this information.

## 2021-09-29 NOTE — TELEPHONE ENCOUNTER
Patient called his insurance about sleep study referral and the only provider they could find was [employer]. Request referral to Pulmonary for sleep study.

## 2021-10-01 ENCOUNTER — HOSPITAL ENCOUNTER (OUTPATIENT)
Dept: PHYSICAL THERAPY | Age: 56
Discharge: HOME | End: 2021-10-01
Attending: NURSE PRACTITIONER
Payer: COMMERCIAL

## 2021-10-01 PROCEDURE — 97161 PT EVAL LOW COMPLEX 20 MIN: CPT

## 2021-10-01 NOTE — PROGRESS NOTES
[facility]  [address] [facility]  [phone] fax    Plan of Care/ Statement of Necessity for Physical Therapy Services    Patient name: Savanah Reis Start of Care: 10/1/2021   Referral source: Dalila Gallegos NP : 1965    Medical Diagnosis: Pain in neck [M54.2]  Payor: Deya  / Plan: [address] / Product Type: Managed Care Medicaid /  Onset Date:2 months ago    Treatment Diagnosis: Neck, Right UE, upper back pain   Prior Hospitalization: see medical history Provider#: [id number]   Medications: Verified on Patient summary List    Comorbidities: diabetes, arthritis, HTN   Prior Level of Function: Right handed, retired, ind with all mobility     The Plan of Care and following information is based on the information from the initial evaluation. Assessment/ beckett information: Mr. Adri Gamez is a 54 y/o, M pt with CC of neck/Right UE pain/numbness. Pain started in his head with some locked jaw sensation, sharp pain along Right neck, shoulder blade and arm. Pt recalled that testing (possibly EMG) identified (C7) nerve impingement and carpal tunnel problem. Pt present with tightness of UTs, Lev scap, TTP with multiple trigger points along mid trap, Rhomboid, and infraspinatus. Pt was (+) with Spurling but reports no change of symptoms with c/s distraction. WNL with dermatomes and myotomes screening; min decreased strength with Right ER, mid, lower trap. Pt would benefit from skilled PT to address these deficits above for pain free functional mobility.     Evaluation Complexity History HIGH Complexity :3+ comorbidities / personal factors will impact the outcome/ POC ; Examination LOW Complexity : 1-2 Standardized tests and measures addressing body structure, function, activity limitation and / or participation in recreation  ;Presentation LOW Complexity : Stable, uncomplicated  ;Clinical Decision Making MEDIUM Complexity : FOTO score of 26-74  Overall Complexity Rating: LOW   Problem List: pain affecting function, decrease strength, edema affecting function, impaired gait/ balance, decrease ADL/ functional abilitiies, decrease activity tolerance, decrease flexibility/ joint mobility and decrease transfer abilities   Treatment Plan may include any combination of the following: Therapeutic exercise, Therapeutic activities, Neuromuscular re-education, Physical agent/modality, Manual therapy, Patient education, Self Care training, Functional mobility training and Home safety training  Patient / Family readiness to learn indicated by: asking questions, trying to perform skills and interest  Persons(s) to be included in education: patient (P)  Barriers to Learning/Limitations: None  Patient Goal (s): improve pain  Patient Self Reported Health Status: fair  Rehabilitation Potential: good    Short term goals: To be accomplished within 1 week  1. Pt will be independent with HEP to maintain progression. Eval status: good understanding    Long term goals: To be accomplished within 6 weeks  1. Pt will improve FOTO score by 4 points to 72/100 to show improvement with functional mobility performance. Eval status: 68    2. Pt will report at least 60% improvement with overall pain/numbness to improve his QOL. Eval status: 5-10/10, sharp pain mid and Right side of scap, occasional numbness of Right arm    3. Pt will improve Right ER, lower trap, mid trap and Rhomboid strength to at least 4+/5 to improve his ease with ADLs. Eval status: 4/5 with Right ER, mid trap/Rhomboid, 3/5 with lower trap     Frequency / Duration: Patient to be seen 2-3 times per week for 6 weeks.     Patient/ CarPatient/ Caregiver education and instruction: Diagnosis, prognosis, self care, activity modification, brace/ splint application and exercises   [x]  Plan of care has been reviewed with MIRACLE Martins 10/1/2021 9:23 AM    ________________________________________________________________________    I certify that the above Therapy Services are being furnished while the patient is under my care. I agree with the treatment plan and certify that this therapy is necessary.     [de-identified] Signature:____________Date:_________TIME:________     Carlin Zacarias NP  ** Signature, Date and Time must be completed for valid certification **    Please sign and return to [facility]  [address] [facility]  [phone] fax

## 2021-10-01 NOTE — PROGRESS NOTES
PT DAILY TREATMENT NOTE/CERVICAL OEOY05-85    Patient Name: Isaiah Corado  Date:10/1/2021  : 1965  [x]  Patient  Verified  Payor: Deya 22 / Plan: [address] / Product Type: Managed Care Medicaid /    In time: 10:30  Out time: 11:15  Total Treatment Time (min): 45  Visit #: 1 of     Medicare/BCBS Only   Total Timed Codes (min):  15 1:1 Treatment Time:  30     Treatment Area: Pain in neck [M54.2]    SUBJECTIVE  Pain Level (0-10 scale): 6/10  []constant []intermittent []improving []worsening []no change since onset    Any medication changes, allergies to medications, adverse drug reactions, diagnosis change, or new procedure performed?: [x] No    [] Yes (see summary sheet for update)  Subjective functional status/changes:     PLOF: Right handed, retired  Limitations to PLOF:   Mechanism of Injury:   Current symptoms/Complaints: Mr. Lucius Stiles is a 54 y/o, M pt with CC of neck/Right UE pain/numbness. Pt stated pain started in his head with some locked jaw sensation, sharp pain along Right neck, shoulder blade and arm. EMG identified C7 nerve impingement and carpal tunnel problem. Previous Treatment/Compliance:   PMHx/Surgical Hx:   Work Hx:   Living Situation:   Pt Goals: improve pain  Barriers: []pain []financial []time []transportation []other  Motivation:   Substance use: []Alcohol []Tobacco []other:   FABQ Score: []low []elevate  Cognition: A & O x     Other:    OBJECTIVE/EXAMINATION  Domestic Life:   Activity/Recreational Limitations:   Mobility:   Self Care:     30 min []Eval                  []Re-Eval     15 min Therapeutic Activity:  []  See flow sheet :Pt edu within scope of practice on prognosis, POC, modalities use, posture, theracane use, HEP review.    Rationale: increase ROM, increase strength, improve coordination and increase proprioception  to improve the patients ability to perform ADLs with more ease           With   [] TE   [] TA   [] neuro [] other: Patient Education: [x] Review HEP    [] Progressed/Changed HEP based on:   [] positioning   [] body mechanics   [] transfers   [] heat/ice application    [] other:      Other Objective/Functional Measures:     Physical Therapy Evaluation Cervical Spine     SUBJECTIVE  Chief Complaint:    Mechanism of injury:    Symptoms  Aggravated by: depending on activities level   [] Bending [] Sitting [] Standing [] Reaching Overhead   [] Moving [] Cough [] Sneeze [] Eating   [] AM  [] PM  Lying:  [] sup   [] pro   [] sidelying   [] Other:     Eased by:    [] Bending [] Sitting [] Standing Lying: [] sup  [] pro  [] sidelying   [] Moving [] AM  [] PM  [] Other:     General Health:  Red Flags Indicated? [] Yes    [] No  [] Yes [] No Recent weight change (If yes, due to dieting? [] Yes  [] No)   [] Yes [] No Persistent cough  [] Yes [] No Unremitting pain at night  [] Yes [] No Dizziness  [] Yes [] No Blurred vision  [] Yes [] No Hands more cold or painful in cold weather  [] Yes [] No Ringing in ears  [] Yes [] No Difficulty swallowing  [] Yes [] No Dysfunction of bowel or bladder  [] Yes [] No Recent illness within past 3 weeks (i.e, cold, flu)  [] Yes [] No Jaw pain    Past History/Treatments:    Diagnostic Tests: [] Lab work [] X-rays    [] CT [] MRI     [x] Other: EMG per pt report  Results: nerve impingement     Functional Status  Prior level of function:  Present functional limitations:  What position do you sleep in?:    Headaches: Do you have headaches? [] Yes   [] No  How often do you get headaches? How long does the headache last?  What aggravates it? What relieves it? Does the headache coincide with any other symptoms (visual disturbances, light sensitivity)? Where is the headache? Does it change locations?   Other:    OBJECTIVE  Posture: [] WNL  Head Position: min forwarded   Shoulder/Scapular Position:  C-Kyphosis:  [] increased   [] decreased   C-Lordosis:   [] increased   [] decreased  T-Kyphosis: [] increased   [] decreased  T-Lordosis:   [] increased   [] decreased     TMJ: [] N/A [] Abnormal - ROM:   Palpation:    Cervical Retraction: [x] WNL    [] Abnormal:    Shoulder/Scapular Screen: [] WNL    [] Abnormal:    Active Movements: [] N/A   [] Too acute   [x] Other: WNL with AROM of c/s and B shoulders    ROM % AROM % PROM Comments:pain, area   Forward flexion      Extension   Increased pain in upper scap   SB right   Aggravated pain and pinning feeling    SB left       Rotation right   Aggravated pain and pinning feeling    Rotation left        Thoracic Spine: [] N/A    [] WNL   [] Other:    PROM:    Palpation:  [] Min  [x] Mod  [x] Severe    Location: TTP with multiple trigger points along mid trap, Rhomboid, and infraspinatus.   [] Min  [] Mod  [] Severe    Location:  [] Min  [] Mod  [] Severe    Location:    Neuro Screen (myotome/dematome/felexes): [x] WNL  Myotome Level Muscle Test Myotome Level Muscle Test   C5 Shoulder Adduction - Deltoid C8 Finger Flexors   C6 Wrist Extension T1 Finger Abduction - Interossei   C7 Elbow Extension     Comments: 4/5 with Right ER and mid trap; 3/5 with lower trap    Upper Limb Tension Tests: [] N/A       Ulnar: [] R    [] L    [] +    [] -       Median: [] R    [] L    [] +    [] -       Radial: [] R    [] L    [] +    [] -    Special Tests:  Cervical:        Vertebral Artery:  [] R    [] L    [] +    [] -       Alar Ligament: [] R    [] L    [] +    [] -       Transverse Lig: [] R    [] L    [] +    [] -       Spurling's:  [x] R    [] L    [x] +    [] -       Distraction:  [x] R    [x] L    [] +    [x] -       Compression: [x] R    [] L    [x] +    [] -    Thoracic Outlet Tests: [] N/A       Adson's:  [] R    [] L    [] +    [] -       Hyperabduction: [] R    [] L    [] +    [] -       Christiano's:  [] R    [] L    [] +    [] -       Viktoriya:  [] R    [] L    [] +    [] -    Diaphragmatic Breathing: [] Normal    [] Abnormal    Muscle Flexibility: [] N/A   Scalenes: [] WNL [x] Tight    [x] R    [x] L   Upper Trap: [] WNL    [x] Tight    [x] R    [x] L   Levator: [] WNL    [x] Tight    [x] R    [x] L   Pect. Minor: [] WNL    [] Tight    [] R    [] L    Other tests/comments:   DNF: 30 sec with good control     Pain Level (0-10 scale) post treatment: 4/10   Very pleased with Moist heat    ASSESSMENT/Changes in Function: see POC    Patient will continue to benefit from skilled PT services to modify and progress therapeutic interventions, address functional mobility deficits, address ROM deficits, address strength deficits, analyze and address soft tissue restrictions, analyze and cue movement patterns, analyze and modify body mechanics/ergonomics, assess and modify postural abnormalities and instruct in home and community integration to attain remaining goals.      [x]  See Plan of Care  []  See progress note/recertification  []  See Discharge Summary         Progress towards goals / Updated goals:  See POC    PLAN  [x]  Upgrade activities as tolerated     [x]  Continue plan of care  []  Update interventions per flow sheet       []  Discharge due to:_  []  Other:_    Abhay Soto 10/1/2021  9:22 AM

## 2021-10-18 ENCOUNTER — OFFICE VISIT (OUTPATIENT)
Dept: ORTHOPEDIC SURGERY | Age: 56
End: 2021-10-18
Payer: COMMERCIAL

## 2021-10-18 VITALS
TEMPERATURE: 96.9 F | OXYGEN SATURATION: 98 % | HEIGHT: 72 IN | HEART RATE: 72 BPM | WEIGHT: 217 LBS | BODY MASS INDEX: 29.39 KG/M2

## 2021-10-18 DIAGNOSIS — M54.12 CERVICAL RADICULOPATHY AT C7: ICD-10-CM

## 2021-10-18 DIAGNOSIS — G56.03 BILATERAL CARPAL TUNNEL SYNDROME: ICD-10-CM

## 2021-10-18 DIAGNOSIS — G56.01 RIGHT CARPAL TUNNEL SYNDROME: Primary | ICD-10-CM

## 2021-10-18 PROCEDURE — 20526 THER INJECTION CARP TUNNEL: CPT | Performed by: ORTHOPAEDIC SURGERY

## 2021-10-18 PROCEDURE — 99214 OFFICE O/P EST MOD 30 MIN: CPT | Performed by: ORTHOPAEDIC SURGERY

## 2021-10-18 NOTE — PROGRESS NOTES
Leroy Acevedo is a 55 y.o. male right handed retiree. Worker's Compensation and legal considerations: none filed. Vitals:    10/18/21 0807   Pulse: 72   Temp: 96.9 °F (36.1 °C)   TempSrc: Temporal   SpO2: 98%   Weight: 217 lb (98.4 kg)   Height: 6' (1.829 m)   PainSc:   1   PainLoc: Wrist           Chief Complaint   Patient presents with    Wrist Pain     right wrist         HPI: Patient presents today with a history of right hand numbness and tingling and pain at the wrist that radiates up the arm. He has recently had an EMG positive for C7 radiculopathy and carpal tunnel syndrome both on the right. He reports the numbness bilaterally right much worse than left.     Date of onset:  indeterminate    Injury: No    Prior Treatment:  No    Numbness/ Tingling: Yes: Comment: Bilateral hands right worse than left      ROS: Review of Systems - General ROS: negative  Psychological ROS: negative  ENT ROS: negative  Allergy and Immunology ROS: negative  Hematological and Lymphatic ROS: negative  Respiratory ROS: no cough, shortness of breath, or wheezing  Cardiovascular ROS: no chest pain or dyspnea on exertion  Gastrointestinal ROS: no abdominal pain, change in bowel habits, or black or bloody stools  Musculoskeletal ROS: positive for - pain in wrist - right  Neurological ROS: positive for - numbness/tingling  Dermatological ROS: negative    Past Medical History:   Diagnosis Date    Arthritis     Cataract, right 2010    Diabetes ([facility] [address]     H/O seasonal allergies     History of vitamin D deficiency 6/2016    Hypercholesterolemia     Hypertension     Hypertriglyceridemia 6/30/2016    Hypokalemia 6/30/2016    Low back pain     Numbness and tingling of foot June 2014    left toes and mid bottom of foot    Peripheral neuropathy     Scrotal mass 6/6/2016    Sleep apnea        Past Surgical History:   Procedure Laterality Date    HX HERNIA REPAIR  4084    umbilical, done at [address] TISS FACE/SCALP SUBQ 2+CM  1/4/16    scalp lesion removal, Dr. Aram Medrano       Current Outpatient Medications   Medication Sig Dispense Refill    meloxicam (MOBIC) 15 mg tablet Take 1 tablet by mouth once daily 30 Tablet 2    topiramate (TOPAMAX) 50 mg tablet Take 1 tab in the morning and 2 tabs at bedtime. 90 Tablet 5    lisinopril-hydroCHLOROthiazide (PRINZIDE, ZESTORETIC) 20-12.5 mg per tablet TAKE 1 TABLET BY MOUTH ONCE DAILY FOR HIGH BLOOD PRESSURE 90 Tablet 0    metFORMIN (GLUCOPHAGE) 1,000 mg tablet TAKE 1 TABLET BY MOUTH TWICE DAILY WITH MEALS 60 Tablet 2    cyclobenzaprine (FLEXERIL) 5 mg tablet Take 1 tablet by mouth nightly 30 Tablet 2    glipiZIDE (GLUCOTROL) 10 mg tablet Take 1 tablet by mouth once daily with breakfast 90 Tablet 1    amLODIPine (NORVASC) 10 mg tablet Take 1 Tablet by mouth daily. 90 Tablet 0    gabapentin (NEURONTIN) 300 mg capsule TAKE 1 CAPSULE BY MOUTH TWICE DAILY FOR  NEUROPATHIC  PAIN 60 Capsule 3    Blood-Glucose Meter (OneTouch Verio Flex Start) monitoring kit Take BG level AC/HS 1 Kit 0    glucose blood VI test strips (OneTouch Verio test strips) strip Take BG level AC/ Strip 3    lancets misc Take BG level AC/HS 1 Each 11    calcium carbonate-vitamin D3 600 mg(1,500mg) -800 unit tab Take  by mouth.  fish oil-dha-epa 1,200-144-216 mg cap Take  by mouth.  multivitamin (ONE A DAY) tablet Take 1 Tablet by mouth daily.  fluticasone propionate (FLONASE) 50 mcg/actuation nasal spray 1 spray each nostril daily (Patient not taking: Reported on 9/29/2021) 1 Bottle 1    rosuvastatin (CRESTOR) 20 mg tablet Take 1 Tab by mouth nightly. (Patient not taking: Reported on 9/29/2021) 30 Tab 6    aspirin 81 mg chewable tablet Take 1 Tab by mouth daily. (Patient not taking: Reported on 9/29/2021) 30 Tab 11       No Known Allergies        PE:     Physical Exam  Vitals and nursing note reviewed. Constitutional:       General: He is not in acute distress. Appearance: Normal appearance. He is not ill-appearing. Cardiovascular:      Pulses: Normal pulses. Pulmonary:      Effort: Pulmonary effort is normal. No respiratory distress. Musculoskeletal:         General: No swelling, tenderness, deformity or signs of injury. Normal range of motion. Cervical back: Normal range of motion and neck supple. Right lower leg: No edema. Left lower leg: No edema. Skin:     General: Skin is warm and dry. Capillary Refill: Capillary refill takes less than 2 seconds. Findings: No bruising or erythema. Neurological:      General: No focal deficit present. Mental Status: He is alert and oriented to person, place, and time. Psychiatric:         Mood and Affect: Mood normal.         Behavior: Behavior normal.            NEUROVASCULAR    Examination L R Examination L R   Carpal Comp. - + Pronator Comp. - -   Carpal Tinel + + Pronator Tinel - -   Phalen's - + Pronator Stress - -   Cubital Comp. - - Guyon Comp. - -   Cubital Tinel - - Guyon Tinel - -   Elbow Hyperflexion - - Adson's - -   Spurling's - - SC Comp. - -   PCB Median abn - - SC Tinel - -   Radial Tinel - - IC Comp. - -   Digital Tinel - - IC Tinel - -   Radial 2-Pt WNL WNL Ulnar 2-Pt WNL WNL     Radial Pulse: 2+  Capillary Refill: < 2 sec  Christiano: Not Performed  [employer]: Not Performed        NCV & EMG Findings:  Evaluation of the right median sensory nerve showed decreased conduction velocity (Wrist-2nd Digit, 37 m/s). The right median/ulnar (dig IV) comparison nerve showed prolonged distal peak latency (Median Wr, 4.0 ms) and abnormal peak latency difference (Median Wr-Ulnar Wr, 0.9 ms). All remaining nerves (as indicated in the following tables) were within normal limits. Left vs. Right side comparison data for the median sensory nerve indicates abnormal L-R latency difference (0.6 ms).   All remaining left vs. right side differences were within normal limits.       Needle evaluation of the right triceps muscle showed increased motor unit amplitude. The right pronator teres muscle showed slightly increased polyphasic potentials. The right Ext Digitorum muscle showed moderately increased polyphasic potentials. All remaining muscles (as indicated in the following table) showed no evidence of electrical instability.       INTERPRETATION     This is an abnormal electrodiagnostic examination. These findings may be consistent with:   1. Mild-to-moderate median mononeuropathy at the right wrist (carpal tunnel syndrome)   2. Chronic right C7 radiculopathy - no active denervation - this is based on mild findings of increased amplitude and polyphasic waves at the triceps, EDC, and PT. There are no signs of muscle membrane instability. There is no electrodiagnostic evidence of any brachial plexopathy, peripheral polyneuropathy, or any other mononeuropathy.     CLINICAL INTERPRETATION     His electrodiagnostic findings of right sided carpal tunnel and chronic C7 radiculopathy may be consistent with his right arm symptoms. Imaging:     None indicated        ICD-10-CM ICD-9-CM    1. Right carpal tunnel syndrome  G56.01 354.0 INJECT CARPAL TUNNEL      triamcinolone acetonide (KENALOG) 10 mg/mL injection 5 mg   2. Bilateral carpal tunnel syndrome  G56.03 354.0 AMB SUPPLY ORDER   3. Cervical radiculopathy at C7  M54.12 723.4          Plan:     Right carpal tunnel injection and bilateral carpal tunnel braces for nighttime wear    Follow-up and Dispositions    · Return if symptoms worsen or fail to improve.           Plan was reviewed with patient, who verbalized agreement and understanding of the plan    [address] NOTE        Chart reviewed for the following:   INeil DO, have reviewed the History, Physical and updated the Allergic reactions for [address] performed immediately prior to start of procedure:   Neil HO DO, have performed the following reviews on Hannah Osorio prior to the start of the procedure:            * Patient was identified by name and date of birth   * Agreement on procedure being performed was verified  * Risks and Benefits explained to the patient  * Procedure site verified and marked as necessary  * Patient was positioned for comfort  * Consent was signed and verified     Time: 08:24 AM      Date of procedure: 10/18/2021    Procedure performed by: Gaby Chen DO    Provider assisted by: Vinnie Kenny LPN    Patient assisted by: self    How tolerated by patient: tolerated the procedure well with no complications    Post Procedural Pain Scale: 0 - No Hurt    Comments: none    Procedure:  After consent was obtained, using sterile technique the right carpal tunnel was prepped. Local anesthetic used: 1% lidocaine. Kenalog 5 mg and was then injected and the needle withdrawn. The procedure was well tolerated. The patient is asked to continue to rest the area for a few more days before resuming regular activities. It may be more painful for the first 1-2 days. Watch for fever, or increased swelling or persistent pain in the joint. Call or return to clinic prn if such symptoms occur or there is failure to improve as anticipated.

## 2021-11-19 ENCOUNTER — OFFICE VISIT (OUTPATIENT)
Dept: FAMILY MEDICINE CLINIC | Age: 56
End: 2021-11-19
Payer: COMMERCIAL

## 2021-11-19 VITALS
OXYGEN SATURATION: 99 % | DIASTOLIC BLOOD PRESSURE: 86 MMHG | SYSTOLIC BLOOD PRESSURE: 136 MMHG | HEIGHT: 72 IN | HEART RATE: 68 BPM | BODY MASS INDEX: 28.71 KG/M2 | TEMPERATURE: 97 F | WEIGHT: 212 LBS | RESPIRATION RATE: 16 BRPM

## 2021-11-19 DIAGNOSIS — G47.33 OSA (OBSTRUCTIVE SLEEP APNEA): ICD-10-CM

## 2021-11-19 DIAGNOSIS — E11.40 TYPE 2 DIABETES MELLITUS WITH DIABETIC NEUROPATHY, WITHOUT LONG-TERM CURRENT USE OF INSULIN (HCC): Primary | ICD-10-CM

## 2021-11-19 DIAGNOSIS — I10 ESSENTIAL HYPERTENSION: ICD-10-CM

## 2021-11-19 DIAGNOSIS — Z23 NEEDS FLU SHOT: ICD-10-CM

## 2021-11-19 DIAGNOSIS — E78.5 DYSLIPIDEMIA: ICD-10-CM

## 2021-11-19 LAB — HBA1C MFR BLD HPLC: 6.5 %

## 2021-11-19 PROCEDURE — 99214 OFFICE O/P EST MOD 30 MIN: CPT | Performed by: NURSE PRACTITIONER

## 2021-11-19 PROCEDURE — 90686 IIV4 VACC NO PRSV 0.5 ML IM: CPT | Performed by: NURSE PRACTITIONER

## 2021-11-19 PROCEDURE — 83036 HEMOGLOBIN GLYCOSYLATED A1C: CPT | Performed by: NURSE PRACTITIONER

## 2021-11-19 RX ORDER — LISINOPRIL AND HYDROCHLOROTHIAZIDE 12.5; 2 MG/1; MG/1
TABLET ORAL
Qty: 90 TABLET | Refills: 1 | Status: SHIPPED | OUTPATIENT
Start: 2021-11-19 | End: 2022-01-12

## 2021-11-19 RX ORDER — GABAPENTIN 300 MG/1
CAPSULE ORAL
Qty: 60 CAPSULE | Refills: 4 | Status: SHIPPED | OUTPATIENT
Start: 2021-11-19 | End: 2022-05-03

## 2021-11-19 RX ORDER — AMLODIPINE BESYLATE 10 MG/1
10 TABLET ORAL DAILY
Qty: 90 TABLET | Refills: 1 | Status: SHIPPED | OUTPATIENT
Start: 2021-11-19 | End: 2022-06-15 | Stop reason: SDUPTHER

## 2021-11-19 NOTE — PROGRESS NOTES
Felicita Choudhury is a 55 y.o. male presenting today for Hypertension (3 month follow-up), Diabetes, and Memory Loss    Diabetes    Hypertension     :  Felicita Choudhury presents to the office today for routine follow-up care. He carries a medical diagnosis for hypertension, diabetes mellitus, hyperlipidemia, neuropathy and EVELIN  He denies any cough, fever, loss of smell or taste or GI upset. EVELIN- complaints of daytime sleepiness and needs new CPAP machine. . Needs follow-up appointment with Sleep provider. Will call the office to get appointment. Hypertension-his BP is elevated today his BP reading are 136/84. He notes he is compliant with the medication treatment plan. Hyperlipidemia-  His cholesterol is 121, HDL 34 and LDL 56.2. He is prescribed a statin daily. Diabetes mellitus-hemoglobin A1c is 6.5 today. He is negative for polyuria or polydipsia. He is compliant with the medication treatment plan. Complaints of memory loss since the Covid 19 vaccine. Patient explained that intermittently when he is having conversations he is unable to recall small words. He believes the symptoms started shortly after getting the COVID-19 vaccine. Review of Systems   Psychiatric/Behavioral: Positive for memory loss. ROS:  History obtained from the patient intake forms which are reviewed with the patient  · General: negative for - chills, fever, weight changes or malaise  · HEENT: no sore throat, nasal congestion, vision problems or ear problems  · Respiratory: no cough, shortness of breath, or wheezing  · Cardiovascular: no chest pain, palpitations, or dyspnea on exertion  · Gastrointestinal: no abdominal pain, N/V, change in bowel habits, or black or bloody stools  · Musculoskeletal: no back pain, joint pain, joint stiffness, muscle pain or muscle weakness  · Neurological: no numbness, tingling, headache or dizziness  · Endo:  No polyuria or polydipsia.    · : no hematuria, dysuria, frequency, hesitancy, or nocturia. ROS:  History obtained from the patient intake forms which are reviewed with the patient  · General: negative for - chills, fever, weight changes or malaise  · HEENT: no sore throat, nasal congestion, vision problems or ear problems  · Respiratory: no cough, shortness of breath, or wheezing  · Cardiovascular: no chest pain, palpitations, or dyspnea on exertion  · Gastrointestinal: no abdominal pain, N/V, change in bowel habits, or black or bloody stools  · Musculoskeletal: Intermittent lumbar pain  · Neurological: no numbness, tingling, headache or dizziness  · Endo:  No polyuria or polydipsia. · : no hematuria, dysuria, frequency, hesitancy, or nocturia. · Psychological: negative for - anxiety, depression, sleep disturbances, suicidal or homicidal ideations      No Known Allergies    Current Outpatient Medications   Medication Sig Dispense Refill    lisinopril-hydroCHLOROthiazide (PRINZIDE, ZESTORETIC) 20-12.5 mg per tablet TAKE 1 TABLET BY MOUTH ONCE DAILY FOR HIGH BLOOD PRESSURE 90 Tablet 1    gabapentin (NEURONTIN) 300 mg capsule TAKE 1 CAPSULE BY MOUTH TWICE DAILY FOR  NEUROPATHIC  PAIN 60 Capsule 4    amLODIPine (NORVASC) 10 mg tablet Take 1 Tablet by mouth daily. 90 Tablet 1    meloxicam (MOBIC) 15 mg tablet Take 1 tablet by mouth once daily 30 Tablet 2    topiramate (TOPAMAX) 50 mg tablet Take 1 tab in the morning and 2 tabs at bedtime. 90 Tablet 5    metFORMIN (GLUCOPHAGE) 1,000 mg tablet TAKE 1 TABLET BY MOUTH TWICE DAILY WITH MEALS 60 Tablet 2    cyclobenzaprine (FLEXERIL) 5 mg tablet Take 1 tablet by mouth nightly 30 Tablet 2    glipiZIDE (GLUCOTROL) 10 mg tablet Take 1 tablet by mouth once daily with breakfast 90 Tablet 1    rosuvastatin (CRESTOR) 20 mg tablet Take 1 Tab by mouth nightly.  30 Tab 6    Blood-Glucose Meter (OneTouch Verio Flex Start) monitoring kit Take BG level AC/HS 1 Kit 0    glucose blood VI test strips (OneTouch Verio test strips) strip Take BG level AC/ Strip 3    lancets misc Take BG level AC/HS 1 Each 11    aspirin 81 mg chewable tablet Take 1 Tab by mouth daily. 30 Tab 11    calcium carbonate-vitamin D3 600 mg(1,500mg) -800 unit tab Take  by mouth.  fish oil-dha-epa 1,200-144-216 mg cap Take  by mouth.  multivitamin (ONE A DAY) tablet Take 1 Tablet by mouth daily.       fluticasone propionate (FLONASE) 50 mcg/actuation nasal spray 1 spray each nostril daily (Patient not taking: Reported on 2021) 1 Bottle 1       Past Medical History:   Diagnosis Date    Arthritis     Cataract, right     Diabetes ([address]     H/O seasonal allergies     History of vitamin D deficiency 2016    Hypercholesterolemia     Hypertension     Hypertriglyceridemia 2016    Hypokalemia 2016    Low back pain     Numbness and tingling of foot 2014    left toes and mid bottom of foot    Peripheral neuropathy     Scrotal mass 2016    Sleep apnea        Past Surgical History:   Procedure Laterality Date    HX HERNIA REPAIR  7226    umbilical, done at [address] TISS FACE/SCALP SUBQ 2+CM  16    scalp lesion removal, Dr. Parish Meyer History     Socioeconomic History    Marital status:      Spouse name: Not on file    Number of children: 0    Years of education: 6    Highest education level: Not on file   Occupational History    Occupation: unemployed and living with son and daughter-in-law, after incarceration 2011 out 2014   Tobacco Use    Smoking status: Former Smoker    Smokeless tobacco: Never Used    Tobacco comment: stopped in HS, never heavy and never long term   Substance and Sexual Activity    Alcohol use: No     Alcohol/week: 0.0 standard drinks     Comment: rarely holiday    Drug use: No    Sexual activity: Yes     Comment: no partner, spouse  2014   Other Topics Concern   [address] Yes     Comment: [address] [facility], from 7777-3451, other than honorable discharge    Blood Transfusions No    Caffeine Concern No     Comment: decreased his 2-3 super big gulps    Occupational Exposure Yes     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[facility] Hazards No    Sleep Concern No    Stress Concern Yes     Comment: Life in general    Weight Concern No    Special Diet No    Back Care No    Exercise No     Comment: starting to do something but not currently    Bike Helmet No    Seat Belt Yes    Self-Exams No   Social History Narrative    Not on file     Social Determinants of Health     Financial Resource Strain:     Difficulty of Paying Living Expenses: Not on file   Food Insecurity:     Worried About Running Out of Food in the Last Year: Not on file    Justin of Food in the Last Year: Not on file   Transportation Needs:     Lack of Transportation (Medical): Not on file    Lack of Transportation (Non-Medical):  Not on file   Physical Activity:     Days of Exercise per Week: Not on file    Minutes of Exercise per Session: Not on file   Stress:     Feeling of Stress : Not on file   Social Connections:     Frequency of Communication with Friends and Family: Not on file    Frequency of Social Gatherings with Friends and Family: Not on file    Attends [employer] Services: Not on file    Active Member of [address] [place] [address] or Organizations: Not on file    Attends Club or Organization Meetings: Not on file    Marital Status: Not on file   Intimate Partner Violence:     Fear of Current or Ex-Partner: Not on file    Emotionally Abused: Not on file    Physically Abused: Not on file    Sexually Abused: Not on file   Housing Stability:     Unable to Pay for Housing in the Last Year: Not on file    Number of [place] in the Last Year: Not on file    Unstable Housing in the Last Year: Not on file         Vitals:    11/19/21 0805   BP: 136/86   Pulse: 68   Resp: 16   Temp: 97 °F (36.1 °C)   TempSrc: Oral   SpO2: 99%   Weight: 212 lb (96.2 kg) Height: 6' (1.829 m)   PainSc:   0 - No pain       Physical Exam  Vitals and nursing note reviewed. Constitutional:       Appearance: Normal appearance. HENT:      Head: Normocephalic. Cardiovascular:      Rate and Rhythm: Normal rate and regular rhythm. Pulses: Normal pulses. Heart sounds: Normal heart sounds. Pulmonary:      Effort: Pulmonary effort is normal. No respiratory distress. Breath sounds: Normal breath sounds. Musculoskeletal:      Cervical back: Normal range of motion and neck supple. Skin:     General: Skin is warm and dry. Neurological:      General: No focal deficit present. Mental Status: He is alert. Psychiatric:         Mood and Affect: Mood normal.         Office Visit on 11/19/2021   Component Date Value Ref Range Status    Hemoglobin A1c (POC) 11/19/2021 6.5  % Final       .  Results for orders placed or performed in visit on 11/19/21   AMB POC HEMOGLOBIN A1C   Result Value Ref Range    Hemoglobin A1c (POC) 6.5 %       Assessment / Plan:      ICD-10-CM ICD-9-CM    1. Type 2 diabetes mellitus with diabetic neuropathy, without long-term current use of insulin (HCC)  E11.40 250.60 AMB POC HEMOGLOBIN A1C     357.2 gabapentin (NEURONTIN) 300 mg capsule   2. Essential hypertension  I10 401.9 lisinopril-hydroCHLOROthiazide (PRINZIDE, ZESTORETIC) 20-12.5 mg per tablet      amLODIPine (NORVASC) 10 mg tablet   3. Needs flu shot  Z23 V04.81 INFLUENZA VIRUS VAC QUAD,SPLIT,PRESV FREE SYRINGE IM   4. EVELIN (obstructive sleep apnea)  G47.33 327.23    5. Dyslipidemia  E78.5 272.4      Continue current treatment plan  Denies any side effects or adverse reaction to the medications  Medication refills  Follow-up in 4 months  Follow-up and Dispositions    · Return in about 4 months (around 3/19/2022). Mini-Mental self-assessment completed. Patient scored a 32    I asked the patient if he  had any questions and answered his  questions.   The patient stated that he understands the treatment plan and agrees with the treatment plan    This document was created with a voice activated dictation system and may contain transcription errors.

## 2021-11-19 NOTE — PROGRESS NOTES
Lizette Alfred is a 55 y.o. male who presents for routine immunizations. He denies any symptoms , reactions or allergies that would exclude them from being immunized today. Risks and adverse reactions were discussed and the VIS was given to them. All questions were addressed. He refused to stay for observation. There were no reaction (s) observed when patient left clinic.

## 2021-11-19 NOTE — PROGRESS NOTES
Vinny Arroyo presents today for   Chief Complaint   Patient presents with    Hypertension     3 month follow-up    Diabetes    Memory Loss       Is someone accompanying this pt? no    Is the patient using any DME equipment during OV? no    Depression Screening:  3 most recent PHQ Screens 8/4/2021   PHQ Not Done -   Little interest or pleasure in doing things Not at all   Feeling down, depressed, irritable, or hopeless Not at all   Total Score PHQ 2 0   Trouble falling or staying asleep, or sleeping too much -   Feeling tired or having little energy -   Poor appetite, weight loss, or overeating -   Feeling bad about yourself - or that you are a failure or have let yourself or your family down -   Trouble concentrating on things such as school, work, reading, or watching TV -   Moving or speaking so slowly that other people could have noticed; or the opposite being so fidgety that others notice -   Thoughts of being better off dead, or hurting yourself in some way -   PHQ 9 Score -       Learning Assessment:  Learning Assessment 1/25/2019   PRIMARY LEARNER Patient   HIGHEST LEVEL OF EDUCATION - PRIMARY LEARNER  GRADUATED HIGH SCHOOL OR GED   BARRIERS PRIMARY LEARNER NONE   CO-LEARNER CAREGIVER No   PRIMARY LANGUAGE ENGLISH   LEARNER PREFERENCE PRIMARY LISTENING   ANSWERED BY patient   RELATIONSHIP SELF       Health Maintenance reviewed and discussed and ordered per Provider. Health Maintenance Due   Topic Date Due    Hepatitis C Screening  Never done    Eye Exam Retinal or Dilated  Never done    DTaP/Tdap/Td series (1 - Tdap) 11/24/2011    Shingrix Vaccine Age 50> (1 of 2) Never done    Foot Exam Q1  12/20/2018    Flu Vaccine (1) 09/01/2021   . Coordination of Care:  1. Have you been to the ER, urgent care clinic since your last visit? Hospitalized since your last visit? No    2.  Have you seen or consulted any other health care providers outside of the [address] [place] [address] since your last visit? Include any pap smears or colon screening.  no

## 2022-01-03 NOTE — PROGRESS NOTES
[facility] - Adeel Saucedo  [address] [facility]  [phone] fax    Physical Therapy Discharge Summary    Patient name: Yokasta Sweeney Start of Care: 10/1/2021   Referral source: Vargas Tirado NP : [id number]                Medical Diagnosis: Pain in neck [M54.2]  [facility] / Plan: [address] / Product Type: Managed Care Medicaid /  Onset Date:2 months ago                Treatment Diagnosis: Neck, Right UE, upper back pain   Prior Hospitalization: see medical history Provider#: [id number]   Medications: Verified on Patient summary List    Comorbidities: diabetes, arthritis, HTN   Prior Level of Function: Right handed, retired, ind with all mobility             Visits from Start of Care: 1    Missed Visits: 0    Reporting Period : 10-1-21    Summary of Care:  Short term goals: To be accomplished within 1 week  1. Pt will be independent with HEP to maintain progression. Eval status: good understanding  Status at discharge: not met, unplanned discharge     Long term goals: To be accomplished within 6 weeks  1. Pt will improve FOTO score by 4 points to 72/100 to show improvement with functional mobility performance. Eval status: 68  Status at discharge: not met, unplanned discharge     2. Pt will report at least 60% improvement with overall pain/numbness to improve his QOL. Eval status: 5-10/10, sharp pain mid and Right side of scap, occasional numbness of Right arm  Status at discharge: not met, unplanned discharge     3. Pt will improve Right ER, lower trap, mid trap and Rhomboid strength to at least 4+/5 to improve his ease with ADLs. Eval status: 4/5 with Right ER, mid trap/Rhomboid, 3/5 with lower trap         Status at discharge: not met, unplanned discharge      ASSESSMENT/RECOMMENDATIONS: due to insurance issue - inability to get authorization.     [x]Discontinue therapy: []Patient has reached or is progressing toward set goals      []Patient is non-compliant or has abdicated      []Due to lack of appreciable progress towards set goals    Pauline Lua 1/3/2022 12:24 PM

## 2022-01-11 DIAGNOSIS — I10 ESSENTIAL HYPERTENSION: ICD-10-CM

## 2022-01-12 RX ORDER — LISINOPRIL AND HYDROCHLOROTHIAZIDE 12.5; 2 MG/1; MG/1
TABLET ORAL
Qty: 90 TABLET | Refills: 0 | Status: SHIPPED | OUTPATIENT
Start: 2022-01-12 | End: 2022-04-21

## 2022-01-18 DIAGNOSIS — M17.0 PRIMARY OSTEOARTHRITIS OF BOTH KNEES: ICD-10-CM

## 2022-01-23 RX ORDER — MELOXICAM 15 MG/1
TABLET ORAL
Qty: 30 TABLET | Refills: 0 | Status: SHIPPED | OUTPATIENT
Start: 2022-01-23 | End: 2022-01-27

## 2022-02-02 ENCOUNTER — TELEPHONE (OUTPATIENT)
Dept: NEUROLOGY | Age: 57
End: 2022-02-02

## 2022-02-02 NOTE — TELEPHONE ENCOUNTER
Received Insurance Retro request per service per Dr. Connie Almanza, scanned in Media. Provider for EMG will need confirmation page sent to insurance showing request was sent within three days after Treatment/Procedure was performed.

## 2022-02-02 NOTE — TELEPHONE ENCOUNTER
Patricia Bradley only allows a 3 day retroactive authorization. This study was performed in September and pre-authorization was not submitted until 01/27/2022.

## 2022-03-02 DIAGNOSIS — E11.40 TYPE 2 DIABETES MELLITUS WITH DIABETIC NEUROPATHY, WITHOUT LONG-TERM CURRENT USE OF INSULIN (HCC): ICD-10-CM

## 2022-03-07 RX ORDER — GLIPIZIDE 10 MG/1
TABLET ORAL
Qty: 90 TABLET | Refills: 0 | Status: SHIPPED | OUTPATIENT
Start: 2022-03-07 | End: 2022-06-09 | Stop reason: SDUPTHER

## 2022-03-09 DIAGNOSIS — G25.81 RESTLESS LEG: ICD-10-CM

## 2022-03-11 RX ORDER — CYCLOBENZAPRINE HCL 5 MG
TABLET ORAL
Qty: 30 TABLET | Refills: 0 | Status: SHIPPED | OUTPATIENT
Start: 2022-03-11 | End: 2022-04-21

## 2022-03-17 ENCOUNTER — OFFICE VISIT (OUTPATIENT)
Dept: FAMILY MEDICINE CLINIC | Age: 57
End: 2022-03-17
Payer: COMMERCIAL

## 2022-03-17 VITALS
TEMPERATURE: 97.5 F | BODY MASS INDEX: 28.31 KG/M2 | RESPIRATION RATE: 16 BRPM | WEIGHT: 209 LBS | DIASTOLIC BLOOD PRESSURE: 87 MMHG | HEART RATE: 70 BPM | OXYGEN SATURATION: 98 % | SYSTOLIC BLOOD PRESSURE: 136 MMHG | HEIGHT: 72 IN

## 2022-03-17 DIAGNOSIS — Z12.5 PROSTATE CANCER SCREENING: ICD-10-CM

## 2022-03-17 DIAGNOSIS — I10 ESSENTIAL HYPERTENSION: ICD-10-CM

## 2022-03-17 DIAGNOSIS — E11.9 TYPE 2 DIABETES MELLITUS WITHOUT COMPLICATION, WITHOUT LONG-TERM CURRENT USE OF INSULIN (HCC): ICD-10-CM

## 2022-03-17 DIAGNOSIS — Z11.59 ENCOUNTER FOR HEPATITIS C SCREENING TEST FOR LOW RISK PATIENT: ICD-10-CM

## 2022-03-17 DIAGNOSIS — Z12.11 COLON CANCER SCREENING: ICD-10-CM

## 2022-03-17 DIAGNOSIS — E11.40 TYPE 2 DIABETES MELLITUS WITH DIABETIC NEUROPATHY, WITHOUT LONG-TERM CURRENT USE OF INSULIN (HCC): Primary | ICD-10-CM

## 2022-03-17 DIAGNOSIS — G47.30 SLEEP APNEA, UNSPECIFIED TYPE: ICD-10-CM

## 2022-03-17 LAB — HBA1C MFR BLD HPLC: 6 %

## 2022-03-17 PROCEDURE — 99214 OFFICE O/P EST MOD 30 MIN: CPT | Performed by: NURSE PRACTITIONER

## 2022-03-17 PROCEDURE — 3044F HG A1C LEVEL LT 7.0%: CPT | Performed by: NURSE PRACTITIONER

## 2022-03-17 PROCEDURE — 83036 HEMOGLOBIN GLYCOSYLATED A1C: CPT | Performed by: NURSE PRACTITIONER

## 2022-03-17 RX ORDER — METFORMIN HYDROCHLORIDE 1000 MG/1
1000 TABLET ORAL DAILY
Qty: 90 TABLET | Refills: 0 | Status: SHIPPED | OUTPATIENT
Start: 2022-03-17 | End: 2022-05-11

## 2022-03-17 NOTE — PROGRESS NOTES
Magen Ortiz presents today for   Chief Complaint   Patient presents with    Hypertension     4 month follow-up    Diabetes       Is someone accompanying this pt? no    Is the patient using any DME equipment during OV? no    Depression Screening:  3 most recent PHQ Screens 3/17/2022   PHQ Not Done -   Little interest or pleasure in doing things Not at all   Feeling down, depressed, irritable, or hopeless Not at all   Total Score PHQ 2 0   Trouble falling or staying asleep, or sleeping too much -   Feeling tired or having little energy -   Poor appetite, weight loss, or overeating -   Feeling bad about yourself - or that you are a failure or have let yourself or your family down -   Trouble concentrating on things such as school, work, reading, or watching TV -   Moving or speaking so slowly that other people could have noticed; or the opposite being so fidgety that others notice -   Thoughts of being better off dead, or hurting yourself in some way -   PHQ 9 Score -       Learning Assessment:  Learning Assessment 1/25/2019   PRIMARY LEARNER Patient   HIGHEST LEVEL OF EDUCATION - PRIMARY LEARNER  GRADUATED HIGH SCHOOL OR GED   BARRIERS PRIMARY LEARNER NONE   CO-LEARNER CAREGIVER No   PRIMARY LANGUAGE ENGLISH   LEARNER PREFERENCE PRIMARY LISTENING   ANSWERED BY patient   RELATIONSHIP SELF       Health Maintenance reviewed and discussed and ordered per Provider. Health Maintenance Due   Topic Date Due    Hepatitis C Screening  Never done    Pneumococcal 0-64 years (1 of 2 - PPSV23) Never done    Eye Exam Retinal or Dilated  Never done    DTaP/Tdap/Td series (1 - Tdap) 11/24/2011    Shingrix Vaccine Age 50> (1 of 2) Never done    Foot Exam Q1  12/20/2018    COVID-19 Vaccine (3 - Booster for Pfizer series) 09/29/2021    Colorectal Cancer Screening Combo  02/12/2022    MICROALBUMIN Q1  03/06/2022    Lipid Screen  03/06/2022   . Coordination of Care:  1.  Have you been to the ER, urgent care clinic since your last visit? Hospitalized since your last visit? no    2. Have you seen or consulted any other health care providers outside of the [address] [place] [address] since your last visit? Include any pap smears or colon screening.  no negative...

## 2022-03-17 NOTE — PROGRESS NOTES
Silas Quinn is a 65 y.o. male presenting today for Hypertension (4 month follow-up) and Diabetes    Hypertension     Diabetes    Memory Loss   His past medical history is significant for hypertension.   :  Silas Quinn presents to the office today for routine follow-up care. He carries a medical diagnosis for hypertension, diabetes mellitus, hyperlipidemia, neuropathy and EVELIN  He denies any cough, fever, loss of smell or taste or GI upset. Hypertension-his BP stable today. His BP reading are 136/87. He notes he is compliant with the medication treatment plan. Hyperlipidemia-  His cholesterol is 121, HDL 34 and LDL 56.2. He is prescribed a statin daily. Diabetes mellitus-hemoglobin A1c is 6.0 today. He is negative for polyuria or polydipsia. He is compliant with the medication treatment plan. ROS:  History obtained from the patient intake forms which are reviewed with the patient  · General: negative for - chills, fever, weight changes or malaise  · HEENT: no sore throat, nasal congestion, vision problems or ear problems  · Respiratory: no cough, shortness of breath, or wheezing  · Cardiovascular: no chest pain, palpitations, or dyspnea on exertion  · Gastrointestinal: no abdominal pain, N/V, change in bowel habits, or black or bloody stools  · Musculoskeletal: no back pain, joint pain, joint stiffness, muscle pain or muscle weakness  · Neurological: no numbness, tingling, headache or dizziness  · Endo:  No polyuria or polydipsia. · : no hematuria, dysuria, frequency, hesitancy, or nocturia.         ROS:  History obtained from the patient intake forms which are reviewed with the patient  · General: negative for - chills, fever, weight changes or malaise  · HEENT: no sore throat, nasal congestion, vision problems or ear problems  · Respiratory: no cough, shortness of breath, or wheezing  · Cardiovascular: no chest pain, palpitations, or dyspnea on exertion  · Gastrointestinal: no abdominal pain, N/V, change in bowel habits, or black or bloody stools  · Musculoskeletal: Intermittent lumbar pain  · Neurological: no numbness, tingling, headache or dizziness  · Endo:  No polyuria or polydipsia. · : no hematuria, dysuria, frequency, hesitancy, or nocturia. · Psychological: negative for - anxiety, depression, sleep disturbances, suicidal or homicidal ideations      No Known Allergies    Current Outpatient Medications   Medication Sig Dispense Refill    metFORMIN (GLUCOPHAGE) 1,000 mg tablet Take 1 Tablet by mouth daily. 90 Tablet 0    cyclobenzaprine (FLEXERIL) 5 mg tablet Take 1 tablet by mouth nightly 30 Tablet 0    glipiZIDE (GLUCOTROL) 10 mg tablet Take 1 tablet by mouth once daily with breakfast 90 Tablet 0    meloxicam (MOBIC) 15 mg tablet Take 1 tablet by mouth once daily 30 Tablet 2    lisinopril-hydroCHLOROthiazide (PRINZIDE, ZESTORETIC) 20-12.5 mg per tablet TAKE 1 TABLET BY MOUTH ONCE DAILY FOR HIGH BLOOD PRESSURE 90 Tablet 0    gabapentin (NEURONTIN) 300 mg capsule TAKE 1 CAPSULE BY MOUTH TWICE DAILY FOR  NEUROPATHIC  PAIN 60 Capsule 4    amLODIPine (NORVASC) 10 mg tablet Take 1 Tablet by mouth daily. 90 Tablet 1    topiramate (TOPAMAX) 50 mg tablet Take 1 tab in the morning and 2 tabs at bedtime. 90 Tablet 5    Blood-Glucose Meter (OneTouch Verio Flex Start) monitoring kit Take BG level AC/HS 1 Kit 0    glucose blood VI test strips (OneTouch Verio test strips) strip Take BG level AC/ Strip 3    lancets misc Take BG level AC/HS 1 Each 11    aspirin 81 mg chewable tablet Take 1 Tab by mouth daily. 30 Tab 11    calcium carbonate-vitamin D3 600 mg(1,500mg) -800 unit tab Take  by mouth.  fish oil-dha-epa 1,200-144-216 mg cap Take  by mouth.  multivitamin (ONE A DAY) tablet Take 1 Tablet by mouth daily.       rosuvastatin (CRESTOR) 20 mg tablet Take 1 tablet by mouth nightly 90 Tablet 1    fluticasone propionate (FLONASE) 50 mcg/actuation nasal spray 1 spray each nostril daily (Patient not taking: Reported on 2021) 1 Bottle 1       Past Medical History:   Diagnosis Date    Arthritis     Cataract, right     Diabetes ([address]     H/O seasonal allergies     History of vitamin D deficiency 2016    Hypercholesterolemia     Hypertension     Hypertriglyceridemia 2016    Hypokalemia 2016    Low back pain     Numbness and tingling of foot 2014    left toes and mid bottom of foot    Peripheral neuropathy     Scrotal mass 2016    Sleep apnea        Past Surgical History:   Procedure Laterality Date    HX HERNIA REPAIR  9509    umbilical, done at [address] TISS FACE/SCALP SUBQ 2+CM  16    scalp lesion removal, Dr. Turner Mall History     Socioeconomic History    Marital status:      Spouse name: Not on file    Number of children: 0    Years of education: 6    Highest education level: Not on file   Occupational History    Occupation: unemployed and living with son and daughter-in-law, after incarceration 2011 out 2014   Tobacco Use    Smoking status: Former Smoker    Smokeless tobacco: Never Used    Tobacco comment: stopped in HS, never heavy and never long term   Substance and Sexual Activity    Alcohol use: No     Alcohol/week: 0.0 standard drinks     Comment: rarely holiday    Drug use: No    Sexual activity: Yes     Comment: no partner, spouse  2014   Other Topics Concern   [address] Yes     Comment: [address] from [address], other than honorable discharge    Blood Transfusions No    Caffeine Concern No     Comment: decreased his 2-3 super big gulps    Occupational Exposure Yes     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[facility] Hazards No    Sleep Concern No    Stress Concern Yes     Comment: Life in general    Weight Concern No    Special Diet No    Back Care No    Exercise No     Comment: starting to do something but not currently    Bike Helmet No    Seat Belt Yes    Self-Exams No   Social History Narrative    Not on file     Social Determinants of Health     Financial Resource Strain:     Difficulty of Paying Living Expenses: Not on file   Food Insecurity:     Worried About Running Out of Food in the Last Year: Not on file    Justin of Food in the Last Year: Not on file   Transportation Needs:     Lack of Transportation (Medical): Not on file    Lack of Transportation (Non-Medical): Not on file   Physical Activity:     Days of Exercise per Week: Not on file    Minutes of Exercise per Session: Not on file   Stress:     Feeling of Stress : Not on file   Social Connections:     Frequency of Communication with Friends and Family: Not on file    Frequency of Social Gatherings with Friends and Family: Not on file    Attends [employer] Services: Not on file    Active Member of [address] [place] [address] [employer] or Organizations: Not on file    Attends Club or Organization Meetings: Not on file    Marital Status: Not on file   Intimate Partner Violence:     Fear of Current or Ex-Partner: Not on file    Emotionally Abused: Not on file    Physically Abused: Not on file    Sexually Abused: Not on file   Housing Stability:     Unable to Pay for Housing in the Last Year: Not on file    Number of [place] in the Last Year: Not on file    Unstable Housing in the Last Year: Not on file         Vitals:    03/17/22 0822   BP: 136/87   Pulse: 70   Resp: 16   Temp: 97.5 °F (36.4 °C)   TempSrc: Oral   SpO2: 98%   Weight: 209 lb (94.8 kg)   Height: 6' (1.829 m)   PainSc:   0 - No pain       Physical Exam  Vitals and nursing note reviewed. Constitutional:       Appearance: Normal appearance. HENT:      Head: Normocephalic. Cardiovascular:      Rate and Rhythm: Normal rate and regular rhythm. Pulses: Normal pulses. Heart sounds: Normal heart sounds. Pulmonary:      Effort: Pulmonary effort is normal. No respiratory distress.       Breath sounds: Normal breath sounds. Musculoskeletal:      Cervical back: Normal range of motion and neck supple. Skin:     General: Skin is warm and dry. Neurological:      General: No focal deficit present. Mental Status: He is alert. Psychiatric:         Mood and Affect: Mood normal.         Office Visit on 03/17/2022   Component Date Value Ref Range Status    Hemoglobin A1c (POC) 03/17/2022 6.0  % Final       .  Results for orders placed or performed in visit on 03/17/22   AMB POC HEMOGLOBIN A1C   Result Value Ref Range    Hemoglobin A1c (POC) 6.0 %       Assessment / Plan:      ICD-10-CM ICD-9-CM    1. Type 2 diabetes mellitus with diabetic neuropathy, without long-term current use of insulin (HCC)  E11.40 250.60 metFORMIN (GLUCOPHAGE) 1,000 mg tablet     357.2 MICROALBUMIN, UR, RAND W/ MICROALB/CREAT RATIO   2. Type 2 diabetes mellitus without complication, without long-term current use of insulin (HCC)  E11.9 250.00 AMB POC HEMOGLOBIN A1C   3. Colon cancer screening  Z12.11 V76.51 REFERRAL TO GASTROENTEROLOGY   4. Encounter for hepatitis C screening test for low risk patient  Z11.59 V73.89 HEPATITIS C AB   5. Prostate cancer screening  Z12.5 V76.44 PSA SCREENING (SCREENING)   6. Essential hypertension  I10 401.9 LIPID PANEL      METABOLIC PANEL, COMPREHENSIVE      CBC WITH AUTOMATED DIFF   7. Sleep apnea, unspecified type  G47.30 780.57 SLEEP MEDICINE REFERRAL     Metformin decreased to 1000 mg daily  Denies any side effects or adverse reaction to the medications  Medication refills  Follow-up in 4 months      I asked the patient if he  had any questions and answered his  questions. The patient stated that he understands the treatment plan and agrees with the treatment plan    This document was created with a voice activated dictation system and may contain transcription errors.

## 2022-03-18 PROBLEM — E11.40 TYPE 2 DIABETES MELLITUS WITH DIABETIC NEUROPATHY (HCC): Status: ACTIVE | Noted: 2018-06-13

## 2022-03-18 PROBLEM — G47.30 SLEEP APNEA: Status: ACTIVE | Noted: 2018-09-26

## 2022-03-19 PROBLEM — G51.0 BELL PALSY: Status: ACTIVE | Noted: 2019-07-09

## 2022-04-19 DIAGNOSIS — I10 ESSENTIAL HYPERTENSION: ICD-10-CM

## 2022-04-19 DIAGNOSIS — G25.81 RESTLESS LEG: ICD-10-CM

## 2022-04-20 RX ORDER — TOPIRAMATE 50 MG/1
TABLET, FILM COATED ORAL
Qty: 90 TABLET | Refills: 0 | Status: SHIPPED | OUTPATIENT
Start: 2022-04-20 | End: 2022-05-27 | Stop reason: SDUPTHER

## 2022-04-21 RX ORDER — LISINOPRIL AND HYDROCHLOROTHIAZIDE 12.5; 2 MG/1; MG/1
TABLET ORAL
Qty: 90 TABLET | Refills: 0 | Status: SHIPPED | OUTPATIENT
Start: 2022-04-21 | End: 2022-07-20 | Stop reason: SDUPTHER

## 2022-04-21 RX ORDER — CYCLOBENZAPRINE HCL 5 MG
TABLET ORAL
Qty: 30 TABLET | Refills: 0 | Status: SHIPPED | OUTPATIENT
Start: 2022-04-21 | End: 2022-07-26 | Stop reason: SDUPTHER

## 2022-05-27 RX ORDER — TOPIRAMATE 50 MG/1
TABLET, FILM COATED ORAL
Qty: 90 TABLET | Refills: 0 | Status: SHIPPED | OUTPATIENT
Start: 2022-05-27 | End: 2022-07-26 | Stop reason: SDUPTHER

## 2022-05-31 DIAGNOSIS — M17.0 PRIMARY OSTEOARTHRITIS OF BOTH KNEES: ICD-10-CM

## 2022-05-31 RX ORDER — MELOXICAM 15 MG/1
15 TABLET ORAL DAILY
Qty: 30 TABLET | Refills: 2 | Status: SHIPPED | OUTPATIENT
Start: 2022-05-31 | End: 2022-08-17

## 2022-06-09 DIAGNOSIS — E11.40 TYPE 2 DIABETES MELLITUS WITH DIABETIC NEUROPATHY, WITHOUT LONG-TERM CURRENT USE OF INSULIN (HCC): ICD-10-CM

## 2022-06-15 DIAGNOSIS — I10 ESSENTIAL HYPERTENSION: ICD-10-CM

## 2022-06-15 RX ORDER — GLIPIZIDE 10 MG/1
10 TABLET ORAL
Qty: 90 TABLET | Refills: 1 | Status: SHIPPED | OUTPATIENT
Start: 2022-06-15

## 2022-06-15 RX ORDER — AMLODIPINE BESYLATE 10 MG/1
10 TABLET ORAL DAILY
Qty: 90 TABLET | Refills: 3 | Status: SHIPPED | OUTPATIENT
Start: 2022-06-15

## 2022-06-22 NOTE — PROGRESS NOTES
6/22/2022       RE: Mitesh Willson  [address]  [facility] [address]     Dear Colleague,    Thank you for referring your patient, Mitesh Willson, to the [facility] PEDIATRIC SPECIALTY CLINIC at [facility]. Please see a copy of my visit note below.    Pediatric Hematology Oncology Clinic    History:  Mitesh originally presented at age 14 with a posterior fossa mass and hydrocephalus. Resultant cranial n. loss resulted in tracheostomy and gastrostomy tube for airway protection. First known retinal angiomas - Laser therapy to left eye done 2/27/2012. 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 Mitesh is the first in his family () with VHL. Mitesh has continued to have hemangioblastomas with most recent surgery 2/2020 for resection of enlarging posterior fossa hemangioblastomas He had a shunt malfunction in August, 2020 requiring revision. Mitesh was cane dependent and now requires a walker. Unfortunately, he is having more trouble bouncing back after surgeries. Mitesh trialed bevacizumab from September 2016 through March 2017. Mitesh subsequently had a prolonged hospitalization for Strep pneumonia, thus foregoing bevacizumab. Notably, Mitesh was born at 34 weeks gestation and had a 21-day NICU stay due to grade I intraventricular hemorrhage and feeding issues. He had developmental delay from early in life. Mitesh began taking Belzutifan through [facility] at the end of October 2021. He comes to clinic today with his mom for evaluation.        HPI   Mitesh has been feeling good on belzutifan. He started taking this drug in October at the max dose of 120mg, but quickly needed to be dose reduced due to hypoxia, decreasing to 40mg daily. He has since been able to increase the dose and as of January has been back up to 120mg daily.  [address]  [address] [facility], [address]  [phone] office/[phone] fax      4/24/2017    Reason for visit:   Chief Complaint   Patient presents with    Results    Follow Up Chronic Condition       Patient: Cory Boateng, 1965, [id number]       Primary MD: Nan Jimenez NP    Subjective:   Cory Boateng, a 47 y.o. male, who presents for Results and Follow Up Chronic Condition    Working to try to get VA benefits. OTH discharge. HPI  Diabetes Follow Up     Current symptoms/problems include neuropathy, controlled on Lyrica  Known diabetic complications: none  Cardiovascular risk factors: dyslipidemia, diabetes mellitus, obesity, male gender, hypertension  Current diabetic medications include Kombiglyze . The patient takes DM meds regularly with no side effects. yes - Does not want insulin     Eye exam current (within one year): yes, got some glasses  Weight trend: stable  Prior visit with dietician: no  Current diet: well balanced  Current exercise: no regular exercise    Current monitoring regimen: none    Is She on ACE inhibitor or angiotensin II receptor blocker? Yes   Prinzide    On Daily Aspirin:  no  On Statin therapy: yes - Livalo   BP today is at goal today <140/90: yes -   Last A1C: 8.7   Last LDL: 84.4   Last DM eye exam: \"A couple of months ago. \" Cataracts   Last DM foot exam: 10/20/17  Last urine microalbumin: 1/11/17         Past Medical History:   Diagnosis Date    Cataract, right 2010    Diabetes ([address]     History of vitamin D deficiency 6/2016    Hypercholesterolemia     Hypertension     Hypertriglyceridemia 6/30/2016    Hypokalemia 6/30/2016    Low back pain     Numbness and tingling of foot June 2014    left toes and mid bottom of foot    Scrotal mass 6/6/2016       Past Surgical History:   Procedure Laterality Date    HX HERNIA REPAIR  9747    umbilical, done at [address] [place] [address] Mitesh's blood pressure has been running a bit low, notable a clinic visits but he's not symptomatic. Mitesh has been in good health and has not had any recent respiratory illnesses. He caps his trach during the day and that's been working great. They acknowledge that he is due for pulmonary follow up. Mitesh sleeps well at night. Fatigue is not a concern. He's been eating and drinking well. He does have his g-tube still and they are considering getting this out if his next set of imaging looks good. They never use it. Mitesh goes regularly for local labs. He travels to [place] every 3 months for MRIs brain/spine/abdomen. No specific questions or concerns today.     History obtained from patient as well as the following historian: mother    Current medications: Symbicort, albuterol inhalers used as needed. No scheduled medications.      Allergies   Allergen Reactions     Propofol Other (See Comments)     NOT a True allergy but a precaution.  The patient's CPKs became very elevated after receiving propofol.  Weigh risks vs benefits prior to using for sedation.      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   HENT: Negative for hearing loss, nosebleeds and tinnitus.         Tracheostomy - recent mild infection, granulomas   Eyes: Negative.         Twice a year exams    Last visit - lasered L eye lesion   Respiratory: Positive for hx wheezing.         Symbicort, albuterol inhalers   Cardiovascular: Negative.    Gastrointestinal: Negative for abdominal pain,  SUBQ 2+CM  16    scalp lesion removal, Dr. Leon Spore History    Marital status: SINGLE     Spouse name: N/A    Number of children: 0    Years of education: 6     Occupational History    unemployed and living with son and daughter-in-law, after incarceration 2011 out 2014      Social History Main Topics    Smoking status: Former Smoker    Smokeless tobacco: Never Used      Comment: stopped in HS, never heavy and never long term    Alcohol use No      Comment: rarely holiday    Drug use: No    Sexual activity: Yes      Comment: no partner, spouse  2014     Other Topics Concern   Yudi Feliciano, from [id number], other than honorable discharge    Blood Transfusions No    Caffeine Concern No     decreased his 2-3 super big gulps    Occupational Exposure Yes     ? 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  lisinopril-hydrochlorothiazide (PRINZIDE, ZESTORETIC) 20-12.5 mg per tablet Take 1 Tab by mouth daily. Indications: HYPERTENSION 30 Tab 5    pitavastatin (LIVALO) 2 mg tablet Take 1 Tab by mouth daily. To start once Crestor complete. 90 Tab 3    saxagliptin-metFORMIN (KOMBIGLYZE XR) 2.5-1,000 mg TM24 Take 2 Tabs by mouth daily (with dinner). 180 Tab 3    omega-3 acid ethyl esters (LOVAZA) 1 gram capsule Take 2 Caps by mouth daily (with breakfast). 180 Cap 3    pregabalin (LYRICA) 75 mg capsule Take 1 Cap by mouth two (2) times a day.  Max Daily Amount: 150 mg. 60 Cap 3    meloxicam (MOBIC) 15 mg tablet TAKE ONE TABLET BY MOUTH ONCE DAILY 30 Tab 0    multivitamin (ONE A DAY) tablet Take 1 Tab by mouth daily.  loratadine (CLARITIN) 10 mg tablet Take 1 Tab by mouth daily. Indications: ALLERGIC RHINITIS 30 Tab 1     No current facility-administered medications on file prior to visit. Review of Systems   Constitutional: Negative. HENT: Positive for congestion. Eyes: Negative. Respiratory: Negative. Cardiovascular: Negative. Gastrointestinal: Negative. Genitourinary: Negative. Musculoskeletal: Negative. Neurological: Negative. Neurapathy better on lyrica    Endo/Heme/Allergies: Positive for environmental allergies (Was on Claritin). Psychiatric/Behavioral: Negative. Objective:   Visit Vitals    /87    Pulse 75    Temp 98.6 °F (37 °C)    Resp 16    Ht 5' 11\" (1.803 m)    Wt 223 lb (101.2 kg)    SpO2 96%    BMI 31.1 kg/m2      Wt Readings from Last 3 Encounters:   04/24/17 223 lb (101.2 kg)   01/23/17 219 lb (99.3 kg)   10/19/16 221 lb 12.8 oz (100.6 kg)     Lab Results   Component Value Date/Time    Glucose 181 04/18/2017 08:47 AM    Glucose (POC) 158 01/04/2016 11:52 AM    Glucose  10/19/2016 01:10 PM         Physical Exam   Constitutional: He is oriented to person, place, and time. He appears well-developed and well-nourished. HENT:   Head: Normocephalic. Eyes: Pupils are equal, round, and reactive to light. Neck: Normal range of motion. Neck supple. No JVD present. Carotid bruit is not present. No thyromegaly present. Cardiovascular: Normal rate and regular rhythm. No murmur heard. Pulmonary/Chest: Effort normal and breath sounds normal. No respiratory distress. He has no wheezes. Abdominal: Soft. Bowel sounds are normal. He exhibits no distension. There is no tenderness. Abdomen obese   Musculoskeletal: Normal range of motion. He exhibits no edema or deformity.    Neurological: He is alert and oriented to blood in stool, constipation, diarrhea, heartburn, nausea and vomiting.   Genitourinary: Negative for frequency and urgency.   Musculoskeletal: Negative.    Skin: Negative.    Neurological: Positive for focal weakness (R LE (not new)). Negative for dizziness, speech change, seizures, loss of consciousness and headaches.   Endo/Heme/Allergies: Negative.    Psychiatric/Behavioral: Negative.  Negative for depression. The patient is not nervous/anxious and does not have insomnia.     Karnofsky = 80    Social History: He lives at home with his mother and father and receives good support through his family unit.    There were no vitals taken for this visit.      Physical Exam  General: Well nourished, well developed without apparent distress  HEENT: Normocephalic. Full head of hair. Eyes are non-injected without drainage. PEERL. Nares patient without drainage. TMs clear with positive landmarks. Oropharynx: uvula midline. No erythema, nor edema. No mucositis.  Chest: Symmetrical  Lungs: clear to bases bilaterally. No cough. No wheezing. Trach in place with cap.   Heart: regular rate. No murmur  Abdomen: Soft, non-tender, No HSM.  Extremities/MSK: BAIRES with full ROM and good perfusion.   Skin: no bumps, rashes, nor bruising.   Neuro: PERRL, cranial nerves II-XII grossly in tact.  : deferred.       Impression:  VHL  Continued proliferation of CNS hemangioblastomas  Retinal angiomas lasered at least x 2 (not recently)  No evidence of renal/adrenal neoplasm  Currently taking Belzutifan    Plan:  1) Continue Belzutifan 120mg every day. Follow up per [facility] instruction, including MRIs (brain, spine, abdomen) every 3 months.   2) Local labs monthly. Specifically monitoring hemoglobin. Would give arinesp if Hgb < 10 per [facility] preference.   3) Recommended pulmonology follow up for trach and respiratory needs  4) In support of g-tube removal. Discussed that this could even be taken out at home, but will defer to GI  5)  Monitor BPs closely  6) RTC in 12 months for labs and exam; sooner if he gets approval to move imaging back locally or as needed.     Jennifer Gayle CNP      Total time spent on the following services on the date of the encounter:  Preparing to see patient, chart review, review of outside records, Referring or communicating with other healthcare professionals, Interpretation of labs, imaging and other tests, Performing a medically appropriate examination , Counseling and educating the patient/family/caregiver , Documenting clinical information in the electronic or other health record , Care coordination  and Total time spent: 40 minutes               Again, thank you for allowing me to participate in the care of your patient.      Sincerely,    NA Recinos CNP     person, place, and time. He has normal reflexes. Skin: Skin is warm and dry. Psychiatric: He has a normal mood and affect. His behavior is normal. Judgment and thought content normal.   Vitals reviewed. Assessment:    Diogo Garcia who has risk factors including (see above previous medical hx) and:       ICD-10-CM ICD-9-CM    1. BMI 31.0-31.9,adult Z68.31 V85.31    2. Type 2 diabetes mellitus without complication, unspecified long term insulin use status E11.9 250.00 dapagliflozin (FARXIGA) 10 mg tab      dapagliflozin (FARXIGA) 10 mg tab   3. Essential hypertension I10 401.9    4. Hypertriglyceridemia E78.1 272.1    5. Routine health maintenance Z00.00 V70.0    6. Seasonal allergic rhinitis, unspecified allergic rhinitis trigger J30.2 477.9 mometasone (NASONEX) 50 mcg/actuation nasal spray      mometasone (NASONEX) 50 mcg/actuation nasal spray      desloratadine (CLARINEX) 5 mg tablet       1. BMI 31.0-31.9,adult  -Weight management: Discussed importance of maintaining a normal weight through healthy dietary changes and aerobic exercise on a daily basis of 30 min or more to decrease need for additional medications, reduction of blood glucose/blood pressure/ dementia/osteoporosis/stress and depression. Aerobic exercise was described as an activity that makes them sweaty and elevates their heart rate such as walking, running, bike, treadmill, stair climbing, joining a gym or swimming. Discussed the patient's above normal BMI with her. Recommended the following interventions: dietary management education, guidance, counseling, exercise and monitoring weight. BMI is out of normal parameters and plan is as follows: Discussed in great detail on diet, portion control, exercise, avoiding foods high in sugar, carbs and starches, mealtimes and to eat until satisfied not til full. I have counseled this patient on diet and exercise regimens      2.  Type 2 diabetes mellitus without complication, unspecified long term insulin use status  -A1C 8.7   -Added Farxiga to regimen, reviewed side effects with the patient. -Reviewed diet and lifestyle modifications. -Given DM handout and booklet with [facility] DM teaching class schedule.   - dapagliflozin (FARXIGA) 10 mg tab; Take 1 Tab by mouth daily (with breakfast). Indications: type 2 diabetes mellitus  Dispense: 90 Tab; Refill: 3  - dapagliflozin (FARXIGA) 10 mg tab; Take 1 Tab by mouth daily (with breakfast). Indications: type 2 diabetes mellitus  Dispense: 90 Tab; Refill: 0    3. Essential hypertension  -The current medical regimen is effective;  continue present plan and medications. 4. Hypertriglyceridemia  -TG over 150, will have patient work on diet to reduce simple carbohydrates, alcohol and saturated fats from diet. A certain amount of triglycerides are needed for your body to function, but excess is stored as fat. Triglycerides have always been known to raise heart disease risk. Foods to lower triglycerides   Cold water fish such as salmon, tuna, and cod. Olive oil and walnut oil. Grapes and Blueberries. Beans, legumes, peas and lentils. Spinach, broccoli, and [place] sprouts. Tomatoes, oranges, grapefruit, edgard, and limes. Foods to avoid  Butter and margarine are simply triglycerides in a stick or tub form. Palm, coconut oil,  Lard. Eating candy and sugary foods. Fatty red meat, poultry skin, butter, high-fat dairy products and shellfish. 5. Routine health maintenance  -Given information on [facility] and [facility] for routine maintenance     6. Seasonal allergic rhinitis, unspecified allergic rhinitis trigger  -Will order nasonex via [facility] as sample and as a program medication. Pt needs to sign [facility] paperwork and to  samples. - mometasone (NASONEX) 50 mcg/actuation nasal spray; 2 Sprays by Both Nostrils route daily.  Indications: ALLERGIC RHINITIS  Dispense: 2 Container; Refill: 0  - mometasone (NASONEX) 50 mcg/actuation nasal spray; 2 Sprays by Both Nostrils route daily. Dispense: 2 Container; Refill: 0  - desloratadine (CLARINEX) 5 mg tablet; Take 1 Tab by mouth daily. Dispense: 90 Tab; Refill: 3      Written instructions followed our verbal discussion of all information discussed above, pending tests ordered and future goals/plans. Patient expressed understanding of current diagnosis, planned testing, follow up and if needed to contact the office for any questions or concerns prior to the next visit. Plan:   Med reconciliation completed with patient. Reviewed side effects of medications with the patient. Questions were answered and patient verb understanding. Discussed lab results with patient  1. Hypertriglyceridemia 184  2. CMP ok  3. A1C 8.7 from 7.1    Orders Placed This Encounter    dapagliflozin (FARXIGA) 10 mg tab     Sig: Take 1 Tab by mouth daily (with breakfast). Indications: type 2 diabetes mellitus     Dispense:  90 Tab     Refill:  3    dapagliflozin (FARXIGA) 10 mg tab     Sig: Take 1 Tab by mouth daily (with breakfast). Indications: type 2 diabetes mellitus     Dispense:  90 Tab     Refill:  0    mometasone (NASONEX) 50 mcg/actuation nasal spray     Si Sprays by Both Nostrils route daily. Indications: ALLERGIC RHINITIS     Dispense:  2 Container     Refill:  0    mometasone (NASONEX) 50 mcg/actuation nasal spray     Si Sprays by Both Nostrils route daily. Dispense:  2 Container     Refill:  0    desloratadine (CLARINEX) 5 mg tablet     Sig: Take 1 Tab by mouth daily. Dispense:  90 Tab     Refill:  3     Current Outpatient Prescriptions   Medication Sig Dispense Refill    dapagliflozin (FARXIGA) 10 mg tab Take 1 Tab by mouth daily (with breakfast). Indications: type 2 diabetes mellitus 90 Tab 3    dapagliflozin (FARXIGA) 10 mg tab Take 1 Tab by mouth daily (with breakfast).  Indications: type 2 diabetes mellitus 90 Tab 0    mometasone (NASONEX) 50 mcg/actuation nasal spray 2 Sprays by Both Nostrils route daily. Indications: ALLERGIC RHINITIS 2 Container 0    mometasone (NASONEX) 50 mcg/actuation nasal spray 2 Sprays by Both Nostrils route daily. 2 Container 0    desloratadine (CLARINEX) 5 mg tablet Take 1 Tab by mouth daily. 90 Tab 3    potassium 99 mg tablet Take 1 Tab by mouth daily.  lisinopril-hydrochlorothiazide (PRINZIDE, ZESTORETIC) 20-12.5 mg per tablet Take 1 Tab by mouth daily. Indications: HYPERTENSION 30 Tab 5    pitavastatin (LIVALO) 2 mg tablet Take 1 Tab by mouth daily. To start once Crestor complete. 90 Tab 3    saxagliptin-metFORMIN (KOMBIGLYZE XR) 2.5-1,000 mg TM24 Take 2 Tabs by mouth daily (with dinner). 180 Tab 3    omega-3 acid ethyl esters (LOVAZA) 1 gram capsule Take 2 Caps by mouth daily (with breakfast). 180 Cap 3    pregabalin (LYRICA) 75 mg capsule Take 1 Cap by mouth two (2) times a day. Max Daily Amount: 150 mg. 60 Cap 3    meloxicam (MOBIC) 15 mg tablet TAKE ONE TABLET BY MOUTH ONCE DAILY 30 Tab 0    multivitamin (ONE A DAY) tablet Take 1 Tab by mouth daily.  loratadine (CLARITIN) 10 mg tablet Take 1 Tab by mouth daily. Indications: ALLERGIC RHINITIS 30 Tab 1     Medications Discontinued During This Encounter   Medication Reason    mometasone (NASONEX) 50 mcg/actuation nasal spray Reorder       Follow-up Disposition:  Return in about 3 months (around 7/24/2017). Labs needed for follow-up appt    \"No Show policy was reviewed with the patient. The services affected are the nurse navigator and the provider. No show appointments include missing labs for a future scheduled appointment, Pap/pelvics, arriving to appointment more than 10 minutes late, and calling to cancel appointment less than 24 hours in advance. After the 3rd No Show, the patient will be removed from the Foundation to include medications for 6 months. The patient will be referred to the [place] [id number] for their primary care needs. \"     Maria De Jesus SARMIENTO Silvana Laughter, [address]      I spent 35 minutes with the patient in face-to-face consultation, of which greater than 50% was spent in counseling and coordination of care as described above.

## 2022-07-01 ENCOUNTER — TELEPHONE (OUTPATIENT)
Dept: NEUROLOGY | Age: 57
End: 2022-07-01

## 2022-07-01 NOTE — TELEPHONE ENCOUNTER
Spoke with patient's Pharmacist, Iman Monroy, unable to update patient request for refill, patient will need to make follow up appointment.

## 2022-07-12 ENCOUNTER — CLINICAL SUPPORT (OUTPATIENT)
Dept: SURGERY | Age: 57
End: 2022-07-12

## 2022-07-12 VITALS
OXYGEN SATURATION: 98 % | RESPIRATION RATE: 18 BRPM | HEIGHT: 72 IN | WEIGHT: 209 LBS | BODY MASS INDEX: 28.31 KG/M2 | HEART RATE: 70 BPM | TEMPERATURE: 97.9 F

## 2022-07-12 DIAGNOSIS — Z12.11 COLON CANCER SCREENING: ICD-10-CM

## 2022-07-12 DIAGNOSIS — Z01.818 PRE-OP TESTING: Primary | ICD-10-CM

## 2022-07-12 RX ORDER — CETIRIZINE HYDROCHLORIDE 10 MG/1
CAPSULE, LIQUID FILLED ORAL
COMMUNITY

## 2022-07-12 NOTE — PROGRESS NOTES
Colon Screen    Patient: Shahzad Comer MRN: [id number]  SSN: [id number]    YOB: 1965  Age: 65 y.o. Sex: male        Subjective:   Shahzad Comer was referred by his PCP, Jese Blankenship NP. Patient referred for colonoscopy for   Screening colonoscopy. Patient denies rectal pain or bleeding. Abdominal surgeries as described below, specifically umbilical hernia repair. Family history as described below, specifically none. Patient has never had a colonoscopy. No Known Allergies    Past Medical History:   Diagnosis Date    Arthritis     Cataract, right 2010    Diabetes ([address]     H/O seasonal allergies     History of vitamin D deficiency 6/2016    Hypercholesterolemia     Hypertension     Hypertriglyceridemia 6/30/2016    Hypokalemia 6/30/2016    Low back pain     Numbness and tingling of foot June 2014    left toes and mid bottom of foot    Peripheral neuropathy     Scrotal mass 6/6/2016    Sleep apnea      Past Surgical History:   Procedure Laterality Date    HX CATARACT REMOVAL Bilateral     HX HERNIA REPAIR  3877    umbilical, done at [facility] [address] SOFT TISS FACE/SCALP SUBQ 2+CM  1/4/16    scalp lesion removal, Dr. Pastor Da Silva      Family History   Problem Relation Age of Onset    No Known Problems Mother     Breast Cancer Sister      Social History     Tobacco Use    Smoking status: Former Smoker    Smokeless tobacco: Never Used    Tobacco comment: stopped in HS, never heavy and never long term   Substance Use Topics    Alcohol use: Yes     Alcohol/week: 0.0 standard drinks     Comment: rarely holiday      Prior to Admission medications    Medication Sig Start Date End Date Taking? Authorizing Provider   Cetirizine (ZyrTEC) 10 mg cap Take  by mouth. Yes Provider, Historical   glipiZIDE (GLUCOTROL) 10 mg tablet Take 1 Tablet by mouth daily (with breakfast).  6/15/22  Yes Yeimi Maxwell NP   amLODIPine (NORVASC) 10 mg tablet Take 1 Tablet by mouth daily. 6/15/22  Yes Yeimi Maxwell NP   meloxicam (MOBIC) 15 mg tablet Take 1 Tablet by mouth daily. 5/31/22  Yes Yeimi Maxwell NP   metFORMIN (GLUCOPHAGE) 1,000 mg tablet TAKE 1 TABLET BY MOUTH TWICE DAILY WITH MEALS 5/11/22  Yes Asif Sloan NP   gabapentin (NEURONTIN) 300 mg capsule TAKE 1 CAPSULE BY MOUTH TWICE DAILY FOR  NEUROPATHIC PAIN 5/3/22  Yes Asif Sloan NP   lisinopril-hydroCHLOROthiazide (PRINZIDE, ZESTORETIC) 20-12.5 mg per tablet TAKE 1 TABLET BY MOUTH ONCE DAILY FOR HIGH BLOOD PRESSURE 4/21/22  Yes Asif Sloan NP   cyclobenzaprine (FLEXERIL) 5 mg tablet Take 1 tablet by mouth nightly 4/21/22  Yes Asif Sloan NP   rosuvastatin (CRESTOR) 20 mg tablet Take 1 tablet by mouth nightly 3/28/22  Yes Asif Sloan NP   fluticasone propionate (FLONASE) 50 mcg/actuation nasal spray 1 spray each nostril daily 7/29/21  Yes Asif Sloan NP   Blood-Glucose Meter (OneTouch Verio Flex Start) monitoring kit Take BG level AC/HS 6/4/20  Yes Asif Sloan NP   glucose blood VI test strips (OneTouch Verio test strips) strip Take BG level AC/HS 6/4/20  Yes Asif Sloan NP   lancets misc Take BG level AC/HS 6/4/20  Yes Asif Sloan NP   aspirin 81 mg chewable tablet Take 1 Tab by mouth daily. 2/20/19  Yes Ayo Willoughby NP   calcium carbonate-vitamin D3 600 mg(1,500mg) -800 unit tab Take  by mouth. Yes Provider, Historical   multivitamin (ONE A DAY) tablet Take 1 Tablet by mouth daily. Yes Provider, Historical   topiramate (TOPAMAX) 50 mg tablet Take 1 tab in the morning and 2 tabs at bedtime. Patient not taking: Reported on 7/12/2022 5/27/22   Juwan Ward, NP   fish oil-dha-epa 2,546-385-058 mg cap Take  by mouth. Patient not taking: Reported on 7/12/2022    Provider, Historical          Review of Systems:  Review of Systems   Constitutional: Negative.     HENT: Positive for congestion. Negative for ear discharge, ear pain, hearing loss, nosebleeds, sinus pain, sore throat and tinnitus. Allergies   Eyes: Negative. Respiratory: Negative. Negative for stridor. Cardiovascular: Negative. Gastrointestinal: Negative. Hemorrhoidal bleeding occas. Genitourinary: Negative. Musculoskeletal: Negative. Skin: Negative. Neurological: Positive for tingling. Negative for dizziness, tremors, sensory change, speech change, focal weakness, seizures, loss of consciousness, weakness and headaches. Neuropathy   Endo/Heme/Allergies: Negative. Psychiatric/Behavioral: Negative. Risks colonoscopy described- colon injury, missed lesion, anesthesia problems, bleeding       Cristina Saravia, JASSI  July 12, 2218  5:61 PM

## 2022-07-20 DIAGNOSIS — I10 ESSENTIAL HYPERTENSION: ICD-10-CM

## 2022-07-20 RX ORDER — LISINOPRIL AND HYDROCHLOROTHIAZIDE 12.5; 2 MG/1; MG/1
TABLET ORAL
Qty: 90 TABLET | Refills: 0 | Status: SHIPPED | OUTPATIENT
Start: 2022-07-20 | End: 2022-09-13

## 2022-07-26 ENCOUNTER — HOSPITAL ENCOUNTER (OUTPATIENT)
Dept: LAB | Age: 57
Discharge: HOME | End: 2022-07-26
Payer: COMMERCIAL

## 2022-07-26 ENCOUNTER — OFFICE VISIT (OUTPATIENT)
Dept: FAMILY MEDICINE CLINIC | Age: 57
End: 2022-07-26
Payer: COMMERCIAL

## 2022-07-26 VITALS
DIASTOLIC BLOOD PRESSURE: 81 MMHG | HEART RATE: 69 BPM | BODY MASS INDEX: 28.71 KG/M2 | WEIGHT: 212 LBS | TEMPERATURE: 98.5 F | RESPIRATION RATE: 18 BRPM | SYSTOLIC BLOOD PRESSURE: 120 MMHG | OXYGEN SATURATION: 93 % | HEIGHT: 72 IN

## 2022-07-26 DIAGNOSIS — I10 ESSENTIAL HYPERTENSION: ICD-10-CM

## 2022-07-26 DIAGNOSIS — R09.81 NASAL CONGESTION: ICD-10-CM

## 2022-07-26 DIAGNOSIS — Z01.818 PRE-OP TESTING: ICD-10-CM

## 2022-07-26 DIAGNOSIS — G25.81 RESTLESS LEG: ICD-10-CM

## 2022-07-26 DIAGNOSIS — E11.40 TYPE 2 DIABETES MELLITUS WITH DIABETIC NEUROPATHY, WITHOUT LONG-TERM CURRENT USE OF INSULIN (HCC): ICD-10-CM

## 2022-07-26 DIAGNOSIS — E78.5 DYSLIPIDEMIA: ICD-10-CM

## 2022-07-26 DIAGNOSIS — Z11.59 ENCOUNTER FOR HEPATITIS C SCREENING TEST FOR LOW RISK PATIENT: ICD-10-CM

## 2022-07-26 DIAGNOSIS — E11.40 TYPE 2 DIABETES MELLITUS WITH DIABETIC NEUROPATHY, WITHOUT LONG-TERM CURRENT USE OF INSULIN (HCC): Primary | ICD-10-CM

## 2022-07-26 DIAGNOSIS — Z12.5 PROSTATE CANCER SCREENING: ICD-10-CM

## 2022-07-26 DIAGNOSIS — G50.0 TRIGEMINAL NEURALGIA OF RIGHT SIDE OF FACE: ICD-10-CM

## 2022-07-26 DIAGNOSIS — Z23 ENCOUNTER FOR IMMUNIZATION: ICD-10-CM

## 2022-07-26 LAB
ALBUMIN SERPL-MCNC: 4.3 G/DL (ref 3.4–5)
ALBUMIN/GLOB SERPL: 1.4 {RATIO} (ref 0.8–1.7)
ALP SERPL-CCNC: 63 U/L (ref 45–117)
ALT SERPL-CCNC: 38 U/L (ref 16–61)
ANION GAP SERPL CALC-SCNC: 10 MMOL/L (ref 3–18)
ANION GAP SERPL CALC-SCNC: 8 MMOL/L (ref 3–18)
AST SERPL-CCNC: 27 U/L (ref 10–38)
BASOPHILS # BLD: 0.1 K/UL (ref 0–0.1)
BASOPHILS NFR BLD: 1 % (ref 0–2)
BILIRUB SERPL-MCNC: 1 MG/DL (ref 0.2–1)
BUN SERPL-MCNC: 19 MG/DL (ref 7–18)
BUN/CREAT SERPL: 20 (ref 12–20)
CALCIUM SERPL-MCNC: 8.8 MG/DL (ref 8.5–10.1)
CHLORIDE SERPL-SCNC: 104 MMOL/L (ref 100–111)
CHLORIDE SERPL-SCNC: 105 MMOL/L (ref 100–111)
CHOLEST SERPL-MCNC: 99 MG/DL
CO2 SERPL-SCNC: 27 MMOL/L (ref 21–32)
CO2 SERPL-SCNC: 29 MMOL/L (ref 21–32)
CREAT SERPL-MCNC: 0.93 MG/DL (ref 0.6–1.3)
DIFFERENTIAL METHOD BLD: NORMAL
EOSINOPHIL # BLD: 0.3 K/UL (ref 0–0.4)
EOSINOPHIL NFR BLD: 3 % (ref 0–5)
ERYTHROCYTE [DISTWIDTH] IN BLOOD BY AUTOMATED COUNT: 13.2 % (ref 11.6–14.5)
EST. AVERAGE GLUCOSE BLD GHB EST-MCNC: 140 MG/DL
GLOBULIN SER CALC-MCNC: 3.1 G/DL (ref 2–4)
GLUCOSE SERPL-MCNC: 150 MG/DL (ref 74–99)
HBA1C MFR BLD: 6.5 % (ref 4.2–5.6)
HCT VFR BLD AUTO: 41.4 % (ref 36–48)
HDLC SERPL-MCNC: 31 MG/DL (ref 40–60)
HDLC SERPL: 3.2 {RATIO} (ref 0–5)
HGB BLD-MCNC: 13.8 G/DL (ref 13–16)
IMM GRANULOCYTES # BLD AUTO: 0 K/UL (ref 0–0.04)
IMM GRANULOCYTES NFR BLD AUTO: 0 % (ref 0–0.5)
LDLC SERPL CALC-MCNC: 29 MG/DL (ref 0–100)
LIPID PROFILE,FLP: ABNORMAL
LYMPHOCYTES # BLD: 3.4 K/UL (ref 0.9–3.6)
LYMPHOCYTES NFR BLD: 42 % (ref 21–52)
MCH RBC QN AUTO: 29.2 PG (ref 24–34)
MCHC RBC AUTO-ENTMCNC: 33.3 G/DL (ref 31–37)
MCV RBC AUTO: 87.5 FL (ref 78–100)
MONOCYTES # BLD: 0.7 K/UL (ref 0.05–1.2)
MONOCYTES NFR BLD: 9 % (ref 3–10)
NEUTS SEG # BLD: 3.7 K/UL (ref 1.8–8)
NEUTS SEG NFR BLD: 45 % (ref 40–73)
NRBC # BLD: 0 K/UL (ref 0–0.01)
NRBC BLD-RTO: 0 PER 100 WBC
PLATELET # BLD AUTO: 219 K/UL (ref 135–420)
PMV BLD AUTO: 10.5 FL (ref 9.2–11.8)
POTASSIUM SERPL-SCNC: 3.1 MMOL/L (ref 3.5–5.5)
POTASSIUM SERPL-SCNC: 3.1 MMOL/L (ref 3.5–5.5)
PROT SERPL-MCNC: 7.4 G/DL (ref 6.4–8.2)
PSA SERPL-MCNC: 2.4 NG/ML (ref 0–4)
RBC # BLD AUTO: 4.73 M/UL (ref 4.35–5.65)
SODIUM SERPL-SCNC: 141 MMOL/L (ref 136–145)
SODIUM SERPL-SCNC: 142 MMOL/L (ref 136–145)
TRIGL SERPL-MCNC: 195 MG/DL (ref ?–150)
VLDLC SERPL CALC-MCNC: 39 MG/DL
WBC # BLD AUTO: 8.1 K/UL (ref 4.6–13.2)

## 2022-07-26 PROCEDURE — 86803 HEPATITIS C AB TEST: CPT

## 2022-07-26 PROCEDURE — 3044F HG A1C LEVEL LT 7.0%: CPT | Performed by: STUDENT IN AN ORGANIZED HEALTH CARE EDUCATION/TRAINING PROGRAM

## 2022-07-26 PROCEDURE — 80053 COMPREHEN METABOLIC PANEL: CPT

## 2022-07-26 PROCEDURE — 85025 COMPLETE CBC W/AUTO DIFF WBC: CPT

## 2022-07-26 PROCEDURE — 84153 ASSAY OF PSA TOTAL: CPT

## 2022-07-26 PROCEDURE — 99214 OFFICE O/P EST MOD 30 MIN: CPT | Performed by: STUDENT IN AN ORGANIZED HEALTH CARE EDUCATION/TRAINING PROGRAM

## 2022-07-26 PROCEDURE — 36415 COLL VENOUS BLD VENIPUNCTURE: CPT

## 2022-07-26 PROCEDURE — 80061 LIPID PANEL: CPT

## 2022-07-26 PROCEDURE — 90677 PCV20 VACCINE IM: CPT | Performed by: STUDENT IN AN ORGANIZED HEALTH CARE EDUCATION/TRAINING PROGRAM

## 2022-07-26 PROCEDURE — 83036 HEMOGLOBIN GLYCOSYLATED A1C: CPT

## 2022-07-26 PROCEDURE — 80051 ELECTROLYTE PANEL: CPT

## 2022-07-26 RX ORDER — ZOSTER VACCINE RECOMBINANT, ADJUVANTED 50 MCG/0.5
0.5 KIT INTRAMUSCULAR ONCE
Qty: 0.5 ML | Refills: 1 | Status: SHIPPED | OUTPATIENT
Start: 2022-07-26 | End: 2022-07-26

## 2022-07-26 RX ORDER — CYCLOBENZAPRINE HCL 5 MG
5 TABLET ORAL
Qty: 30 TABLET | Refills: 0 | Status: SHIPPED | OUTPATIENT
Start: 2022-07-26 | End: 2022-08-23

## 2022-07-26 RX ORDER — FLUTICASONE PROPIONATE 50 MCG
SPRAY, SUSPENSION (ML) NASAL
Qty: 1 EACH | Refills: 1 | Status: SHIPPED | OUTPATIENT
Start: 2022-07-26

## 2022-07-26 RX ORDER — METFORMIN HYDROCHLORIDE 1000 MG/1
1000 TABLET ORAL DAILY
Qty: 90 TABLET | Refills: 1 | Status: SHIPPED | OUTPATIENT
Start: 2022-07-26

## 2022-07-26 RX ORDER — ROSUVASTATIN CALCIUM 20 MG/1
20 TABLET, COATED ORAL
Qty: 90 TABLET | Refills: 1 | Status: SHIPPED | OUTPATIENT
Start: 2022-07-26

## 2022-07-26 RX ORDER — TOPIRAMATE 50 MG/1
TABLET, FILM COATED ORAL
Qty: 90 TABLET | Refills: 1 | Status: SHIPPED | OUTPATIENT
Start: 2022-07-26 | End: 2022-09-30

## 2022-07-26 NOTE — PROGRESS NOTES
Chief Complaint   Patient presents with    Nail Problem     Toe nail fungus       Establish Care     1. \"Have you been to the ER, urgent care clinic since your last visit? Hospitalized since your last visit? \" No    2. \"Have you seen or consulted any other health care providers outside of the [address] [place] [address] since your last visit? \" No     3. For patients aged 39-70: Has the patient had a colonoscopy / FIT/ Cologuard?  No

## 2022-07-26 NOTE — PROGRESS NOTES
Clinton Flores is a 65 y.o. male presenting today for Nail Problem (Toe nail fungus /) and Establish Care  . Chief Complaint   Patient presents with    Nail Problem     Toe nail fungus       Establish Care       HPI:  Clinton Flores presents to the office today fto establish care. Patient has a past medical history of HTN, HLD, T2DM with neuropathy, EVELIN. Is compliant with his medications. HTN: Patient is on lisinopril-HCTZ. BP is well controlled. HLD: Patient is on Crestor. Lipid panel in 3/21 showed LDL 56, HDL 34, triglycerides 154. T2DM: Patient denies any polyuria, polydipsia. Last HbA1c was 6% in 3/22. Patient is taking Metformin and Glipizide. Patient has a history of neuropathy. He is taking the gabapentin for it. States he had an eye exam recently this year-has history of cataracts. Patient noticed onychomycosis in his right big toenail for which she used topical treatments and then cut off a portion of his toenail. Patient has been following with neurology due to right-sided headaches and facial pain. He was started on Topamax for possible trigeminal neuralgia with improvement. Patient has been referred for a colonoscopy. Review of Systems   Constitutional:  Negative for chills, diaphoresis, fever, malaise/fatigue and weight loss. HENT:  Negative for congestion, ear discharge, ear pain, hearing loss, nosebleeds, sinus pain, sore throat and tinnitus. Eyes:  Negative for blurred vision, double vision and photophobia. Respiratory:  Negative for cough, sputum production, shortness of breath, wheezing and stridor. Cardiovascular:  Positive for leg swelling. Negative for chest pain, palpitations, orthopnea and claudication. Gastrointestinal:  Negative for abdominal pain, constipation, diarrhea, heartburn, nausea and vomiting. Genitourinary:  Negative for dysuria, flank pain, frequency, hematuria and urgency.    Musculoskeletal:  Negative for back pain, joint pain, myalgias and neck pain. Skin:  Negative for rash. Neurological:  Positive for tingling and sensory change. Negative for tremors, speech change, focal weakness, seizures, weakness and headaches. Psychiatric/Behavioral:  Negative for depression. The patient is not nervous/anxious. All other systems reviewed and are negative.     No Known Allergies    PHQ Screening   3 most recent PHQ Screens 7/26/2022   PHQ Not Done -   Little interest or pleasure in doing things Not at all   Feeling down, depressed, irritable, or hopeless Not at all   Total Score PHQ 2 0   Trouble falling or staying asleep, or sleeping too much -   Feeling tired or having little energy -   Poor appetite, weight loss, or overeating -   Feeling bad about yourself - or that you are a failure or have let yourself or your family down -   Trouble concentrating on things such as school, work, reading, or watching TV -   Moving or speaking so slowly that other people could have noticed; or the opposite being so fidgety that others notice -   Thoughts of being better off dead, or hurting yourself in some way -   PHQ 9 Score -       History  Past Medical History:   Diagnosis Date    Arthritis     Cataract, right 2010    Diabetes ([facility] [address]     H/O seasonal allergies     History of vitamin D deficiency 6/2016    Hypercholesterolemia     Hypertension     Hypertriglyceridemia 6/30/2016    Hypokalemia 6/30/2016    Low back pain     Numbness and tingling of foot June 2014    left toes and mid bottom of foot    Peripheral neuropathy     Scrotal mass 6/6/2016    Sleep apnea        Past Surgical History:   Procedure Laterality Date    HX CATARACT REMOVAL Bilateral     HX HERNIA REPAIR  5961    umbilical, done at [address] [place] [address] FACE/SCALP SUBQ 2+CM  1/4/16    scalp lesion removal, Dr. Vasquez Cords History     Socioeconomic History    Marital status:      Spouse name: Not on file    Number of children: 0 Years of education: 6    Highest education level: Not on file   Occupational History    Occupation: unemployed and living with son and daughter-in-law, after incarceration 2011 out 2014   Tobacco Use    Smoking status: Former    Smokeless tobacco: Never    Tobacco comments:     stopped in HS, never heavy and never long term   Substance and Sexual Activity    Alcohol use: Yes     Alcohol/week: 0.0 standard drinks     Comment: rarely holiday    Drug use: No    Sexual activity: Yes     Comment: no partner, spouse  2014   Other Topics Concern     Service Yes     Comment: [address] [facility], from [id number], other than honorable discharge    Blood Transfusions No    Caffeine Concern No     Comment: decreased his 2-3 super big gulps    Occupational Exposure Yes     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[place], [place] then [address] [place] No    Sleep Concern No    Stress Concern Yes     Comment: Life in general    Weight Concern No    Special Diet No    Back Care No    Exercise No     Comment: starting to do something but not currently    Bike Helmet No    Seat Belt Yes    Self-Exams No   Social History Narrative    Not on file     Social Determinants of Health     Financial Resource Strain: Not on file   Food Insecurity: Not on file   Transportation Needs: Not on file   Physical Activity: Not on file   Stress: Not on file   Social Connections: Not on file   Intimate Partner Violence: Not on file   Housing Stability: Not on file       Current Outpatient Medications   Medication Sig Dispense Refill    topiramate (TOPAMAX) 50 mg tablet Take 1 tab in the morning and 2 tabs at bedtime. 90 Tablet 1    cyclobenzaprine (FLEXERIL) 5 mg tablet Take 1 Tablet by mouth nightly. 30 Tablet 0    varicella-zoster recombinant, PF, (Shingrix, PF,) 50 mcg/0.5 mL susr injection 0.5 mL by IntraMUSCular route once for 1 dose. Please administer shingles vaccine 1st dose.  2nd dose to be administered 2-6 months apart  Indications: shingles vaccination 0.5 mL 1    metFORMIN (GLUCOPHAGE) 1,000 mg tablet Take 1 Tablet by mouth in the morning. 90 Tablet 1    rosuvastatin (CRESTOR) 20 mg tablet Take 1 Tablet by mouth nightly. 90 Tablet 1    fluticasone propionate (FLONASE) 50 mcg/actuation nasal spray 1 spray each nostril daily 1 Each 1    lisinopril-hydroCHLOROthiazide (PRINZIDE, ZESTORETIC) 20-12.5 mg per tablet TAKE 1 TABLET BY MOUTH ONCE DAILY FOR HIGH BLOOD PRESSURE 90 Tablet 0    Cetirizine (ZyrTEC) 10 mg cap Take  by mouth. glipiZIDE (GLUCOTROL) 10 mg tablet Take 1 Tablet by mouth daily (with breakfast). 90 Tablet 1    amLODIPine (NORVASC) 10 mg tablet Take 1 Tablet by mouth daily. 90 Tablet 3    meloxicam (MOBIC) 15 mg tablet Take 1 Tablet by mouth daily. 30 Tablet 2    gabapentin (NEURONTIN) 300 mg capsule TAKE 1 CAPSULE BY MOUTH TWICE DAILY FOR  NEUROPATHIC PAIN 180 Capsule 1    Blood-Glucose Meter (OneTouch Verio Flex Start) monitoring kit Take BG level AC/HS 1 Kit 0    glucose blood VI test strips (OneTouch Verio test strips) strip Take BG level AC/ Strip 3    lancets misc Take BG level AC/HS 1 Each 11    calcium carbonate-vitamin D3 600 mg(1,500mg) -800 unit tab Take  by mouth.      multivitamin (ONE A DAY) tablet Take 1 Tablet by mouth daily. aspirin 81 mg chewable tablet Take 1 Tab by mouth daily. (Patient not taking: No sig reported) 30 Tab 11    fish oil-dha-epa 1,200-144-216 mg cap Take  by mouth. (Patient not taking: No sig reported)           Vitals:    07/26/22 0841   BP: 120/81   Pulse: 69   Resp: 18   Temp: 98.5 °F (36.9 °C)   TempSrc: Temporal   SpO2: 93%   Weight: 212 lb (96.2 kg)   Height: 6' (1.829 m)   PainSc:   0 - No pain       Physical Exam  Vitals and nursing note reviewed. Constitutional:       General: He is not in acute distress. Appearance: Normal appearance. He is not ill-appearing, toxic-appearing or diaphoretic. HENT:      Head: Normocephalic and atraumatic.    Eyes:      General: No scleral icterus. Extraocular Movements: Extraocular movements intact. Conjunctiva/sclera: Conjunctivae normal.      Pupils: Pupils are equal, round, and reactive to light. Cardiovascular:      Rate and Rhythm: Normal rate and regular rhythm. Pulses: Normal pulses. Heart sounds: Normal heart sounds. No murmur heard. No gallop. Pulmonary:      Effort: Pulmonary effort is normal. No respiratory distress. Breath sounds: Normal breath sounds. No wheezing or rales. Musculoskeletal:         General: No swelling or tenderness. Normal range of motion. Cervical back: Normal range of motion and neck supple. Right lower leg: No edema. Left lower leg: No edema. Skin:     General: Skin is warm and dry. Coloration: Skin is not jaundiced or pale. Comments: Short Rt toenail   Neurological:      General: No focal deficit present. Mental Status: He is alert and oriented to person, place, and time. Mental status is at baseline. Cranial Nerves: No cranial nerve deficit. Motor: No weakness. Gait: Gait normal.   Psychiatric:         Mood and Affect: Mood normal.         Behavior: Behavior normal.         Thought Content: Thought content normal.         Judgment: Judgment normal.     Diabetic foot exam: 7/26/22    Left Foot:   Visual Exam: normal    Pulse DP: 2+ (normal)   Filament test: normal sensation          Right Foot:   Visual Exam: normal  Rt big toe nail - short with nail bed exposed   Pulse DP: 2+ (normal)   Filament test: normal sensation         No visits with results within 3 Month(s) from this visit. Latest known visit with results is:   Office Visit on 03/17/2022   Component Date Value Ref Range Status    Hemoglobin A1c (POC) 03/17/2022 6.0  % Final       No results found for any visits on 07/26/22.     Patient Care Team:  Patient Care Team:  Vladislav Sadler NP as PCP - General (Nurse Practitioner)  Henri Thurston MD as Surgeon (General Surgery)  SOLEDAD Gomez (Physician Assistant)  Hemant Dior MD as Physician (Urology)  Fabrice Espana MD as Consulting Provider (Neurology)  Tito Wallis NP (Neurology)      Assessment / Plan:      ICD-10-CM ICD-9-CM    1. Type 2 diabetes mellitus with diabetic neuropathy, without long-term current use of insulin (HCC)  E11.40 250.60 HEMOGLOBIN A1C WITH EAG     357.2 metFORMIN (GLUCOPHAGE) 1,000 mg tablet      rosuvastatin (CRESTOR) 20 mg tablet      HM DIABETES FOOT EXAM      MICROALBUMIN, UR, RAND W/ MICROALB/CREAT RATIO      2. Restless leg  G25.81 333.94 cyclobenzaprine (FLEXERIL) 5 mg tablet      3. Encounter for immunization  Z23 V03.89 varicella-zoster recombinant, PF, (Shingrix, PF,) 50 mcg/0.5 mL susr injection      PNEUMOCOCCAL, PCV20, PREVNAR 20, (AGE 18 YRS+), IM, PF      4. Trigeminal neuralgia of right side of face  G50.0 350.1 topiramate (TOPAMAX) 50 mg tablet      5. Nasal congestion  R09.81 478.19 fluticasone propionate (FLONASE) 50 mcg/actuation nasal spray      6. Essential hypertension  I10 401.9       7. Dyslipidemia  E78.5 272.4         Prior labs ordered are pending. HTN: Well-controlled on amlodipine and lisinopril-HCTZ. Continue at current dosage. T2DM: Controlled on metformin and glipizide. Denies any hypoglycemic events. Continue gabapentin for neuropathy. Check HbA1c and urine microalbumin. HLD: Stable on Crestor. Follow-up on repeat lipid panel. Trigeminal neuralgia: Refill Topamax. Received PCV 20 vaccine today. Due for colon cancer screening-patient had an appointment with colorectal surgery for scheduling colonoscopy. Follow-up and Dispositions    Return in about 4 months (around 11/26/2022). I asked the patient if he  had any questions and answered his  questions.   The patient stated that he understands the treatment plan and agrees with the treatment plan    This document was created with a voice activated dictation system and may contain transcription errors.

## 2022-07-27 LAB
HCV AB SER IA-ACNC: 0.03 INDEX
HCV AB SERPL QL IA: NEGATIVE
HCV COMMENT,HCGAC: NORMAL

## 2022-07-28 NOTE — PROGRESS NOTES
Two pt identifiers verified. Pt was given lab results. States understanding with no further questions or concerns.

## 2022-07-29 ENCOUNTER — TELEPHONE (OUTPATIENT)
Dept: SURGERY | Age: 57
End: 2022-07-29

## 2022-07-29 NOTE — TELEPHONE ENCOUNTER
I called this patient  and left a voice message at 11:05am to get him scheduled for a colonoscopy. He was seen in our office on 7/12/2022 for a nurse visit.  He had his labs drawn on 7/26/2022 and he potassium is low and he needs a prescription before the procedure to correct the potassium levels

## 2022-08-01 ENCOUNTER — HOSPITAL ENCOUNTER (OUTPATIENT)
Dept: LAB | Age: 57
Discharge: HOME | End: 2022-08-01
Payer: COMMERCIAL

## 2022-08-01 DIAGNOSIS — Z01.818 PRE-OP TESTING: ICD-10-CM

## 2022-08-01 LAB
ATRIAL RATE: 69 BPM
CALCULATED P AXIS, ECG09: 50 DEGREES
CALCULATED R AXIS, ECG10: 15 DEGREES
CALCULATED T AXIS, ECG11: 53 DEGREES
DIAGNOSIS, 93000: NORMAL
P-R INTERVAL, ECG05: 150 MS
Q-T INTERVAL, ECG07: 398 MS
QRS DURATION, ECG06: 104 MS
QTC CALCULATION (BEZET), ECG08: 423 MS
VENTRICULAR RATE, ECG03: 68 BPM

## 2022-08-01 PROCEDURE — 93005 ELECTROCARDIOGRAM TRACING: CPT

## 2022-08-01 RX ORDER — POLYETHYLENE GLYCOL 3350 17 G/17G
POWDER, FOR SOLUTION ORAL
Qty: 238 G | Refills: 0 | Status: SHIPPED | OUTPATIENT
Start: 2022-08-01

## 2022-08-01 RX ORDER — BISACODYL 5 MG
TABLET, DELAYED RELEASE (ENTERIC COATED) ORAL
Qty: 4 TABLET | Refills: 0 | Status: SHIPPED | OUTPATIENT
Start: 2022-08-01

## 2022-08-01 RX ORDER — POTASSIUM CHLORIDE 20 MEQ/1
40 TABLET, EXTENDED RELEASE ORAL DAILY
Qty: 14 TABLET | Refills: 0 | Status: SHIPPED | OUTPATIENT
Start: 2022-08-01 | End: 2022-08-08

## 2022-08-15 NOTE — PERIOP NOTES
Heather Jackson PAT phone assessment completed on 8/15/2022. The following instructions were reviewed with Heather Jackson and he verbalized understanding. Do NOT eat or drink anything, including candy, gum, or ice chips after midnight on 8/17/2022, unless you have specific instructions from your surgeon or anesthesia provider to do so. You may brush your teeth before coming to the hospital.  No smoking 24 hours prior to the day of surgery. No alcohol 24 hours prior to the day of surgery. No recreational drugs for one week prior to the day of surgery. Leave all valuables, including money/purse, at home. Remove all jewelry, nail polish, acrylic nails, and makeup (including mascara); no lotions powders, deodorant, or perfume/cologne/after shave on the skin. Glasses/contact lenses and dentures may be worn to the hospital.  They will be removed prior to surgery. Call your doctor if symptoms of a cold or illness develop within 24-48 hours prior to your surgery. 10.  AN ADULT MUST DRIVE YOU HOME AFTER OUTPATIENT SURGERY. 11.  If you are having an outpatient procedure, please make arrangements for a responsible adult to be with you for 24 hours after your surgery. 1      Special Instructions:      Bring list of CURRENT medications. Bring any pertinent legal medical records. Take these medications the morning of surgery with a sip of water:  per surgeon   Follow physician instructions about stopping anticoagulants. Complete bowel prep per MD instructions.

## 2022-08-16 ENCOUNTER — ANESTHESIA EVENT (OUTPATIENT)
Dept: ENDOSCOPY | Age: 57
End: 2022-08-16
Payer: COMMERCIAL

## 2022-08-17 ENCOUNTER — ANESTHESIA (OUTPATIENT)
Dept: ENDOSCOPY | Age: 57
End: 2022-08-17
Payer: COMMERCIAL

## 2022-08-17 ENCOUNTER — HOSPITAL ENCOUNTER (OUTPATIENT)
Age: 57
Setting detail: OUTPATIENT SURGERY
Discharge: HOME | End: 2022-08-17
Attending: COLON & RECTAL SURGERY | Admitting: COLON & RECTAL SURGERY
Payer: COMMERCIAL

## 2022-08-17 VITALS
DIASTOLIC BLOOD PRESSURE: 85 MMHG | RESPIRATION RATE: 14 BRPM | BODY MASS INDEX: 27.75 KG/M2 | TEMPERATURE: 98 F | WEIGHT: 204.9 LBS | HEART RATE: 65 BPM | SYSTOLIC BLOOD PRESSURE: 126 MMHG | HEIGHT: 72 IN | OXYGEN SATURATION: 99 %

## 2022-08-17 LAB
GLUCOSE BLD STRIP.AUTO-MCNC: 108 MG/DL (ref 70–110)
GLUCOSE BLD STRIP.AUTO-MCNC: 109 MG/DL (ref 70–110)

## 2022-08-17 PROCEDURE — 74011250636 HC RX REV CODE- 250/636: Performed by: NURSE ANESTHETIST, CERTIFIED REGISTERED

## 2022-08-17 PROCEDURE — 77030021593 HC FCPS BIOP ENDOSC BSC -A: Performed by: COLON & RECTAL SURGERY

## 2022-08-17 PROCEDURE — 76060000032 HC ANESTHESIA 0.5 TO 1 HR: Performed by: COLON & RECTAL SURGERY

## 2022-08-17 PROCEDURE — 00812 ANES LWR INTST SCR COLSC: CPT | Performed by: NURSE ANESTHETIST, CERTIFIED REGISTERED

## 2022-08-17 PROCEDURE — 88305 TISSUE EXAM BY PATHOLOGIST: CPT

## 2022-08-17 PROCEDURE — C1729 CATH, DRAINAGE: HCPCS | Performed by: COLON & RECTAL SURGERY

## 2022-08-17 PROCEDURE — 2709999900 HC NON-CHARGEABLE SUPPLY: Performed by: COLON & RECTAL SURGERY

## 2022-08-17 PROCEDURE — 82962 GLUCOSE BLOOD TEST: CPT

## 2022-08-17 PROCEDURE — 76040000007: Performed by: COLON & RECTAL SURGERY

## 2022-08-17 PROCEDURE — 45380 COLONOSCOPY AND BIOPSY: CPT | Performed by: COLON & RECTAL SURGERY

## 2022-08-17 PROCEDURE — 74011250637 HC RX REV CODE- 250/637: Performed by: NURSE ANESTHETIST, CERTIFIED REGISTERED

## 2022-08-17 PROCEDURE — 00812 ANES LWR INTST SCR COLSC: CPT | Performed by: ANESTHESIOLOGY

## 2022-08-17 PROCEDURE — 77030008565 HC TBNG SUC IRR ERBE -B: Performed by: COLON & RECTAL SURGERY

## 2022-08-17 PROCEDURE — 45385 COLONOSCOPY W/LESION REMOVAL: CPT | Performed by: COLON & RECTAL SURGERY

## 2022-08-17 PROCEDURE — 74011000250 HC RX REV CODE- 250: Performed by: NURSE ANESTHETIST, CERTIFIED REGISTERED

## 2022-08-17 PROCEDURE — 77030013992 HC SNR POLYP ENDOSC BSC -B: Performed by: COLON & RECTAL SURGERY

## 2022-08-17 PROCEDURE — 74011250637 HC RX REV CODE- 250/637: Performed by: COLON & RECTAL SURGERY

## 2022-08-17 RX ORDER — SODIUM CHLORIDE, SODIUM LACTATE, POTASSIUM CHLORIDE, CALCIUM CHLORIDE 600; 310; 30; 20 MG/100ML; MG/100ML; MG/100ML; MG/100ML
125 INJECTION, SOLUTION INTRAVENOUS CONTINUOUS
Status: CANCELLED | OUTPATIENT
Start: 2022-08-17

## 2022-08-17 RX ORDER — SODIUM CHLORIDE 0.9 % (FLUSH) 0.9 %
5-40 SYRINGE (ML) INJECTION AS NEEDED
Status: DISCONTINUED | OUTPATIENT
Start: 2022-08-17 | End: 2022-08-17 | Stop reason: HOSPADM

## 2022-08-17 RX ORDER — DEXTROSE MONOHYDRATE 100 MG/ML
0-250 INJECTION, SOLUTION INTRAVENOUS AS NEEDED
Status: CANCELLED | OUTPATIENT
Start: 2022-08-17

## 2022-08-17 RX ORDER — FAMOTIDINE 20 MG/1
20 TABLET, FILM COATED ORAL ONCE
Status: COMPLETED | OUTPATIENT
Start: 2022-08-17 | End: 2022-08-17

## 2022-08-17 RX ORDER — PROPOFOL 10 MG/ML
VIAL (ML) INTRAVENOUS
Status: DISCONTINUED | OUTPATIENT
Start: 2022-08-17 | End: 2022-08-17 | Stop reason: HOSPADM

## 2022-08-17 RX ORDER — SODIUM CHLORIDE, SODIUM LACTATE, POTASSIUM CHLORIDE, CALCIUM CHLORIDE 600; 310; 30; 20 MG/100ML; MG/100ML; MG/100ML; MG/100ML
25 INJECTION, SOLUTION INTRAVENOUS CONTINUOUS
Status: DISCONTINUED | OUTPATIENT
Start: 2022-08-17 | End: 2022-08-17 | Stop reason: HOSPADM

## 2022-08-17 RX ORDER — LIDOCAINE HYDROCHLORIDE 20 MG/ML
INJECTION, SOLUTION EPIDURAL; INFILTRATION; INTRACAUDAL; PERINEURAL AS NEEDED
Status: DISCONTINUED | OUTPATIENT
Start: 2022-08-17 | End: 2022-08-17 | Stop reason: HOSPADM

## 2022-08-17 RX ORDER — MAGNESIUM SULFATE 100 %
4 CRYSTALS MISCELLANEOUS AS NEEDED
Status: CANCELLED | OUTPATIENT
Start: 2022-08-17

## 2022-08-17 RX ORDER — DEXTROMETHORPHAN/PSEUDOEPHED 2.5-7.5/.8
DROPS ORAL AS NEEDED
Status: DISCONTINUED | OUTPATIENT
Start: 2022-08-17 | End: 2022-08-17 | Stop reason: HOSPADM

## 2022-08-17 RX ORDER — ONDANSETRON 2 MG/ML
4 INJECTION INTRAMUSCULAR; INTRAVENOUS ONCE
Status: CANCELLED | OUTPATIENT
Start: 2022-08-17 | End: 2022-08-17

## 2022-08-17 RX ORDER — INSULIN LISPRO 100 [IU]/ML
INJECTION, SOLUTION INTRAVENOUS; SUBCUTANEOUS ONCE
Status: DISCONTINUED | OUTPATIENT
Start: 2022-08-17 | End: 2022-08-17 | Stop reason: HOSPADM

## 2022-08-17 RX ORDER — LIDOCAINE HYDROCHLORIDE 10 MG/ML
0.1 INJECTION, SOLUTION EPIDURAL; INFILTRATION; INTRACAUDAL; PERINEURAL AS NEEDED
Status: DISCONTINUED | OUTPATIENT
Start: 2022-08-17 | End: 2022-08-17 | Stop reason: HOSPADM

## 2022-08-17 RX ORDER — PROPOFOL 10 MG/ML
INJECTION, EMULSION INTRAVENOUS AS NEEDED
Status: DISCONTINUED | OUTPATIENT
Start: 2022-08-17 | End: 2022-08-17 | Stop reason: HOSPADM

## 2022-08-17 RX ORDER — INSULIN LISPRO 100 [IU]/ML
INJECTION, SOLUTION INTRAVENOUS; SUBCUTANEOUS ONCE
Status: CANCELLED | OUTPATIENT
Start: 2022-08-17 | End: 2022-08-17

## 2022-08-17 RX ORDER — SODIUM CHLORIDE 0.9 % (FLUSH) 0.9 %
5-40 SYRINGE (ML) INJECTION EVERY 8 HOURS
Status: DISCONTINUED | OUTPATIENT
Start: 2022-08-17 | End: 2022-08-17 | Stop reason: HOSPADM

## 2022-08-17 RX ADMIN — SODIUM CHLORIDE, POTASSIUM CHLORIDE, SODIUM LACTATE AND CALCIUM CHLORIDE 25 ML/HR: 600; 310; 30; 20 INJECTION, SOLUTION INTRAVENOUS at 07:52

## 2022-08-17 RX ADMIN — PROPOFOL 160 MCG/KG/MIN: 10 INJECTION, EMULSION INTRAVENOUS at 08:14

## 2022-08-17 RX ADMIN — LIDOCAINE HYDROCHLORIDE 50 MG: 20 INJECTION, SOLUTION EPIDURAL; INFILTRATION; INTRACAUDAL; PERINEURAL at 08:14

## 2022-08-17 RX ADMIN — FAMOTIDINE 20 MG: 20 TABLET ORAL at 07:56

## 2022-08-17 RX ADMIN — PROPOFOL 50 MG: 10 INJECTION, EMULSION INTRAVENOUS at 08:14

## 2022-08-17 NOTE — OP NOTES
[facility]  [facility], [address]   [place], [address]  [phone]                    Colonoscopy Procedure Note      Adalberto Finch  1965  [id number]                Date of Procedure: 8/17/2022    Preoperative diagnosis: Colon cancer screening [Z12.11]    Postoperative diagnosis: ascending polyp x1, transverse polyp x1, sigmoid polyp x1    :  Jeffrey Wynne MD    Assistant(s): Endoscopy Technician-1: Jerilyn Brooks  Float Staff: Mery Tadeo RN    Sedation: MAC    Complications: None    Implants: None    Procedure Details:  Prior to the procedure, a history and physical were performed. The patients medications, allergies and sensitivities were reviewed and all questions were answered. After informed consent was obtained for the procedure, with all risks and benefits of procedure explained. The patient was taken to the endoscopy suite and placed in the left lateral decubitus position. Patient identification and proposed procedure were verified prior to the procedure by the nurse and I. After sequential anesthesia administered by anesthesiologist, a digital rectal exam was performed and was normal.  The Olympus video colonoscope was introduced through the anus and advanced to cecum, which was identified by the ileocecal valve and appendiceal orifice. The quality of preparation was fair. The colonoscope was slowly withdrawn and the mucosa examined for any abnormalities. Cecal withdrawal time was greater than 6 minutes. The patient tolerated the procedure well. There were no complications.       Findings/Interventions:   Polyps - #1, 10 mm in size, located in the ascending colon, removed by snare cautery and retrieved for pathology, - #2, 10 mm in size, located in the transverse colon, removed by snare cautery and retrieved for pathology, - #3, 5 mm in size, located in the sigmoid, removed by cold biopsy and sent for pathology    EBL: none    Recommendations: -Repeat colonoscopy in 3 years.    NO aspirin for 5 days     Discharge Disposition:  Home  Natan Arguelles MD  8/17/2022  8:51 AM

## 2022-08-17 NOTE — H&P
HPI: Kailey Mathews is a 65 y.o. male presenting with chief complain of need for crc screening    Past Medical History:   Diagnosis Date    Arthritis     Cataract, right     Diabetes ([address]     H/O seasonal allergies     History of vitamin D deficiency 2016    Hypercholesterolemia     Hypertension     Hypertriglyceridemia 2016    Hypokalemia 2016    Low back pain     Numbness and tingling of foot 2014    left toes and mid bottom of foot    Peripheral neuropathy     Scrotal mass 2016    Sleep apnea        Past Surgical History:   Procedure Laterality Date    HX CATARACT REMOVAL Bilateral     HX HERNIA REPAIR  0964    umbilical, done at [address] [place] [address] FACE/SCALP SUBQ 2+CM  16    scalp lesion removal, Dr. Caitie Bowen       Family History   Problem Relation Age of Onset    No Known Problems Mother     Breast Cancer Sister        Social History     Socioeconomic History    Marital status:     Number of children: 0    Years of education: 6   Occupational History    Occupation: unemployed and living with son and daughter-in-law, after incarceration 2011 out 2014   Tobacco Use    Smoking status: Former    Smokeless tobacco: Never    Tobacco comments:     stopped in HS, never heavy and never long term   Substance and Sexual Activity    Alcohol use:  Yes     Alcohol/week: 0.0 standard drinks     Comment: rarely holiday    Drug use: No    Sexual activity: Yes     Comment: no partner, spouse  2014   Other Topics Concern     Service Yes     Comment: [address] [facility], from [id number], other than honorable discharge    Blood Transfusions No    Caffeine Concern No     Comment: decreased his 2-3 super big gulps    Occupational Exposure Yes     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[place], [address] then [address] [facility] [place] No    Sleep Concern No    Stress Concern Yes     Comment: Life in general    Weight Concern No    Special Diet No    Back Care No    Exercise No     Comment: starting to do something but not currently    Bike Helmet No    Seat Belt Yes    Self-Exams No       Review of Systems - neg    Outpatient Medications Marked as Taking for the 8/17/22 [facility])   Medication Sig Dispense Refill    polyethylene glycol (Miralax) 17 gram/dose powder As Directed  Indications: bowel prep follow dr doran office instructions 238 g 0    bisacodyL (DULCOLAX) 5 mg EC tablet Take as directed for bowel prep 4 Tablet 0    topiramate (TOPAMAX) 50 mg tablet Take 1 tab in the morning and 2 tabs at bedtime. 90 Tablet 1    cyclobenzaprine (FLEXERIL) 5 mg tablet Take 1 Tablet by mouth nightly. 30 Tablet 0    metFORMIN (GLUCOPHAGE) 1,000 mg tablet Take 1 Tablet by mouth in the morning. 90 Tablet 1    rosuvastatin (CRESTOR) 20 mg tablet Take 1 Tablet by mouth nightly. 90 Tablet 1    fluticasone propionate (FLONASE) 50 mcg/actuation nasal spray 1 spray each nostril daily 1 Each 1    lisinopril-hydroCHLOROthiazide (PRINZIDE, ZESTORETIC) 20-12.5 mg per tablet TAKE 1 TABLET BY MOUTH ONCE DAILY FOR HIGH BLOOD PRESSURE 90 Tablet 0    Cetirizine (ZyrTEC) 10 mg cap Take  by mouth. glipiZIDE (GLUCOTROL) 10 mg tablet Take 1 Tablet by mouth daily (with breakfast). 90 Tablet 1    amLODIPine (NORVASC) 10 mg tablet Take 1 Tablet by mouth daily. 90 Tablet 3    meloxicam (MOBIC) 15 mg tablet Take 1 Tablet by mouth daily. 30 Tablet 2    gabapentin (NEURONTIN) 300 mg capsule TAKE 1 CAPSULE BY MOUTH TWICE DAILY FOR  NEUROPATHIC PAIN 180 Capsule 1    multivitamin (ONE A DAY) tablet Take 1 Tablet by mouth daily. No Known Allergies    Vitals:    08/15/22 1341   Weight: 97.5 kg (215 lb)   Height: 6' (1.829 m)       Physical Exam  Constitutional:       Appearance: He is well-developed. HENT:      Head: Normocephalic and atraumatic. Eyes:      Conjunctiva/sclera: Conjunctivae normal.   Abdominal:      General: There is no distension. Palpations: Abdomen is soft. There is no mass. Tenderness: There is no abdominal tenderness. Musculoskeletal:         General: Normal range of motion. Lymphadenopathy:      Cervical: No cervical adenopathy. Skin:     General: Skin is warm and dry. Neurological:      Sensory: No sensory deficit. Psychiatric:         Speech: Speech normal.       Assessment / Plan    colonoscopy    The diagnoses and plan were discussed with the patient. All questions answered. Plan of care agreed to by all concerned.

## 2022-08-17 NOTE — ANESTHESIA PREPROCEDURE EVALUATION
Relevant Problems   RESPIRATORY SYSTEM   (+) Sleep apnea      CARDIOVASCULAR   (+) Essential hypertension      ENDOCRINE   (+) Type 2 diabetes mellitus with diabetic neuropathy (HCC)   (+) Type 2 diabetes mellitus without complication (HCC)       Anesthetic History   No history of anesthetic complications            Review of Systems / Medical History  Patient summary reviewed and pertinent labs reviewed    Pulmonary        Sleep apnea: CPAP           Neuro/Psych   Within defined limits           Cardiovascular    Hypertension: well controlled              Exercise tolerance: >4 METS     GI/Hepatic/Renal                Endo/Other    Diabetes: well controlled    Arthritis     Other Findings              Physical Exam    Airway  Mallampati: III  TM Distance: 4 - 6 cm  Neck ROM: decreased range of motion   Mouth opening: Normal     Cardiovascular    Rhythm: regular  Rate: normal         Dental  No notable dental hx       Pulmonary  Breath sounds clear to auscultation               Abdominal  GI exam deferred       Other Findings            Anesthetic Plan    ASA: 3  Anesthesia type: general          Induction: Intravenous  Anesthetic plan and risks discussed with: Patient

## 2022-08-17 NOTE — ANESTHESIA POSTPROCEDURE EVALUATION
Procedure(s):  COLONOSCOPY/ Polypectomies.     general    Anesthesia Post Evaluation      Multimodal analgesia: multimodal analgesia used between 6 hours prior to anesthesia start to PACU discharge  Patient location during evaluation: bedside  Patient participation: complete - patient participated  Level of consciousness: awake  Pain management: adequate  Airway patency: patent  Anesthetic complications: no  Cardiovascular status: stable  Respiratory status: acceptable  Hydration status: acceptable  Post anesthesia nausea and vomiting:  controlled      INITIAL Post-op Vital signs:   Vitals Value Taken Time   /85 08/17/22 0921   Temp 36.7 °C (98 °F) 08/17/22 0921   Pulse 65 08/17/22 0921   Resp 14 08/17/22 0921   SpO2 99 % 08/17/22 0921

## 2022-08-17 NOTE — DISCHARGE INSTRUCTIONS
No aspirin or ibuprofen (e.g. Aleve, Motrin, Advil) for 5 days. Repeat colonoscopy in 3 year(s). Colonoscopy: What to Expect at [facility]  After a colonoscopy, you'll stay at the clinic until you wake up. Then you can go home. But you'll need to arrange for a ride. Your doctor will tell you when you can eat and do your other usual activities. Your doctor will talk to you about when you'll need your next colonoscopy. Your doctor can help you decide how often you need to be checked. This will depend on the results of your test and your risk for colorectal cancer. After the test, you may be bloated or have gas pains. You may need to pass gas. If a biopsy was done or a polyp was removed, you may have streaks of blood in your stool (feces) for a few days. Problems such as heavy rectal bleeding may not occur until several weeks after the test. This isn't common. But it can happen after polyps are removed. This care sheet gives you a general idea about how long it will take for you to recover. But each person recovers at a different pace. Follow the steps below to get better as quickly as possible. How can you care for yourself at home? Activity    Rest when you feel tired. You can do your normal activities when it feels okay to do so. Diet    Follow your doctor's directions for eating. Unless your doctor has told you not to, drink plenty of fluids. This helps to replace the fluids that were lost during the colon prep. Do not drink alcohol. Medicines    Your doctor will tell you if and when you can restart your medicines. You will also be given instructions about taking any new medicines. If you take aspirin or some other blood thinner, ask your doctor if and when to start taking it again. Make sure that you understand exactly what your doctor wants you to do.      If polyps were removed or a biopsy was done during the test, your doctor may tell you not to take aspirin or other anti-inflammatory medicines for a few days. These include ibuprofen (Advil, Motrin) and naproxen (Aleve). Other instructions    For your safety, do not drive or operate machinery until the medicine wears off and you can think clearly. Your doctor may tell you not to drive or operate machinery until the day after your test.     Do not sign legal documents or make major decisions until the medicine wears off and you can think clearly. The anesthesia can make it hard for you to fully understand what you are agreeing to. Follow-up care is a key part of your treatment and safety. Be sure to make and go to all appointments, and call your doctor if you are having problems. It's also a good idea to know your test results and keep a list of the medicines you take. When should you call for help? Call 911 anytime you think you may need emergency care. For example, call if:    You passed out (lost consciousness). You pass maroon or bloody stools. You have trouble breathing. Call your doctor now or seek immediate medical care if:    You have pain that does not get better after you take pain medicine. You are sick to your stomach or cannot drink fluids. You have new or worse belly pain. You have blood in your stools. You have a fever. You cannot pass stools or gas. Watch closely for changes in your health, and be sure to contact your doctor if you have any problems. Where can you learn more? Go to http://www.gray.com/  Enter E264 in the search box to learn more about \"Colonoscopy: What to Expect at Home. \"  Current as of: September 8, 2021               Content Version: 13.2  © 2006-2022 [employer]. Care instructions adapted under license by [employer] (which disclaims liability or warranty for this information).  If you have questions about a medical condition or this instruction, always ask your healthcare professional. [employer] disclaims any warranty or liability for your use of this information. DISCHARGE SUMMARY from Nurse     POST-PROCEDURE INSTRUCTIONS:    Call your Physician if you:  Observe any excess bleeding. Develop a temperature over 100.5o F. Experience abdominal, shoulder or chest pain. Notice any signs of decreased circulation or nerve impairment to an extremity such as a change in color, persistent numbness, tingling, coldness or increase in pain. Vomit blood or you have nausea and vomiting lasting longer than 4 hours. Are unable to take medications. Are unable to urinate within 8 hours after discharge following general anesthesia or intravenous sedation. For the next 24 hours after receiving general anesthesia or intravenous sedation, or while taking prescription Narcotics, limit your activities:  Do NOT drive a motor vehicle, operate hazard machinery or power tools, or perform tasks that require coordination. The medication you received during your procedure may have some effect on your mental awareness. Do NOT make important personal or business decisions. The medication you received during your procedure may have some effect on your mental awareness. Do NOT drink alcoholic beverages. These drinks do not mix well with the medications that have been given to you. Upon discharge from the hospital, you must be accompanied by a responsible adult. Resume your diet as directed by your physician. Resume medications as your physician has prescribed. Please give a list of your current medications to your Primary Care Provider. Please update this list whenever your medications are discontinued, doses are changed, or new medications (including over-the-counter products) are added. Please carry medication information at all times in case of emergency situations.           These are general instructions for a healthy lifestyle:    No smoking/ No tobacco products/ Avoid exposure to second hand smoke.  Surgeon General's Warning:  Quitting smoking now greatly reduces serious risk to your health. Obesity, smoking, and a sedentary lifestyle greatly increase your risk for illness. A healthy diet, regular physical exercise & weight monitoring are important for maintaining a healthy lifestyle  You may be retaining fluid if you have a history of heart failure or if you experience any of the following symptoms:  Weight gain of 3 pounds or more overnight or 5 pounds in a week, increased swelling in our hands or feet or shortness of breath while lying flat in bed. Please call your doctor as soon as you notice any of these symptoms; do not wait until your next office visit. Recognize signs and symptoms of STROKE:  F  -  Face looks uneven  A  -  Arms unable to move or move unevenly  S  -  Speech slurred or non-existent  T  -  Time to call 911 - as soon as signs and symptoms begin - DO NOT go back to bed or wait to see If you get better - TIME IS BRAIN. Colorectal Screening  Colorectal cancer almost always develops from precancerous polyps (abnormal growths) in the colon or rectum. Screening tests can find precancerous polyps, so that they can be removed before they turn into cancer. Screening tests can also find colorectal cancer early, when treatment works best.  Speak with your physician about when you should begin screening and how often you should be tested. EQUISO Activation    Thank you for requesting access to EQUISO. Please follow the instructions below to securely access and download your online medical record. EQUISO allows you to send messages to your doctor, view your test results, renew your prescriptions, schedule appointments, and more. How Do I Sign Up? In your internet browser, go to [email or web address]. Click on the First Time User? Click Here link in the Sign In box. You will see the New Member Sign Up page.   Enter your EQUISO Access Code exactly as it appears below. You will not need to use this code after youve completed the sign-up process. If you do not sign up before the expiration date, you must request a new code. Heroic Access Code: Activation code not generated  Current Heroic Status: Active (This is the date your Heroic access code will )    Enter the last four digits of your Social Security Number (xxxx) and Date of Birth (mm/dd/yyyy) as indicated and click Submit. You will be taken to the next sign-up page. Create a Heroic ID. This will be your Heroic login ID and cannot be changed, so think of one that is secure and easy to remember. Create a Heroic password. You can change your password at any time. Enter your Password Reset Question and Answer. This can be used at a later time if you forget your password. Enter your e-mail address. You will receive e-mail notification when new information is available in [address] Click Sign Up. You can now view and download portions of your medical record. Click the [employer] link to download a portable copy of your medical information. Additional Information    If you have questions, please call [phone]. Remember, Heroic is NOT to be used for urgent needs. For medical emergencies, dial 911. Educational references and/or instructions provided during this visit included:    See Attached      APPOINTMENTS:    Per MD Instruction    Discharge information has been reviewed with the patient. The patient verbalized understanding. Colon Polyps: Care Instructions  Your Care Instructions     Colon polyps are growths in the colon or the rectum. The cause of most colon polyps is not known, and most people who get them do not have any problems. But a certain kind can turn into cancer. For this reason, regular testing for colon polyps is important for people as they get older. It is also important for anyone who has an increased risk for colon cancer.   Polyps are usually found through routine colon cancer screening tests. Although most colon polyps are not cancerous, they are usually removed and then tested for cancer. Screening for colon cancer saves lives because the cancer can usually be cured if it is caught early. If you have a polyp that is the type that can turn into cancer, you may need more tests to examine your entire colon. The doctor will remove any other polyps that he or she finds, and you will be tested more often. Follow-up care is a key part of your treatment and safety. Be sure to make and go to all appointments, and call your doctor if you are having problems. It's also a good idea to know your test results and keep a list of the medicines you take. How can you care for yourself at home? Regular exams to look for colon polyps are the best way to prevent polyps from turning into colon cancer. These can include stool tests, sigmoidoscopy, colonoscopy, and CT colonography. Talk with your doctor about a testing schedule that is right for you. To prevent polyps  There is no home treatment that can prevent colon polyps. But these steps may help lower your risk for cancer. Stay active. Being active can help you get to and stay at a healthy weight. Try to exercise on most days of the week. Walking is a good choice. Eat well. Choose a variety of vegetables, fruits, legumes (such as peas and beans), fish, poultry, and whole grains. Do not smoke. If you need help quitting, talk to your doctor about stop-smoking programs and medicines. These can increase your chances of quitting for good. If you drink alcohol, limit how much you drink. Limit alcohol to 2 drinks a day for men and 1 drink a day for women. When should you call for help? Call your doctor now or seek immediate medical care if:    You have severe belly pain. Your stools are maroon or very bloody.    Watch closely for changes in your health, and be sure to contact your doctor if:    You have a fever. You have nausea or vomiting. You have a change in bowel habits (new constipation or diarrhea). Your symptoms get worse or are not improving as expected. Where can you learn more? Go to http://www.pittman.com/  Enter C571 in the search box to learn more about \"Colon Polyps: Care Instructions. \"  Current as of: September 8, 2021               Content Version: 13.2  © 2006-2022 [employer]. Care instructions adapted under license by [employer] (which disclaims liability or warranty for this information). If you have questions about a medical condition or this instruction, always ask your healthcare professional. [address] any warranty or liability for your use of this information.

## 2022-08-22 DIAGNOSIS — G25.81 RESTLESS LEG: ICD-10-CM

## 2022-08-23 RX ORDER — CYCLOBENZAPRINE HCL 5 MG
5 TABLET ORAL
Qty: 30 TABLET | Refills: 0 | Status: SHIPPED | OUTPATIENT
Start: 2022-08-23 | End: 2022-09-13

## 2022-08-26 ENCOUNTER — TELEPHONE (OUTPATIENT)
Dept: SURGERY | Age: 57
End: 2022-08-26

## 2022-08-26 NOTE — TELEPHONE ENCOUNTER
----- Message from Juan A Alarcon MD sent at 8/19/2022 11:09 AM EDT -----  Benign polyp(s). Repeat colonoscopy in 3 year(s) as planned. Notified patient of pathology results. Tickler placed in recall for 3 years. Patient understands.

## 2022-09-02 NOTE — PROGRESS NOTES
Amauri Mora is a 37 year old male patient.  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)   • Gross hematuria   • Abdominal pain     Past Medical History:   Diagnosis Date   • Anemia     sickle cell    • Blood clot associated with vein wall inflammation    • Chronic pain     sickle cell anemia    • Failed moderate sedation during procedure    • Fracture    • Pneumonia    • RAD (reactive airway disease)    • Sickle cell anemia (CMS/HCC)    • Sickle cell anemia (CMS/HCC)      Current Facility-Administered Medications   Medication Dose Route Frequency Provider Last Rate Last Admin   • rivaroxaban (XARELTO) tablet 20 mg  20 mg Oral Daily Jose Alfredo Singh MD   20 mg at 09/02/22 0901   • gabapentin (NEURONTIN) capsule 800 mg  800 mg Oral TID Amy Ruvalcaba MD   800 mg at 09/02/22 0901   • albuterol inhaler 1-2 puff  1-2 puff Inhalation Q4H PRN Amy Ruvalcaba MD       • docusate sodium-sennosides (SENOKOT S) 50-8.6 MG 2 tablet  2 tablet Oral Daily PRN Amy Ruvalcaba MD       • hydroxyUREA (HYDREA) capsule 500 mg  500 mg Oral Once per day on Mon Wed Fri Amy Ruvalcaba MD   500 mg at 09/02/22 0901   • methaDONE (DOLOPHINE) tablet 15 mg  15 mg Oral BID Amy Ruvalcaba MD   15 mg at 09/02/22 0901   • nortriptyline (PAMELOR) capsule 25 mg  25 mg Oral Nightly Amy Ruvalcaba MD   25 mg at 09/01/22 2030   • sodium chloride 0.9 % flush bag 25 mL  25 mL Intravenous PRN Amy Ruvalcaba MD       • sodium chloride (PF) 0.9 % injection 2 mL  2 mL Intracatheter 2 times per day Amy Ruvalcaba MD   2 mL at 08/30/22 2002   • Potassium Standard Replacement Protocol (Levels 3.5 and lower)   Does not apply See Admin Instructions Amy Ruvalcaba MD       • Potassium Replacement (Levels 3.6 - 4)   Does not apply See Admin Instructions Amy Ruvalcaba MD       •  Please notify the patient of negative FIT test Magnesium Standard Replacement Protocol   Does not apply See Admin Instructions Amy Ruvalcaba MD       • Phosphorus Standard Replacement Protocol   Does not apply See Admin Instructions Amy Ruvalcaba MD       • ondansetron (ZOFRAN) injection 4 mg  4 mg Intravenous BID PRN Amy Ruvalcaba MD       • prochlorperazine (COMPAZINE) tablet 5 mg  5 mg Oral Q4H PRN Amy Ruvalcaba MD       • acetaminophen (TYLENOL) tablet 650 mg  650 mg Oral Q4H PRN Amy Ruvalcaba MD       • polyethylene glycol (MIRALAX) packet 17 g  17 g Oral Daily PRN Amy Ruvalcaba MD       • magnesium hydroxide (MILK OF MAGNESIA) 400 MG/5ML suspension 30 mL  30 mL Oral Daily PRN Amy Ruvalcaba MD       • aluminum-magnesium hydroxide-simethicone (MAALOX) 200-200-20 MG/5ML suspension 30 mL  30 mL Oral Q4H PRN Amy Ruvalcaba MD       • sodium chloride 0.9 % flush bag 25 mL  25 mL Intravenous PRN Amy Ruvalcaba MD       • sodium chloride 0.9% infusion   Intravenous Continuous Amy Ruvalcaba  mL/hr at 09/02/22 1220 Rate Verify at 09/02/22 1220   • polyethylene glycol (MIRALAX) packet 17 g  17 g Oral Daily Amy Ruvalcaba MD       • bisacodyl (DULCOLAX) suppository 10 mg  10 mg Rectal Daily PRN Amy Ruvalcaba MD       • HYDROmorphone (DILAUDID) injection 1 mg  1 mg Intravenous Q4H PRN Jose Miguel Martin MD   1 mg at 09/02/22 1016   • oxyCODONE (IMM REL) (ROXICODONE) tablet 10 mg  10 mg Oral Q4H PRN Jose Miguel Martin MD   10 mg at 08/30/22 1816    Or   • oxyCODONE (IMM REL) (ROXICODONE) tablet 5 mg  5 mg Oral Q4H PRN Jose Miguel Martin MD       • diphenhydrAMINE (BENADRYL) injection 25 mg  25 mg Intravenous Q4H PRN Jose Miguel Martin MD   25 mg at 09/02/22 1016     ALLERGIES:   Allergen Reactions   • Acetaminophen SHORTNESS OF BREATH   • Ibuprofen SWELLING   • Vicodin [Hydrocodone-Acetaminophen] SWELLING     Throat swelling    Tolerates oxycodone    Tolerated IV dilaudid 8/3 to 8/5/2022   • Iodine   (Environmental Or Med) PRURITUS   • Latex   (Environmental)  RASH   • Morphine Other (See Comments)     PT states after given Morphine pt's nose became itchy and then his face- denies SOB or CP   • Rocephin [Ceftriaxone] PRURITUS   • Seafood   (Food) SWELLING   • Tramadol SWELLING     Principal Problem:    Sickle cell pain crisis (CMS/[facility])    Blood pressure 101/60, pulse (!) 103, temperature 98.4 °F (36.9 °C), temperature source Oral, resp. rate 16, height 6' (1.829 m), weight 91.4 kg (201 lb 8 oz), SpO2 94 %.    Subjective:  Symptoms:  Improved.  He reports malaise and weakness.  No shortness of breath, cough, chest pain, headache, chest pressure, anorexia, diarrhea or anxiety.    Diet:  Adequate intake.  No nausea or vomiting.    Activity level: Impaired due to pain.    Pain:  He complains of pain that is moderate.  He reports pain is improving.  Pain is partially controlled and requiring pain medication.      Objective:  General Appearance:  Comfortable, well-appearing, in no acute distress and not in pain.    Vital signs: (most recent): Blood pressure 101/60, pulse (!) 103, temperature 98.4 °F (36.9 °C), temperature source Oral, resp. rate 16, height 6' (1.829 m), weight 91.4 kg (201 lb 8 oz), SpO2 94 %.  Vital signs are normal.    Output: Producing urine and producing stool.    HEENT: Normal HEENT exam.    Lungs:  Normal effort and normal respiratory rate.    Heart: Normal rate.  Regular rhythm.    Abdomen: Abdomen is soft.    Extremities: Decreased range of motion.    Neurological: Patient is alert and oriented to person, place and time.  Normal strength.    Skin:  Warm and dry.      Assessment:    Condition: In stable condition.  Improving.       I was asked to see Amauri Mora for sickle cell pain      Patient is a 37 year old male who was admitted with Sickle cell pain crisis (CMS/[facility]) [D57.00].     CC:  Sickle cell pain      HPI:  Admitted with sickle cell crisis,  Describes usual crisis pain as generalized back, leg and arm pain.  Not improved with his home  dose of methadone and oxycodone.    Admitted for eval      Known to me from prior hospital stay     Feeling better today    Feels he could go home tomorrow     No request to change meds     Scores pain at 8/10 with goal of 6             usual back and leg pain   POSS score ot 2    Methadone 15 mg twice daily   Prn oxycodone 10 mg every 4 hours prn times 0 yesterday and none today   Prn dilaudid 1 mg every 4 hours prn clau 5 yesterday and 2 today  Gabapentin 800  Mg tid   Benadryl 25 mg prn     No request to change med     wi pdmp   Methadone 15 mg twice daily   Oxycodone 10 mg prn (4 per day)   Benadryl 25 mg iv prn   Gabapentin 800 mg tid     wi pdmp reviewed  Had rx for methadone and oxycodone on 9/17       No request to change med       A   Sickle cell crisis   Chronic pain   Opioid tolerant   Hx dvt        p     D/w pt     No change     Stop dilaudid in am     Resume home meds on discharge   No Rx on discharge       Jose Miguel Martin MD

## 2022-09-13 DIAGNOSIS — I10 ESSENTIAL HYPERTENSION: ICD-10-CM

## 2022-09-13 DIAGNOSIS — G25.81 RESTLESS LEG: ICD-10-CM

## 2022-09-13 RX ORDER — CYCLOBENZAPRINE HCL 5 MG
5 TABLET ORAL
Qty: 30 TABLET | Refills: 0 | Status: SHIPPED | OUTPATIENT
Start: 2022-09-13 | End: 2022-10-11

## 2022-09-13 RX ORDER — LISINOPRIL AND HYDROCHLOROTHIAZIDE 12.5; 2 MG/1; MG/1
TABLET ORAL
Qty: 90 TABLET | Refills: 0 | Status: SHIPPED | OUTPATIENT
Start: 2022-09-13

## 2022-09-28 DIAGNOSIS — G50.0 TRIGEMINAL NEURALGIA OF RIGHT SIDE OF FACE: ICD-10-CM

## 2022-09-30 RX ORDER — TOPIRAMATE 50 MG/1
TABLET, FILM COATED ORAL
Qty: 90 TABLET | Refills: 0 | Status: SHIPPED | OUTPATIENT
Start: 2022-09-30 | End: 2022-11-01

## 2022-10-11 DIAGNOSIS — G25.81 RESTLESS LEG: ICD-10-CM

## 2022-10-11 RX ORDER — CYCLOBENZAPRINE HCL 5 MG
5 TABLET ORAL
Qty: 30 TABLET | Refills: 0 | Status: SHIPPED | OUTPATIENT
Start: 2022-10-11

## 2022-11-01 DIAGNOSIS — G50.0 TRIGEMINAL NEURALGIA OF RIGHT SIDE OF FACE: ICD-10-CM

## 2022-11-01 RX ORDER — TOPIRAMATE 50 MG/1
TABLET, FILM COATED ORAL
Qty: 90 TABLET | Refills: 0 | Status: SHIPPED | OUTPATIENT
Start: 2022-11-01

## 2022-11-04 DIAGNOSIS — E11.40 TYPE 2 DIABETES MELLITUS WITH DIABETIC NEUROPATHY, WITHOUT LONG-TERM CURRENT USE OF INSULIN (HCC): ICD-10-CM

## 2022-11-04 NOTE — TELEPHONE ENCOUNTER
Requested Prescriptions     Pending Prescriptions Disp Refills    gabapentin (NEURONTIN) 300 mg capsule 180 Capsule 1

## 2022-11-08 RX ORDER — GABAPENTIN 300 MG/1
CAPSULE ORAL
Qty: 180 CAPSULE | Refills: 1 | Status: SHIPPED | OUTPATIENT
Start: 2022-11-08

## 2022-11-15 ENCOUNTER — HOSPITAL ENCOUNTER (OUTPATIENT)
Dept: LAB | Age: 57
Discharge: HOME | End: 2022-11-15
Payer: COMMERCIAL

## 2022-11-15 ENCOUNTER — OFFICE VISIT (OUTPATIENT)
Dept: FAMILY MEDICINE CLINIC | Age: 57
End: 2022-11-15
Payer: COMMERCIAL

## 2022-11-15 VITALS
WEIGHT: 208 LBS | SYSTOLIC BLOOD PRESSURE: 117 MMHG | DIASTOLIC BLOOD PRESSURE: 76 MMHG | OXYGEN SATURATION: 99 % | RESPIRATION RATE: 17 BRPM | HEIGHT: 72 IN | HEART RATE: 67 BPM | BODY MASS INDEX: 28.17 KG/M2 | TEMPERATURE: 97.5 F

## 2022-11-15 DIAGNOSIS — E11.40 TYPE 2 DIABETES MELLITUS WITH DIABETIC NEUROPATHY, WITHOUT LONG-TERM CURRENT USE OF INSULIN (HCC): ICD-10-CM

## 2022-11-15 DIAGNOSIS — B35.1 ONYCHOMYCOSIS: ICD-10-CM

## 2022-11-15 DIAGNOSIS — E87.6 HYPOKALEMIA: ICD-10-CM

## 2022-11-15 DIAGNOSIS — R05.3 CHRONIC COUGH: ICD-10-CM

## 2022-11-15 DIAGNOSIS — E87.6 HYPOKALEMIA: Primary | ICD-10-CM

## 2022-11-15 LAB
ANION GAP SERPL CALC-SCNC: 8 MMOL/L (ref 3–18)
BUN SERPL-MCNC: 17 MG/DL (ref 7–18)
BUN/CREAT SERPL: 16 (ref 12–20)
CALCIUM SERPL-MCNC: 9.3 MG/DL (ref 8.5–10.1)
CHLORIDE SERPL-SCNC: 107 MMOL/L (ref 100–111)
CO2 SERPL-SCNC: 26 MMOL/L (ref 21–32)
CREAT SERPL-MCNC: 1.06 MG/DL (ref 0.6–1.3)
CREAT UR-MCNC: 108 MG/DL (ref 30–125)
EST. AVERAGE GLUCOSE BLD GHB EST-MCNC: 126 MG/DL
GLUCOSE SERPL-MCNC: 125 MG/DL (ref 74–99)
HBA1C MFR BLD: 6 % (ref 4.2–5.6)
MICROALBUMIN UR-MCNC: 1.46 MG/DL (ref 0–3)
MICROALBUMIN/CREAT UR-RTO: 14 MG/G (ref 0–30)
POTASSIUM SERPL-SCNC: 3.1 MMOL/L (ref 3.5–5.5)
SODIUM SERPL-SCNC: 141 MMOL/L (ref 136–145)

## 2022-11-15 PROCEDURE — 3044F HG A1C LEVEL LT 7.0%: CPT | Performed by: STUDENT IN AN ORGANIZED HEALTH CARE EDUCATION/TRAINING PROGRAM

## 2022-11-15 PROCEDURE — 36415 COLL VENOUS BLD VENIPUNCTURE: CPT

## 2022-11-15 PROCEDURE — 3074F SYST BP LT 130 MM HG: CPT | Performed by: STUDENT IN AN ORGANIZED HEALTH CARE EDUCATION/TRAINING PROGRAM

## 2022-11-15 PROCEDURE — 3078F DIAST BP <80 MM HG: CPT | Performed by: STUDENT IN AN ORGANIZED HEALTH CARE EDUCATION/TRAINING PROGRAM

## 2022-11-15 PROCEDURE — 82043 UR ALBUMIN QUANTITATIVE: CPT

## 2022-11-15 PROCEDURE — 99214 OFFICE O/P EST MOD 30 MIN: CPT | Performed by: STUDENT IN AN ORGANIZED HEALTH CARE EDUCATION/TRAINING PROGRAM

## 2022-11-15 PROCEDURE — 80048 BASIC METABOLIC PNL TOTAL CA: CPT

## 2022-11-15 PROCEDURE — 83036 HEMOGLOBIN GLYCOSYLATED A1C: CPT

## 2022-11-15 RX ORDER — BENZONATATE 100 MG/1
100 CAPSULE ORAL
Qty: 21 CAPSULE | Refills: 0 | Status: SHIPPED | OUTPATIENT
Start: 2022-11-15 | End: 2022-11-22

## 2022-11-15 RX ORDER — ALBUTEROL SULFATE 90 UG/1
1 AEROSOL, METERED RESPIRATORY (INHALATION)
Qty: 18 G | Refills: 1 | Status: SHIPPED | OUTPATIENT
Start: 2022-11-15

## 2022-11-15 NOTE — PROGRESS NOTES
Dorothea Elena is a 65 y.o. male presenting today for Follow Up Chronic Condition (Cough, chest pain. States that OTC meds are not working. Toenail fungus on right great toe, OTC meds are not working. )  . Chief Complaint   Patient presents with    Follow Up Chronic Condition     Cough, chest pain. States that OTC meds are not working. Toenail fungus on right great toe, OTC meds are not working. HPI:  Dorothea Elena presents to the office today for follow up. Patient has a past medical history of HTN, HLD, T2DM with neuropathy, VEELIN. Patient states he recently had flu like symptoms started 4 weeks ago. He felt better for a while but continues to have a post nasal drip and a persistent dry cough. The cough has been causing chest pain. Denies fever, chills. He has been taking NyQuil with no relief. Is compliant with his medications. HTN: Patient is on amlodipine and lisinopril-HCTZ. BP is well controlled. HLD: Patient is on Crestor. Lipid panel in 3/21 showed LDL 56, HDL 34, triglycerides 154. T2DM: Patient denies any polyuria, polydipsia. Last HbA1c was 6.5% in 7/22. Patient is taking Metformin and Glipizide. Patient has a history of neuropathy. He is taking the gabapentin for it. States he had an eye exam recently this year-has history of cataract surgery. Patient noticed onychomycosis in his right big toenail for which he used topical treatments and then cut off a portion of his toenail - he was referred to podiatry    Patient has been following with neurology due to right-sided headaches and facial pain. He was started on Topamax for possible trigeminal neuralgia with improvement. Review of Systems   Constitutional:  Negative for chills, diaphoresis, fever, malaise/fatigue and weight loss. HENT:  Negative for congestion, ear discharge, ear pain, hearing loss, nosebleeds, sinus pain, sore throat and tinnitus.     Eyes:  Negative for blurred vision, double vision and photophobia. Respiratory:  Positive for cough. Negative for sputum production, shortness of breath, wheezing and stridor. Cardiovascular:  Positive for leg swelling. Negative for chest pain, palpitations, orthopnea and claudication. Gastrointestinal:  Negative for abdominal pain, constipation, diarrhea, heartburn, nausea and vomiting. Genitourinary:  Negative for dysuria, flank pain, frequency, hematuria and urgency. Musculoskeletal:  Negative for back pain, joint pain, myalgias and neck pain. Skin:  Negative for rash. Neurological:  Positive for tingling and sensory change. Negative for tremors, speech change, focal weakness, seizures, weakness and headaches. Psychiatric/Behavioral:  Negative for depression. The patient is not nervous/anxious. All other systems reviewed and are negative.     No Known Allergies    PHQ Screening   3 most recent PHQ Screens 11/15/2022   PHQ Not Done -   Little interest or pleasure in doing things Not at all   Feeling down, depressed, irritable, or hopeless Not at all   Total Score PHQ 2 0   Trouble falling or staying asleep, or sleeping too much -   Feeling tired or having little energy -   Poor appetite, weight loss, or overeating -   Feeling bad about yourself - or that you are a failure or have let yourself or your family down -   Trouble concentrating on things such as school, work, reading, or watching TV -   Moving or speaking so slowly that other people could have noticed; or the opposite being so fidgety that others notice -   Thoughts of being better off dead, or hurting yourself in some way -   PHQ 9 Score -       History  Past Medical History:   Diagnosis Date    Arthritis     Cataract, right 2010    Diabetes ([address]     H/O seasonal allergies     History of vitamin D deficiency 6/2016    Hypercholesterolemia     Hypertension     Hypertriglyceridemia 6/30/2016    Hypokalemia 6/30/2016    Low back pain     Numbness and tingling of foot June     left toes and mid bottom of foot    Peripheral neuropathy     Scrotal mass 2016    Sleep apnea        Past Surgical History:   Procedure Laterality Date    COLONOSCOPY N/A 2022    COLONOSCOPY/ Polypectomies performed by Francesco Donaldson MD at [facility]COPY    HX CATARACT REMOVAL Bilateral     HX HERNIA REPAIR      umbilical, done at [address] [place] [address] FACE/SCALP SUBQ 2+CM  16    scalp lesion removal, Dr. Richardson Hand History     Socioeconomic History    Marital status:      Spouse name: Not on file    Number of children: 0    Years of education: 11    Highest education level: Not on file   Occupational History    Occupation: unemployed and living with son and daughter-in-law, after incarceration 2011 out 2014   Tobacco Use    Smoking status: Former    Smokeless tobacco: Never    Tobacco comments:     stopped in HS, never heavy and never long term   Substance and Sexual Activity    Alcohol use:  Yes     Alcohol/week: 0.0 standard drinks     Comment: rarely holiday    Drug use: No    Sexual activity: Yes     Comment: no partner, spouse  2014   Other Topics Concern     Service Yes     Comment: [address] [facility], from [phone], other than honorable discharge    Blood Transfusions No    Caffeine Concern No     Comment: decreased his 2-3 super big gulps    Occupational Exposure Yes     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[place], [facility] then [address] [address] [place] No    Sleep Concern No    Stress Concern Yes     Comment: Life in general    Weight Concern No    Special Diet No    Back Care No    Exercise No     Comment: starting to do something but not currently    Bike Helmet No    Seat Belt Yes    Self-Exams No   Social History Narrative    Not on file     Social Determinants of Health     Financial Resource Strain: Not on file   Food Insecurity: Not on file   Transportation Needs: Not on file   Physical Activity: Not on file   Stress: Not on file   Social Connections: Not on file   Intimate Partner Violence: Not on file   Housing Stability: Not on file       Current Outpatient Medications   Medication Sig Dispense Refill    benzonatate (TESSALON) 100 mg capsule Take 1 Capsule by mouth three (3) times daily as needed for Cough for up to 7 days. 21 Capsule 0    albuterol (PROVENTIL HFA, VENTOLIN HFA, PROAIR HFA) 90 mcg/actuation inhaler Take 1 Puff by inhalation every six (6) hours as needed for Wheezing, Cough or Shortness of Breath. 18 g 1    gabapentin (NEURONTIN) 300 mg capsule TAKE 1 CAPSULE BY MOUTH TWICE DAILY FOR  NEUROPATHIC PAIN 180 Capsule 1    topiramate (TOPAMAX) 50 mg tablet TAKE 1 TABLET BY MOUTH IN THE MORNING AND 2 AT BEDTIME 90 Tablet 0    cyclobenzaprine (FLEXERIL) 5 mg tablet Take 1 tablet by mouth nightly 30 Tablet 0    lisinopril-hydroCHLOROthiazide (PRINZIDE, ZESTORETIC) 20-12.5 mg per tablet TAKE 1 TABLET BY MOUTH ONCE DAILY FOR HIGH BLOOD PRESSURE 90 Tablet 0    metFORMIN (GLUCOPHAGE) 1,000 mg tablet Take 1 Tablet by mouth in the morning. 90 Tablet 1    rosuvastatin (CRESTOR) 20 mg tablet Take 1 Tablet by mouth nightly. 90 Tablet 1    fluticasone propionate (FLONASE) 50 mcg/actuation nasal spray 1 spray each nostril daily 1 Each 1    glipiZIDE (GLUCOTROL) 10 mg tablet Take 1 Tablet by mouth daily (with breakfast). 90 Tablet 1    amLODIPine (NORVASC) 10 mg tablet Take 1 Tablet by mouth daily. 90 Tablet 3    Blood-Glucose Meter (OneTouch Verio Flex Start) monitoring kit Take BG level AC/HS 1 Kit 0    glucose blood VI test strips (OneTouch Verio test strips) strip Take BG level AC/ Strip 3    lancets misc Take BG level AC/HS 1 Each 11    multivitamin (ONE A DAY) tablet Take 1 Tablet by mouth daily.       polyethylene glycol (Miralax) 17 gram/dose powder As Directed  Indications: bowel prep follow dr doran office instructions (Patient not taking: Reported on 11/15/2022) 238 g 0    bisacodyL (DULCOLAX) 5 mg EC tablet Take as directed for bowel prep (Patient not taking: Reported on 11/15/2022) 4 Tablet 0    Cetirizine (ZyrTEC) 10 mg cap Take  by mouth. (Patient not taking: No sig reported)           Vitals:    11/15/22 0831   BP: 117/76   Pulse: 67   Resp: 17   Temp: 97.5 °F (36.4 °C)   SpO2: 99%   Weight: 208 lb (94.3 kg)   Height: 6' (1.829 m)   PainSc:   2         Physical Exam  Vitals and nursing note reviewed. Constitutional:       General: He is not in acute distress. Appearance: Normal appearance. He is not ill-appearing, toxic-appearing or diaphoretic. HENT:      Head: Normocephalic and atraumatic. Eyes:      General: No scleral icterus. Extraocular Movements: Extraocular movements intact. Conjunctiva/sclera: Conjunctivae normal.      Pupils: Pupils are equal, round, and reactive to light. Cardiovascular:      Rate and Rhythm: Normal rate and regular rhythm. Pulses: Normal pulses. Heart sounds: Normal heart sounds. No murmur heard. No gallop. Pulmonary:      Effort: No respiratory distress. Breath sounds: Normal breath sounds. No wheezing or rales. Comments: Coughing frequently  Musculoskeletal:         General: No swelling or tenderness. Normal range of motion. Cervical back: Normal range of motion and neck supple. Right lower leg: No edema. Left lower leg: No edema. Skin:     General: Skin is warm and dry. Coloration: Skin is not jaundiced or pale. Comments: Short Rt toenail   Neurological:      General: No focal deficit present. Mental Status: He is alert and oriented to person, place, and time. Mental status is at baseline. Cranial Nerves: No cranial nerve deficit. Motor: No weakness. Gait: Gait normal.   Psychiatric:         Mood and Affect: Mood normal.         Behavior: Behavior normal.         Thought Content:  Thought content normal.         Judgment: Judgment normal.     Diabetic foot exam: 7/26/22          Admission on 08/17/2022, Discharged on 08/17/2022   Component Date Value Ref Range Status    Glucose (POC) 08/17/2022 109  70 - 110 mg/dL Final    Comment: (NOTE)  The FDA has indicated that no capillary point of care blood glucose   monitoring systems are approved for use in \"critically ill\" patients,   however they have not defined this population. The College of   American Pathologists has recommended that these devices should not   be used in cases such as severe hypotension, dehydration, shock, and   hyperglycemic-hyperosmolar state, amongst others. Venous or arterial   collection is the recommended specimen for testing these patients. Glucose (POC) 08/17/2022 108  70 - 110 mg/dL Final    Comment: (NOTE)  The FDA has indicated that no capillary point of care blood glucose   monitoring systems are approved for use in \"critically ill\" patients,   however they have not defined this population. The College of   American Pathologists has recommended that these devices should not   be used in cases such as severe hypotension, dehydration, shock, and   hyperglycemic-hyperosmolar state, amongst others. Venous or arterial   collection is the recommended specimen for testing these patients. No results found for any visits on 11/15/22. Patient Care Team:  Patient Care Team:  Elizabeth Chou MD as PCP - General (Internal Medicine Physician)  Yuliya George MD as Surgeon (General Surgery)  Vinny Cortezma (Physician Assistant)  Magalie Farrar MD as Physician (Urology)  Aniya Ellis MD as Consulting Provider (Neurology)  Tigist Hess NP (Neurology)  Roslyn Morrison MD (Colon and Rectal Surgery)      Assessment / Plan:      ICD-10-CM ICD-9-CM    1. Hypokalemia  Z80.2 185.2 METABOLIC PANEL, BASIC      2. Type 2 diabetes mellitus with diabetic neuropathy, without long-term current use of insulin (HCC)  E11.40 250.60 HEMOGLOBIN A1C WITH EAG     357.2       3. Onychomycosis  B35.1 110.1 REFERRAL TO PODIATRY      4. Chronic cough  R05.3 786.2 benzonatate (TESSALON) 100 mg capsule      albuterol (PROVENTIL HFA, VENTOLIN HFA, PROAIR HFA) 90 mcg/actuation inhaler        Chronic cough: Post viral infection. Will prescribe Tessalon as needed. Will also prescribe albuterol as needed. Continue Flonase. HTN: Well-controlled on amlodipine and lisinopril-HCTZ. Continue at current dosage. Onychomycosis: Refer to podiatry    Hypokalemia: Repeat BMP ordered. T2DM: Controlled on metformin and glipizide. Denies any hypoglycemic events. Continue gabapentin for neuropathy. HbA1c is 6.5%  - repeat ordered. HLD: Controlled on Crestor. Trigeminal neuralgia: ContinueTopamax. Patient has received the flu vaccine this year at his local pharmacy. Follow-up and Dispositions    Return in about 5 months (around 4/15/2023) for Diabetes F/U, BP check. I asked the patient if he  had any questions and answered his  questions. The patient stated that he understands the treatment plan and agrees with the treatment plan    This document was created with a voice activated dictation system and may contain transcription errors.

## 2022-11-16 ENCOUNTER — TELEPHONE (OUTPATIENT)
Dept: FAMILY MEDICINE CLINIC | Age: 57
End: 2022-11-16

## 2022-11-16 DIAGNOSIS — E87.6 HYPOKALEMIA: Primary | ICD-10-CM

## 2022-11-16 RX ORDER — POTASSIUM CHLORIDE 750 MG/1
TABLET, EXTENDED RELEASE ORAL
Qty: 30 TABLET | Refills: 2 | Status: SHIPPED | OUTPATIENT
Start: 2022-11-16

## 2022-11-16 NOTE — TELEPHONE ENCOUNTER
Advised pt of results and new medication that was sent to pharmacy. Pt stated that he understood and would take as directed. He had no concerns.

## 2022-11-16 NOTE — TELEPHONE ENCOUNTER
Please inform patient that his potassium level is low at 3.1. I have prescribed potassium pills to take once daily to his pharmacy. His diabetes remains well controlled. Universal Safety Interventions

## 2022-11-28 DIAGNOSIS — G25.81 RESTLESS LEG: ICD-10-CM

## 2022-11-29 RX ORDER — CYCLOBENZAPRINE HCL 5 MG
5 TABLET ORAL
Qty: 30 TABLET | Refills: 0 | Status: SHIPPED | OUTPATIENT
Start: 2022-11-29

## 2022-12-05 DIAGNOSIS — E11.40 TYPE 2 DIABETES MELLITUS WITH DIABETIC NEUROPATHY, WITHOUT LONG-TERM CURRENT USE OF INSULIN (HCC): ICD-10-CM

## 2022-12-05 NOTE — TELEPHONE ENCOUNTER
Requested Prescriptions     Pending Prescriptions Disp Refills    metFORMIN (GLUCOPHAGE) 1,000 mg tablet 90 Tablet 1     Sig: Take 1 Tablet by mouth daily.

## 2022-12-06 RX ORDER — METFORMIN HYDROCHLORIDE 1000 MG/1
1000 TABLET ORAL DAILY
Qty: 90 TABLET | Refills: 1 | Status: SHIPPED | OUTPATIENT
Start: 2022-12-06

## 2022-12-07 DIAGNOSIS — E11.40 TYPE 2 DIABETES MELLITUS WITH DIABETIC NEUROPATHY, WITHOUT LONG-TERM CURRENT USE OF INSULIN (HCC): ICD-10-CM

## 2022-12-07 RX ORDER — METFORMIN HYDROCHLORIDE 1000 MG/1
1000 TABLET ORAL DAILY
Qty: 90 TABLET | Refills: 1 | OUTPATIENT
Start: 2022-12-07

## 2022-12-08 DIAGNOSIS — E11.40 TYPE 2 DIABETES MELLITUS WITH DIABETIC NEUROPATHY, WITHOUT LONG-TERM CURRENT USE OF INSULIN (HCC): ICD-10-CM

## 2022-12-08 DIAGNOSIS — G50.0 TRIGEMINAL NEURALGIA OF RIGHT SIDE OF FACE: ICD-10-CM

## 2022-12-08 RX ORDER — TOPIRAMATE 50 MG/1
TABLET, FILM COATED ORAL
Qty: 90 TABLET | Refills: 3 | Status: SHIPPED | OUTPATIENT
Start: 2022-12-08

## 2023-01-03 NOTE — TELEPHONE ENCOUNTER
Requested Prescriptions     Pending Prescriptions Disp Refills    glipiZIDE (GLUCOTROL) 10 mg tablet 90 Tablet 1     Sig: Take 1 Tablet by mouth daily (with breakfast).      Signed Prescriptions Disp Refills    topiramate (TOPAMAX) 50 mg tablet 90 Tablet 3     Sig: TAKE 1 TABLET BY MOUTH IN THE MORNING AND THEN 2 TABLETS AT BEDTIME     Authorizing Provider: Prudence Casillas

## 2023-01-04 RX ORDER — GLIPIZIDE 10 MG/1
10 TABLET ORAL
Qty: 90 TABLET | Refills: 1 | Status: SHIPPED | OUTPATIENT
Start: 2023-01-04

## 2023-01-10 DIAGNOSIS — G25.81 RESTLESS LEG: ICD-10-CM

## 2023-01-10 RX ORDER — CYCLOBENZAPRINE HCL 5 MG
5 TABLET ORAL
Qty: 30 TABLET | Refills: 0 | Status: SHIPPED | OUTPATIENT
Start: 2023-01-10

## 2023-01-23 DIAGNOSIS — R09.81 NASAL CONGESTION: ICD-10-CM

## 2023-01-24 RX ORDER — FLUTICASONE PROPIONATE 50 MCG
SPRAY, SUSPENSION (ML) NASAL
Qty: 16 G | Refills: 0 | Status: SHIPPED | OUTPATIENT
Start: 2023-01-24

## 2023-01-25 DIAGNOSIS — I10 ESSENTIAL HYPERTENSION: ICD-10-CM

## 2023-01-25 RX ORDER — LISINOPRIL AND HYDROCHLOROTHIAZIDE 12.5; 2 MG/1; MG/1
TABLET ORAL
Qty: 90 TABLET | Refills: 0 | Status: SHIPPED | OUTPATIENT
Start: 2023-01-25

## 2023-02-06 DIAGNOSIS — G25.81 RESTLESS LEG: ICD-10-CM

## 2023-02-08 RX ORDER — CYCLOBENZAPRINE HCL 5 MG
5 TABLET ORAL
Qty: 30 TABLET | Refills: 0 | Status: SHIPPED | OUTPATIENT
Start: 2023-02-08

## 2023-03-07 RX ORDER — CYCLOBENZAPRINE HCL 5 MG
TABLET ORAL
Qty: 30 TABLET | Refills: 0 | Status: SHIPPED | OUTPATIENT
Start: 2023-03-07

## 2023-03-24 ENCOUNTER — OFFICE VISIT (OUTPATIENT)
Facility: CLINIC | Age: 58
End: 2023-03-24
Payer: COMMERCIAL

## 2023-03-24 VITALS
WEIGHT: 208 LBS | BODY MASS INDEX: 28.17 KG/M2 | HEIGHT: 72 IN | RESPIRATION RATE: 16 BRPM | OXYGEN SATURATION: 95 % | SYSTOLIC BLOOD PRESSURE: 114 MMHG | DIASTOLIC BLOOD PRESSURE: 74 MMHG | HEART RATE: 84 BPM | TEMPERATURE: 97.4 F

## 2023-03-24 DIAGNOSIS — J40 BRONCHITIS: Primary | ICD-10-CM

## 2023-03-24 DIAGNOSIS — R05.1 ACUTE COUGH: ICD-10-CM

## 2023-03-24 PROCEDURE — 3074F SYST BP LT 130 MM HG: CPT | Performed by: STUDENT IN AN ORGANIZED HEALTH CARE EDUCATION/TRAINING PROGRAM

## 2023-03-24 PROCEDURE — 3078F DIAST BP <80 MM HG: CPT | Performed by: STUDENT IN AN ORGANIZED HEALTH CARE EDUCATION/TRAINING PROGRAM

## 2023-03-24 PROCEDURE — 99213 OFFICE O/P EST LOW 20 MIN: CPT | Performed by: STUDENT IN AN ORGANIZED HEALTH CARE EDUCATION/TRAINING PROGRAM

## 2023-03-24 RX ORDER — BENZONATATE 100 MG/1
100 CAPSULE ORAL 3 TIMES DAILY PRN
Qty: 30 CAPSULE | Refills: 0 | Status: SHIPPED | OUTPATIENT
Start: 2023-03-24 | End: 2023-04-03

## 2023-03-24 RX ORDER — AZITHROMYCIN 250 MG/1
250 TABLET, FILM COATED ORAL SEE ADMIN INSTRUCTIONS
Qty: 6 TABLET | Refills: 0 | Status: SHIPPED | OUTPATIENT
Start: 2023-03-24 | End: 2023-03-29

## 2023-03-24 SDOH — ECONOMIC STABILITY: HOUSING INSECURITY
IN THE LAST 12 MONTHS, WAS THERE A TIME WHEN YOU DID NOT HAVE A STEADY PLACE TO SLEEP OR SLEPT IN A SHELTER (INCLUDING NOW)?: NO

## 2023-03-24 SDOH — ECONOMIC STABILITY: INCOME INSECURITY: HOW HARD IS IT FOR YOU TO PAY FOR THE VERY BASICS LIKE FOOD, HOUSING, MEDICAL CARE, AND HEATING?: HARD

## 2023-03-24 SDOH — ECONOMIC STABILITY: FOOD INSECURITY: WITHIN THE PAST 12 MONTHS, THE FOOD YOU BOUGHT JUST DIDN'T LAST AND YOU DIDN'T HAVE MONEY TO GET MORE.: SOMETIMES TRUE

## 2023-03-24 SDOH — ECONOMIC STABILITY: FOOD INSECURITY: WITHIN THE PAST 12 MONTHS, YOU WORRIED THAT YOUR FOOD WOULD RUN OUT BEFORE YOU GOT MONEY TO BUY MORE.: SOMETIMES TRUE

## 2023-03-24 ASSESSMENT — ENCOUNTER SYMPTOMS
COUGH: 1
SHORTNESS OF BREATH: 0
CHEST TIGHTNESS: 1
RHINORRHEA: 1
WHEEZING: 0
SORE THROAT: 1

## 2023-03-24 ASSESSMENT — ANXIETY QUESTIONNAIRES
4. TROUBLE RELAXING: 0
3. WORRYING TOO MUCH ABOUT DIFFERENT THINGS: 0
6. BECOMING EASILY ANNOYED OR IRRITABLE: 0
1. FEELING NERVOUS, ANXIOUS, OR ON EDGE: 0
2. NOT BEING ABLE TO STOP OR CONTROL WORRYING: 0
7. FEELING AFRAID AS IF SOMETHING AWFUL MIGHT HAPPEN: 0
5. BEING SO RESTLESS THAT IT IS HARD TO SIT STILL: 0
IF YOU CHECKED OFF ANY PROBLEMS ON THIS QUESTIONNAIRE, HOW DIFFICULT HAVE THESE PROBLEMS MADE IT FOR YOU TO DO YOUR WORK, TAKE CARE OF THINGS AT HOME, OR GET ALONG WITH OTHER PEOPLE: NOT DIFFICULT AT ALL
GAD7 TOTAL SCORE: 0

## 2023-03-24 ASSESSMENT — PATIENT HEALTH QUESTIONNAIRE - PHQ9
SUM OF ALL RESPONSES TO PHQ QUESTIONS 1-9: 0
SUM OF ALL RESPONSES TO PHQ QUESTIONS 1-9: 0
1. LITTLE INTEREST OR PLEASURE IN DOING THINGS: 0
SUM OF ALL RESPONSES TO PHQ9 QUESTIONS 1 & 2: 0
SUM OF ALL RESPONSES TO PHQ QUESTIONS 1-9: 0
2. FEELING DOWN, DEPRESSED OR HOPELESS: 0
SUM OF ALL RESPONSES TO PHQ QUESTIONS 1-9: 0

## 2023-03-24 NOTE — PROGRESS NOTES
diaphoretic. HENT:      Head: Normocephalic and atraumatic. Mouth/Throat:      Mouth: Mucous membranes are dry. Pharynx: Oropharynx is clear. Posterior oropharyngeal erythema present. No oropharyngeal exudate. Eyes:      General: No scleral icterus. Extraocular Movements: Extraocular movements intact. Conjunctiva/sclera: Conjunctivae normal.      Pupils: Pupils are equal, round, and reactive to light. Cardiovascular:      Rate and Rhythm: Normal rate. Pulses: Normal pulses. Heart sounds: Normal heart sounds. No murmur heard. Pulmonary:      Effort: Pulmonary effort is normal. No respiratory distress. Breath sounds: Normal breath sounds. No wheezing. Comments: Hacking cough  Musculoskeletal:         General: Normal range of motion. Cervical back: Normal range of motion and neck supple. Skin:     General: Skin is warm and dry. Neurological:      General: No focal deficit present. Mental Status: He is alert and oriented to person, place, and time. Mental status is at baseline. Cranial Nerves: No cranial nerve deficit. Motor: No weakness. Gait: Gait normal.   Psychiatric:         Mood and Affect: Mood normal.         Behavior: Behavior normal.         Thought Content: Thought content normal.         Judgment: Judgment normal.        No visits with results within 3 Month(s) from this visit. Latest known visit with results is:   Office Visit on 03/17/2022   Component Date Value Ref Range Status    Hemoglobin A1C, POC 03/17/2022 6.0  % Final       No results found for any visits on 03/24/23. Patient Care Team:  Patient Care Team:  Pablo Gross MD as PCP - Basilia Morris MD as PCP - Empaneled Provider  Gladys Falcon MD as Surgeon  Sangeetha Salazar MD as Physician  Chip Cornejo MD as Consulting Physician      Assessment / Plan:     Diagnosis Orders   1. Bronchitis  azithromycin (ZITHROMAX) 250 MG tablet      2.  Acute cough

## 2023-04-20 RX ORDER — TOPIRAMATE 50 MG/1
TABLET, FILM COATED ORAL
Qty: 90 TABLET | Refills: 0 | Status: SHIPPED | OUTPATIENT
Start: 2023-04-20

## 2023-04-20 RX ORDER — CYCLOBENZAPRINE HCL 5 MG
TABLET ORAL
Qty: 30 TABLET | Refills: 0 | Status: SHIPPED | OUTPATIENT
Start: 2023-04-20

## 2023-04-25 RX ORDER — LISINOPRIL AND HYDROCHLOROTHIAZIDE 20; 12.5 MG/1; MG/1
TABLET ORAL
Qty: 90 TABLET | Refills: 0 | Status: SHIPPED | OUTPATIENT
Start: 2023-04-25

## 2023-05-15 DIAGNOSIS — G50.0 TRIGEMINAL NEURALGIA: ICD-10-CM

## 2023-05-15 DIAGNOSIS — E11.40 TYPE 2 DIABETES MELLITUS WITH DIABETIC NEUROPATHY, WITHOUT LONG-TERM CURRENT USE OF INSULIN (HCC): Primary | ICD-10-CM

## 2023-05-15 DIAGNOSIS — G25.81 RESTLESS LEGS SYNDROME: ICD-10-CM

## 2023-05-19 RX ORDER — GABAPENTIN 300 MG/1
300 CAPSULE ORAL 2 TIMES DAILY
Qty: 60 CAPSULE | Refills: 2 | Status: SHIPPED | OUTPATIENT
Start: 2023-05-19 | End: 2023-08-17

## 2023-05-19 RX ORDER — TOPIRAMATE 50 MG/1
TABLET, FILM COATED ORAL
Qty: 90 TABLET | Refills: 0 | Status: SHIPPED | OUTPATIENT
Start: 2023-05-19

## 2023-05-19 RX ORDER — CYCLOBENZAPRINE HCL 5 MG
TABLET ORAL
Qty: 30 TABLET | Refills: 2 | Status: SHIPPED | OUTPATIENT
Start: 2023-05-19

## 2023-05-19 NOTE — TELEPHONE ENCOUNTER
Requested Prescriptions     Pending Prescriptions Disp Refills    gabapentin (NEURONTIN) 300 MG capsule [Pharmacy Med Name: Gabapentin 300 MG Oral Capsule] 60 capsule 0     Sig: TAKE 1 CAPSULE BY MOUTH TWICE DAILY FOR  NEUROPATHIC  PAIN    topiramate (TOPAMAX) 50 MG tablet [Pharmacy Med Name: Topiramate 50 MG Oral Tablet] 90 tablet 0     Sig: TAKE 1 TABLET BY MOUTH IN THE MORNING AND THEN  2  TABLETS  DAILY  AT  BEDTIME    cyclobenzaprine (FLEXERIL) 5 MG tablet [Pharmacy Med Name: Cyclobenzaprine HCl 5 MG Oral Tablet] 30 tablet 0     Sig: Take 1 tablet by mouth nightly

## 2023-06-19 DIAGNOSIS — G50.0 TRIGEMINAL NEURALGIA: ICD-10-CM

## 2023-06-20 RX ORDER — GLIPIZIDE 10 MG/1
TABLET ORAL
Qty: 90 TABLET | Refills: 0 | Status: SHIPPED | OUTPATIENT
Start: 2023-06-20

## 2023-06-20 RX ORDER — TOPIRAMATE 50 MG/1
TABLET, FILM COATED ORAL
Qty: 90 TABLET | Refills: 0 | Status: SHIPPED | OUTPATIENT
Start: 2023-06-20

## 2023-07-12 DIAGNOSIS — G50.0 TRIGEMINAL NEURALGIA: ICD-10-CM

## 2023-07-13 RX ORDER — TOPIRAMATE 50 MG/1
TABLET, FILM COATED ORAL
Qty: 90 TABLET | Refills: 0 | Status: SHIPPED | OUTPATIENT
Start: 2023-07-13

## 2023-07-17 ENCOUNTER — TELEPHONE (OUTPATIENT)
Facility: CLINIC | Age: 58
End: 2023-07-17

## 2023-07-17 DIAGNOSIS — I10 ESSENTIAL (PRIMARY) HYPERTENSION: Primary | ICD-10-CM

## 2023-07-17 NOTE — TELEPHONE ENCOUNTER
Requesting refill amLODIPine (NORVASC) 10 MG tablet   SENT TO [address] [place] [address], [address] [place] [address],  [phone] [phone] -

## 2023-07-18 DIAGNOSIS — I10 ESSENTIAL (PRIMARY) HYPERTENSION: Primary | ICD-10-CM

## 2023-07-18 DIAGNOSIS — G50.0 TRIGEMINAL NEURALGIA: ICD-10-CM

## 2023-07-18 RX ORDER — AMLODIPINE BESYLATE 10 MG/1
10 TABLET ORAL DAILY
Qty: 90 TABLET | Refills: 1 | Status: SHIPPED | OUTPATIENT
Start: 2023-07-18

## 2023-07-21 RX ORDER — LISINOPRIL AND HYDROCHLOROTHIAZIDE 20; 12.5 MG/1; MG/1
TABLET ORAL
Qty: 90 TABLET | Refills: 0 | Status: SHIPPED | OUTPATIENT
Start: 2023-07-21

## 2023-07-21 RX ORDER — TOPIRAMATE 50 MG/1
TABLET, FILM COATED ORAL
Qty: 90 TABLET | Refills: 0 | OUTPATIENT
Start: 2023-07-21

## 2023-07-21 RX ORDER — FLUTICASONE PROPIONATE 50 MCG
SPRAY, SUSPENSION (ML) NASAL
Qty: 16 G | Refills: 0 | OUTPATIENT
Start: 2023-07-21

## 2023-07-25 RX ORDER — ROSUVASTATIN CALCIUM 20 MG/1
TABLET, COATED ORAL
Qty: 90 TABLET | Refills: 0 | Status: SHIPPED | OUTPATIENT
Start: 2023-07-25

## 2023-07-27 ENCOUNTER — OFFICE VISIT (OUTPATIENT)
Facility: CLINIC | Age: 58
End: 2023-07-27
Payer: COMMERCIAL

## 2023-07-27 VITALS
SYSTOLIC BLOOD PRESSURE: 123 MMHG | DIASTOLIC BLOOD PRESSURE: 84 MMHG | HEART RATE: 69 BPM | OXYGEN SATURATION: 98 % | HEIGHT: 72 IN | BODY MASS INDEX: 26.95 KG/M2 | WEIGHT: 199 LBS | RESPIRATION RATE: 18 BRPM | TEMPERATURE: 98.5 F

## 2023-07-27 DIAGNOSIS — G25.81 RESTLESS LEGS SYNDROME: ICD-10-CM

## 2023-07-27 DIAGNOSIS — M72.2 PLANTAR FASCIITIS: ICD-10-CM

## 2023-07-27 DIAGNOSIS — I10 ESSENTIAL (PRIMARY) HYPERTENSION: ICD-10-CM

## 2023-07-27 DIAGNOSIS — R07.81 RIB PAIN ON LEFT SIDE: Primary | ICD-10-CM

## 2023-07-27 DIAGNOSIS — E11.40 TYPE 2 DIABETES MELLITUS WITH DIABETIC NEUROPATHY, WITHOUT LONG-TERM CURRENT USE OF INSULIN (HCC): ICD-10-CM

## 2023-07-27 DIAGNOSIS — E78.5 HYPERLIPIDEMIA, UNSPECIFIED HYPERLIPIDEMIA TYPE: ICD-10-CM

## 2023-07-27 DIAGNOSIS — Z12.5 SCREENING FOR MALIGNANT NEOPLASM OF PROSTATE: ICD-10-CM

## 2023-07-27 DIAGNOSIS — G50.0 TRIGEMINAL NEURALGIA: ICD-10-CM

## 2023-07-27 PROCEDURE — 3074F SYST BP LT 130 MM HG: CPT | Performed by: STUDENT IN AN ORGANIZED HEALTH CARE EDUCATION/TRAINING PROGRAM

## 2023-07-27 PROCEDURE — 3079F DIAST BP 80-89 MM HG: CPT | Performed by: STUDENT IN AN ORGANIZED HEALTH CARE EDUCATION/TRAINING PROGRAM

## 2023-07-27 PROCEDURE — 99214 OFFICE O/P EST MOD 30 MIN: CPT | Performed by: STUDENT IN AN ORGANIZED HEALTH CARE EDUCATION/TRAINING PROGRAM

## 2023-07-27 RX ORDER — GLIPIZIDE 10 MG/1
10 TABLET ORAL
Qty: 90 TABLET | Refills: 1 | Status: SHIPPED | OUTPATIENT
Start: 2023-07-27

## 2023-07-27 RX ORDER — GABAPENTIN 300 MG/1
300 CAPSULE ORAL 2 TIMES DAILY
Qty: 60 CAPSULE | Refills: 2 | Status: SHIPPED | OUTPATIENT
Start: 2023-07-27 | End: 2023-10-25

## 2023-07-27 RX ORDER — LIDOCAINE 50 MG/G
1 PATCH TOPICAL DAILY
Qty: 30 PATCH | Refills: 0 | Status: SHIPPED | OUTPATIENT
Start: 2023-07-27 | End: 2023-08-26

## 2023-07-27 SDOH — ECONOMIC STABILITY: INCOME INSECURITY: HOW HARD IS IT FOR YOU TO PAY FOR THE VERY BASICS LIKE FOOD, HOUSING, MEDICAL CARE, AND HEATING?: VERY HARD

## 2023-07-27 SDOH — ECONOMIC STABILITY: FOOD INSECURITY: WITHIN THE PAST 12 MONTHS, THE FOOD YOU BOUGHT JUST DIDN'T LAST AND YOU DIDN'T HAVE MONEY TO GET MORE.: OFTEN TRUE

## 2023-07-27 SDOH — ECONOMIC STABILITY: FOOD INSECURITY: WITHIN THE PAST 12 MONTHS, YOU WORRIED THAT YOUR FOOD WOULD RUN OUT BEFORE YOU GOT MONEY TO BUY MORE.: OFTEN TRUE

## 2023-07-27 ASSESSMENT — ENCOUNTER SYMPTOMS
VOMITING: 0
DIARRHEA: 0
ABDOMINAL PAIN: 0
RHINORRHEA: 0
BACK PAIN: 0
NAUSEA: 0
SHORTNESS OF BREATH: 0
CHEST TIGHTNESS: 0
CONSTIPATION: 0

## 2023-07-27 ASSESSMENT — ANXIETY QUESTIONNAIRES
4. TROUBLE RELAXING: 0
5. BEING SO RESTLESS THAT IT IS HARD TO SIT STILL: 0
IF YOU CHECKED OFF ANY PROBLEMS ON THIS QUESTIONNAIRE, HOW DIFFICULT HAVE THESE PROBLEMS MADE IT FOR YOU TO DO YOUR WORK, TAKE CARE OF THINGS AT HOME, OR GET ALONG WITH OTHER PEOPLE: NOT DIFFICULT AT ALL
2. NOT BEING ABLE TO STOP OR CONTROL WORRYING: 0
1. FEELING NERVOUS, ANXIOUS, OR ON EDGE: 0
7. FEELING AFRAID AS IF SOMETHING AWFUL MIGHT HAPPEN: 0
3. WORRYING TOO MUCH ABOUT DIFFERENT THINGS: 0
GAD7 TOTAL SCORE: 0
6. BECOMING EASILY ANNOYED OR IRRITABLE: 0

## 2023-07-27 ASSESSMENT — PATIENT HEALTH QUESTIONNAIRE - PHQ9
SUM OF ALL RESPONSES TO PHQ QUESTIONS 1-9: 0
2. FEELING DOWN, DEPRESSED OR HOPELESS: 0
1. LITTLE INTEREST OR PLEASURE IN DOING THINGS: 0
SUM OF ALL RESPONSES TO PHQ QUESTIONS 1-9: 0
SUM OF ALL RESPONSES TO PHQ9 QUESTIONS 1 & 2: 0

## 2023-07-27 NOTE — PROGRESS NOTES
07/27/23. Patient Care Team:  Patient Care Team:  Amina Crane MD as PCP - Krystle Torres MD as PCP - Empaneled Provider  Krysten Johnson MD as Surgeon  Nish Connelly MD as Physician  Dinesh Shirley MD as Consulting Physician      Assessment / Plan:    ICD-10-CM    1. Rib pain on left side  R07.81 lidocaine (LIDODERM) 5 %      2. Essential (primary) hypertension  I10 CBC with Auto Differential     Comprehensive Metabolic Panel      3. Trigeminal neuralgia  G50.0       4. Type 2 diabetes mellitus with diabetic neuropathy, without long-term current use of insulin (HCC)  E11.40 Microalbumin / Creatinine Urine Ratio     Hemoglobin A1C      5. Plantar fasciitis  M72.2       6. Screening for malignant neoplasm of prostate  Z12.5 PSA Screening      7. Hyperlipidemia, unspecified hyperlipidemia type  E78.5 Lipid Panel           HTN: Well-controlled on amlodipine and lisinopril-HCTZ. Check BMP-patient has had hypokalemia in the past.    T2DM: Controlled on metformin and glipizide. Denies any hypoglycemic events. Continue gabapentin for neuropathy. Patient is following with podiatry and ophthalmology. Check HbA1c and urine microalbumin. HLD: Controlled on Crestor. Repeat lipid panel. Trigeminal neuralgia: ContinueTopamax. Left-sided rib pain: Likely soft tissue injury, improving. Patient advised to take ibuprofen as needed. Will prescribe topical lidocaine patch. No significant tenderness noted on exam-will hold off imaging. Right heel pain: Likely due to plantar fasciitis: Advised home exercises    Patient reports he has received the shingles vaccine-advised to bring records with him at next visit. Return in about 6 months (around 1/27/2024). I asked the patient if he  had any questions and answered his  questions.   The patient stated that he understands the treatment plan and agrees with the treatment plan    This document was created with a voice activated dictation

## 2023-08-02 RX ORDER — FLUTICASONE PROPIONATE 50 MCG
SPRAY, SUSPENSION (ML) NASAL
Qty: 16 G | Refills: 0 | Status: SHIPPED | OUTPATIENT
Start: 2023-08-02

## 2023-08-07 ENCOUNTER — HOSPITAL ENCOUNTER (OUTPATIENT)
Facility: HOSPITAL | Age: 58
Setting detail: SPECIMEN
Discharge: HOME | End: 2023-08-10
Payer: COMMERCIAL

## 2023-08-07 ENCOUNTER — TELEPHONE (OUTPATIENT)
Facility: CLINIC | Age: 58
End: 2023-08-07

## 2023-08-07 DIAGNOSIS — Z12.5 SCREENING FOR MALIGNANT NEOPLASM OF PROSTATE: ICD-10-CM

## 2023-08-07 DIAGNOSIS — I10 ESSENTIAL (PRIMARY) HYPERTENSION: ICD-10-CM

## 2023-08-07 DIAGNOSIS — E78.5 HYPERLIPIDEMIA, UNSPECIFIED HYPERLIPIDEMIA TYPE: ICD-10-CM

## 2023-08-07 DIAGNOSIS — R07.81 RIB PAIN ON LEFT SIDE: Primary | ICD-10-CM

## 2023-08-07 DIAGNOSIS — E11.40 TYPE 2 DIABETES MELLITUS WITH DIABETIC NEUROPATHY, WITHOUT LONG-TERM CURRENT USE OF INSULIN (HCC): ICD-10-CM

## 2023-08-07 LAB
ALBUMIN SERPL-MCNC: 4.2 G/DL (ref 3.4–5)
ALBUMIN/GLOB SERPL: 1.3 (ref 0.8–1.7)
ALP SERPL-CCNC: 81 U/L (ref 45–117)
ALT SERPL-CCNC: 25 U/L (ref 16–61)
ANION GAP SERPL CALC-SCNC: 4 MMOL/L (ref 3–18)
AST SERPL-CCNC: 11 U/L (ref 10–38)
BASOPHILS # BLD: 0.1 K/UL (ref 0–0.1)
BASOPHILS NFR BLD: 1 % (ref 0–2)
BILIRUB SERPL-MCNC: 0.8 MG/DL (ref 0.2–1)
BUN SERPL-MCNC: 19 MG/DL (ref 7–18)
BUN/CREAT SERPL: 16 (ref 12–20)
CALCIUM SERPL-MCNC: 9.4 MG/DL (ref 8.5–10.1)
CHLORIDE SERPL-SCNC: 108 MMOL/L (ref 100–111)
CHOLEST SERPL-MCNC: 99 MG/DL
CO2 SERPL-SCNC: 29 MMOL/L (ref 21–32)
CREAT SERPL-MCNC: 1.18 MG/DL (ref 0.6–1.3)
CREAT UR-MCNC: 109 MG/DL (ref 30–125)
DIFFERENTIAL METHOD BLD: NORMAL
EOSINOPHIL # BLD: 0.4 K/UL (ref 0–0.4)
EOSINOPHIL NFR BLD: 5 % (ref 0–5)
ERYTHROCYTE [DISTWIDTH] IN BLOOD BY AUTOMATED COUNT: 13.2 % (ref 11.6–14.5)
EST. AVERAGE GLUCOSE BLD GHB EST-MCNC: 123 MG/DL
GLOBULIN SER CALC-MCNC: 3.3 G/DL (ref 2–4)
GLUCOSE SERPL-MCNC: 105 MG/DL (ref 74–99)
HBA1C MFR BLD: 5.9 % (ref 4.2–5.6)
HCT VFR BLD AUTO: 43.9 % (ref 36–48)
HDLC SERPL-MCNC: 32 MG/DL (ref 40–60)
HDLC SERPL: 3.1 (ref 0–5)
HGB BLD-MCNC: 14.5 G/DL (ref 13–16)
IMM GRANULOCYTES # BLD AUTO: 0 K/UL (ref 0–0.04)
IMM GRANULOCYTES NFR BLD AUTO: 0 % (ref 0–0.5)
LDLC SERPL CALC-MCNC: 28.6 MG/DL (ref 0–100)
LIPID PANEL: ABNORMAL
LYMPHOCYTES # BLD: 3.4 K/UL (ref 0.9–3.6)
LYMPHOCYTES NFR BLD: 39 % (ref 21–52)
MCH RBC QN AUTO: 29 PG (ref 24–34)
MCHC RBC AUTO-ENTMCNC: 33 G/DL (ref 31–37)
MCV RBC AUTO: 87.8 FL (ref 78–100)
MICROALBUMIN UR-MCNC: 1.37 MG/DL (ref 0–3)
MICROALBUMIN/CREAT UR-RTO: 13 MG/G (ref 0–30)
MONOCYTES # BLD: 0.8 K/UL (ref 0.05–1.2)
MONOCYTES NFR BLD: 9 % (ref 3–10)
NEUTS SEG # BLD: 4 K/UL (ref 1.8–8)
NEUTS SEG NFR BLD: 46 % (ref 40–73)
NRBC # BLD: 0 K/UL (ref 0–0.01)
NRBC BLD-RTO: 0 PER 100 WBC
PLATELET # BLD AUTO: 197 K/UL (ref 135–420)
PMV BLD AUTO: 10.3 FL (ref 9.2–11.8)
POTASSIUM SERPL-SCNC: 3.1 MMOL/L (ref 3.5–5.5)
PROT SERPL-MCNC: 7.5 G/DL (ref 6.4–8.2)
PSA SERPL-MCNC: 2.8 NG/ML (ref 0–4)
RBC # BLD AUTO: 5 M/UL (ref 4.35–5.65)
SODIUM SERPL-SCNC: 141 MMOL/L (ref 136–145)
TRIGL SERPL-MCNC: 192 MG/DL
VLDLC SERPL CALC-MCNC: 38.4 MG/DL
WBC # BLD AUTO: 8.7 K/UL (ref 4.6–13.2)

## 2023-08-07 PROCEDURE — 36415 COLL VENOUS BLD VENIPUNCTURE: CPT

## 2023-08-07 PROCEDURE — G0103 PSA SCREENING: HCPCS

## 2023-08-07 PROCEDURE — 83036 HEMOGLOBIN GLYCOSYLATED A1C: CPT

## 2023-08-07 PROCEDURE — 82043 UR ALBUMIN QUANTITATIVE: CPT

## 2023-08-07 PROCEDURE — 80053 COMPREHEN METABOLIC PANEL: CPT

## 2023-08-07 PROCEDURE — 80061 LIPID PANEL: CPT

## 2023-08-07 PROCEDURE — 85025 COMPLETE CBC W/AUTO DIFF WBC: CPT

## 2023-08-07 PROCEDURE — 82570 ASSAY OF URINE CREATININE: CPT

## 2023-08-07 NOTE — TELEPHONE ENCOUNTER
Patient came in office stating he is still having left side pain after taking his prescribed LIDOCAINE and is requesting XRAY be ordered. Please advise and follow up.

## 2023-08-08 ENCOUNTER — TELEPHONE (OUTPATIENT)
Facility: CLINIC | Age: 58
End: 2023-08-08

## 2023-08-08 DIAGNOSIS — E87.6 HYPOKALEMIA: Primary | ICD-10-CM

## 2023-08-08 RX ORDER — POTASSIUM CHLORIDE 20 MEQ/1
20 TABLET, EXTENDED RELEASE ORAL DAILY
Qty: 90 TABLET | Refills: 1 | Status: SHIPPED | OUTPATIENT
Start: 2023-08-08

## 2023-08-08 NOTE — TELEPHONE ENCOUNTER
Provided information to pt below:    Please inform patient that his potassium level was low at 3.1. I have prescribed potassium supplements for him to take once daily.     Pt has been advised as well as advised of orders being placed for [facility]

## 2023-08-08 NOTE — TELEPHONE ENCOUNTER
Please inform patient I have ordered the x-ray. He can go to [facility] to get it done.

## 2023-08-08 NOTE — TELEPHONE ENCOUNTER
Please inform patient that his potassium level was low at 3.1. I have prescribed potassium supplements for him to take once daily.

## 2023-08-09 ENCOUNTER — HOSPITAL ENCOUNTER (OUTPATIENT)
Facility: HOSPITAL | Age: 58
Discharge: HOME | End: 2023-08-12
Payer: COMMERCIAL

## 2023-08-09 DIAGNOSIS — R07.81 RIB PAIN ON LEFT SIDE: ICD-10-CM

## 2023-08-09 PROCEDURE — 71100 X-RAY EXAM RIBS UNI 2 VIEWS: CPT

## 2023-08-15 DIAGNOSIS — G50.0 TRIGEMINAL NEURALGIA: ICD-10-CM

## 2023-08-15 DIAGNOSIS — G25.81 RESTLESS LEGS SYNDROME: ICD-10-CM

## 2023-08-17 RX ORDER — TOPIRAMATE 50 MG/1
TABLET, FILM COATED ORAL
Qty: 90 TABLET | Refills: 0 | Status: SHIPPED | OUTPATIENT
Start: 2023-08-17

## 2023-08-17 RX ORDER — CYCLOBENZAPRINE HCL 5 MG
TABLET ORAL
Qty: 30 TABLET | Refills: 0 | Status: SHIPPED | OUTPATIENT
Start: 2023-08-17

## 2023-09-11 DIAGNOSIS — G25.81 RESTLESS LEGS SYNDROME: ICD-10-CM

## 2023-09-15 RX ORDER — CYCLOBENZAPRINE HCL 5 MG
TABLET ORAL
Qty: 30 TABLET | Refills: 0 | OUTPATIENT
Start: 2023-09-15

## 2023-09-18 DIAGNOSIS — G50.0 TRIGEMINAL NEURALGIA: ICD-10-CM

## 2023-09-19 RX ORDER — TOPIRAMATE 50 MG/1
TABLET, FILM COATED ORAL
Qty: 90 TABLET | Refills: 3 | Status: SHIPPED | OUTPATIENT
Start: 2023-09-19

## 2023-09-28 DIAGNOSIS — G25.81 RESTLESS LEGS SYNDROME: ICD-10-CM

## 2023-09-28 RX ORDER — CYCLOBENZAPRINE HCL 5 MG
TABLET ORAL
Qty: 30 TABLET | Refills: 0 | Status: SHIPPED | OUTPATIENT
Start: 2023-09-28

## 2023-09-28 NOTE — TELEPHONE ENCOUNTER
Requested Prescriptions     Signed Prescriptions Disp Refills    topiramate (TOPAMAX) 50 MG tablet 90 tablet 3     Sig: TAKE 1 TABLET BY MOUTH IN THE MORNING AND 2 AT BEDTIME     Authorizing Provider: Army Hoff

## 2023-10-12 RX ORDER — FLUTICASONE PROPIONATE 50 MCG
SPRAY, SUSPENSION (ML) NASAL
Qty: 16 G | Refills: 0 | Status: SHIPPED | OUTPATIENT
Start: 2023-10-12

## 2023-10-31 DIAGNOSIS — E78.5 HYPERLIPIDEMIA, UNSPECIFIED HYPERLIPIDEMIA TYPE: Primary | ICD-10-CM

## 2023-11-01 RX ORDER — ROSUVASTATIN CALCIUM 20 MG/1
TABLET, COATED ORAL
Qty: 90 TABLET | Refills: 1 | Status: SHIPPED | OUTPATIENT
Start: 2023-11-01

## 2023-11-25 DIAGNOSIS — G25.81 RESTLESS LEGS SYNDROME: ICD-10-CM

## 2023-11-25 DIAGNOSIS — E11.40 TYPE 2 DIABETES MELLITUS WITH DIABETIC NEUROPATHY, WITHOUT LONG-TERM CURRENT USE OF INSULIN (HCC): ICD-10-CM

## 2023-11-28 DIAGNOSIS — G25.81 RESTLESS LEGS SYNDROME: ICD-10-CM

## 2023-11-28 DIAGNOSIS — E11.40 TYPE 2 DIABETES MELLITUS WITH DIABETIC NEUROPATHY, WITHOUT LONG-TERM CURRENT USE OF INSULIN (HCC): ICD-10-CM

## 2023-11-29 RX ORDER — GABAPENTIN 300 MG/1
CAPSULE ORAL
Qty: 60 CAPSULE | Refills: 2 | Status: SHIPPED | OUTPATIENT
Start: 2023-11-29 | End: 2024-02-27

## 2023-11-29 RX ORDER — GABAPENTIN 300 MG/1
300 CAPSULE ORAL 2 TIMES DAILY
Qty: 60 CAPSULE | Refills: 2 | OUTPATIENT
Start: 2023-11-29 | End: 2024-02-27

## 2023-11-29 RX ORDER — FLUTICASONE PROPIONATE 50 MCG
2 SPRAY, SUSPENSION (ML) NASAL DAILY PRN
Qty: 16 G | Refills: 0 | Status: SHIPPED | OUTPATIENT
Start: 2023-11-29

## 2024-02-05 DIAGNOSIS — I10 ESSENTIAL (PRIMARY) HYPERTENSION: ICD-10-CM

## 2024-02-05 DIAGNOSIS — G50.0 TRIGEMINAL NEURALGIA: ICD-10-CM

## 2024-02-05 DIAGNOSIS — E87.6 HYPOKALEMIA: ICD-10-CM

## 2024-02-07 ENCOUNTER — OFFICE VISIT (OUTPATIENT)
Facility: CLINIC | Age: 59
End: 2024-02-07
Payer: COMMERCIAL

## 2024-02-07 VITALS
DIASTOLIC BLOOD PRESSURE: 75 MMHG | SYSTOLIC BLOOD PRESSURE: 121 MMHG | HEIGHT: 72 IN | HEART RATE: 65 BPM | OXYGEN SATURATION: 95 % | BODY MASS INDEX: 26.68 KG/M2 | TEMPERATURE: 98.2 F | RESPIRATION RATE: 16 BRPM | WEIGHT: 197 LBS

## 2024-02-07 DIAGNOSIS — J30.2 SEASONAL ALLERGIC RHINITIS, UNSPECIFIED TRIGGER: ICD-10-CM

## 2024-02-07 DIAGNOSIS — B97.89 VIRAL SINUSITIS: ICD-10-CM

## 2024-02-07 DIAGNOSIS — J32.9 VIRAL SINUSITIS: ICD-10-CM

## 2024-02-07 DIAGNOSIS — I10 ESSENTIAL (PRIMARY) HYPERTENSION: Primary | ICD-10-CM

## 2024-02-07 DIAGNOSIS — Z12.5 SCREENING FOR MALIGNANT NEOPLASM OF PROSTATE: ICD-10-CM

## 2024-02-07 DIAGNOSIS — E87.6 HYPOKALEMIA: ICD-10-CM

## 2024-02-07 DIAGNOSIS — G50.0 TRIGEMINAL NEURALGIA: ICD-10-CM

## 2024-02-07 DIAGNOSIS — Z11.59 NEED FOR HEPATITIS B SCREENING TEST: ICD-10-CM

## 2024-02-07 DIAGNOSIS — E11.40 TYPE 2 DIABETES MELLITUS WITH DIABETIC NEUROPATHY, WITHOUT LONG-TERM CURRENT USE OF INSULIN (HCC): ICD-10-CM

## 2024-02-07 DIAGNOSIS — E78.5 HYPERLIPIDEMIA, UNSPECIFIED HYPERLIPIDEMIA TYPE: ICD-10-CM

## 2024-02-07 LAB — HBA1C MFR BLD: 6 %

## 2024-02-07 PROCEDURE — 99214 OFFICE O/P EST MOD 30 MIN: CPT | Performed by: STUDENT IN AN ORGANIZED HEALTH CARE EDUCATION/TRAINING PROGRAM

## 2024-02-07 PROCEDURE — 3078F DIAST BP <80 MM HG: CPT | Performed by: STUDENT IN AN ORGANIZED HEALTH CARE EDUCATION/TRAINING PROGRAM

## 2024-02-07 PROCEDURE — 83036 HEMOGLOBIN GLYCOSYLATED A1C: CPT | Performed by: STUDENT IN AN ORGANIZED HEALTH CARE EDUCATION/TRAINING PROGRAM

## 2024-02-07 PROCEDURE — 3074F SYST BP LT 130 MM HG: CPT | Performed by: STUDENT IN AN ORGANIZED HEALTH CARE EDUCATION/TRAINING PROGRAM

## 2024-02-07 RX ORDER — BENZONATATE 100 MG/1
100 CAPSULE ORAL 3 TIMES DAILY PRN
Qty: 30 CAPSULE | Refills: 0 | Status: SHIPPED | OUTPATIENT
Start: 2024-02-07 | End: 2024-02-17

## 2024-02-07 RX ORDER — TOPIRAMATE 50 MG/1
TABLET, FILM COATED ORAL
Qty: 90 TABLET | Refills: 0 | OUTPATIENT
Start: 2024-02-07

## 2024-02-07 RX ORDER — POTASSIUM CHLORIDE 20 MEQ/1
20 TABLET, EXTENDED RELEASE ORAL DAILY
Qty: 90 TABLET | Refills: 1 | Status: SHIPPED | OUTPATIENT
Start: 2024-02-07

## 2024-02-07 RX ORDER — POTASSIUM CHLORIDE 20 MEQ/1
20 TABLET, EXTENDED RELEASE ORAL DAILY
Qty: 90 TABLET | Refills: 0 | OUTPATIENT
Start: 2024-02-07

## 2024-02-07 RX ORDER — TOPIRAMATE 50 MG/1
TABLET, FILM COATED ORAL
Qty: 90 TABLET | Refills: 3 | Status: SHIPPED | OUTPATIENT
Start: 2024-02-07

## 2024-02-07 RX ORDER — FLUTICASONE PROPIONATE 50 MCG
2 SPRAY, SUSPENSION (ML) NASAL DAILY PRN
Qty: 16 G | Refills: 0 | Status: SHIPPED | OUTPATIENT
Start: 2024-02-07

## 2024-02-07 RX ORDER — AMLODIPINE BESYLATE 10 MG/1
10 TABLET ORAL DAILY
Qty: 90 TABLET | Refills: 1 | Status: SHIPPED | OUTPATIENT
Start: 2024-02-07

## 2024-02-07 RX ORDER — ROSUVASTATIN CALCIUM 20 MG/1
20 TABLET, COATED ORAL NIGHTLY
Qty: 90 TABLET | Refills: 1 | Status: SHIPPED | OUTPATIENT
Start: 2024-02-07

## 2024-02-07 RX ORDER — AMLODIPINE BESYLATE 10 MG/1
10 TABLET ORAL DAILY
Qty: 90 TABLET | Refills: 0 | OUTPATIENT
Start: 2024-02-07

## 2024-02-07 SDOH — ECONOMIC STABILITY: FOOD INSECURITY: WITHIN THE PAST 12 MONTHS, YOU WORRIED THAT YOUR FOOD WOULD RUN OUT BEFORE YOU GOT MONEY TO BUY MORE.: NEVER TRUE

## 2024-02-07 SDOH — ECONOMIC STABILITY: FOOD INSECURITY: WITHIN THE PAST 12 MONTHS, THE FOOD YOU BOUGHT JUST DIDN'T LAST AND YOU DIDN'T HAVE MONEY TO GET MORE.: NEVER TRUE

## 2024-02-07 SDOH — ECONOMIC STABILITY: INCOME INSECURITY: HOW HARD IS IT FOR YOU TO PAY FOR THE VERY BASICS LIKE FOOD, HOUSING, MEDICAL CARE, AND HEATING?: NOT HARD AT ALL

## 2024-02-07 ASSESSMENT — PATIENT HEALTH QUESTIONNAIRE - PHQ9
1. LITTLE INTEREST OR PLEASURE IN DOING THINGS: 0
SUM OF ALL RESPONSES TO PHQ9 QUESTIONS 1 & 2: 0
SUM OF ALL RESPONSES TO PHQ QUESTIONS 1-9: 0
SUM OF ALL RESPONSES TO PHQ QUESTIONS 1-9: 0
2. FEELING DOWN, DEPRESSED OR HOPELESS: 0
SUM OF ALL RESPONSES TO PHQ QUESTIONS 1-9: 0
SUM OF ALL RESPONSES TO PHQ QUESTIONS 1-9: 0

## 2024-02-07 ASSESSMENT — ENCOUNTER SYMPTOMS
VOMITING: 0
ABDOMINAL PAIN: 0
BACK PAIN: 0
DIARRHEA: 0
NAUSEA: 0
CHEST TIGHTNESS: 0
CONSTIPATION: 0
SHORTNESS OF BREATH: 0
RHINORRHEA: 0

## 2024-02-07 ASSESSMENT — ANXIETY QUESTIONNAIRES
IF YOU CHECKED OFF ANY PROBLEMS ON THIS QUESTIONNAIRE, HOW DIFFICULT HAVE THESE PROBLEMS MADE IT FOR YOU TO DO YOUR WORK, TAKE CARE OF THINGS AT HOME, OR GET ALONG WITH OTHER PEOPLE: NOT DIFFICULT AT ALL
4. TROUBLE RELAXING: 0
7. FEELING AFRAID AS IF SOMETHING AWFUL MIGHT HAPPEN: 0
6. BECOMING EASILY ANNOYED OR IRRITABLE: 0
3. WORRYING TOO MUCH ABOUT DIFFERENT THINGS: 0
GAD7 TOTAL SCORE: 0
5. BEING SO RESTLESS THAT IT IS HARD TO SIT STILL: 0
1. FEELING NERVOUS, ANXIOUS, OR ON EDGE: 0
2. NOT BEING ABLE TO STOP OR CONTROL WORRYING: 0

## 2024-02-07 NOTE — PROGRESS NOTES
Edmundo Eller is a 59 y.o. male presenting today for Follow-up (Congestion and cold)  .     Chief Complaint   Patient presents with    Follow-up     Congestion and cold       HPI:  Edmundo Eller presents to the office today for follow up.    Patient has a past medical history of HTN, HLD, T2DM with neuropathy, JESIKA.    Is compliant with his medications.    HTN: Patient is on amlodipine and lisinopril-HCTZ.  BP is well controlled.  Noted to have hypokalemia with potassium 3.1 in 8/23    HLD: Patient is on Crestor.  LDL was 28.6 in 8/23    T2DM: Patient denies any polyuria, polydipsia.  Last HbA1c was 5.9% in 8/23 patient is taking Metformin and Glipizide.  Patient has a history of neuropathy.  He is taking  gabapentin for it.  Patient noticed onychomycosis in his right big toenail for which he used topical treatments and then cut off a portion of his toenail - he was referred to podiatry     Patient has been following with neurology due to right-sided headaches and facial pain.  He was started on Topamax for possible trigeminal neuralgia with improvement.    He is also complaining of pain in the heel of the right foot worse on walking.    Review of Systems   Constitutional:  Negative for activity change, appetite change, fatigue and fever.   HENT:  Negative for congestion, ear pain, postnasal drip and rhinorrhea.    Respiratory:  Negative for chest tightness and shortness of breath.    Cardiovascular:  Positive for chest pain. Negative for palpitations and leg swelling.   Gastrointestinal:  Negative for abdominal pain, constipation, diarrhea, nausea and vomiting.   Endocrine: Negative for cold intolerance, heat intolerance, polydipsia, polyphagia and polyuria.   Genitourinary:  Negative for decreased urine volume, difficulty urinating, dysuria, enuresis, frequency and urgency.   Musculoskeletal:  Positive for gait problem. Negative for arthralgias, back pain and neck pain.   Neurological:  Positive for

## 2024-02-07 NOTE — PROGRESS NOTES
\"Have you been to the ER, urgent care clinic since your last visit?  Hospitalized since your last visit?\"    NO    “Have you seen or consulted any other health care providers outside of [facility] since your last visit?”    NO

## 2024-02-19 DIAGNOSIS — G25.81 RESTLESS LEGS SYNDROME: ICD-10-CM

## 2024-02-21 RX ORDER — CYCLOBENZAPRINE HCL 5 MG
TABLET ORAL
Qty: 30 TABLET | Refills: 0 | Status: SHIPPED | OUTPATIENT
Start: 2024-02-21

## 2024-03-11 DIAGNOSIS — E11.40 TYPE 2 DIABETES MELLITUS WITH DIABETIC NEUROPATHY, WITHOUT LONG-TERM CURRENT USE OF INSULIN (HCC): ICD-10-CM

## 2024-03-11 DIAGNOSIS — G25.81 RESTLESS LEGS SYNDROME: ICD-10-CM

## 2024-03-14 RX ORDER — GABAPENTIN 300 MG/1
CAPSULE ORAL
Qty: 60 CAPSULE | Refills: 2 | Status: SHIPPED | OUTPATIENT
Start: 2024-03-14 | End: 2024-06-12

## 2024-03-18 DIAGNOSIS — G25.81 RESTLESS LEGS SYNDROME: ICD-10-CM

## 2024-03-20 RX ORDER — CYCLOBENZAPRINE HCL 5 MG
TABLET ORAL
Qty: 30 TABLET | Refills: 3 | Status: SHIPPED | OUTPATIENT
Start: 2024-03-20

## 2024-04-01 DIAGNOSIS — E11.40 TYPE 2 DIABETES MELLITUS WITH DIABETIC NEUROPATHY, WITHOUT LONG-TERM CURRENT USE OF INSULIN (HCC): ICD-10-CM

## 2024-04-08 RX ORDER — GLIPIZIDE 10 MG/1
10 TABLET ORAL
Qty: 90 TABLET | Refills: 0 | Status: SHIPPED | OUTPATIENT
Start: 2024-04-08

## 2024-05-14 DIAGNOSIS — I10 ESSENTIAL (PRIMARY) HYPERTENSION: ICD-10-CM

## 2024-05-15 RX ORDER — LISINOPRIL AND HYDROCHLOROTHIAZIDE 20; 12.5 MG/1; MG/1
TABLET ORAL
Qty: 90 TABLET | Refills: 0 | Status: SHIPPED | OUTPATIENT
Start: 2024-05-15

## 2024-06-01 DIAGNOSIS — J30.2 SEASONAL ALLERGIC RHINITIS, UNSPECIFIED TRIGGER: ICD-10-CM

## 2024-06-04 RX ORDER — FLUTICASONE PROPIONATE 50 MCG
SPRAY, SUSPENSION (ML) NASAL
Qty: 16 G | Refills: 0 | Status: SHIPPED | OUTPATIENT
Start: 2024-06-04

## 2024-06-11 DIAGNOSIS — G50.0 TRIGEMINAL NEURALGIA: ICD-10-CM

## 2024-06-11 DIAGNOSIS — G25.81 RESTLESS LEGS SYNDROME: ICD-10-CM

## 2024-06-11 DIAGNOSIS — E11.40 TYPE 2 DIABETES MELLITUS WITH DIABETIC NEUROPATHY, WITHOUT LONG-TERM CURRENT USE OF INSULIN (HCC): ICD-10-CM

## 2024-06-14 RX ORDER — GABAPENTIN 300 MG/1
CAPSULE ORAL
Qty: 60 CAPSULE | Refills: 2 | Status: SHIPPED | OUTPATIENT
Start: 2024-06-14 | End: 2024-09-12

## 2024-06-14 RX ORDER — TOPIRAMATE 50 MG/1
TABLET, FILM COATED ORAL
Qty: 90 TABLET | Refills: 2 | Status: SHIPPED | OUTPATIENT
Start: 2024-06-14

## 2024-07-02 DIAGNOSIS — E11.40 TYPE 2 DIABETES MELLITUS WITH DIABETIC NEUROPATHY, WITHOUT LONG-TERM CURRENT USE OF INSULIN (HCC): ICD-10-CM

## 2024-07-05 RX ORDER — GLIPIZIDE 10 MG/1
10 TABLET ORAL
Qty: 90 TABLET | Refills: 0 | Status: SHIPPED | OUTPATIENT
Start: 2024-07-05

## 2024-07-12 DIAGNOSIS — J30.2 SEASONAL ALLERGIC RHINITIS, UNSPECIFIED TRIGGER: ICD-10-CM

## 2024-07-12 RX ORDER — FLUTICASONE PROPIONATE 50 MCG
SPRAY, SUSPENSION (ML) NASAL
Qty: 16 G | Refills: 1 | Status: SHIPPED | OUTPATIENT
Start: 2024-07-12

## 2024-08-06 DIAGNOSIS — G25.81 RESTLESS LEGS SYNDROME: ICD-10-CM

## 2024-08-06 DIAGNOSIS — I10 ESSENTIAL (PRIMARY) HYPERTENSION: ICD-10-CM

## 2024-08-07 ENCOUNTER — OFFICE VISIT (OUTPATIENT)
Facility: CLINIC | Age: 59
End: 2024-08-07
Payer: COMMERCIAL

## 2024-08-07 VITALS
RESPIRATION RATE: 16 BRPM | OXYGEN SATURATION: 97 % | HEART RATE: 67 BPM | DIASTOLIC BLOOD PRESSURE: 83 MMHG | HEIGHT: 72 IN | TEMPERATURE: 98.4 F | WEIGHT: 204 LBS | BODY MASS INDEX: 27.63 KG/M2 | SYSTOLIC BLOOD PRESSURE: 125 MMHG

## 2024-08-07 DIAGNOSIS — E87.6 HYPOKALEMIA: ICD-10-CM

## 2024-08-07 DIAGNOSIS — G89.29 CHRONIC RIGHT-SIDED LOW BACK PAIN WITHOUT SCIATICA: ICD-10-CM

## 2024-08-07 DIAGNOSIS — E11.40 TYPE 2 DIABETES MELLITUS WITH DIABETIC NEUROPATHY, WITHOUT LONG-TERM CURRENT USE OF INSULIN (HCC): ICD-10-CM

## 2024-08-07 DIAGNOSIS — I10 ESSENTIAL (PRIMARY) HYPERTENSION: ICD-10-CM

## 2024-08-07 DIAGNOSIS — E78.5 HYPERLIPIDEMIA, UNSPECIFIED HYPERLIPIDEMIA TYPE: ICD-10-CM

## 2024-08-07 DIAGNOSIS — M54.50 CHRONIC RIGHT-SIDED LOW BACK PAIN WITHOUT SCIATICA: ICD-10-CM

## 2024-08-07 DIAGNOSIS — M25.551 RIGHT HIP PAIN: Primary | ICD-10-CM

## 2024-08-07 DIAGNOSIS — G50.0 TRIGEMINAL NEURALGIA: ICD-10-CM

## 2024-08-07 LAB — HBA1C MFR BLD: 6.7 %

## 2024-08-07 PROCEDURE — 3074F SYST BP LT 130 MM HG: CPT | Performed by: STUDENT IN AN ORGANIZED HEALTH CARE EDUCATION/TRAINING PROGRAM

## 2024-08-07 PROCEDURE — 83036 HEMOGLOBIN GLYCOSYLATED A1C: CPT | Performed by: STUDENT IN AN ORGANIZED HEALTH CARE EDUCATION/TRAINING PROGRAM

## 2024-08-07 PROCEDURE — 3079F DIAST BP 80-89 MM HG: CPT | Performed by: STUDENT IN AN ORGANIZED HEALTH CARE EDUCATION/TRAINING PROGRAM

## 2024-08-07 PROCEDURE — 99214 OFFICE O/P EST MOD 30 MIN: CPT | Performed by: STUDENT IN AN ORGANIZED HEALTH CARE EDUCATION/TRAINING PROGRAM

## 2024-08-07 RX ORDER — MELOXICAM 15 MG/1
15 TABLET ORAL DAILY
Qty: 30 TABLET | Refills: 0 | Status: SHIPPED | OUTPATIENT
Start: 2024-08-07

## 2024-08-07 SDOH — ECONOMIC STABILITY: FOOD INSECURITY: WITHIN THE PAST 12 MONTHS, YOU WORRIED THAT YOUR FOOD WOULD RUN OUT BEFORE YOU GOT MONEY TO BUY MORE.: NEVER TRUE

## 2024-08-07 SDOH — ECONOMIC STABILITY: INCOME INSECURITY: HOW HARD IS IT FOR YOU TO PAY FOR THE VERY BASICS LIKE FOOD, HOUSING, MEDICAL CARE, AND HEATING?: NOT HARD AT ALL

## 2024-08-07 ASSESSMENT — PATIENT HEALTH QUESTIONNAIRE - PHQ9
SUM OF ALL RESPONSES TO PHQ QUESTIONS 1-9: 0
SUM OF ALL RESPONSES TO PHQ QUESTIONS 1-9: 0
SUM OF ALL RESPONSES TO PHQ9 QUESTIONS 1 & 2: 0
2. FEELING DOWN, DEPRESSED OR HOPELESS: NOT AT ALL
SUM OF ALL RESPONSES TO PHQ QUESTIONS 1-9: 0
SUM OF ALL RESPONSES TO PHQ QUESTIONS 1-9: 0
1. LITTLE INTEREST OR PLEASURE IN DOING THINGS: NOT AT ALL

## 2024-08-07 ASSESSMENT — ANXIETY QUESTIONNAIRES
5. BEING SO RESTLESS THAT IT IS HARD TO SIT STILL: NOT AT ALL
2. NOT BEING ABLE TO STOP OR CONTROL WORRYING: NOT AT ALL
GAD7 TOTAL SCORE: 0
1. FEELING NERVOUS, ANXIOUS, OR ON EDGE: NOT AT ALL
6. BECOMING EASILY ANNOYED OR IRRITABLE: NOT AT ALL
3. WORRYING TOO MUCH ABOUT DIFFERENT THINGS: NOT AT ALL
4. TROUBLE RELAXING: NOT AT ALL
7. FEELING AFRAID AS IF SOMETHING AWFUL MIGHT HAPPEN: NOT AT ALL

## 2024-08-07 ASSESSMENT — ENCOUNTER SYMPTOMS
CONSTIPATION: 0
ABDOMINAL PAIN: 0
DIARRHEA: 0
CHEST TIGHTNESS: 0
VOMITING: 0
BACK PAIN: 1
NAUSEA: 0
SHORTNESS OF BREATH: 0
RHINORRHEA: 0

## 2024-08-07 NOTE — PROGRESS NOTES
\"Have you been to the ER, urgent care clinic since your last visit?  Hospitalized since your last visit?\"    NO    “Have you seen or consulted any other health care providers outside of [facility] since your last visit?”    NO            Click Here for Release of Records Request   
amLODIPine (NORVASC) 10 MG tablet Take 1 tablet by mouth daily 90 tablet 1    metFORMIN (GLUCOPHAGE) 1000 MG tablet Take 1 tablet by mouth daily 90 tablet 1    rosuvastatin (CRESTOR) 20 MG tablet Take 1 tablet by mouth nightly 90 tablet 1    potassium chloride (KLOR-CON M) 20 MEQ extended release tablet Take 1 tablet by mouth daily 90 tablet 1    Lancets MISC Take BG level AC/HS       No current facility-administered medications for this visit.       /83 (Site: Right Upper Arm, Position: Sitting, Cuff Size: Small Adult)   Pulse 67   Temp 98.4 °F (36.9 °C) (Temporal)   Resp 16   Ht 1.829 m (6')   Wt 92.5 kg (204 lb)   SpO2 97%   BMI 27.67 kg/m²      Physical Exam  Vitals and nursing note reviewed.   Constitutional:       General: He is not in acute distress.     Appearance: Normal appearance. He is not ill-appearing, toxic-appearing or diaphoretic.   HENT:      Head: Normocephalic and atraumatic.   Eyes:      General: No scleral icterus.     Extraocular Movements: Extraocular movements intact.      Conjunctiva/sclera: Conjunctivae normal.      Pupils: Pupils are equal, round, and reactive to light.   Cardiovascular:      Rate and Rhythm: Normal rate.      Pulses: Normal pulses.      Heart sounds: Normal heart sounds. No murmur heard.  Pulmonary:      Effort: Pulmonary effort is normal. No respiratory distress.      Breath sounds: Normal breath sounds. No wheezing or rales.   Musculoskeletal:      Cervical back: Normal range of motion and neck supple.      Right lower leg: No edema.      Left lower leg: No edema.      Comments: Straight leg test negative bilaterally.  [place] test positive on right   Skin:     General: Skin is warm and dry.   Neurological:      General: No focal deficit present.      Mental Status: He is alert and oriented to person, place, and time. Mental status is at baseline.      Cranial Nerves: No cranial nerve deficit.      Motor: No weakness.      Gait: Gait normal.   Psychiatric:

## 2024-08-08 RX ORDER — AMLODIPINE BESYLATE 10 MG/1
10 TABLET ORAL DAILY
Qty: 90 TABLET | Refills: 0 | Status: SHIPPED | OUTPATIENT
Start: 2024-08-08

## 2024-08-08 RX ORDER — CYCLOBENZAPRINE HCL 5 MG
TABLET ORAL
Qty: 30 TABLET | Refills: 0 | Status: SHIPPED | OUTPATIENT
Start: 2024-08-08

## 2024-08-12 ENCOUNTER — HOSPITAL ENCOUNTER (OUTPATIENT)
Facility: HOSPITAL | Age: 59
Discharge: HOME | End: 2024-08-15
Payer: COMMERCIAL

## 2024-08-12 DIAGNOSIS — Z11.59 NEED FOR HEPATITIS B SCREENING TEST: ICD-10-CM

## 2024-08-12 DIAGNOSIS — M54.50 CHRONIC RIGHT-SIDED LOW BACK PAIN WITHOUT SCIATICA: ICD-10-CM

## 2024-08-12 DIAGNOSIS — Z12.5 SCREENING FOR MALIGNANT NEOPLASM OF PROSTATE: ICD-10-CM

## 2024-08-12 DIAGNOSIS — I10 ESSENTIAL (PRIMARY) HYPERTENSION: ICD-10-CM

## 2024-08-12 DIAGNOSIS — M25.551 RIGHT HIP PAIN: ICD-10-CM

## 2024-08-12 DIAGNOSIS — E11.40 TYPE 2 DIABETES MELLITUS WITH DIABETIC NEUROPATHY, WITHOUT LONG-TERM CURRENT USE OF INSULIN (HCC): ICD-10-CM

## 2024-08-12 DIAGNOSIS — G89.29 CHRONIC RIGHT-SIDED LOW BACK PAIN WITHOUT SCIATICA: ICD-10-CM

## 2024-08-12 DIAGNOSIS — E78.5 HYPERLIPIDEMIA, UNSPECIFIED HYPERLIPIDEMIA TYPE: ICD-10-CM

## 2024-08-12 LAB
ALBUMIN SERPL-MCNC: 4.3 G/DL (ref 3.4–5)
ALBUMIN/GLOB SERPL: 1.4 (ref 0.8–1.7)
ALP SERPL-CCNC: 71 U/L (ref 45–117)
ALT SERPL-CCNC: 28 U/L (ref 16–61)
ANION GAP SERPL CALC-SCNC: 8 MMOL/L (ref 3–18)
AST SERPL-CCNC: 16 U/L (ref 10–38)
BASOPHILS # BLD: 0.1 K/UL (ref 0–0.1)
BASOPHILS NFR BLD: 2 % (ref 0–2)
BILIRUB SERPL-MCNC: 1 MG/DL (ref 0.2–1)
BUN SERPL-MCNC: 25 MG/DL (ref 7–18)
BUN/CREAT SERPL: 19 (ref 12–20)
CALCIUM SERPL-MCNC: 8.7 MG/DL (ref 8.5–10.1)
CHLORIDE SERPL-SCNC: 107 MMOL/L (ref 100–111)
CHOLEST SERPL-MCNC: 105 MG/DL
CO2 SERPL-SCNC: 27 MMOL/L (ref 21–32)
CREAT SERPL-MCNC: 1.35 MG/DL (ref 0.6–1.3)
CREAT UR-MCNC: 161 MG/DL (ref 30–125)
DIFFERENTIAL METHOD BLD: NORMAL
EOSINOPHIL # BLD: 0.3 K/UL (ref 0–0.4)
EOSINOPHIL NFR BLD: 4 % (ref 0–5)
ERYTHROCYTE [DISTWIDTH] IN BLOOD BY AUTOMATED COUNT: 12.6 % (ref 11.6–14.5)
EST. AVERAGE GLUCOSE BLD GHB EST-MCNC: 140 MG/DL
GLOBULIN SER CALC-MCNC: 3.1 G/DL (ref 2–4)
GLUCOSE SERPL-MCNC: 141 MG/DL (ref 74–99)
HBA1C MFR BLD: 6.5 % (ref 4.2–5.6)
HCT VFR BLD AUTO: 40.5 % (ref 36–48)
HDLC SERPL-MCNC: 27 MG/DL (ref 40–60)
HDLC SERPL: 3.9 (ref 0–5)
HGB BLD-MCNC: 14 G/DL (ref 13–16)
IMM GRANULOCYTES # BLD AUTO: 0 K/UL (ref 0–0.04)
IMM GRANULOCYTES NFR BLD AUTO: 0 % (ref 0–0.5)
LDLC SERPL CALC-MCNC: 29.8 MG/DL (ref 0–100)
LIPID PANEL: ABNORMAL
LYMPHOCYTES # BLD: 2.9 K/UL (ref 0.9–3.6)
LYMPHOCYTES NFR BLD: 39 % (ref 21–52)
MCH RBC QN AUTO: 29.7 PG (ref 24–34)
MCHC RBC AUTO-ENTMCNC: 34.6 G/DL (ref 31–37)
MCV RBC AUTO: 86 FL (ref 78–100)
MICROALBUMIN UR-MCNC: 1.55 MG/DL (ref 0–3)
MICROALBUMIN/CREAT UR-RTO: 10 MG/G (ref 0–30)
MONOCYTES # BLD: 0.6 K/UL (ref 0.05–1.2)
MONOCYTES NFR BLD: 8 % (ref 3–10)
NEUTS SEG # BLD: 3.5 K/UL (ref 1.8–8)
NEUTS SEG NFR BLD: 48 % (ref 40–73)
NRBC # BLD: 0 K/UL (ref 0–0.01)
NRBC BLD-RTO: 0 PER 100 WBC
PLATELET # BLD AUTO: 201 K/UL (ref 135–420)
PMV BLD AUTO: 10.2 FL (ref 9.2–11.8)
POTASSIUM SERPL-SCNC: 3.1 MMOL/L (ref 3.5–5.5)
PROT SERPL-MCNC: 7.4 G/DL (ref 6.4–8.2)
PSA SERPL-MCNC: 2.7 NG/ML (ref 0–4)
RBC # BLD AUTO: 4.71 M/UL (ref 4.35–5.65)
SODIUM SERPL-SCNC: 142 MMOL/L (ref 136–145)
TRIGL SERPL-MCNC: 241 MG/DL
VLDLC SERPL CALC-MCNC: 48.2 MG/DL
WBC # BLD AUTO: 7.4 K/UL (ref 4.6–13.2)

## 2024-08-12 PROCEDURE — 83036 HEMOGLOBIN GLYCOSYLATED A1C: CPT

## 2024-08-12 PROCEDURE — 87340 HEPATITIS B SURFACE AG IA: CPT

## 2024-08-12 PROCEDURE — 85025 COMPLETE CBC W/AUTO DIFF WBC: CPT

## 2024-08-12 PROCEDURE — 73502 X-RAY EXAM HIP UNI 2-3 VIEWS: CPT

## 2024-08-12 PROCEDURE — 80053 COMPREHEN METABOLIC PANEL: CPT

## 2024-08-12 PROCEDURE — 82570 ASSAY OF URINE CREATININE: CPT

## 2024-08-12 PROCEDURE — G0103 PSA SCREENING: HCPCS

## 2024-08-12 PROCEDURE — 86706 HEP B SURFACE ANTIBODY: CPT

## 2024-08-12 PROCEDURE — 80061 LIPID PANEL: CPT

## 2024-08-12 PROCEDURE — 72100 X-RAY EXAM L-S SPINE 2/3 VWS: CPT

## 2024-08-12 PROCEDURE — 82043 UR ALBUMIN QUANTITATIVE: CPT

## 2024-08-12 PROCEDURE — 36415 COLL VENOUS BLD VENIPUNCTURE: CPT

## 2024-08-13 DIAGNOSIS — I10 ESSENTIAL (PRIMARY) HYPERTENSION: ICD-10-CM

## 2024-08-13 LAB
HBV SURFACE AB SER QL IA: NEGATIVE
HBV SURFACE AB SERPL IA-ACNC: <3.1 MIU/ML
HBV SURFACE AG SER QL: <0.1 INDEX
HBV SURFACE AG SER QL: NEGATIVE
HEP BS AB COMMENT: ABNORMAL

## 2024-08-14 RX ORDER — LISINOPRIL AND HYDROCHLOROTHIAZIDE 20; 12.5 MG/1; MG/1
TABLET ORAL
Qty: 90 TABLET | Refills: 0 | Status: SHIPPED | OUTPATIENT
Start: 2024-08-14

## 2024-09-09 DIAGNOSIS — G25.81 RESTLESS LEGS SYNDROME: ICD-10-CM

## 2024-09-13 RX ORDER — CYCLOBENZAPRINE HCL 5 MG
TABLET ORAL
Qty: 30 TABLET | Refills: 3 | Status: SHIPPED | OUTPATIENT
Start: 2024-09-13

## 2024-09-17 DIAGNOSIS — E11.40 TYPE 2 DIABETES MELLITUS WITH DIABETIC NEUROPATHY, WITHOUT LONG-TERM CURRENT USE OF INSULIN (HCC): ICD-10-CM

## 2024-09-24 DIAGNOSIS — E11.40 TYPE 2 DIABETES MELLITUS WITH DIABETIC NEUROPATHY, WITHOUT LONG-TERM CURRENT USE OF INSULIN (HCC): ICD-10-CM

## 2024-09-24 DIAGNOSIS — G25.81 RESTLESS LEGS SYNDROME: ICD-10-CM

## 2024-09-24 DIAGNOSIS — G50.0 TRIGEMINAL NEURALGIA: ICD-10-CM

## 2024-09-25 RX ORDER — GABAPENTIN 300 MG/1
CAPSULE ORAL
Qty: 60 CAPSULE | Refills: 2 | Status: SHIPPED | OUTPATIENT
Start: 2024-09-25 | End: 2024-12-24

## 2024-09-25 RX ORDER — TOPIRAMATE 50 MG/1
TABLET, FILM COATED ORAL
Qty: 90 TABLET | Refills: 0 | Status: SHIPPED | OUTPATIENT
Start: 2024-09-25

## 2024-10-08 DIAGNOSIS — E11.40 TYPE 2 DIABETES MELLITUS WITH DIABETIC NEUROPATHY, WITHOUT LONG-TERM CURRENT USE OF INSULIN (HCC): ICD-10-CM

## 2024-10-09 RX ORDER — GLIPIZIDE 10 MG/1
10 TABLET ORAL
Qty: 90 TABLET | Refills: 0 | Status: SHIPPED | OUTPATIENT
Start: 2024-10-09

## 2024-10-30 DIAGNOSIS — G50.0 TRIGEMINAL NEURALGIA: ICD-10-CM

## 2024-10-30 RX ORDER — TOPIRAMATE 50 MG/1
TABLET, FILM COATED ORAL
Qty: 90 TABLET | Refills: 0 | Status: SHIPPED | OUTPATIENT
Start: 2024-10-30

## 2024-11-11 DIAGNOSIS — I10 ESSENTIAL (PRIMARY) HYPERTENSION: ICD-10-CM

## 2024-11-12 RX ORDER — AMLODIPINE BESYLATE 10 MG/1
10 TABLET ORAL DAILY
Qty: 90 TABLET | Refills: 0 | Status: SHIPPED | OUTPATIENT
Start: 2024-11-12

## 2024-11-19 DIAGNOSIS — I10 ESSENTIAL (PRIMARY) HYPERTENSION: ICD-10-CM

## 2024-11-19 RX ORDER — LISINOPRIL AND HYDROCHLOROTHIAZIDE 12.5; 2 MG/1; MG/1
TABLET ORAL
Qty: 90 TABLET | Refills: 0 | Status: SHIPPED | OUTPATIENT
Start: 2024-11-19

## 2024-12-04 DIAGNOSIS — G50.0 TRIGEMINAL NEURALGIA: ICD-10-CM

## 2024-12-05 RX ORDER — TOPIRAMATE 50 MG/1
TABLET, FILM COATED ORAL
Qty: 90 TABLET | Refills: 0 | Status: SHIPPED | OUTPATIENT
Start: 2024-12-05

## 2024-12-17 DIAGNOSIS — E11.40 TYPE 2 DIABETES MELLITUS WITH DIABETIC NEUROPATHY, WITHOUT LONG-TERM CURRENT USE OF INSULIN (HCC): ICD-10-CM

## 2024-12-17 DIAGNOSIS — E78.5 HYPERLIPIDEMIA, UNSPECIFIED HYPERLIPIDEMIA TYPE: ICD-10-CM

## 2024-12-18 RX ORDER — ROSUVASTATIN CALCIUM 20 MG/1
20 TABLET, COATED ORAL NIGHTLY
Qty: 90 TABLET | Refills: 0 | Status: SHIPPED | OUTPATIENT
Start: 2024-12-18

## 2024-12-31 DIAGNOSIS — G25.81 RESTLESS LEGS SYNDROME: ICD-10-CM

## 2024-12-31 DIAGNOSIS — E11.40 TYPE 2 DIABETES MELLITUS WITH DIABETIC NEUROPATHY, WITHOUT LONG-TERM CURRENT USE OF INSULIN (HCC): ICD-10-CM

## 2025-01-02 RX ORDER — GABAPENTIN 300 MG/1
CAPSULE ORAL
Qty: 60 CAPSULE | Refills: 3 | Status: SHIPPED | OUTPATIENT
Start: 2025-01-02 | End: 2025-05-02

## 2025-01-07 NOTE — TELEPHONE ENCOUNTER
Medication: Lovaza 1gm, dose: 2 caps, how often: qd, current number of medication days provided: 90, refill per application. Lot #: [id number], EXP 02/2018. This medication was received and verified for the following 1. Correct Patient, 2. Correct Diagnosis, 3. Correct Drug, 4. Correct route, and no current allergy to medication. Medication: Kombiglyze 2.5/1000, dose: 2 tab, how often: qd , current number of medication days provided: 90, refill per application. Lot #: [id number], EXP 12/2017. This medication was received and verified for the following 1. Correct Patient, 2. Correct Diagnosis, 3. Correct Drug, 4. Correct route, and no current allergy to medication. Please contact patient to come  their medications.      Heber Rodriguez, MSN,RN,[facility] no redness/no swelling/no discharge no redness/no swelling/no discharge

## 2025-01-08 DIAGNOSIS — G50.0 TRIGEMINAL NEURALGIA: ICD-10-CM

## 2025-01-08 DIAGNOSIS — E11.40 TYPE 2 DIABETES MELLITUS WITH DIABETIC NEUROPATHY, WITHOUT LONG-TERM CURRENT USE OF INSULIN (HCC): ICD-10-CM

## 2025-01-09 RX ORDER — GLIPIZIDE 10 MG/1
10 TABLET ORAL
Qty: 90 TABLET | Refills: 0 | Status: SHIPPED | OUTPATIENT
Start: 2025-01-09

## 2025-01-09 RX ORDER — TOPIRAMATE 50 MG/1
TABLET, FILM COATED ORAL
Qty: 90 TABLET | Refills: 0 | Status: SHIPPED | OUTPATIENT
Start: 2025-01-09

## 2025-01-28 DIAGNOSIS — G25.81 RESTLESS LEGS SYNDROME: ICD-10-CM

## 2025-01-29 RX ORDER — CYCLOBENZAPRINE HCL 5 MG
TABLET ORAL
Qty: 30 TABLET | Refills: 0 | Status: SHIPPED | OUTPATIENT
Start: 2025-01-29

## 2025-02-11 DIAGNOSIS — G50.0 TRIGEMINAL NEURALGIA: ICD-10-CM

## 2025-02-12 RX ORDER — TOPIRAMATE 50 MG/1
TABLET, FILM COATED ORAL
Qty: 90 TABLET | Refills: 0 | Status: SHIPPED | OUTPATIENT
Start: 2025-02-12

## 2025-02-17 NOTE — MR AVS SNAPSHOT
Visit Information Date & Time Provider Department Dept. Phone Encounter #  
 4/24/2017  1:30 PM David Dai NP [address] [facility] [id number] Follow-up Instructions Return in about 3 months (around 7/24/2017). Upcoming Health Maintenance Date Due  
 EYE EXAM RETINAL OR DILATED Q1 1/2/1976 DTaP/Tdap/Td series (1 - Tdap) 11/24/2011 FOBT Q 1 YEAR AGE 50-75 1/2/2016 Pneumococcal 19-64 Medium Risk (1 of 1 - PPSV23) 1/23/2018* HEMOGLOBIN A1C Q6M 10/18/2017 FOOT EXAM Q1 10/20/2017 MICROALBUMIN Q1 1/11/2018 LIPID PANEL Q1 4/18/2018 *Topic was postponed. The date shown is not the original due date. Allergies as of 4/24/2017  Review Complete On: 4/24/2017 By: Donis Almaraz No Known Allergies Current Immunizations  Reviewed on 10/22/2015 Name Date Influenza Vaccine Sunny Nnamdi) 10/12/2016, 10/22/2015 Td 11/23/2011 Not reviewed this visit You Were Diagnosed With   
  
 Codes Comments BMI 31.0-31.9,adult    -  Primary ICD-10-CM: Z68.31 
ICD-9-CM: V85.31 Type 2 diabetes mellitus without complication, unspecified long term insulin use status     ICD-10-CM: E11.9 ICD-9-CM: 250.00 Essential hypertension     ICD-10-CM: I10 
ICD-9-CM: 401.9 Hypertriglyceridemia     ICD-10-CM: E78.1 ICD-9-CM: 272.1 Routine health maintenance     ICD-10-CM: Z00.00 ICD-9-CM: V70.0 Seasonal allergic rhinitis, unspecified allergic rhinitis trigger     ICD-10-CM: J30.2 ICD-9-CM: 477.9 Vitals BP Pulse Temp Resp Height(growth percentile) Weight(growth percentile) 126/87 75 98.6 °F (37 °C) 16 5' 11\" (1.803 m) 223 lb (101.2 kg) SpO2 BMI Smoking Status 96% 31.1 kg/m2 Former Smoker BMI and BSA Data Body Mass Index Body Surface Area  
 31.1 kg/m 2 2.25 m 2 Preferred Pharmacy Pharmacy Name Phone [facility] [address] [phone] Your Updated Medication List  
  
   
This list is accurate as of: 4/24/17  2:16 PM.  Always use your most recent med list.  
  
  
  
  
 * dapagliflozin 10 mg Tab Commonly known as:  [employer] Take 1 Tab by mouth daily (with breakfast). Indications: type 2 diabetes mellitus * dapagliflozin 10 mg Tab Commonly known as:  [employer] Take 1 Tab by mouth daily (with breakfast). Indications: type 2 diabetes mellitus  
  
 desloratadine 5 mg tablet Commonly known as:  CLARINEX Take 1 Tab by mouth daily. lisinopril-hydroCHLOROthiazide 20-12.5 mg per tablet Commonly known as:  Rosemarie Nation Take 1 Tab by mouth daily. Indications: HYPERTENSION  
  
 loratadine 10 mg tablet Commonly known as:  [employer] Take 1 Tab by mouth daily. Indications: ALLERGIC RHINITIS  
  
 meloxicam 15 mg tablet Commonly known as:  MOBIC  
TAKE ONE TABLET BY MOUTH ONCE DAILY * mometasone 50 mcg/actuation nasal spray Commonly known as:  NASONEX  
2 Sprays by Both Nostrils route daily. Indications: ALLERGIC RHINITIS * mometasone 50 mcg/actuation nasal spray Commonly known as:  NASONEX  
2 Sprays by Both Nostrils route daily. multivitamin tablet Commonly known as:  ONE A DAY Take 1 Tab by mouth daily. omega-3 acid ethyl esters 1 gram capsule Commonly known as:  Coleman Clarendon Take 2 Caps by mouth daily (with breakfast). pitavastatin 2 mg tablet Commonly known as:  LIVALO Take 1 Tab by mouth daily. To start once Crestor complete. potassium 99 mg tablet Take 1 Tab by mouth daily. pregabalin 75 mg capsule Commonly known as:  [place] [place] Take 1 Cap by mouth two (2) times a day. Max Daily Amount: 150 mg. sAXagliptin-metFORMIN 2.5-1,000 mg Tm24 Commonly known as:  KOMBIGLYZE XR Take 2 Tabs by mouth daily (with dinner). * Notice: This list has 4 medication(s) that are the same as other medications prescribed for you. Read the directions carefully, and ask your doctor or other care provider to review them with you. Prescriptions Printed Refills  
 dapagliflozin (FARXIGA) 10 mg tab 3 Sig: Take 1 Tab by mouth daily (with breakfast). Indications: type 2 diabetes mellitus Class: Program  
 Route: Oral  
 mometasone (NASONEX) 50 mcg/actuation nasal spray 0 Si Sprays by Both Nostrils route daily. Indications: ALLERGIC RHINITIS Class: Program  
 Route: Both Nostrils  
 desloratadine (CLARINEX) 5 mg tablet 3 Sig: Take 1 Tab by mouth daily. Class: Program  
 Route: Oral  
  
Prescriptions Sent to Mail Order Refills  
 dapagliflozin (FARXIGA) 10 mg tab 3 Sig: Take 1 Tab by mouth daily (with breakfast). Indications: type 2 diabetes mellitus Class: Program  
 Pharmacy: [address], [address] [facility]. Ph #: [phone] Route: Oral  
 mometasone (NASONEX) 50 mcg/actuation nasal spray 0 Si Sprays by Both Nostrils route daily. Indications: ALLERGIC RHINITIS Class: Program  
 Pharmacy: [address], [address] [facility]. Ph #: [phone] Route: Both Nostrils  
 desloratadine (CLARINEX) 5 mg tablet 3 Sig: Take 1 Tab by mouth daily. Class: Program  
 Pharmacy: [address], [address] [facility]. Ph #: [phone] Route: Oral  
  
Prescriptions Sent to Pharmacy Refills  
 dapagliflozin (FARXIGA) 10 mg tab 3 Sig: Take 1 Tab by mouth daily (with breakfast). Indications: type 2 diabetes mellitus Class: Program  
 Pharmacy: [address], [address] [facility]. Ph #: [phone] Route: Oral  
 mometasone (NASONEX) 50 mcg/actuation nasal spray 0 Si Sprays by Both Nostrils route daily. Indications: ALLERGIC RHINITIS  Class: Program  
 Pharmacy: [facility] [address] Ph #: [phone] Route: Both Nostrils  
 desloratadine (CLARINEX) 5 mg tablet 3 Sig: Take 1 Tab by mouth daily. Class: Program  
 Pharmacy: [facility] [address], [address] Ph #: [phone] Route: Oral  
  
Follow-up Instructions Return in about 3 months (around 7/24/2017). Patient Instructions Learning About Diabetes Food Guidelines Your Care Instructions Meal planning is important to manage diabetes. It helps keep your blood sugar at a target level (which you set with your doctor). You don't have to eat special foods. You can eat what your family eats, including sweets once in a while. But you do have to pay attention to how often you eat and how much you eat of certain foods. You may want to work with a dietitian or a certified diabetes educator (CDE) to help you plan meals and snacks. A dietitian or CDE can also help you lose weight if that is one of your goals. What should you know about eating carbs? Managing the amount of carbohydrate (carbs) you eat is an important part of healthy meals when you have diabetes. Carbohydrate is found in many foods. · Learn which foods have carbs. And learn the amounts of carbs in different foods. ¨ Bread, cereal, pasta, and rice have about 15 grams of carbs in a serving. A serving is 1 slice of bread (1 ounce), ½ cup of cooked cereal, or 1/3 cup of cooked pasta or rice. ¨ Fruits have 15 grams of carbs in a serving. A serving is 1 small fresh fruit, such as an apple or orange; ½ of a banana; ½ cup of cooked or canned fruit; ½ cup of fruit juice; 1 cup of melon or raspberries; or 2 tablespoons of dried fruit. ¨ Milk and no-sugar-added yogurt have 15 grams of carbs in a serving. A serving is 1 cup of milk or 2/3 cup of no-sugar-added yogurt. ¨ Starchy vegetables have 15 grams of carbs in a serving.  A serving is ½ cup of mashed potatoes or sweet potato; 1 cup winter squash; ½ of a small baked potato; ½ cup of cooked beans; or ½ cup cooked corn or green peas. · Learn how much carbs to eat each day and at each meal. A dietitian or CDE can teach you how to keep track of the amount of carbs you eat. This is called carbohydrate counting. · If you are not sure how to count carbohydrate grams, use the Plate Method to plan meals. It is a good, quick way to make sure that you have a balanced meal. It also helps you spread carbs throughout the day. ¨ Divide your plate by types of foods. Put non-starchy vegetables on half the plate, meat or other protein food on one-quarter of the plate, and a grain or starchy vegetable in the final quarter of the plate. To this you can add a small piece of fruit and 1 cup of milk or yogurt, depending on how many carbs you are supposed to eat at a meal. 
· Try to eat about the same amount of carbs at each meal. Do not \"save up\" your daily allowance of carbs to eat at one meal. 
· Proteins have very little or no carbs per serving. Examples of proteins are beef, chicken, turkey, fish, eggs, tofu, cheese, cottage cheese, and peanut butter. A serving size of meat is 3 ounces, which is about the size of a deck of cards. Examples of meat substitute serving sizes (equal to 1 ounce of meat) are 1/4 cup of cottage cheese, 1 egg, 1 tablespoon of peanut butter, and ½ cup of tofu. How can you eat out and still eat healthy? · Learn to estimate the serving sizes of foods that have carbohydrate. If you measure food at home, it will be easier to estimate the amount in a serving of restaurant food. · If the meal you order has too much carbohydrate (such as potatoes, corn, or baked beans), ask to have a low-carbohydrate food instead. Ask for a salad or green vegetables. · If you use insulin, check your blood sugar before and after eating out to help you plan how much to eat in the future. · If you eat more carbohydrate at a meal than you had planned, take a walk or do other exercise. This will help lower your blood sugar. What else should you know? · Limit saturated fat, such as the fat from meat and dairy products. This is a healthy choice because people who have diabetes are at higher risk of heart disease. So choose lean cuts of meat and nonfat or low-fat dairy products. Use olive or canola oil instead of butter or shortening when cooking. · Don't skip meals. Your blood sugar may drop too low if you skip meals and take insulin or certain medicines for diabetes. · Check with your doctor before you drink alcohol. Alcohol can cause your blood sugar to drop too low. Alcohol can also cause a bad reaction if you take certain diabetes medicines. Follow-up care is a key part of your treatment and safety. Be sure to make and go to all appointments, and call your doctor if you are having problems. It's also a good idea to know your test results and keep a list of the medicines you take. Where can you learn more? Go to http://todd-brayden.info/. Enter Y661 in the search box to learn more about \"Learning About Diabetes Food Guidelines. \" Current as of: May 23, 2016 Content Version: 11.2 © 9696-1097 [employer]. Care instructions adapted under license by [employer] (which disclaims liability or warranty for this information). If you have questions about a medical condition or this instruction, always ask your healthcare professional. [place] [address] any warranty or liability for your use of this information. Dapagliflozin (By mouth) Dapagliflozin (bnp-v-vbl-FLOE-zin) Treats type 2 diabetes. Brand Name(s):Farxiga There may be other brand names for this medicine. When This Medicine Should Not Be Used: This medicine is not right for everyone. Do not use it if you had an allergic reaction to dapagliflozin. How to Use This Medicine:  
Tablet · Take your medicine as directed. Your dose may need to be changed several times to find what works best for you. This medicine is usually taken in the morning. · This medicine should come with a Medication Guide. Ask your pharmacist for a copy if you do not have one. · Missed dose: Take a dose as soon as you remember. If it is almost time for your next dose, wait until then and take a regular dose. Do not take extra medicine to make up for a missed dose. · Store the medicine in a closed container at room temperature, away from heat, moisture, and direct light. Drugs and Foods to Avoid: Ask your doctor or pharmacist before using any other medicine, including over-the-counter medicines, vitamins, and herbal products. · Some medicines can affect how dapagliflozin works. Tell your doctor if you are using any of the following: ¨ Diuretic (water pill) ¨ Insulin or other diabetes medicine Warnings While Using This Medicine: · Tell your doctor if you are pregnant or breastfeeding, or if you have kidney disease, liver disease, high cholesterol, or a history of pancreas problems, genital yeast or urinary tract infections, or bladder cancer. Tell your doctor if you are on a low-salt diet or if you drink alcohol. · This medicine may cause the following problems: 
¨ Low blood pressure ¨ Ketoacidosis (high ketones and acid in the blood) ¨ Increased risk of genital yeast or urinary tract infections ¨ Low blood sugar levels · Tell any doctor or dentist who treats you that you are using this medicine. This medicine may affect certain medical test results. This medicine may affect the results of urine glucose tests. · Your doctor will do lab tests at regular visits to check on the effects of this medicine. Keep all appointments. · Keep all medicine out of the reach of children. Never share your medicine with anyone. Possible Side Effects While Using This Medicine: Call your doctor right away if you notice any of these side effects: · Allergic reaction: Itching or hives, swelling in your face or hands, swelling or tingling in your mouth or throat, chest tightness, trouble breathing · Change in how much or how often you urinate, bloody urine, painful or difficult urination, lower back or side pain, fever, chills · Increased hunger, headache, confusion, shaking, trembling, sweating · Lightheadedness, dizziness, fainting · Trouble breathing, tiredness, stomach pain, nausea, vomiting If you notice these less serious side effects, talk with your doctor: · Redness, itching, pain, or swelling of the penis, bad-smelling discharge from the penis · White or yellow vaginal discharge, vaginal itching or odor If you notice other side effects that you think are caused by this medicine, tell your doctor. Call your doctor for medical advice about side effects. You may report side effects to FDA at 1-383-FDA-9454 © 2016 [address] is for End User's use only and may not be sold, redistributed or otherwise used for commercial purposes. The above information is an  only. It is not intended as medical advice for individual conditions or treatments. Talk to your doctor, nurse or pharmacist before following any medical regimen to see if it is safe and effective for you. Please provide this summary of care documentation to your next provider. Your primary care clinician is listed as [facility]. If you have any questions after today's visit, please call [phone]. 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 Discussed treatment options: cryo vs. field therapy (PDT vs. Efudex)

## 2025-02-19 DIAGNOSIS — I10 ESSENTIAL (PRIMARY) HYPERTENSION: ICD-10-CM

## 2025-02-21 ENCOUNTER — TELEPHONE (OUTPATIENT)
Facility: CLINIC | Age: 60
End: 2025-02-21

## 2025-02-21 RX ORDER — AMLODIPINE BESYLATE 10 MG/1
10 TABLET ORAL DAILY
Qty: 90 TABLET | Refills: 0 | Status: SHIPPED | OUTPATIENT
Start: 2025-02-21

## 2025-03-01 DIAGNOSIS — I10 ESSENTIAL (PRIMARY) HYPERTENSION: ICD-10-CM

## 2025-03-05 DIAGNOSIS — G25.81 RESTLESS LEGS SYNDROME: ICD-10-CM

## 2025-03-05 RX ORDER — LISINOPRIL AND HYDROCHLOROTHIAZIDE 12.5; 2 MG/1; MG/1
TABLET ORAL
Qty: 90 TABLET | Refills: 0 | Status: SHIPPED | OUTPATIENT
Start: 2025-03-05

## 2025-03-05 RX ORDER — CYCLOBENZAPRINE HCL 5 MG
TABLET ORAL
Qty: 30 TABLET | Refills: 0 | Status: SHIPPED | OUTPATIENT
Start: 2025-03-05

## 2025-03-21 ENCOUNTER — OFFICE VISIT (OUTPATIENT)
Facility: CLINIC | Age: 60
End: 2025-03-21

## 2025-03-21 VITALS
SYSTOLIC BLOOD PRESSURE: 114 MMHG | HEIGHT: 72 IN | OXYGEN SATURATION: 97 % | DIASTOLIC BLOOD PRESSURE: 78 MMHG | WEIGHT: 206 LBS | BODY MASS INDEX: 27.9 KG/M2 | HEART RATE: 64 BPM

## 2025-03-21 DIAGNOSIS — I10 ESSENTIAL (PRIMARY) HYPERTENSION: ICD-10-CM

## 2025-03-21 DIAGNOSIS — E78.5 HYPERLIPIDEMIA, UNSPECIFIED HYPERLIPIDEMIA TYPE: ICD-10-CM

## 2025-03-21 DIAGNOSIS — M54.50 CHRONIC RIGHT-SIDED LOW BACK PAIN WITHOUT SCIATICA: ICD-10-CM

## 2025-03-21 DIAGNOSIS — E11.40 TYPE 2 DIABETES MELLITUS WITH DIABETIC NEUROPATHY, WITHOUT LONG-TERM CURRENT USE OF INSULIN (HCC): ICD-10-CM

## 2025-03-21 DIAGNOSIS — M25.551 RIGHT HIP PAIN: ICD-10-CM

## 2025-03-21 DIAGNOSIS — G89.29 CHRONIC RIGHT-SIDED LOW BACK PAIN WITHOUT SCIATICA: ICD-10-CM

## 2025-03-21 DIAGNOSIS — M54.12 CERVICAL RADICULOPATHY: Primary | ICD-10-CM

## 2025-03-21 DIAGNOSIS — G50.0 TRIGEMINAL NEURALGIA: ICD-10-CM

## 2025-03-21 DIAGNOSIS — E87.6 HYPOKALEMIA: ICD-10-CM

## 2025-03-21 LAB — HBA1C MFR BLD: 7.2 %

## 2025-03-21 RX ORDER — MELOXICAM 15 MG/1
15 TABLET ORAL DAILY
Qty: 30 TABLET | Refills: 1 | Status: SHIPPED | OUTPATIENT
Start: 2025-03-21

## 2025-03-21 RX ORDER — ROSUVASTATIN CALCIUM 20 MG/1
20 TABLET, COATED ORAL NIGHTLY
Qty: 90 TABLET | Refills: 1 | Status: SHIPPED | OUTPATIENT
Start: 2025-03-21

## 2025-03-21 RX ORDER — POTASSIUM CHLORIDE 1500 MG/1
20 TABLET, EXTENDED RELEASE ORAL DAILY
Qty: 90 TABLET | Refills: 1 | Status: SHIPPED | OUTPATIENT
Start: 2025-03-21

## 2025-03-21 RX ORDER — METHYLPREDNISOLONE 4 MG/1
TABLET ORAL
Qty: 1 KIT | Refills: 0 | Status: SHIPPED | OUTPATIENT
Start: 2025-03-21 | End: 2025-03-27

## 2025-03-21 SDOH — ECONOMIC STABILITY: FOOD INSECURITY: WITHIN THE PAST 12 MONTHS, YOU WORRIED THAT YOUR FOOD WOULD RUN OUT BEFORE YOU GOT MONEY TO BUY MORE.: NEVER TRUE

## 2025-03-21 SDOH — ECONOMIC STABILITY: FOOD INSECURITY: WITHIN THE PAST 12 MONTHS, THE FOOD YOU BOUGHT JUST DIDN'T LAST AND YOU DIDN'T HAVE MONEY TO GET MORE.: NEVER TRUE

## 2025-03-21 ASSESSMENT — PATIENT HEALTH QUESTIONNAIRE - PHQ9
SUM OF ALL RESPONSES TO PHQ QUESTIONS 1-9: 0
2. FEELING DOWN, DEPRESSED OR HOPELESS: NOT AT ALL
SUM OF ALL RESPONSES TO PHQ QUESTIONS 1-9: 0
1. LITTLE INTEREST OR PLEASURE IN DOING THINGS: NOT AT ALL

## 2025-03-21 ASSESSMENT — ENCOUNTER SYMPTOMS
BACK PAIN: 1
CHEST TIGHTNESS: 0
SHORTNESS OF BREATH: 0
DIARRHEA: 0
NAUSEA: 0
VOMITING: 0
RHINORRHEA: 0
ABDOMINAL PAIN: 0
CONSTIPATION: 0

## 2025-03-21 NOTE — PROGRESS NOTES
low-carb diet.  Denies any hypoglycemic events.  Continue gabapentin for neuropathy.  A1c today is 7.2%    HLD: Controlled on Crestor.  Repeat lipid panel pending.    Trigeminal neuralgia: Continue Topamax.  Stable.    Patient reports he has received the shingles vaccine.    Labs ordered    Return in about 4 months (around 7/21/2025).     I asked the patient if he  had any questions and answered his  questions.  The patient stated that he understands the treatment plan and agrees with the treatment plan    This document was created with a voice activated dictation system and may contain transcription errors.

## 2025-03-26 DIAGNOSIS — G50.0 TRIGEMINAL NEURALGIA: ICD-10-CM

## 2025-03-26 RX ORDER — TOPIRAMATE 50 MG/1
TABLET, FILM COATED ORAL
Qty: 90 TABLET | Refills: 0 | Status: SHIPPED | OUTPATIENT
Start: 2025-03-26

## 2025-04-08 DIAGNOSIS — G25.81 RESTLESS LEGS SYNDROME: ICD-10-CM

## 2025-04-08 DIAGNOSIS — E11.40 TYPE 2 DIABETES MELLITUS WITH DIABETIC NEUROPATHY, WITHOUT LONG-TERM CURRENT USE OF INSULIN (HCC): ICD-10-CM

## 2025-04-10 RX ORDER — CYCLOBENZAPRINE HCL 5 MG
5 TABLET ORAL NIGHTLY
Qty: 30 TABLET | Refills: 0 | Status: SHIPPED | OUTPATIENT
Start: 2025-04-10

## 2025-04-10 RX ORDER — GLIPIZIDE 10 MG/1
10 TABLET ORAL
Qty: 90 TABLET | Refills: 0 | Status: SHIPPED | OUTPATIENT
Start: 2025-04-10

## 2025-04-16 DIAGNOSIS — E11.40 TYPE 2 DIABETES MELLITUS WITH DIABETIC NEUROPATHY, WITHOUT LONG-TERM CURRENT USE OF INSULIN (HCC): ICD-10-CM

## 2025-04-16 RX ORDER — GLIPIZIDE 10 MG/1
10 TABLET ORAL
Qty: 90 TABLET | Refills: 0 | Status: SHIPPED | OUTPATIENT
Start: 2025-04-16

## 2025-04-22 DIAGNOSIS — G50.0 TRIGEMINAL NEURALGIA: ICD-10-CM

## 2025-04-25 RX ORDER — TOPIRAMATE 50 MG/1
TABLET, FILM COATED ORAL
Qty: 90 TABLET | Refills: 4 | Status: SHIPPED | OUTPATIENT
Start: 2025-04-25

## 2025-05-13 DIAGNOSIS — E11.40 TYPE 2 DIABETES MELLITUS WITH DIABETIC NEUROPATHY, WITHOUT LONG-TERM CURRENT USE OF INSULIN (HCC): ICD-10-CM

## 2025-05-13 DIAGNOSIS — G25.81 RESTLESS LEGS SYNDROME: ICD-10-CM

## 2025-05-16 RX ORDER — CYCLOBENZAPRINE HCL 5 MG
5 TABLET ORAL NIGHTLY
Qty: 30 TABLET | Refills: 2 | Status: SHIPPED | OUTPATIENT
Start: 2025-05-16

## 2025-05-16 RX ORDER — GABAPENTIN 300 MG/1
CAPSULE ORAL
Qty: 60 CAPSULE | Refills: 3 | Status: SHIPPED | OUTPATIENT
Start: 2025-05-16 | End: 2025-09-13

## 2025-05-20 DIAGNOSIS — I10 ESSENTIAL (PRIMARY) HYPERTENSION: ICD-10-CM

## 2025-05-21 RX ORDER — AMLODIPINE BESYLATE 10 MG/1
10 TABLET ORAL DAILY
Qty: 90 TABLET | Refills: 0 | Status: SHIPPED | OUTPATIENT
Start: 2025-05-21

## 2025-05-27 DIAGNOSIS — M25.551 RIGHT HIP PAIN: ICD-10-CM

## 2025-05-27 DIAGNOSIS — M54.12 CERVICAL RADICULOPATHY: ICD-10-CM

## 2025-05-27 RX ORDER — MELOXICAM 15 MG/1
15 TABLET ORAL DAILY
Qty: 30 TABLET | Refills: 2 | Status: SHIPPED | OUTPATIENT
Start: 2025-05-27

## 2025-06-05 DIAGNOSIS — I10 ESSENTIAL (PRIMARY) HYPERTENSION: ICD-10-CM

## 2025-06-10 RX ORDER — LISINOPRIL AND HYDROCHLOROTHIAZIDE 12.5; 2 MG/1; MG/1
1 TABLET ORAL DAILY
Qty: 90 TABLET | Refills: 0 | Status: SHIPPED | OUTPATIENT
Start: 2025-06-10

## 2025-07-10 ENCOUNTER — TELEPHONE (OUTPATIENT)
Facility: CLINIC | Age: 60
End: 2025-07-10

## 2025-07-18 ENCOUNTER — HOSPITAL ENCOUNTER (OUTPATIENT)
Facility: HOSPITAL | Age: 60
Discharge: HOME | End: 2025-07-21

## 2025-07-18 DIAGNOSIS — E11.40 TYPE 2 DIABETES MELLITUS WITH DIABETIC NEUROPATHY, WITHOUT LONG-TERM CURRENT USE OF INSULIN (HCC): ICD-10-CM

## 2025-07-18 DIAGNOSIS — I10 ESSENTIAL (PRIMARY) HYPERTENSION: ICD-10-CM

## 2025-07-18 DIAGNOSIS — E78.5 HYPERLIPIDEMIA, UNSPECIFIED HYPERLIPIDEMIA TYPE: ICD-10-CM

## 2025-07-18 LAB
ALBUMIN SERPL-MCNC: 4 G/DL (ref 3.4–5)
ALBUMIN/GLOB SERPL: 1.4 (ref 0.8–1.7)
ALP SERPL-CCNC: 62 U/L (ref 45–117)
ALT SERPL-CCNC: 29 U/L (ref 10–50)
ANION GAP SERPL CALC-SCNC: 14 MMOL/L (ref 3–18)
AST SERPL-CCNC: 18 U/L (ref 10–38)
BASOPHILS # BLD: 0.09 K/UL (ref 0–0.1)
BASOPHILS NFR BLD: 1.1 % (ref 0–2)
BILIRUB SERPL-MCNC: 0.8 MG/DL (ref 0.2–1)
BUN SERPL-MCNC: 12 MG/DL (ref 6–23)
BUN/CREAT SERPL: 10 (ref 12–20)
CALCIUM SERPL-MCNC: 9.3 MG/DL (ref 8.5–10.1)
CHLORIDE SERPL-SCNC: 107 MMOL/L (ref 98–107)
CHOLEST SERPL-MCNC: 114 MG/DL
CO2 SERPL-SCNC: 24 MMOL/L (ref 21–32)
CREAT SERPL-MCNC: 1.19 MG/DL (ref 0.6–1.3)
CREAT UR-MCNC: 83.8 MG/DL (ref 30–125)
DIFFERENTIAL METHOD BLD: ABNORMAL
EOSINOPHIL # BLD: 0.29 K/UL (ref 0–0.4)
EOSINOPHIL NFR BLD: 3.4 % (ref 0–5)
ERYTHROCYTE [DISTWIDTH] IN BLOOD BY AUTOMATED COUNT: 12.8 % (ref 11.6–14.5)
EST. AVERAGE GLUCOSE BLD GHB EST-MCNC: 145 MG/DL
GLOBULIN SER CALC-MCNC: 2.8 G/DL (ref 2–4)
GLUCOSE SERPL-MCNC: 102 MG/DL (ref 74–108)
HBA1C MFR BLD: 6.7 % (ref 4.2–5.6)
HCT VFR BLD AUTO: 41.8 % (ref 36–48)
HDLC SERPL-MCNC: 29 MG/DL (ref 40–60)
HDLC SERPL: 3.9 (ref 0–5)
HGB BLD-MCNC: 14.3 G/DL (ref 13–16)
IMM GRANULOCYTES # BLD AUTO: 0.03 K/UL (ref 0–0.04)
IMM GRANULOCYTES NFR BLD AUTO: 0.4 % (ref 0–0.5)
LDLC SERPL CALC-MCNC: 42 MG/DL (ref 0–100)
LYMPHOCYTES # BLD: 3.78 K/UL (ref 0.9–3.6)
LYMPHOCYTES NFR BLD: 44.1 % (ref 21–52)
MCH RBC QN AUTO: 29.5 PG (ref 24–34)
MCHC RBC AUTO-ENTMCNC: 34.2 G/DL (ref 31–37)
MCV RBC AUTO: 86.2 FL (ref 78–100)
MICROALBUMIN UR-MCNC: <1.2 MG/DL (ref 0–3)
MICROALBUMIN/CREAT UR-RTO: NORMAL MG/G (ref 0–30)
MONOCYTES # BLD: 0.81 K/UL (ref 0.05–1.2)
MONOCYTES NFR BLD: 9.5 % (ref 3–10)
NEUTS SEG # BLD: 3.57 K/UL (ref 1.8–8)
NEUTS SEG NFR BLD: 41.5 % (ref 40–73)
NRBC # BLD: 0 K/UL (ref 0–0.01)
NRBC BLD-RTO: 0 PER 100 WBC
PLATELET # BLD AUTO: 200 K/UL (ref 135–420)
PMV BLD AUTO: 10.3 FL (ref 9.2–11.8)
POTASSIUM SERPL-SCNC: 3.5 MMOL/L (ref 3.5–5.5)
PROT SERPL-MCNC: 6.8 G/DL (ref 6.4–8.2)
RBC # BLD AUTO: 4.85 M/UL (ref 4.35–5.65)
SODIUM SERPL-SCNC: 145 MMOL/L (ref 136–145)
TRIGL SERPL-MCNC: 213 MG/DL (ref 0–150)
VLDLC SERPL CALC-MCNC: 43 MG/DL
WBC # BLD AUTO: 8.6 K/UL (ref 4.6–13.2)

## 2025-07-18 PROCEDURE — 80053 COMPREHEN METABOLIC PANEL: CPT

## 2025-07-18 PROCEDURE — 36415 COLL VENOUS BLD VENIPUNCTURE: CPT

## 2025-07-18 PROCEDURE — 82570 ASSAY OF URINE CREATININE: CPT

## 2025-07-18 PROCEDURE — 80061 LIPID PANEL: CPT

## 2025-07-18 PROCEDURE — 85025 COMPLETE CBC W/AUTO DIFF WBC: CPT

## 2025-07-18 PROCEDURE — 82043 UR ALBUMIN QUANTITATIVE: CPT

## 2025-07-18 PROCEDURE — 83036 HEMOGLOBIN GLYCOSYLATED A1C: CPT

## 2025-07-22 ENCOUNTER — OFFICE VISIT (OUTPATIENT)
Facility: CLINIC | Age: 60
End: 2025-07-22
Payer: MEDICAID

## 2025-07-22 VITALS
SYSTOLIC BLOOD PRESSURE: 116 MMHG | WEIGHT: 204 LBS | RESPIRATION RATE: 18 BRPM | HEIGHT: 72 IN | OXYGEN SATURATION: 85 % | BODY MASS INDEX: 27.63 KG/M2 | TEMPERATURE: 98 F | DIASTOLIC BLOOD PRESSURE: 73 MMHG | HEART RATE: 67 BPM

## 2025-07-22 DIAGNOSIS — I10 ESSENTIAL (PRIMARY) HYPERTENSION: ICD-10-CM

## 2025-07-22 DIAGNOSIS — E87.6 HYPOKALEMIA: ICD-10-CM

## 2025-07-22 DIAGNOSIS — Z12.11 SCREENING FOR COLON CANCER: ICD-10-CM

## 2025-07-22 DIAGNOSIS — M15.0 PRIMARY OSTEOARTHRITIS INVOLVING MULTIPLE JOINTS: ICD-10-CM

## 2025-07-22 DIAGNOSIS — M25.512 ACUTE PAIN OF LEFT SHOULDER: Primary | ICD-10-CM

## 2025-07-22 DIAGNOSIS — E78.2 MIXED HYPERLIPIDEMIA: ICD-10-CM

## 2025-07-22 DIAGNOSIS — L60.3 DYSTROPHIC NAIL: ICD-10-CM

## 2025-07-22 PROCEDURE — G2211 COMPLEX E/M VISIT ADD ON: HCPCS | Performed by: STUDENT IN AN ORGANIZED HEALTH CARE EDUCATION/TRAINING PROGRAM

## 2025-07-22 PROCEDURE — 99214 OFFICE O/P EST MOD 30 MIN: CPT | Performed by: STUDENT IN AN ORGANIZED HEALTH CARE EDUCATION/TRAINING PROGRAM

## 2025-07-22 PROCEDURE — 3078F DIAST BP <80 MM HG: CPT | Performed by: STUDENT IN AN ORGANIZED HEALTH CARE EDUCATION/TRAINING PROGRAM

## 2025-07-22 PROCEDURE — 3074F SYST BP LT 130 MM HG: CPT | Performed by: STUDENT IN AN ORGANIZED HEALTH CARE EDUCATION/TRAINING PROGRAM

## 2025-07-22 RX ORDER — METHYLPREDNISOLONE 4 MG/1
TABLET ORAL
Qty: 1 KIT | Refills: 0 | Status: SHIPPED | OUTPATIENT
Start: 2025-07-22 | End: 2025-07-28

## 2025-07-22 SDOH — ECONOMIC STABILITY: FOOD INSECURITY: WITHIN THE PAST 12 MONTHS, THE FOOD YOU BOUGHT JUST DIDN'T LAST AND YOU DIDN'T HAVE MONEY TO GET MORE.: NEVER TRUE

## 2025-07-22 SDOH — ECONOMIC STABILITY: FOOD INSECURITY: WITHIN THE PAST 12 MONTHS, YOU WORRIED THAT YOUR FOOD WOULD RUN OUT BEFORE YOU GOT MONEY TO BUY MORE.: NEVER TRUE

## 2025-07-22 ASSESSMENT — ENCOUNTER SYMPTOMS
BACK PAIN: 1
CONSTIPATION: 0
NAUSEA: 0
VOMITING: 0
ABDOMINAL PAIN: 0
DIARRHEA: 0
RHINORRHEA: 0
CHEST TIGHTNESS: 0
SHORTNESS OF BREATH: 0
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
## 2025-08-14 ENCOUNTER — OFFICE VISIT (OUTPATIENT)
Age: 60
End: 2025-08-14
Payer: MEDICAID

## 2025-08-14 DIAGNOSIS — M75.82 ROTATOR CUFF TENDONITIS, LEFT: ICD-10-CM

## 2025-08-14 DIAGNOSIS — M25.512 ACUTE PAIN OF LEFT SHOULDER: Primary | ICD-10-CM

## 2025-08-14 DIAGNOSIS — M75.42 IMPINGEMENT SYNDROME OF LEFT SHOULDER: ICD-10-CM

## 2025-08-14 PROCEDURE — 99203 OFFICE O/P NEW LOW 30 MIN: CPT | Performed by: ORTHOPAEDIC SURGERY

## 2025-08-14 PROCEDURE — 73030 X-RAY EXAM OF SHOULDER: CPT | Performed by: ORTHOPAEDIC SURGERY

## 2025-08-14 RX ORDER — MELOXICAM 15 MG/1
15 TABLET ORAL DAILY
Qty: 30 TABLET | Refills: 3 | Status: SHIPPED | OUTPATIENT
Start: 2025-08-14

## (undated) DEVICE — SINGLE-USE POLYPECTOMY SNARE: Brand: CAPTIFLEX

## (undated) DEVICE — CANNULA ORIG TL CLR W FOAM CUSHIONS AND 14FT SUPL TB 3 CHN

## (undated) DEVICE — FORCEPS BX L240CM JAW DIA2.8MM L CAP W/ NDL MIC MESH TOOTH

## (undated) DEVICE — TRAP SPEC COLL POLYP POLYSTYR --

## (undated) DEVICE — FLUFF AND POLYMER UNDERPAD,EXTRA HEAVY: Brand: WINGS

## (undated) DEVICE — YANKAUER,SMOOTH HANDLE,HIGH CAPACITY: Brand: MEDLINE INDUSTRIES, INC.

## (undated) DEVICE — SYRINGE MED 25GA 3ML L5/8IN SUBQ PLAS W/ DETACH NDL SFTY

## (undated) DEVICE — CANNULA NSL AD TBNG L14FT STD PVC O2 CRV CONN NONFLARED NSL

## (undated) DEVICE — GAUZE,SPONGE,4"X4",16PLY,STRL,LF,10/TRAY: Brand: MEDLINE

## (undated) DEVICE — MEDI-VAC NON-CONDUCTIVE SUCTION TUBING: Brand: CARDINAL HEALTH

## (undated) DEVICE — ENDOSCOPY PUMP TUBING/ CAP SET: Brand: ERBE

## (undated) DEVICE — CATHETER THOR 36FR DIA10.7MM POLYVI CHL TRCR TIP STR SFT

## (undated) DEVICE — CATHETER SUCT TR FL TIP 14FR W/ O CTRL

## (undated) DEVICE — SYR 20ML LL STRL LF --

## (undated) DEVICE — SYR 50ML SLIP TIP NSAF LF STRL --

## (undated) DEVICE — GOWN ISOL IMPERV UNIV, DISP, OPEN BACK, BLUE --

## (undated) DEVICE — Device: Brand: OLYMPUS

## (undated) DEVICE — LINER SUCT CANSTR 3000CC PLAS SFT PRE ASSEMB W/OUT TBNG W/

## (undated) DEVICE — SYR 10ML LUER LOK 1/5ML GRAD --

## (undated) DEVICE — SOLUTION IRRIG 1000ML H2O STRL BLT

## (undated) DEVICE — SNARE POLYP M W27MMXL240CM OVL STIFF DISP CAPTIVATOR